# Patient Record
Sex: MALE | Race: WHITE | NOT HISPANIC OR LATINO | Employment: OTHER | URBAN - METROPOLITAN AREA
[De-identification: names, ages, dates, MRNs, and addresses within clinical notes are randomized per-mention and may not be internally consistent; named-entity substitution may affect disease eponyms.]

---

## 2020-07-29 ENCOUNTER — PREP FOR PROCEDURE (OUTPATIENT)
Dept: OTOLARYNGOLOGY | Facility: CLINIC | Age: 70
End: 2020-07-29

## 2020-07-29 ENCOUNTER — PATIENT OUTREACH (OUTPATIENT)
Dept: OTOLARYNGOLOGY | Facility: CLINIC | Age: 70
End: 2020-07-29

## 2020-07-29 DIAGNOSIS — C32.9 LARYNGEAL CANCER (H): Primary | ICD-10-CM

## 2020-07-29 DIAGNOSIS — Z11.59 ENCOUNTER FOR SCREENING FOR OTHER VIRAL DISEASES: Primary | ICD-10-CM

## 2020-07-29 NOTE — TELEPHONE ENCOUNTER
Called and spoke with patient regarding new consult scheduled on Friday with Dr. Wang. Advised that Dr. Wang will most likely need to plan for DL biopsy so we would like to have patient complete PAC appointment on Friday as well.     He is now scheduled for the followin. Consult with Marii on Friday at 1:00pm.   2. Covid testing Friday at 1:50pm.   3. PAC consult Friday at 2:45pm.     Also reviewed with patient that we will plan to place him on the OR schedule for Monday and would further evaluate and discuss at his clinic visit on Friday. Patient verbalized understanding of all above information.     Patient  has audible stridor over the phone but indicates that this is the way he has been for a few weeks without worsening. He does not currently feel short of breath. He was advised to contact writer with any new or worsening symptoms. Patient agreeable and has direct line for return.     Patient advised to bring family with him to appointment on Friday.     All information emailed to patient per his request.     Felisha Cronin, RN, BSN

## 2020-07-30 PROBLEM — I48.11 LONGSTANDING PERSISTENT ATRIAL FIBRILLATION (H): Status: ACTIVE | Noted: 2020-07-21

## 2020-07-30 PROBLEM — I50.22 CHRONIC SYSTOLIC CONGESTIVE HEART FAILURE (H): Status: ACTIVE | Noted: 2020-07-30

## 2020-07-30 PROBLEM — I48.20 CHRONIC ATRIAL FIBRILLATION (H): Status: ACTIVE | Noted: 2020-07-30

## 2020-07-30 PROBLEM — R49.0 HOARSENESS, CHRONIC: Status: ACTIVE | Noted: 2020-07-21

## 2020-07-30 PROBLEM — K21.9 CHRONIC GERD: Status: ACTIVE | Noted: 2020-07-21

## 2020-07-30 PROBLEM — I10 ESSENTIAL HYPERTENSION: Status: ACTIVE | Noted: 2020-07-21

## 2020-07-30 RX ORDER — IBUPROFEN 200 MG
400 TABLET ORAL EVERY 6 HOURS PRN
Status: ON HOLD | COMMUNITY
End: 2020-08-12

## 2020-07-30 RX ORDER — LISINOPRIL 10 MG/1
10 TABLET ORAL DAILY
Status: ON HOLD | COMMUNITY
End: 2020-08-31

## 2020-07-30 RX ORDER — METOPROLOL SUCCINATE 50 MG/1
100 TABLET, EXTENDED RELEASE ORAL DAILY
Status: ON HOLD | COMMUNITY
End: 2020-08-12

## 2020-07-30 RX ORDER — ONDANSETRON 4 MG/1
4 TABLET, FILM COATED ORAL EVERY 8 HOURS PRN
COMMUNITY
End: 2022-03-07

## 2020-07-30 RX ORDER — ACETAMINOPHEN 325 MG/1
325-650 TABLET ORAL EVERY 6 HOURS PRN
Status: ON HOLD | COMMUNITY
End: 2020-08-12

## 2020-07-30 RX ORDER — PANTOPRAZOLE SODIUM 40 MG/1
40 TABLET, DELAYED RELEASE ORAL DAILY
COMMUNITY
End: 2020-07-31

## 2020-07-30 NOTE — PROGRESS NOTES
Preoperative Assessment Center Medication History Note    Medication history completed via phone call on July 30, 2020 by this writer. See Epic admission navigator for prior to admission medications. Operating room staff will still need to confirm medications and last dose information on day of surgery.     Medication history interview sources:  patient, care everywhere records    Changes made to PTA medication list (reason)  Added: all medications on list were added by this user as the list was blank prior to me talking with him    Additional medication history information (including reliability of information, actions taken by pharmacist):    -- No recent (within 30 days) course of antibiotics  -- No recent (within 30 days) course of systemic steroids  -- Patient declines being on any other prescription or over-the-counter medications    Prior to Admission medications    Medication Sig Last Dose Taking? Auth Provider   acetaminophen (TYLENOL) 325 MG tablet Take 325-650 mg by mouth every 6 hours as needed for mild pain Taking Yes Unknown, Entered By History   ibuprofen (ADVIL/MOTRIN) 200 MG tablet Take 400 mg by mouth every 6 hours as needed for mild pain Taking Yes Unknown, Entered By History   lisinopril (ZESTRIL) 10 MG tablet Take 10 mg by mouth daily Taking Yes Unknown, Entered By History   metoprolol succinate ER (TOPROL-XL) 50 MG 24 hr tablet Take 100 mg by mouth daily Taking Yes Unknown, Entered By History   ondansetron (ZOFRAN) 4 MG tablet Take 4 mg by mouth every 8 hours as needed for nausea Taking Yes Unknown, Entered By History   pantoprazole (PROTONIX) 40 MG EC tablet Take 40 mg by mouth daily Taking Yes Unknown, Entered By History         Medication history completed by: [facility]

## 2020-07-30 NOTE — TELEPHONE ENCOUNTER
FUTURE VISIT INFORMATION      SURGERY INFORMATION:    Date: 8/3/20    Location: uu or    Surgeon:  Caron Wang MD     Anesthesia Type:  general    Procedure: LARYNGOSCOPY WITH BIOPSY TRACHEOSTOMY     Consult: Ov scheduled for  1:00pm    RECORDS REQUESTED FROM:       Primary Care Provider: Arian Ferris MD- [facility]    Most recent EKG+ Tracin20- [facility]    Most recent ECHO: 16- [facility]

## 2020-07-31 ENCOUNTER — PRE VISIT (OUTPATIENT)
Dept: SURGERY | Facility: CLINIC | Age: 70
End: 2020-07-31

## 2020-07-31 ENCOUNTER — APPOINTMENT (OUTPATIENT)
Dept: LAB | Facility: CLINIC | Age: 70
End: 2020-07-31

## 2020-07-31 ENCOUNTER — OFFICE VISIT (OUTPATIENT)
Dept: OTOLARYNGOLOGY | Facility: CLINIC | Age: 70
End: 2020-07-31

## 2020-07-31 ENCOUNTER — THERAPY VISIT (OUTPATIENT)
Dept: SPEECH THERAPY | Facility: CLINIC | Age: 70
End: 2020-07-31

## 2020-07-31 ENCOUNTER — PRE VISIT (OUTPATIENT)
Dept: OTOLARYNGOLOGY | Facility: CLINIC | Age: 70
End: 2020-07-31

## 2020-07-31 ENCOUNTER — OFFICE VISIT (OUTPATIENT)
Dept: SURGERY | Facility: CLINIC | Age: 70
End: 2020-07-31

## 2020-07-31 ENCOUNTER — ANESTHESIA EVENT (OUTPATIENT)
Dept: SURGERY | Facility: CLINIC | Age: 70
DRG: 012 | End: 2020-07-31
Payer: MEDICARE

## 2020-07-31 VITALS
HEART RATE: 98 BPM | DIASTOLIC BLOOD PRESSURE: 87 MMHG | RESPIRATION RATE: 16 BRPM | TEMPERATURE: 98.2 F | WEIGHT: 188 LBS | HEIGHT: 69 IN | OXYGEN SATURATION: 97 % | SYSTOLIC BLOOD PRESSURE: 136 MMHG | BODY MASS INDEX: 27.85 KG/M2

## 2020-07-31 VITALS
RESPIRATION RATE: 20 BRPM | DIASTOLIC BLOOD PRESSURE: 93 MMHG | OXYGEN SATURATION: 95 % | WEIGHT: 190 LBS | HEART RATE: 75 BPM | SYSTOLIC BLOOD PRESSURE: 138 MMHG | TEMPERATURE: 98.4 F | BODY MASS INDEX: 27.2 KG/M2 | HEIGHT: 70 IN

## 2020-07-31 DIAGNOSIS — Z01.818 PREOP EXAMINATION: Primary | ICD-10-CM

## 2020-07-31 DIAGNOSIS — C32.9 LARYNGEAL CANCER (H): Primary | ICD-10-CM

## 2020-07-31 DIAGNOSIS — C32.9 LARYNGEAL CANCER (H): ICD-10-CM

## 2020-07-31 DIAGNOSIS — R13.12 OROPHARYNGEAL DYSPHAGIA: ICD-10-CM

## 2020-07-31 DIAGNOSIS — Z11.59 ENCOUNTER FOR SCREENING FOR OTHER VIRAL DISEASES: ICD-10-CM

## 2020-07-31 DIAGNOSIS — Z01.818 PRE-OP EVALUATION: Primary | ICD-10-CM

## 2020-07-31 LAB
LABORATORY COMMENT REPORT: NORMAL
NT-PROBNP SERPL-MCNC: 3835 PG/ML (ref 0–125)
SARS-COV-2 RNA SPEC QL NAA+PROBE: NEGATIVE
SARS-COV-2 RNA SPEC QL NAA+PROBE: NORMAL
SPECIMEN SOURCE: NORMAL
SPECIMEN SOURCE: NORMAL

## 2020-07-31 ASSESSMENT — MIFFLIN-ST. JEOR
SCORE: 1630.14
SCORE: 1613.14

## 2020-07-31 ASSESSMENT — LIFESTYLE VARIABLES: TOBACCO_USE: 1

## 2020-07-31 ASSESSMENT — PAIN SCALES - GENERAL
PAINLEVEL: NO PAIN (0)
PAINLEVEL: NO PAIN (0)

## 2020-07-31 NOTE — PATIENT INSTRUCTIONS
Preparing for Your Surgery      Name:  Evin Sterling   MRN:  [id number]   :  1950   Today's Date:  2020       Arriving for surgery:  Surgery date:  8/3/20  Arrival time:  11:00 am    Restrictions due to COVID 19:  Patients are allowed one visitor in the pre-op period  All visitors must wear a mask  No visitors under 18  No ill visitors   parking is not available     Please come to:       [facility]   [address] [place]  [address]       -    Please proceed to the Surgery Lounge on the 3rd floor. [phone]?     - ?If you are in need of directions, wheelchair or escort please stop at the Information Desk in the lobby.  Inform the information person that you are here for surgery; a wheelchair and escort will be provided to the Surgery Lounge .?     What can I eat or drink?  -  You may eat and drink normally for up to 8 hours before your surgery. (Until 5:00 am)  -  You may have clear liquids until 2 hours before surgery. (Until 11:00 am)    Examples of clear liquids:  Water  Clear broth  Juices (apple, white grape, white cranberry  and cider) without pulp  Noncarbonated, powder based beverages  (lemonade and Justo-Aid)  Sodas (Sprite, 7-Up, ginger ale and seltzer)  Coffee or tea (without milk or cream)  Gatorade    -  No Alcohol for at least 24 hours before surgery     Which medicines can I take?    Hold Aspirin for 7 days before surgery.   Hold Multivitamins for 7 days before surgery.  Hold Supplements for 7 days before surgery.  Hold Ibuprofen (Advil, Motrin) for 1 day before surgery--unless otherwise directed by surgeon.  Hold Naproxen (Aleve) for 4 days before surgery.    -  DO NOT take these medications the day of surgery:  Lisinopril (Zestril)    -  PLEASE TAKE these medications the day of surgery:  Acetaminophen (Tylenol)  Metoprolol (Toprol) - be sure to take this the morning of surgery before you come in  Ondansetron (Zofran)    How do I  prepare myself?  - Please take 2 showers before surgery using Scrubcare or Hibiclens soap.    Use this soap only from the neck to your toes.     Leave the soap on your skin for one minute--then rinse thoroughly.      You may use your own shampoo and conditioner; no other hair products.   - Please remove all jewelry and body piercings.  - No lotions, deodorants or fragrance.  - Bring your ID and insurance card.    - All patients are required to have a Covid-19 test within 4 days of surgery/procedure.      -Patients will be contacted by the [facility] scheduling team within 1 week of surgery to make an appointment.      - Patients may call the Scheduling team at [phone] if they have not been scheduled within 4 days of  surgery.      ALL PATIENTS GOING HOME THE SAME DAY OF SURGERY ARE REQUIRED TO HAVE A RESPONSIBLE ADULT TO DRIVE AND BE IN ATTENDANCE WITH THEM FOR 24 HOURS FOLLOWING SURGERY     Questions or Concerns:    - For any questions regarding the day of surgery or your hospital stay, please contact the Pre Admission Nursing Office at [phone].       - If you have health changes between today and your surgery please call your surgeon.       For questions after surgery please call your surgeons office.

## 2020-07-31 NOTE — H&P
Pre-Operative H & P     CC:  Preoperative exam to assess for increased cardiopulmonary risk while undergoing surgery and anesthesia.    Date of Encounter: 7/31/2020  Primary Care Physician:  No primary care provider on file.  associated diagnosis: laryngeal mass    HPI  Evin Sterling is a 70 year old male who presents for pre-operative H & P in preparation for  LARYNGOSCOPY WITH BIOPSY, TRACHEOSTOMY with Dr. Wang on 8/3/20 at [facility]. 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 Sterling has a history of progressive hoarseness and poor vocal quality for many years. He has been using omeprazole as he attributed symptoms to GERD. His voice has continued to worsen.  He was seen by ENT at an outside facility. He was diagnosed with a large posterior glottis neoplasm and referred to Dr. Wang for further evaluation.  Above procedure is planned.     History is obtained from the patient and chart review.      Past Medical History  Past Medical History:   Diagnosis Date     CHF (congestive heart failure) (H)      GERD (gastroesophageal reflux disease)      Hypertension        Past Surgical History  Past Surgical History:   Procedure Laterality Date     HERNIA REPAIR, INGUINAL RT/LT       HERNIA REPAIR, UMBILICAL         Hx of Blood transfusions/reactions: denies     Hx of abnormal bleeding or anti-platelet use: deneis    Menstrual history: No LMP for male patient.    Steroid use in the last year: denies    Personal or FH with difficulty with Anesthesia:  denies    Prior to Admission Medications  Current Outpatient Medications   Medication Sig Dispense Refill     acetaminophen (TYLENOL) 325 MG tablet Take 325-650 mg by mouth every 6 hours as needed for mild pain       ibuprofen (ADVIL/MOTRIN) 200 MG tablet Take 400 mg by mouth every 6 hours as needed for mild pain        lisinopril (ZESTRIL) 10 MG tablet Take 10 mg by mouth daily       metoprolol succinate ER (TOPROL-XL) 50 MG 24 hr tablet Take 100 mg by mouth daily       ondansetron (ZOFRAN) 4 MG tablet Take 4 mg by mouth every 8 hours as needed for nausea         Allergies  No Known Allergies    Social History  Social History     Socioeconomic History     Marital status:      Spouse name: Not on file     Number of children: Not on file     Years of education: Not on file     Highest education level: Not on file   Occupational History     Not on file   Social Needs     Financial resource strain: Not on file     Food insecurity     Worry: Not on file     Inability: Not on file     Transportation needs     Medical: Not on file     Non-medical: Not on file   Tobacco Use     Smoking status: Former Smoker     Packs/day: 1.00     Years: 20.00     Pack years: 20.00     Last attempt to quit:      Years since quittin.5     Smokeless tobacco: Former User   Substance and Sexual Activity     Alcohol use: Not Currently     Drug use: Not Currently     Sexual activity: Not on file   Lifestyle     Physical activity     Days per week: Not on file     Minutes per session: Not on file     Stress: Not on file   Relationships     Social connections     Talks on phone: Not on file     Gets together: Not on file     Attends Mu-ism service: Not on file     Active member of club or organization: Not on file     Attends meetings of clubs or organizations: Not on file     Relationship status: Not on file     Intimate partner violence     Fear of current or ex partner: Not on file     Emotionally abused: Not on file     Physically abused: Not on file     Forced sexual activity: Not on file   Other Topics Concern     Not on file   Social History Narrative     Not on file       Family History  Family History   Problem Relation Age of Onset     Anesthesia Reaction No family hx of      Deep Vein Thrombosis (DVT) No family hx of      ROS/MED  "HX    ENT/Pulmonary:     (+)tobacco use (quit 2002), Past use , . .    Neurologic:  - neg neurologic ROS     Cardiovascular: Comment: H/o afib s/p ablation 2012. Most recent EKG showed a flutter. On rate control and will consider restarting anticoagulation after surgery per PCP.     (+) hypertension----. : . CHF . . :. . Previous cardiac testing Echodate:2016results:1. Mild concentric left ventricular hypertrophy.  2. Normal left ventricular systolic function with a visually estimated   ejection fraction of 55%.  3. Mild dilation of the right ventricle with normal function.  date: results:ECG reviewed date:4/2/20 results:Probable Atrial Flutter  Left axis deviation  Septal infarct , age undetermined  Inferior infarct , age undetermined  Abnormal ECG  No previous ECGs available   date: results:          METS/Exercise Tolerance: Comment: Walked for 40 minutes on Wednesday.  Some difficulty due to obstruction in throat. 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   Temp: 98.4  F (36.9  C) Temp src: Oral BP: (!) 138/93 Pulse: 75   Resp: 20 SpO2: 95 %         190 lbs 0 oz  5' 9.5\"[pt reported[   Body mass index is 27.66 kg/m .       Physical Exam  Constitutional: Awake, alert, cooperative, no apparent distress, and appears stated age.  Eyes: Pupils equal, round and reactive to light, extra ocular muscles intact, sclera clear, conjunctiva normal.  HENT: Normocephalic, oral pharynx with " moist mucus membranes, good dentition.   Respiratory: transmitted upper airway sounds. Stridor.   Cardiovascular: Regular rate and rhythm, normal S1 and S2, and no murmur noted.  Carotids no bruits. No edema. Palpable pulses to radial arteries.   GI: Normal bowel sounds, soft, non-distended, non-tender  Genitourinary:  deferred  Skin: Warm and dry.    Musculoskeletal: Full ROM of neck. There is no redness, warmth, or swelling of the exposed joints. Gross motor strength is normal.    Neurologic: Awake, alert, oriented to name, place and time. Cranial nerves II-XII are grossly intact. Gait is normal.   Neuropsychiatric: Calm, cooperative. Normal affect.     Labs: (personally reviewed)  Component      Latest Ref Rng & Units 7/31/2020   N-Terminal Pro Bnp      0 - 125 pg/mL 3,835 (H)     WHITE BLOOD COUNT          4.5 - 11.0 thou/cu mm  7.2     RED BLOOD COUNT            4.30 - 5.90 mil/cu mm  4.39     HEMOGLOBIN                 13.5 - 17.5 g/dL  14.2     HEMATOCRIT                 37.0 - 53.0 %  42.1     MCV                        80 - 100 fL  96     MCH                        26.0 - 34.0 pg  32.3     MCHC                       32.0 - 36.0 g/dL  33.6     RDW                        11.5 - 15.5 %  14.5     PLATELET COUNT             140 - 440 thou/cu mm  203     MPV                        6.5 - 11.0 fL  7.2     NRBC                        %  0.0     NEUTROPHILS                42.0 - 72.0 %  78.0High       LYMPHOCYTES                20.0 - 44.0 %  12.5Low       MONOCYTES                  <12.0 %  8.0     EOSINOPHILS                <8.0 %  1.3     BASOPHILS                  <3.0 %  0.2     ABSOLUTE NEUTROPHILS       1.7 - 7.0 thou/cu mm  5.6     ABSOLUTE LYMPHOCYTES       0.9 - 2.9 thou/cu mm  0.9     ABSOLUTE MONOCYTES         <0.9 thou/cu mm  0.6     ABSOLUTE EOSINOPHILS       <0.5 thou/cu mm  0.1     ABSOLUTE BASOPHILS         <0.3 thou/cu mm  0.0      SODIUM  136 - 145 mmol/L  142     POTASSIUM  3.5 - 5.1 mmol/L  4.3      CHLORIDE  98 - 110 mmol/L  102     CO2,TOTAL  22 - 29 MEq/L  30High       ANION GAP  2 - 12  10     GLUCOSE  70 - 110 mg/dL  87     CALCIUM  8.4 - 10.2 mg/dL  9.1     BUN  7 - 25 mg/dL  13     CREATININE  0.50 - 1.20 mg/dL  0.82     BUN/CREAT RATIO  6 - 22  16     GFR if African American  >60 ml/min/1.73m2  >60     GFR if not African American  >60 ml/min/1.73m2  >60        EKG 4/2/20  Probable Atrial Flutter  Left axis deviation  Septal infarct , age undetermined  Inferior infarct , age undetermined  Abnormal ECG  No previous ECGs available    ECHO 9/2016  1. Mild concentric left ventricular hypertrophy.  2. Normal left ventricular systolic function with a visually estimated   ejection fraction of 55%.  3. Mild dilation of the right ventricle with normal function.    Outside records reviewed from: care everywhere    ASSESSMENT and PLAN  Evin Sterling is a 70 year old male scheduled for LARYNGOSCOPY WITH BIOPSY, TRACHEOSTOMY on 8/3/20 by Dr. 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  Cardiac:  Functional status- METS >4- reports he went for a 40 minute walk Wednesday- some difficulty breathing associated with airway mass.  Denies CP or edema. H/o CHF. H/o atrial fibrillation s/p ablation in 2012 per patient. EKG 4/2/20 showed probable aflutter. He is rate controlled on metoprolol and discussed waiting to restart anticoagulation until after surgery with his PCP. Hypertension using lisinopril. Prior cardiac testing as above. BNP elevated at 3,800 today.  ECHO scheduled Monday morning at 8:30 prior to surgery. intermediate risk surgery with 0.9% (RCRI #) risk of major adverse cardiac event.   2.  Pulm:  Airway exam completed by Dr. Wang 7/31/20. Large laryngeal mass.  Stridor on exam. Above  procedure is planned.  COVID testing per surgery team.   3.  GI:  Risk of PONV score = 2.  If > 2, anti-emetic intervention recommended. GERD- no longer using Protonix.   4.  Psych: h/o ETOH abuse in remission.     VTE risk: 3%    Patient is optimized and is acceptable candidate for the proposed procedure.  Will obtain ECHO prior to procedure on Monday and order postoperative troponin x3 due to elevated BNP.  Discussed with Dr. Huang and Dr. Wang- will proceed with procedure on Monday due to potential airway compromise.       Addendum: ECHO 8/3/20 showed:  Interpretation Summary  Severely (EF <30%) reduced left ventricular function is present. Severe left  ventricular dilation is present. Severe diffuse hypokinesis is present.  Diastolic function not assessed due to atrial fibrillation.  Right ventricular function, chamber size, wall motion, and thickness are  normal.  No significant valvular abnormalities.  Pulmonary artery systolic pressure cannot be assessed.  Dilation of the inferior vena cava is present with abnormal respiratory  variation in diameter.  No pericaridal effusion.  There is no prior study for direct comparison.    Recommend cardiology consult while in the hospital and needs cardiac follow up. Dr. Trejo is alerting anesthesia team of these results.     Jacy Hay PA-C  Preoperative Assessment Center  [facility]  Clinic and Surgery Center  Phone: [phone]  Fax: [phone]

## 2020-07-31 NOTE — LETTER
7/31/2020       RE: Evin Sterling  [address] [facility] [address]     Dear Colleague,    Thank you for referring your patient, Evin Sterling, to the [facility] EAR [facility]ROAT at [facility]. Please see a copy of my visit note below.    Dear Dr. Blankenship:    I had the pleasure of meeting Evin Sterling in consultation today at the [facility] at your request.     History of Present Illness:   Evin Sterling is a 70 year old man with a likely laryngeal carcinoma. The patient has about a 4 year history of progressive hoarseness. He was potentially seen by an ENT locally in [facility] and diagnosed with reflux. He had insurance issues and was not able to afford the medication. He had progressive voice changes. He saw Dr Blankenship on 7/28/2020 for consultation at which time he had biphasic stridor and on scope exam was noted to have a large exophytic mass of the glottis without mobility of either cord and a glottic opening of about 5 mm.     He says today that he thinks the voice issues have been ongoing for about 6 months.  He does not think that they have substantially changed recently.  He has noticed some difficulty with his breathing in the last few weeks.  He says that he has no shortness of breath at rest but has difficulty with activities.  He does say that he can walk about 40 minutes.  His daughter notes that he is unable to lay flat.  He has some sore throat and intermittent bilateral ear pain.  He denies any specific dysphasia but his daughter is concerned about his eating/swallowing.  He does say that he coughs on carbonated beverages as well as certain liquids.  He has lost about 50 pounds in the last 6 months which he says is related to changes in his diet.        Past medical history: Congestive heart failure (last echo 4 years ago), history of atrial fibrillation    Past surgical history: Hernia repair    Social history: Previous alcoholic. Former  smoker, quit 12 years ago, previously 1/2 ppd.  Some chewing tobacco use, quit about 30 years ago.  Lives with his son-in-law's father.  Previously worked in construction.    Family history: Negative for laryngeal cancer    MEDICATIONS:     Current Outpatient Medications   Medication Sig Dispense Refill     acetaminophen (TYLENOL) 325 MG tablet Take 325-650 mg by mouth every 6 hours as needed for mild pain       ibuprofen (ADVIL/MOTRIN) 200 MG tablet Take 400 mg by mouth every 6 hours as needed for mild pain       lisinopril (ZESTRIL) 10 MG tablet Take 10 mg by mouth daily       metoprolol succinate ER (TOPROL-XL) 50 MG 24 hr tablet Take 100 mg by mouth daily       ondansetron (ZOFRAN) 4 MG tablet Take 4 mg by mouth every 8 hours as needed for nausea         ALLERGIES:  No Known Allergies    HABITS/SOCIAL HISTORY:   Previous alcoholic. Former smoker, quit 12 years ago, previously 1/2 ppd.  Some chewing tobacco use, quit about 30 years ago.    Lives with his son-in-law's father.    Previously worked in construction.    Social History     Socioeconomic History     Marital status:      Spouse name: Not on file     Number of children: Not on file     Years of education: Not on file     Highest education level: Not on file   Occupational History     Not on file   Social Needs     Financial resource strain: Not on file     Food insecurity     Worry: Not on file     Inability: Not on file     Transportation needs     Medical: Not on file     Non-medical: Not on file   Tobacco Use     Smoking status: Former Smoker     Packs/day: 1.00     Years: 20.00     Pack years: 20.00     Last attempt to quit:      Years since quittin.5     Smokeless tobacco: Former User   Substance and Sexual Activity     Alcohol use: Not Currently     Drug use: Not Currently     Sexual activity: Not on file   Lifestyle     Physical activity     Days per week: Not on file     Minutes per session: Not on file     Stress: Not on file  "  Relationships     Social connections     Talks on phone: Not on file     Gets together: Not on file     Attends Restorationism service: Not on file     Active member of club or organization: Not on file     Attends meetings of clubs or organizations: Not on file     Relationship status: Not on file     Intimate partner violence     Fear of current or ex partner: Not on file     Emotionally abused: Not on file     Physically abused: Not on file     Forced sexual activity: Not on file   Other Topics Concern     Not on file   Social History Narrative     Not on file       PAST MEDICAL HISTORY:   Past Medical History:   Diagnosis Date     CHF (congestive heart failure) (H)      GERD (gastroesophageal reflux disease)      Hypertension         PAST SURGICAL HISTORY:   Past Surgical History:   Procedure Laterality Date     HERNIA REPAIR, INGUINAL RT/LT       HERNIA REPAIR, UMBILICAL         FAMILY HISTORY:    Family History   Problem Relation Age of Onset     Anesthesia Reaction No family hx of      Deep Vein Thrombosis (DVT) No family hx of        REVIEW OF SYSTEMS:  12 point ROS was negative other than the symptoms noted above in the HPI.  Patient Supplied Answers to Review of Systems   ENT ROS 7/31/2020   Constitutional Problems with sleep   Neurology Headache   Ears, Nose, Throat Sore throat, Trouble swallowing, Hoarseness   Cardiopulmonary Cough, Breathing problems   Gastrointestinal/Genitourinary Heartburn/indigestion         PHYSICAL EXAMINATION:   /87   Pulse 98   Temp 98.2  F (36.8  C)   Resp 16   Ht 1.753 m (5' 9\")   Wt 85.3 kg (188 lb)   SpO2 97%   BMI 27.76 kg/m     Appearance:   normal; NAD, age-appropriate appearance, well-developed, normal habitus   Communication:   Communicates verbally, hoarse voice   Head/Face:   inspection -  Normal; no scars or visible lesions   Salivary glands -  Normal size, no tenderness, swelling, or palpable masses   Facial strength -  Normal and symmetric bilateral "   Skin:  normal, no rash   Ears:  auricle (AD) -  normal  EAC (AD) -  normal  TM (AD) -  Normal, no effusion  auricle (AS) -  normal  EAC (AS) -  normal  TM (AS) -  Normal, no effusion  Normal clinical speech reception   Nose:  Ext. inspection -  Normal  Internal Inspection -  Normal mucosa, septum, and turbinates   Nasopharynx:  Normal mucosa, no masses   Oral Cavity:  lips -  Normal mucosa, oral competence, and stoma size  Has a few remaining teeth in poor repair, healthy gingival mucosa  No mucosal lesions or masses along the hard palate, buccal mucosa, floor of mouth  Tongue protrudes midline, no palpable masses or mucosal lesions, strong gag reflex   Oropharynx:  mucosa -  Normal, no lesions  soft palate -  Normal, no lesions, no asymmetry, normal elevation  tonsils -  Normal, no exudates, no abnormal lesions, symmetric  Strong gag reflex limiting palpation of the oropharynx  Base of tongue exam is normal and the vallecula is clear on scope exam   Hypopharynx:  Normal pyriform sinus and pharyngeal wall mucosa   No pooled secretions    Larynx:  Epiglottis is normal on the lingual surface and the majority of the laryngeal surface.  There is an ulcerative lesion near the petiole of the epiglottis that then extends down to involve the false cords with some pooled secretions.  There is an exophytic lesion along the bilateral true cords that significantly narrows them.  The posterior glottis has about a 5 mm opening.  The cords are immobile bilaterally.   Neck: No visible mass or asymmetry   Normal palpation, no tenderness, no tracheal deviation  Larynx appears mobile relative to the spine  Normal range of motion   Lymphatic:  no abnormal nodes   Cardiovascular: warm, pink, well-perfused extremities without swelling, tenderness, or edema   Respiratory:  Some abdominal effort with breathing, stridor present with both inspiration and expiration    Neuro/Psych.:  mood/affect -  normal  mental status -  normal          PROCEDURES:   Flexible fiberoptic laryngoscopy: Scope exam was indicated due to laryngeal cancer. Verbal consent was obtained. The nasal cavity was prepped with an aerosolized solution of topical anesthetic and vasoconstrictive agent. The scope was passed through the anterior nasal cavity and advanced. Inspection of the nasopharynx revealed no gross abnormality. The base of tongue and vallecula are normal.  The lingual surface of the epiglottis is normal.  The laryngeal surface of the epiglottis is largely normal with exception that there is this ulcerative area near the petiole that then extends to involve both false cords.  There is an ulcerative mass involving the false cords and true cords bilaterally.  This significantly narrows the airway so it is only a few millimeters in size posteriorly near the interarytenoid space.  It appears that both cords are immobile.  The piriform sinuses are clear and uninvolved.  Procedure tolerated well with no immediate complications noted.            RESULTS REVIEWED:   I reviewed the referral records from Dr. 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  He comes in today without a tissue diagnosis.  He has audible stridor on exam and does have some mildly increased work of breathing.  I discussed with the patient and his daughter that we need to start with getting a tissue diagnosis which would need to be done with a laryngoscopy in the operating room.  His airway is significantly narrowed by the tumor and he would have difficulty with both intubation and extubation.  His airway cannot tolerate any swelling.  I discussed with him that my recommendation would be to proceed with a tracheostomy.  I anticipate performing this awake prior to doing a laryngoscopy as I am concerned about intubating him.    We discussed that he will need to be admitted to the hospital  postoperatively after the trach.  We will plan on placing an NG tube in the operating room in case he is unable to take his medications or a diet.  We discussed that the tracheostomy can impact his swallowing and is speaking.  I am hoping he will be able to do some communication verbally once we change his trach to a cuffless Shiley.  However I did warn him that he may have to use communication.  He says he is able to text and is comfortable with this plan.      We would plan on completing a staging work-up while he is in the hospital.  Ideally this will be done with a PET scan.  We did discuss with him that this will be important with assessing treatment recommendations.    Dr. Gonzales and I briefly discussed treatment options with him.  I explained that in my hands treatment would require a total laryngectomy.  The patient states very clearly that he ideally would not like to have a laryngectomy but he is amenable to other treatments based on our recommendations.  Dr. Gonzales spoke with the patient about radiation in some detail without going into further information about the side effects but did discuss the time course with the patient.  He also spoke with him about the role of concurrent chemotherapy or even upfront chemotherapy followed by radiation.  We did explain to him that he would have to have the tracheostomy in place with any sort of radiation due to concerns for airway swelling.    His daughter had questions about the safety of undergoing surgery with his congestive heart failure.  I explained to them that we do not really have a choice with proceeding with surgery given the significant narrowing of his airway.  I am concerned that we need to do this soon as possible and have scheduled him for surgery on Monday next week.  He is seeing the anesthesia preoperative clinic today for clearance.    Both myself and Dr. Gonzales discussed with him the importance of the role of the speech therapy team in his  treatment.  We did have him meet with speech today to assess him for clinical swallow as I am concerned he is aspirating.  He will likely need to be seen by the speech pathology team while in the hospital and potentially need a video swallow study after the trach is in place.    Once we get a tissue diagnosis and proceed with his work-up we will plan on reviewing his case at tumor conference and move forward with the treatment plan.     Thank you very much for the opportunity to participate in the care of your patient.      Caron Wang MD, M.D.  Otolaryngology- Head & Neck Surgery      This note was dictated with voice recognition software and then edited. Please excuse any unintentional errors.         CC:  Kyaw Blankenship MD  [address] [address]      Vineet Gonzales MD  Department of Radiation Oncology  [facility]

## 2020-07-31 NOTE — PROGRESS NOTES
Department of Radiation Oncology  [facility]  [address] [place] [address]  [phone]       Consultation Note    Name: Evin Sterling MRN: [id number]   : 1950   Date of Service: 2020  Referring: Dr. Kyaw Blankenship / head and neck surgery     Reason for consultation: Suspected cT3 N0 MX laryngeal carcinoma    History of Present Illness   Mr. Sterling is a 70 year old male who is referred to head and neck multidisciplinary clinic today by Dr. Kyaw Blankenship for evaluation of a suspected locally advanced squamous cell carcinoma. Briefly, Mr. Sterling presented to Dr. Blankenship on 2020 with a 4-year history of progressive hoarseness. He had been previously seen by an outside ENT at the time of his symptom onset and was diagnosed with GERD and started on omeprazole. He subsequently lost his insurance and did not have regular medical follow-up in the interim. On examination with Dr. Blankenship, a large exophytic posterior glottic mass with a 5 mm airway was appreciated and, given the concern for the local advanced tumor and his tenuous airway status, Mr. Sterling was referred to the [facility] for further management.    In clinic today, Mr. Sterling reports that his hoarseness has been present and fairly severe for the past 6 months. He has additionally noted more difficulty breathing over the past several weeks to a month and his daughter who accompanies him to clinic today reports that he is sleeping in a recliner as he is unable to lay flat without becoming dyspneic. His p.o. intake has significantly decreased over the past several months and he reports an approximately 50 pound weight loss over the past half year although does state that some of this was intentional and related to a planned diet. On further examination, he admits to frequent coughing/choking with some liquids and carbonated beverages.    Past Medical History:    CHF    Substance  abuse    Hypertension    Atrial fibrillation    Past Surgical History:    Hernia repair    Vasectomy    Cardiac ablation    Chemotherapy History:  None    Radiation History:  None    Pregnant: Not Applicable  Implanted Cardiac Devices: No    Medications:    Atenolol    Lisinopril    Metoprolol    Metoprolol    Allergies:    NKDA    Social History:  Tobacco: Former smoker. Quit approximately 12 years ago. Previously smoked 0.5 packs/day.  Alcohol: History of EtOH abuse. Currently sober.  Employment: Retired. Previously worked in construction.  Lives in [place], [place]    Family History:    No significant history for head and neck cancer    Review of Systems   A 10-point review of systems was performed. Pertinent findings are noted in the HPI.    Physical Exam   ECOG Status: 2    Vitals:  Weight: 85.3 kg  B/P: 136/87  P: 98  Pain: 0/10    General: Slightly dyspneic 70 year old gentleman seated in an examination chair  HEENT: NC/AT. EOMI. Normal TMs bilaterally. No rhinorrhea or epistaxis. Poor dentition. No visible oral cavity lesions. No palpable oral cavity masses, nodularity or lesions. Palpation of the base of tongue is limited by the patient's gag reflex.  Neck: No cervical adenopathy.  Pulmonary: Audible stridor with inspiration and expiration.   Skin: No cutaneous lesions of the head and neck    I was present for the flexible laryngoscopy is performed by Dr. Wang. Briefly, this examination reveals a normal oropharynx and supraglottic larynx with a large exophytic tumor involving the bilateral true and false vocal folds and obliterates visualization of the normal anatomic landmarks. The mass narrows the airway severely with a glottic opening of a few millimeters in size.     Imaging/Path/Labs   Imaging: Reviewed    Path: Reviewed    Labs: Reviewed    Assessment    Mr. Sterling is an 70 year old male with what appears to be a cT3 N0 M0 laryngeal cancer.    Plan   I reviewed treatment of locally advanced laryngeal cancer  with Mr. Sterling and his daughter at length, specifically focusing on the role of radiation therapy. Presently, he still requires a tissue diagnosis as well as airway stabilization. Dr. Wang is currently making plans to take him to the OR for biopsy and trach placement on an urgent basis given his somewhat tenuous airway status. Following this, we will obtain a PET/CT for staging purposes before ultimately deciding on a plan of care, likely consisting of either definitive chemoradiation therapy versus laryngectomy followed by tumor directed adjuvant treatment. Mr. Sterling and his daughter had several pertinent questions which were answered to their stated satisfaction. I will plan to follow back up with them pending completion of his upcoming staging work-up.    Vineet Gonzales MD/PhD    Dept of Radiation Oncology  [facility]

## 2020-07-31 NOTE — PROGRESS NOTES
Dear Dr. Blankenship:    I had the pleasure of meeting Evin Sterling in consultation today at the [facility] at your request.     History of Present Illness:   Evin Sterling is a 70 year old man with a likely laryngeal carcinoma. The patient has about a 4 year history of progressive hoarseness. He was potentially seen by an ENT locally in [facility] and diagnosed with reflux. He had insurance issues and was not able to afford the medication. He had progressive voice changes. He saw Dr Blankenship on 7/28/2020 for consultation at which time he had biphasic stridor and on scope exam was noted to have a large exophytic mass of the glottis without mobility of either cord and a glottic opening of about 5 mm.     He says today that he thinks the voice issues have been ongoing for about 6 months.  He does not think that they have substantially changed recently.  He has noticed some difficulty with his breathing in the last few weeks.  He says that he has no shortness of breath at rest but has difficulty with activities.  He does say that he can walk about 40 minutes.  His daughter notes that he is unable to lay flat.  He has some sore throat and intermittent bilateral ear pain.  He denies any specific dysphasia but his daughter is concerned about his eating/swallowing.  He does say that he coughs on carbonated beverages as well as certain liquids.  He has lost about 50 pounds in the last 6 months which he says is related to changes in his diet.        Past medical history: Congestive heart failure (last echo 4 years ago), history of atrial fibrillation    Past surgical history: Hernia repair    Social history: Previous alcoholic. Former smoker, quit 12 years ago, previously 1/2 ppd.  Some chewing tobacco use, quit about 30 years ago.  Lives with his son-in-law's father.  Previously worked in construction.    Family history: Negative for laryngeal cancer    MEDICATIONS:     Current Outpatient Medications    Medication Sig Dispense Refill     acetaminophen (TYLENOL) 325 MG tablet Take 325-650 mg by mouth every 6 hours as needed for mild pain       ibuprofen (ADVIL/MOTRIN) 200 MG tablet Take 400 mg by mouth every 6 hours as needed for mild pain       lisinopril (ZESTRIL) 10 MG tablet Take 10 mg by mouth daily       metoprolol succinate ER (TOPROL-XL) 50 MG 24 hr tablet Take 100 mg by mouth daily       ondansetron (ZOFRAN) 4 MG tablet Take 4 mg by mouth every 8 hours as needed for nausea         ALLERGIES:  No Known Allergies    HABITS/SOCIAL HISTORY:   Previous alcoholic. Former smoker, quit 12 years ago, previously 1/2 ppd.  Some chewing tobacco use, quit about 30 years ago.    Lives with his son-in-law's father.    Previously worked in construction.    Social History     Socioeconomic History     Marital status:      Spouse name: Not on file     Number of children: Not on file     Years of education: Not on file     Highest education level: Not on file   Occupational History     Not on file   Social Needs     Financial resource strain: Not on file     Food insecurity     Worry: Not on file     Inability: Not on file     Transportation needs     Medical: Not on file     Non-medical: Not on file   Tobacco Use     Smoking status: Former Smoker     Packs/day: 1.00     Years: 20.00     Pack years: 20.00     Last attempt to quit: 2002     Years since quittin.5     Smokeless tobacco: Former User   Substance and Sexual Activity     Alcohol use: Not Currently     Drug use: Not Currently     Sexual activity: Not on file   Lifestyle     Physical activity     Days per week: Not on file     Minutes per session: Not on file     Stress: Not on file   Relationships     Social connections     Talks on phone: Not on file     Gets together: Not on file     Attends Mosque service: Not on file     Active member of club or organization: Not on file     Attends meetings of clubs or organizations: Not on file     Relationship  "status: Not on file     Intimate partner violence     Fear of current or ex partner: Not on file     Emotionally abused: Not on file     Physically abused: Not on file     Forced sexual activity: Not on file   Other Topics Concern     Not on file   Social History Narrative     Not on file       PAST MEDICAL HISTORY:   Past Medical History:   Diagnosis Date     CHF (congestive heart failure) (H)      GERD (gastroesophageal reflux disease)      Hypertension         PAST SURGICAL HISTORY:   Past Surgical History:   Procedure Laterality Date     HERNIA REPAIR, INGUINAL RT/LT       HERNIA REPAIR, UMBILICAL         FAMILY HISTORY:    Family History   Problem Relation Age of Onset     Anesthesia Reaction No family hx of      Deep Vein Thrombosis (DVT) No family hx of        REVIEW OF SYSTEMS:  12 point ROS was negative other than the symptoms noted above in the HPI.  Patient Supplied Answers to Review of Systems  [facility] ROS 7/31/2020   Constitutional Problems with sleep   Neurology Headache   Ears, Nose, Throat Sore throat, Trouble swallowing, Hoarseness   Cardiopulmonary Cough, Breathing problems   Gastrointestinal/Genitourinary Heartburn/indigestion         PHYSICAL EXAMINATION:   /87   Pulse 98   Temp 98.2  F (36.8  C)   Resp 16   Ht 1.753 m (5' 9\")   Wt 85.3 kg (188 lb)   SpO2 97%   BMI 27.76 kg/m     Appearance:   normal; NAD, age-appropriate appearance, well-developed, normal habitus   Communication:   Communicates verbally, hoarse voice   Head/Face:   inspection -  Normal; no scars or visible lesions   Salivary glands -  Normal size, no tenderness, swelling, or palpable masses   Facial strength -  Normal and symmetric bilateral   Skin:  normal, no rash   Ears:  auricle (AD) -  normal  EAC (AD) -  normal  TM (AD) -  Normal, no effusion  auricle (AS) -  normal  EAC (AS) -  normal  TM (AS) -  Normal, no effusion  Normal clinical speech reception   Nose:  Ext. inspection -  Normal  Internal Inspection -  " Normal mucosa, septum, and turbinates   Nasopharynx:  Normal mucosa, no masses   Oral Cavity:  lips -  Normal mucosa, oral competence, and stoma size  Has a few remaining teeth in poor repair, healthy gingival mucosa  No mucosal lesions or masses along the hard palate, buccal mucosa, floor of mouth  Tongue protrudes midline, no palpable masses or mucosal lesions, strong gag reflex   Oropharynx:  mucosa -  Normal, no lesions  soft palate -  Normal, no lesions, no asymmetry, normal elevation  tonsils -  Normal, no exudates, no abnormal lesions, symmetric  Strong gag reflex limiting palpation of the oropharynx  Base of tongue exam is normal and the vallecula is clear on scope exam   Hypopharynx:  Normal pyriform sinus and pharyngeal wall mucosa   No pooled secretions    Larynx:  Epiglottis is normal on the lingual surface and the majority of the laryngeal surface.  There is an ulcerative lesion near the petiole of the epiglottis that then extends down to involve the false cords with some pooled secretions.  There is an exophytic lesion along the bilateral true cords that significantly narrows them.  The posterior glottis has about a 5 mm opening.  The cords are immobile bilaterally.   Neck: No visible mass or asymmetry   Normal palpation, no tenderness, no tracheal deviation  Larynx appears mobile relative to the spine  Normal range of motion   Lymphatic:  no abnormal nodes   Cardiovascular: warm, pink, well-perfused extremities without swelling, tenderness, or edema   Respiratory:  Some abdominal effort with breathing, stridor present with both inspiration and expiration    Neuro/Psych.:  mood/affect -  normal  mental status -  normal         PROCEDURES:   Flexible fiberoptic laryngoscopy: Scope exam was indicated due to laryngeal cancer. Verbal consent was obtained. The nasal cavity was prepped with an aerosolized solution of topical anesthetic and vasoconstrictive agent. The scope was passed through the anterior  nasal cavity and advanced. Inspection of the nasopharynx revealed no gross abnormality. The base of tongue and vallecula are normal.  The lingual surface of the epiglottis is normal.  The laryngeal surface of the epiglottis is largely normal with exception that there is this ulcerative area near the petiole that then extends to involve both false cords.  There is an ulcerative mass involving the false cords and true cords bilaterally.  This significantly narrows the airway so it is only a few millimeters in size posteriorly near the interarytenoid space.  It appears that both cords are immobile.  The piriform sinuses are clear and uninvolved.  Procedure tolerated well with no immediate complications noted.            RESULTS REVIEWED:   I reviewed the referral records from Dr. 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  He comes in today without a tissue diagnosis.  He has audible stridor on exam and does have some mildly increased work of breathing.  I discussed with the patient and his daughter that we need to start with getting a tissue diagnosis which would need to be done with a laryngoscopy in the operating room.  His airway is significantly narrowed by the tumor and he would have difficulty with both intubation and extubation.  His airway cannot tolerate any swelling.  I discussed with him that my recommendation would be to proceed with a tracheostomy.  I anticipate performing this awake prior to doing a laryngoscopy as I am concerned about intubating him.    We discussed that he will need to be admitted to the hospital postoperatively after the trach.  We will plan on placing an NG tube in the operating room in case he is unable to take his medications or a diet.  We discussed that the tracheostomy can impact his swallowing and is speaking.  I am hoping he will be able to do some communication verbally  once we change his trach to a cuffless Shiley.  However I did warn him that he may have to use communication.  He says he is able to text and is comfortable with this plan.      We would plan on completing a staging work-up while he is in the hospital.  Ideally this will be done with a PET scan.  We did discuss with him that this will be important with assessing treatment recommendations.    Dr. Gonzales and I briefly discussed treatment options with him.  I explained that in my hands treatment would require a total laryngectomy.  The patient states very clearly that he ideally would not like to have a laryngectomy but he is amenable to other treatments based on our recommendations.  Dr. Gonzales spoke with the patient about radiation in some detail without going into further information about the side effects but did discuss the time course with the patient.  He also spoke with him about the role of concurrent chemotherapy or even upfront chemotherapy followed by radiation.  We did explain to him that he would have to have the tracheostomy in place with any sort of radiation due to concerns for airway swelling.    His daughter had questions about the safety of undergoing surgery with his congestive heart failure.  I explained to them that we do not really have a choice with proceeding with surgery given the significant narrowing of his airway.  I am concerned that we need to do this soon as possible and have scheduled him for surgery on Monday next week.  He is seeing the anesthesia preoperative clinic today for clearance.    Both myself and Dr. Gonzales discussed with him the importance of the role of the speech therapy team in his treatment.  We did have him meet with speech today to assess him for clinical swallow as I am concerned he is aspirating.  He will likely need to be seen by the speech pathology team while in the hospital and potentially need a video swallow study after the trach is in place.    Once we get a  tissue diagnosis and proceed with his work-up we will plan on reviewing his case at tumor conference and move forward with the treatment plan.     Thank you very much for the opportunity to participate in the care of your patient.      Caron Wang MD, M.D.  Otolaryngology- Head & Neck Surgery      This note was dictated with voice recognition software and then edited. Please excuse any unintentional errors.         CC:  Kyaw Blankenship MD  [address] [address]      Vineet Gonzales MD  Department of Radiation Oncology  [facility]

## 2020-07-31 NOTE — LETTER
2020      RE: Evin Sterling  [address] [facility] [address]          Department of Radiation Oncology  [facility]  [address] [place] [address]  [phone]       Consultation Note    Name: Evin Sterling MRN: [id number]   : 1950   Date of Service: 2020  Referring: Dr. Kyaw Blankenship / head and neck surgery     Reason for consultation: Suspected cT3 N0 MX laryngeal carcinoma    History of Present Illness   Mr. Sterling is a 70 year old male who is referred to head and neck multidisciplinary clinic today by Dr. Kyaw Blankenship for evaluation of a suspected locally advanced squamous cell carcinoma. Briefly, Mr. Sterling presented to Dr. Blankenship on 2020 with a 4-year history of progressive hoarseness. He had been previously seen by an outside ENT at the time of his symptom onset and was diagnosed with GERD and started on omeprazole. He subsequently lost his insurance and did not have regular medical follow-up in the interim. On examination with Dr. Blankenship, a large exophytic posterior glottic mass with a 5 mm airway was appreciated and, given the concern for the local advanced tumor and his tenuous airway status, Mr. Sterling was referred to the [facility] for further management.    In clinic today, Mr. Sterling reports that his hoarseness has been present and fairly severe for the past 6 months. He has additionally noted more difficulty breathing over the past several weeks to a month and his daughter who accompanies him to clinic today reports that he is sleeping in a recliner as he is unable to lay flat without becoming dyspneic. His p.o. intake has significantly decreased over the past several months and he reports an approximately 50 pound weight loss over the past half year although does state that some of this was intentional and related to a planned diet. On further examination, he admits to frequent coughing/choking with some liquids and carbonated  beverages.    Past Medical History:    CHF    Substance abuse    Hypertension    Atrial fibrillation    Past Surgical History:    Hernia repair    Vasectomy    Cardiac ablation    Chemotherapy History:  None    Radiation History:  None    Pregnant: Not Applicable  Implanted Cardiac Devices: No    Medications:    Atenolol    Lisinopril    Metoprolol    Metoprolol    Allergies:    NKDA    Social History:  Tobacco: Former smoker. Quit approximately 12 years ago. Previously smoked 0.5 packs/day.  Alcohol: History of EtOH abuse. Currently sober.  Employment: Retired. Previously worked in construction.  Lives in [place], [place]    Family History:    No significant history for head and neck cancer    Review of Systems   A 10-point review of systems was performed. Pertinent findings are noted in the HPI.    Physical Exam   ECOG Status: 2    Vitals:  Weight: 85.3 kg  B/P: 136/87  P: 98  Pain: 0/10    General: Slightly dyspneic 70 year old gentleman seated in an examination chair  HEENT: NC/AT. EOMI. Normal TMs bilaterally. No rhinorrhea or epistaxis. Poor dentition. No visible oral cavity lesions. No palpable oral cavity masses, nodularity or lesions. Palpation of the base of tongue is limited by the patient's gag reflex.  Neck: No cervical adenopathy.  Pulmonary: Audible stridor with inspiration and expiration.   Skin: No cutaneous lesions of the head and neck    I was present for the flexible laryngoscopy is performed by Dr. Wang. Briefly, this examination reveals a normal oropharynx and supraglottic larynx with a large exophytic tumor involving the bilateral true and false vocal folds and obliterates visualization of the normal anatomic landmarks. The mass narrows the airway severely with a glottic opening of a few millimeters in size.     Imaging/Path/Labs   Imaging: Reviewed    Path: Reviewed    Labs: Reviewed    Assessment    Mr. Sterling is an 70 year old male with what appears to be a cT3 N0 M0 laryngeal cancer.    Plan   I  reviewed treatment of locally advanced laryngeal cancer with Mr. Sterling and his daughter at length, specifically focusing on the role of radiation therapy. Presently, he still requires a tissue diagnosis as well as airway stabilization. Dr. Wang is currently making plans to take him to the OR for biopsy and trach placement on an urgent basis given his somewhat tenuous airway status. Following this, we will obtain a PET/CT for staging purposes before ultimately deciding on a plan of care, likely consisting of either definitive chemoradiation therapy versus laryngectomy followed by tumor directed adjuvant treatment. Mr. Sterling and his daughter had several pertinent questions which were answered to their stated satisfaction. I will plan to follow back up with them pending completion of his upcoming staging work-up.    Vineet Gonzales MD/PhD    Dept of Radiation Oncology  [facility]

## 2020-07-31 NOTE — NURSING NOTE
"Chief Complaint   Patient presents with     Consult     laryngeal cancer      Blood pressure 136/87, pulse 98, temperature 98.2  F (36.8  C), resp. rate 16, height 1.753 m (5' 9\"), weight 85.3 kg (188 lb), SpO2 97 %.    .  Yoseph Yee LPN   "

## 2020-07-31 NOTE — ANESTHESIA PREPROCEDURE EVALUATION
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]   Procedure(s):  LARYNGOSCOPY WITH BIOPSY  TRACHEOSTOMY     LABS:  CBC: No results found for: WBC, HGB, HCT, PLT  BMP: No results found for: NA, POTASSIUM, CHLORIDE, CO2, BUN, CR, GLC  COAGS: No results found for: PTT, INR, FIBR  POC: No results found for: BGM, HCG, HCGS  OTHER: No results found for: PH, LACT, A1C, DONNIE, PHOS, MAG, ALBUMIN, PROTTOTAL, ALT, AST, GGT, ALKPHOS, BILITOTAL, BILIDIRECT, LIPASE, AMYLASE, ERMA, TSH, T4, T3, CRP, SED     Preop Vitals    BP Readings from Last 3 Encounters:   20 136/87    Pulse Readings from Last 3 Encounters:   20 98      Resp Readings from Last 3 Encounters:   20 16    SpO2 Readings from Last 3 Encounters:   20 97%      Temp Readings from Last 1 Encounters:   20 98.2  F (36.8  C)    Ht Readings from Last 1 Encounters:   20 1.753 m (5' 9\")      Wt Readings from Last 1 Encounters:   20 85.3 kg (188 lb)    Estimated body mass index is 27.76 kg/m  as calculated from the following:    Height as of an earlier encounter on 20: 1.753 m (5' 9\").    Weight as of an earlier encounter on 20: 85.3 kg (188 lb).     LDA:        Past Medical History:   Diagnosis Date     CHF (congestive heart failure) (H)      GERD (gastroesophageal reflux disease)      Hypertension       No past surgical history on file.   No Known Allergies     Anesthesia Evaluation     . Pt has had prior anesthetic.     No history of anesthetic complications          ROS/MED HX    ENT/Pulmonary:     (+)tobacco use (quit ), Past use , . .    Neurologic:  - neg neurologic ROS     Cardiovascular: Comment: H/o afib s/p ablation . Most recent EKG showed a flutter. On rate control and will consider restarting anticoagulation after surgery per PCP.     (+) hypertension----. " : . CHF . . :. . Previous cardiac testing Echodate:08/03/20results:Severely (EF <30%) reduced left ventricular function is present. Severe left ventricular dilation is present. Severe diffuse hypokinesis is present. Diastolic function not assessed due to atrial fibrillation. Right ventricular function, chamber size, wall motion, and thickness are  Normal. No significant valvular abnormalities. Pulmonary artery systolic pressure cannot be assessed. Dilation of the inferior vena cava is present with abnormal respiratory  variation in diameter. No pericaridal effusion.date: results:ECG reviewed date:4/2/20 results:Probable Atrial Flutter  Left axis deviation  Septal infarct , age undetermined  Inferior infarct , age undetermined  Abnormal ECG  No previous ECGs available   date: results:          METS/Exercise Tolerance: Comment: Walked for 40 minutes on Wednesday.  Some difficulty due to obstruction in throat. 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 Effort: Increased; Stridor      CV: Rhythm: Regular  Heart: Normal Sounds  Edema: None   Comments: Multiple missing teeth     Dental: Details                Assessment:   ASA SCORE: 3    H&P: History and physical reviewed and following examination; no interval change.   Smoking Status:  Non-Smoker/Unknown    NPO Status: NPO Appropriate     Plan:   Anes. Type:  General (Awake trach)   Pre-Medication: None   Induction:  N/a   Airway: tracheostomy to be placed intra operatively.   Access/Monitoring: PIV   Maintenance: Balanced     Postop Plan:   Postop Pain: Opioids  Postop Sedation/Airway: Not planned  Disposition: Inpatient/Admit     PONV Management:   Adult Risk Factors:, Non-Smoker, Postop Opioids   Prevention: Ondansetron     CONSENT: Direct conversation   Plan and risks discussed with: Patient   Blood Products: Consent Deferred (Minimal Blood Loss)       Comments for Plan/Consent:  Plan to convert to general for biopsies once trach is in place.  No sedation planned until close to completion of initial portion of surgery due to severe narrowing of airway and risk of collapse. Once trach in place              PAC Discussion and Assessment    ASA Classification: 3  Case is suitable for: [place]  Anesthetic techniques and relevant risks discussed: GA  Invasive monitoring and risk discussed:   Types:   Possibility and Risk of blood transfusion discussed:   NPO instructions given:   Additional anesthetic preparation and risks discussed:   Needs early admission to pre-op area:   Other:     PAC Resident/NP Anesthesia Assessment:  Evin Sterling is a 70 year old male scheduled for LARYNGOSCOPY WITH BIOPSY, TRACHEOSTOMY on 8/3/20 by Dr. 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  Cardiac:  Functional status- METS >4- reports he went for a 40 minute walk Wednesday- some difficulty breathing associated with airway mass.  Denies CP or edema. H/o CHF. H/o atrial fibrillation s/p ablation in 2012 per patient. EKG 4/2/20 showed probable aflutter. He is rate controlled on metoprolol and discussed waiting to  restart anticoagulation until after surgery with his PCP. Hypertension using lisinopril. Prior cardiac testing as above. BNP elevated at 3,800 today.  ECHO scheduled Monday morning at 8:30 prior to surgery. intermediate risk surgery with 0.9% (RCRI #) risk of major adverse cardiac event.   2.  Pulm:  Airway exam completed by Dr. Wang 7/31/20. Large laryngeal mass.  Stridor on exam. Above procedure is planned.  COVID testing per surgery team.   3.  GI:  Risk of PONV score = 2.  If > 2, anti-emetic intervention recommended. GERD- no longer using Protonix.   4.  Psych: h/o ETOH abuse in remission.     VTE risk: 3%    Patient is optimized and is acceptable candidate for the proposed procedure.  Will obtain ECHO prior to procedure on Monday and order postoperative troponin x3 due to elevated BNP.  Discussed with Dr. Huang and Dr. Wang- will proceed with procedure on Monday due to potential airway compromise.       Addendum: ECHO 8/3/20 showed:  Interpretation Summary  Severely (EF <30%) reduced left ventricular function is present. Severe left  ventricular dilation is present. Severe diffuse hypokinesis is present.  Diastolic function not assessed due to atrial fibrillation.  Right ventricular function, chamber size, wall motion, and thickness are  normal.  No significant valvular abnormalities.  Pulmonary artery systolic pressure cannot be assessed.  Dilation of the inferior vena cava is present with abnormal respiratory  variation in diameter.  No pericaridal effusion.  There is no prior study for direct comparison.    Recommend cardiology consult while in the hospital and needs cardiac follow up. Dr. Trejo is alerting anesthesia team of these results.        **For further details of assessment, testing, and physical exam please see H and P completed on same date.      Jacy Hay PA-C        Mid-Level Provider/Resident:   Date:   Time:     Attending Anesthesiologist Anesthesia  Assessment:        Anesthesiologist:   Date:   Time:   Pass/Fail:   Disposition:     PAC Pharmacist Assessment:        Pharmacist:   Date:   Time:    ALEXYS Macdonald MD  Staff Anesthesiologist  [phone]

## 2020-08-03 ENCOUNTER — APPOINTMENT (OUTPATIENT)
Dept: GENERAL RADIOLOGY | Facility: CLINIC | Age: 70
DRG: 012 | End: 2020-08-03
Attending: OTOLARYNGOLOGY
Payer: MEDICARE

## 2020-08-03 ENCOUNTER — HOSPITAL ENCOUNTER (INPATIENT)
Facility: CLINIC | Age: 70
LOS: 9 days | Discharge: HOME | DRG: 012 | End: 2020-08-12
Attending: OTOLARYNGOLOGY | Admitting: OTOLARYNGOLOGY
Payer: MEDICARE

## 2020-08-03 ENCOUNTER — ANCILLARY PROCEDURE (OUTPATIENT)
Dept: CARDIOLOGY | Facility: CLINIC | Age: 70
End: 2020-08-03
Attending: PHYSICIAN ASSISTANT

## 2020-08-03 ENCOUNTER — ANESTHESIA (OUTPATIENT)
Dept: SURGERY | Facility: CLINIC | Age: 70
DRG: 012 | End: 2020-08-03
Payer: MEDICARE

## 2020-08-03 DIAGNOSIS — C32.9 LARYNGEAL CANCER (H): ICD-10-CM

## 2020-08-03 DIAGNOSIS — I50.20 HFREF (HEART FAILURE WITH REDUCED EJECTION FRACTION) (H): ICD-10-CM

## 2020-08-03 DIAGNOSIS — I50.22 CHRONIC SYSTOLIC CONGESTIVE HEART FAILURE (H): ICD-10-CM

## 2020-08-03 DIAGNOSIS — I10 ESSENTIAL HYPERTENSION: ICD-10-CM

## 2020-08-03 DIAGNOSIS — R33.9 URINARY RETENTION: Primary | ICD-10-CM

## 2020-08-03 PROBLEM — Z43.0: Status: ACTIVE | Noted: 2020-08-03

## 2020-08-03 LAB
GLUCOSE BLDC GLUCOMTR-MCNC: 83 MG/DL (ref 70–99)
TROPONIN I SERPL-MCNC: 0.02 UG/L (ref 0–0.04)

## 2020-08-03 PROCEDURE — 84484 ASSAY OF TROPONIN QUANT: CPT | Performed by: ANESTHESIOLOGY

## 2020-08-03 PROCEDURE — 25000128 H RX IP 250 OP 636: Performed by: NURSE ANESTHETIST, CERTIFIED REGISTERED

## 2020-08-03 PROCEDURE — 0CBT8ZX EXCISION OF RIGHT VOCAL CORD, VIA NATURAL OR ARTIFICIAL OPENING ENDOSCOPIC, DIAGNOSTIC: ICD-10-PCS | Performed by: OTOLARYNGOLOGY

## 2020-08-03 PROCEDURE — 0DH67UZ INSERTION OF FEEDING DEVICE INTO STOMACH, VIA NATURAL OR ARTIFICIAL OPENING: ICD-10-PCS | Performed by: OTOLARYNGOLOGY

## 2020-08-03 PROCEDURE — 25000132 ZZH RX MED GY IP 250 OP 250 PS 637: Mod: GY | Performed by: STUDENT IN AN ORGANIZED HEALTH CARE EDUCATION/TRAINING PROGRAM

## 2020-08-03 PROCEDURE — 25800030 ZZH RX IP 258 OP 636: Performed by: NURSE ANESTHETIST, CERTIFIED REGISTERED

## 2020-08-03 PROCEDURE — 25000128 H RX IP 250 OP 636: Performed by: OTOLARYNGOLOGY

## 2020-08-03 PROCEDURE — 37000008 ZZH ANESTHESIA TECHNICAL FEE, 1ST 30 MIN: Performed by: OTOLARYNGOLOGY

## 2020-08-03 PROCEDURE — 36000059 ZZH SURGERY LEVEL 3 EA 15 ADDTL MIN UMMC: Performed by: OTOLARYNGOLOGY

## 2020-08-03 PROCEDURE — 88331 PATH CONSLTJ SURG 1 BLK 1SPC: CPT | Performed by: OTOLARYNGOLOGY

## 2020-08-03 PROCEDURE — 25000566 ZZH SEVOFLURANE, EA 15 MIN: Performed by: OTOLARYNGOLOGY

## 2020-08-03 PROCEDURE — 0CBV8ZX EXCISION OF LEFT VOCAL CORD, VIA NATURAL OR ARTIFICIAL OPENING ENDOSCOPIC, DIAGNOSTIC: ICD-10-PCS | Performed by: OTOLARYNGOLOGY

## 2020-08-03 PROCEDURE — 0CBR8ZX EXCISION OF EPIGLOTTIS, VIA NATURAL OR ARTIFICIAL OPENING ENDOSCOPIC, DIAGNOSTIC: ICD-10-PCS | Performed by: OTOLARYNGOLOGY

## 2020-08-03 PROCEDURE — 25000125 ZZHC RX 250: Performed by: NURSE ANESTHETIST, CERTIFIED REGISTERED

## 2020-08-03 PROCEDURE — 36000057 ZZH SURGERY LEVEL 3 1ST 30 MIN - UMMC: Performed by: OTOLARYNGOLOGY

## 2020-08-03 PROCEDURE — 40000986 XR ABDOMEN PORT 1 VW

## 2020-08-03 PROCEDURE — 25000128 H RX IP 250 OP 636: Performed by: ANESTHESIOLOGY

## 2020-08-03 PROCEDURE — 25800030 ZZH RX IP 258 OP 636: Performed by: STUDENT IN AN ORGANIZED HEALTH CARE EDUCATION/TRAINING PROGRAM

## 2020-08-03 PROCEDURE — 27210794 ZZH OR GENERAL SUPPLY STERILE: Performed by: OTOLARYNGOLOGY

## 2020-08-03 PROCEDURE — 40000170 ZZH STATISTIC PRE-PROCEDURE ASSESSMENT II: Performed by: OTOLARYNGOLOGY

## 2020-08-03 PROCEDURE — 37000009 ZZH ANESTHESIA TECHNICAL FEE, EACH ADDTL 15 MIN: Performed by: OTOLARYNGOLOGY

## 2020-08-03 PROCEDURE — 12000001 ZZH R&B MED SURG/OB UMMC

## 2020-08-03 PROCEDURE — 88305 TISSUE EXAM BY PATHOLOGIST: CPT | Performed by: OTOLARYNGOLOGY

## 2020-08-03 PROCEDURE — 71000014 ZZH RECOVERY PHASE 1 LEVEL 2 FIRST HR: Performed by: OTOLARYNGOLOGY

## 2020-08-03 PROCEDURE — 25000125 ZZHC RX 250: Performed by: OTOLARYNGOLOGY

## 2020-08-03 PROCEDURE — 71000015 ZZH RECOVERY PHASE 1 LEVEL 2 EA ADDTL HR: Performed by: OTOLARYNGOLOGY

## 2020-08-03 PROCEDURE — 00000146 ZZHCL STATISTIC GLUCOSE BY METER IP

## 2020-08-03 PROCEDURE — 0B110F4 BYPASS TRACHEA TO CUTANEOUS WITH TRACHEOSTOMY DEVICE, OPEN APPROACH: ICD-10-PCS | Performed by: OTOLARYNGOLOGY

## 2020-08-03 RX ORDER — SODIUM CHLORIDE, SODIUM LACTATE, POTASSIUM CHLORIDE, CALCIUM CHLORIDE 600; 310; 30; 20 MG/100ML; MG/100ML; MG/100ML; MG/100ML
INJECTION, SOLUTION INTRAVENOUS CONTINUOUS
Status: DISCONTINUED | OUTPATIENT
Start: 2020-08-03 | End: 2020-08-03

## 2020-08-03 RX ORDER — ONDANSETRON 2 MG/ML
4 INJECTION INTRAMUSCULAR; INTRAVENOUS EVERY 30 MIN PRN
Status: DISCONTINUED | OUTPATIENT
Start: 2020-08-03 | End: 2020-08-03 | Stop reason: HOSPADM

## 2020-08-03 RX ORDER — NALOXONE HYDROCHLORIDE 0.4 MG/ML
.1-.4 INJECTION, SOLUTION INTRAMUSCULAR; INTRAVENOUS; SUBCUTANEOUS
Status: ACTIVE | OUTPATIENT
Start: 2020-08-03 | End: 2020-08-04

## 2020-08-03 RX ORDER — SODIUM CHLORIDE, SODIUM LACTATE, POTASSIUM CHLORIDE, CALCIUM CHLORIDE 600; 310; 30; 20 MG/100ML; MG/100ML; MG/100ML; MG/100ML
INJECTION, SOLUTION INTRAVENOUS CONTINUOUS
Status: DISCONTINUED | OUTPATIENT
Start: 2020-08-03 | End: 2020-08-06

## 2020-08-03 RX ORDER — PROPOFOL 10 MG/ML
INJECTION, EMULSION INTRAVENOUS PRN
Status: DISCONTINUED | OUTPATIENT
Start: 2020-08-03 | End: 2020-08-03

## 2020-08-03 RX ORDER — LISINOPRIL 2.5 MG/1
2.5 TABLET ORAL DAILY
Status: DISCONTINUED | OUTPATIENT
Start: 2020-08-03 | End: 2020-08-05

## 2020-08-03 RX ORDER — SODIUM CHLORIDE, SODIUM LACTATE, POTASSIUM CHLORIDE, CALCIUM CHLORIDE 600; 310; 30; 20 MG/100ML; MG/100ML; MG/100ML; MG/100ML
INJECTION, SOLUTION INTRAVENOUS CONTINUOUS PRN
Status: DISCONTINUED | OUTPATIENT
Start: 2020-08-03 | End: 2020-08-03

## 2020-08-03 RX ORDER — LIDOCAINE 40 MG/G
CREAM TOPICAL
Status: DISCONTINUED | OUTPATIENT
Start: 2020-08-03 | End: 2020-08-12 | Stop reason: HOSPADM

## 2020-08-03 RX ORDER — HYDROMORPHONE HYDROCHLORIDE 1 MG/ML
.3-.5 INJECTION, SOLUTION INTRAMUSCULAR; INTRAVENOUS; SUBCUTANEOUS EVERY 5 MIN PRN
Status: DISCONTINUED | OUTPATIENT
Start: 2020-08-03 | End: 2020-08-03 | Stop reason: HOSPADM

## 2020-08-03 RX ORDER — NALOXONE HYDROCHLORIDE 0.4 MG/ML
.1-.4 INJECTION, SOLUTION INTRAMUSCULAR; INTRAVENOUS; SUBCUTANEOUS
Status: DISCONTINUED | OUTPATIENT
Start: 2020-08-03 | End: 2020-08-12 | Stop reason: HOSPADM

## 2020-08-03 RX ORDER — ONDANSETRON 2 MG/ML
INJECTION INTRAMUSCULAR; INTRAVENOUS PRN
Status: DISCONTINUED | OUTPATIENT
Start: 2020-08-03 | End: 2020-08-03

## 2020-08-03 RX ORDER — HYDROMORPHONE HYDROCHLORIDE 1 MG/ML
0.3 INJECTION, SOLUTION INTRAMUSCULAR; INTRAVENOUS; SUBCUTANEOUS EVERY 4 HOURS PRN
Status: DISCONTINUED | OUTPATIENT
Start: 2020-08-03 | End: 2020-08-05

## 2020-08-03 RX ORDER — FENTANYL CITRATE 50 UG/ML
INJECTION, SOLUTION INTRAMUSCULAR; INTRAVENOUS PRN
Status: DISCONTINUED | OUTPATIENT
Start: 2020-08-03 | End: 2020-08-03

## 2020-08-03 RX ORDER — DEXMEDETOMIDINE HYDROCHLORIDE 4 UG/ML
0.2-1.2 INJECTION, SOLUTION INTRAVENOUS CONTINUOUS
Status: DISCONTINUED | OUTPATIENT
Start: 2020-08-03 | End: 2020-08-03

## 2020-08-03 RX ORDER — LABETALOL 20 MG/4 ML (5 MG/ML) INTRAVENOUS SYRINGE
PRN
Status: DISCONTINUED | OUTPATIENT
Start: 2020-08-03 | End: 2020-08-03

## 2020-08-03 RX ORDER — HYDRALAZINE HYDROCHLORIDE 20 MG/ML
INJECTION INTRAMUSCULAR; INTRAVENOUS PRN
Status: DISCONTINUED | OUTPATIENT
Start: 2020-08-03 | End: 2020-08-03

## 2020-08-03 RX ORDER — LIDOCAINE HYDROCHLORIDE AND EPINEPHRINE 10; 10 MG/ML; UG/ML
INJECTION, SOLUTION INFILTRATION; PERINEURAL PRN
Status: DISCONTINUED | OUTPATIENT
Start: 2020-08-03 | End: 2020-08-03 | Stop reason: HOSPADM

## 2020-08-03 RX ORDER — OXYMETAZOLINE HYDROCHLORIDE 0.05 G/100ML
SPRAY NASAL PRN
Status: DISCONTINUED | OUTPATIENT
Start: 2020-08-03 | End: 2020-08-03 | Stop reason: HOSPADM

## 2020-08-03 RX ORDER — ONDANSETRON 4 MG/1
4 TABLET, ORALLY DISINTEGRATING ORAL EVERY 30 MIN PRN
Status: DISCONTINUED | OUTPATIENT
Start: 2020-08-03 | End: 2020-08-03 | Stop reason: HOSPADM

## 2020-08-03 RX ORDER — ONDANSETRON 2 MG/ML
4 INJECTION INTRAMUSCULAR; INTRAVENOUS EVERY 6 HOURS PRN
Status: DISCONTINUED | OUTPATIENT
Start: 2020-08-03 | End: 2020-08-12 | Stop reason: HOSPADM

## 2020-08-03 RX ORDER — NALOXONE HYDROCHLORIDE 0.4 MG/ML
.1-.4 INJECTION, SOLUTION INTRAMUSCULAR; INTRAVENOUS; SUBCUTANEOUS
Status: DISCONTINUED | OUTPATIENT
Start: 2020-08-03 | End: 2020-08-03

## 2020-08-03 RX ORDER — OXYCODONE HCL 5 MG/5 ML
5-10 SOLUTION, ORAL ORAL EVERY 4 HOURS PRN
Status: DISCONTINUED | OUTPATIENT
Start: 2020-08-03 | End: 2020-08-12 | Stop reason: HOSPADM

## 2020-08-03 RX ORDER — FENTANYL CITRATE 50 UG/ML
25-50 INJECTION, SOLUTION INTRAMUSCULAR; INTRAVENOUS
Status: DISCONTINUED | OUTPATIENT
Start: 2020-08-03 | End: 2020-08-03 | Stop reason: HOSPADM

## 2020-08-03 RX ORDER — AMPICILLIN AND SULBACTAM 2; 1 G/1; G/1
3 INJECTION, POWDER, FOR SOLUTION INTRAMUSCULAR; INTRAVENOUS
Status: COMPLETED | OUTPATIENT
Start: 2020-08-03 | End: 2020-08-03

## 2020-08-03 RX ORDER — DEXAMETHASONE SODIUM PHOSPHATE 4 MG/ML
10 INJECTION, SOLUTION INTRA-ARTICULAR; INTRALESIONAL; INTRAMUSCULAR; INTRAVENOUS; SOFT TISSUE ONCE
Status: COMPLETED | OUTPATIENT
Start: 2020-08-03 | End: 2020-08-03

## 2020-08-03 RX ORDER — AMPICILLIN AND SULBACTAM 1; .5 G/1; G/1
1.5 INJECTION, POWDER, FOR SOLUTION INTRAMUSCULAR; INTRAVENOUS SEE ADMIN INSTRUCTIONS
Status: DISCONTINUED | OUTPATIENT
Start: 2020-08-03 | End: 2020-08-03

## 2020-08-03 RX ORDER — LIDOCAINE HYDROCHLORIDE 20 MG/ML
INJECTION, SOLUTION INFILTRATION; PERINEURAL PRN
Status: DISCONTINUED | OUTPATIENT
Start: 2020-08-03 | End: 2020-08-03

## 2020-08-03 RX ADMIN — PROPOFOL 50 MG: 10 INJECTION, EMULSION INTRAVENOUS at 14:22

## 2020-08-03 RX ADMIN — LABETALOL 20 MG/4 ML (5 MG/ML) INTRAVENOUS SYRINGE 10 MG: at 13:54

## 2020-08-03 RX ADMIN — ROCURONIUM BROMIDE 20 MG: 10 INJECTION INTRAVENOUS at 14:29

## 2020-08-03 RX ADMIN — FENTANYL CITRATE 50 MCG: 50 INJECTION, SOLUTION INTRAMUSCULAR; INTRAVENOUS at 14:38

## 2020-08-03 RX ADMIN — AMPICILLIN SODIUM AND SULBACTAM SODIUM 3 G: 2; 1 INJECTION, POWDER, FOR SOLUTION INTRAMUSCULAR; INTRAVENOUS at 13:27

## 2020-08-03 RX ADMIN — PROPOFOL 100 MG: 10 INJECTION, EMULSION INTRAVENOUS at 14:15

## 2020-08-03 RX ADMIN — ROCURONIUM BROMIDE 20 MG: 10 INJECTION INTRAVENOUS at 14:37

## 2020-08-03 RX ADMIN — AMPICILLIN SODIUM AND SULBACTAM SODIUM 1.5 G: 1; .5 INJECTION, POWDER, FOR SOLUTION INTRAMUSCULAR; INTRAVENOUS at 15:27

## 2020-08-03 RX ADMIN — HYDRALAZINE HYDROCHLORIDE 10 MG: 20 INJECTION INTRAMUSCULAR; INTRAVENOUS at 13:49

## 2020-08-03 RX ADMIN — LISINOPRIL 2.5 MG: 2.5 TABLET ORAL at 19:44

## 2020-08-03 RX ADMIN — LABETALOL 20 MG/4 ML (5 MG/ML) INTRAVENOUS SYRINGE 10 MG: at 13:34

## 2020-08-03 RX ADMIN — ROCURONIUM BROMIDE 20 MG: 10 INJECTION INTRAVENOUS at 14:15

## 2020-08-03 RX ADMIN — ONDANSETRON 4 MG: 2 INJECTION INTRAMUSCULAR; INTRAVENOUS at 15:33

## 2020-08-03 RX ADMIN — FENTANYL CITRATE 25 MCG: 50 INJECTION, SOLUTION INTRAMUSCULAR; INTRAVENOUS at 16:23

## 2020-08-03 RX ADMIN — SODIUM CHLORIDE, POTASSIUM CHLORIDE, SODIUM LACTATE AND CALCIUM CHLORIDE: 600; 310; 30; 20 INJECTION, SOLUTION INTRAVENOUS at 13:14

## 2020-08-03 RX ADMIN — LIDOCAINE HYDROCHLORIDE 50 MG: 20 INJECTION, SOLUTION INFILTRATION; PERINEURAL at 14:15

## 2020-08-03 RX ADMIN — LABETALOL 20 MG/4 ML (5 MG/ML) INTRAVENOUS SYRINGE 10 MG: at 13:27

## 2020-08-03 RX ADMIN — DEXAMETHASONE SODIUM PHOSPHATE 10 MG: 4 INJECTION, SOLUTION INTRA-ARTICULAR; INTRALESIONAL; INTRAMUSCULAR; INTRAVENOUS; SOFT TISSUE at 14:03

## 2020-08-03 RX ADMIN — DEXAMETHASONE SODIUM PHOSPHATE 10 MG: 4 INJECTION, SOLUTION INTRA-ARTICULAR; INTRALESIONAL; INTRAMUSCULAR; INTRAVENOUS; SOFT TISSUE at 13:30

## 2020-08-03 RX ADMIN — SODIUM CHLORIDE, POTASSIUM CHLORIDE, SODIUM LACTATE AND CALCIUM CHLORIDE: 600; 310; 30; 20 INJECTION, SOLUTION INTRAVENOUS at 15:33

## 2020-08-03 RX ADMIN — Medication 3 MG: at 22:45

## 2020-08-03 RX ADMIN — FENTANYL CITRATE 50 MCG: 50 INJECTION, SOLUTION INTRAMUSCULAR; INTRAVENOUS at 17:00

## 2020-08-03 RX ADMIN — LABETALOL 20 MG/4 ML (5 MG/ML) INTRAVENOUS SYRINGE 10 MG: at 14:05

## 2020-08-03 RX ADMIN — SUGAMMADEX 200 MG: 100 INJECTION, SOLUTION INTRAVENOUS at 15:34

## 2020-08-03 RX ADMIN — HYDROMORPHONE HYDROCHLORIDE 0.5 MG: 1 INJECTION, SOLUTION INTRAMUSCULAR; INTRAVENOUS; SUBCUTANEOUS at 17:07

## 2020-08-03 RX ADMIN — ACETAMINOPHEN 650 MG: 325 SOLUTION ORAL at 19:44

## 2020-08-03 RX ADMIN — ROCURONIUM BROMIDE 20 MG: 10 INJECTION INTRAVENOUS at 14:51

## 2020-08-03 RX ADMIN — FENTANYL CITRATE 25 MCG: 50 INJECTION, SOLUTION INTRAMUSCULAR; INTRAVENOUS at 16:17

## 2020-08-03 RX ADMIN — LABETALOL 20 MG/4 ML (5 MG/ML) INTRAVENOUS SYRINGE 10 MG: at 14:01

## 2020-08-03 RX ADMIN — SODIUM CHLORIDE, POTASSIUM CHLORIDE, SODIUM LACTATE AND CALCIUM CHLORIDE: 600; 310; 30; 20 INJECTION, SOLUTION INTRAVENOUS at 17:14

## 2020-08-03 ASSESSMENT — ACTIVITIES OF DAILY LIVING (ADL)
COGNITION: 0 - NO COGNITION ISSUES REPORTED
RETIRED_COMMUNICATION: 0-->UNDERSTANDS/COMMUNICATES WITHOUT DIFFICULTY
DRESS: 0-->INDEPENDENT
AMBULATION: 0-->INDEPENDENT
BATHING: 0-->INDEPENDENT
TOILETING: 0-->INDEPENDENT
SWALLOWING: 0-->SWALLOWS FOODS/LIQUIDS WITHOUT DIFFICULTY
FALL_HISTORY_WITHIN_LAST_SIX_MONTHS: NO
ADLS_ACUITY_SCORE: 13
TRANSFERRING: 0-->INDEPENDENT
RETIRED_EATING: 0-->INDEPENDENT

## 2020-08-03 ASSESSMENT — MIFFLIN-ST. JEOR: SCORE: 1623.31

## 2020-08-03 NOTE — ANESTHESIA POSTPROCEDURE EVALUATION
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     Anesthesia Type: Other (see Comments)       Disposition: Admission   Postop Pain Control: Uneventful            Sign Out: Well controlled pain   PONV: No   Neuro/Psych: Uneventful            Sign Out: Acceptable/Baseline neuro status   Airway/Respiratory: Uneventful            Sign Out: AIRWAY IN SITU/Resp. Support               Airway in situ/Resp. Support: Tracheostomy                 Reason: Planned Pre-op   CV/Hemodynamics: Uneventful            Sign Out: Acceptable CV status   Other NRE: NONE   DID A NON-ROUTINE EVENT OCCUR? No         Last Anesthesia Record Vitals:  CRNA VITALS  8/3/2020 1520 - 8/3/2020 1620      8/3/2020             Resp Rate (observed):  14    EKG:  Atrial fibrillation          Last PACU Vitals:  Vitals Value Taken Time   /84 8/3/2020  4:50 PM   Temp 36.8  C (98.2  F) 8/3/2020  4:00 PM   Pulse 105 8/3/2020  4:50 PM   Resp 14 8/3/2020  4:00 PM   SpO2 99 % 8/3/2020  4:52 PM   Temp src     NIBP     Pulse     SpO2     Resp     Temp     Ht Rate     Temp 2     Vitals shown include unvalidated device data.      Electronically Signed By: Raman Mathias MD, August 3, 2020, 4:53 PM

## 2020-08-03 NOTE — OR NURSING
Paged DECLAN Jeffers to come discuss pt concerns regarding heart issues and scheduled procedure today per pt and his daughter request, also paged Dr. Felipe MD pt pt and family request to come talk with pt about upcoming procedure scheduled today. Advised LIANNE Govea, 3C Charge of pt concerns

## 2020-08-03 NOTE — BRIEF OP NOTE
[facility], [place]    Brief Operative Note    Pre-operative diagnosis: Laryngeal cancer (H) [C32.9]  Post-operative diagnosis Same as pre-operative diagnosis    Procedure: Procedure(s):  LARYNGOSCOPY WITH BIOPSY  TRACHEOSTOMY  Surgeon: Surgeon(s) and Role:     * Caron Wang MD - Primary     * Joy Hi MD - Resident - Assisting  Anesthesia: General   Estimated blood loss: * No values recorded between 8/3/2020  1:38 PM and 8/3/2020  3:27 PM *  Drains: None  Specimens:   ID Type Source Tests Collected by Time Destination   A : pre tracheal tissue  Tissue Trachea SURGICAL PATHOLOGY EXAM Caron Wang MD 8/3/2020  2:05 PM    B : Right False Cord Biopsy Tissue Other SURGICAL PATHOLOGY EXAM Caron Wang MD 8/3/2020  3:02 PM      Findings:   Please see full op note for details. .  Complications: None.  Implants: * No implants in log *    Neuro:   -PRN oxycodone and tylenol via feeding tube, PRN Dilaudid     HEENT;  -HIGH RISK TRACHEOSTOMY: due to tumor burden patient would be a difficult (likely impossible) intubation from above.  -6-0 cuffed shiley in place secured with sutures and trach ties.   -High risk trach change, will do change over exchanger   -NG tube in place and secured to right naris   -S/p DLB on 8/3  - PET scan scheduled for Friday. Will need to be cognizant about not giving glucose on Thursday in preparation for scan.   Trach Tube Instructions:   1) Contact ENT on-call with any questions or concerns   2) The first changing of trach tube, sponge, and or ties will be performed by ENT  3) Perform regular suctioning   4) RN should change disposable inner canulas every shift and/or clean it with a brush   5) Keep trach tube obturator taped to wall behind the head of the bed   6) Keep extra unopened 6-0 Shiley and 4-0 Shiley at bedside at all times   7) Minimize the amount of air in the cuff needed to adequately apply positive pressure ventilate. If  positive pressure ventilation is not need then the cuff should be down.     8) Do not cut trach ties or remove velcro ties     CV/H:  -Hx CHF, HTN, A. Fib, EF <30%  -Cardiology consult for assistance in managing comorbidities  -Trend troponins  - PTA metoprolol, lisinopril   - CBC daily     FEN/GI:  -NPO for now  -NG tube placed in OR, will follow up on abdominal xray   -SLP consult, appreciate recs (likely video swallow Wednesday)  -LR @ 75cc/hr  -Albumin, pre-albumin on POD#1  -Mg, phos, electrolytes daily  - PRN Zofran      :   -No morales     Endo:  -TSH, T4 on POD#1    ID:   -No indication for antibiotics     PPX:  -SCDs  -Encourage ambulation      CONSULTS:   -Nutrition consult for tube feeds   -SLP  -Cardiology

## 2020-08-03 NOTE — OR NURSING
DECLAN Jeffers and Dr. Felipe MD talked with pt and his daughter for approximately 25 minutes to discuss pt concerns, pt and his daughter now agree they want to proceed with his scheduled procedure today

## 2020-08-03 NOTE — OR NURSING
Pt has been doing very well.. Mod secretions from trach but is able to cough them out  Per self.. Sign out  Et feeding tube plcment checked Report to 6A

## 2020-08-03 NOTE — ANESTHESIA CARE TRANSFER NOTE
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    Anesthesia Type:   No value filed.     Note:  Airway :Tracheostomy  Patient transferred to:PACU  Comments:   -on 6L O2 via TD, Pt Spont. breathing, awake & alert, monitors placed, VSS, RN at bedside.      Vitals: (Last set prior to Anesthesia Care Transfer)    CRNA VITALS  8/3/2020 1520 - 8/3/2020 1557      8/3/2020             Pulse:  85    Ht Rate:  82    SpO2:  100 %    Resp Rate (observed):  (!) 1                Electronically Signed By: MARIBETH Daiz CRNA  August 3, 2020  3:57 PM

## 2020-08-03 NOTE — OP NOTE
Date of Procedure: 8/3/2020    Attending Physician: Caron Wang MD    Resident Physicians:   1. Joy Hi MD  2. Ramakrishna Pino MD    Procedure Performed:  1. Awake tracheostomy  2. Direct laryngoscopy with biopsy with telescope  3. NG tube placement    Preoperative Diagnosis:  1. Laryngeal carcinoma  2. Airway obstruction  3. Heart failure    Postoperative Diagnosis: same    Anesthesia: General    Blood loss: 10 cc    Specimens:   ID Type Source Tests Collected by Time Destination   A : pre tracheal tissue  Tissue Trachea SURGICAL PATHOLOGY EXAM Caron Wang MD 8/3/2020  2:05 PM     B : Right False Cord Biopsy Tissue Other SURGICAL PATHOLOGY EXAM Caron Wang MD 8/3/2020  3:02 PM          Implants:  6 cuffed Shiley  NG tube     Complications: None    Findings:  Friable tracheal and cricoid cartilage  6 cuffed Shiley placed between first and second tracheal ring through a window as tracheal cartilage tore with attempts at Edin flap  Tumor present within the supraglottis and glottis involving both false cords, the petiole of the epiglottis, bilateral true cords.  The patient had essentially no airway visible on direct laryngoscopy.  The tumor was firm to palpation.  The subglottis could not be visualized due to the tumor. Tumor demonstrated progression between flexible scope exam last week and OR today.  Frozen section pathology consistent with invasive squamous cell carcinoma.    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  The patient has a history of progressive hoarseness and dyspnea.  He was seen in clinic on 7/31/2020 with bilateral vocal cord paralysis and an exophytic tumor involving essentially the entire glottis with about a 5 mm opening.  He was also noted to have concerns for congestive heart failure in the setting of atrial fibrillation that was poorly controlled.  Given his significant airway obstruction and concerns for  decompensation with any tumor progression or swelling he is indicated for an awake tracheostomy followed by direct laryngoscopy with biopsy for definitive diagnosis.    Description of Procedure:  After informed consent was obtained, the patient was brought back to the main operating room and placed in a semirecumbent position.  The patient was appropriately positioned.  He was unable to tolerate laying flat due to the airway obstruction.  The landmarks of the neck were palpated including the tracheal cartilage, cricoid cartilage, sternal notch.  The planned incision was marked at approximately the level of the cricoid cartilage.  The planned incision was injected with 1% lidocaine with 1-100,000 epinephrine.  The patient was prepped and draped in usual fashion.  A timeout was performed.  After verifying adequate topical anesthesia 15 blade was used to make an incision through the skin.  This was extended down through the subcutaneous fat to the level of the strap muscles.  The midline raphae was divided with the monopolar cautery.  There is difficulty with palpating the cricoid cartilage.  Due to the patient's anatomy and his inability to lay flat the airway which is displaced deep at an angle and inferiorly.  The cricoid cartilage was attempted to be identified and the soft tissue was removed over it.  As the airway was displaced inferiorly, a cricoid hook was placed under the cricoid to lift the airway within the field.  The cricoid was essentially at the level of the sternal notch.  The cricoid cartilage was friable and partially tore with placement of the cricoid hook.  The thyroid isthmus was identified and lifted off the anterior tracheal wall.  The thyroid isthmus was divided with monopolar cautery.  A Kitner was used to release the soft tissue along the anterior tracheal wall.  Again a cricoid hook was placed to try and improve access to the tracheal rings.  The first and second tracheal ring were  identified.  At this point the patient was having increasing chest pain and a small amount of Precedex was administered to the patient.  A 15 blade was used to enter the airway between the first and second tracheal rings.  Attempts are made to perform lateral cuts with heavy curved scissor but the cartilage was partially calcified and difficult to cut.  Given the patient's increasing difficulty with tolerating the procedure the decision was made to place an endotracheal tube through the tracheotomy and sedate him.  End-tidal CO2 was verified.  Once he was adequately sedated the endotracheal tube was removed and the tracheotomy was visualized.  With some difficulty the lateral cuts were made with a heavy curved scissors.  With attempts at placement of the 3-0 Vicryl stitch for the Edin flap the cartilage tore and was unable to hold the stitch.  The heavy curved scissors were used to try to clean the edges of the tracheotomy to avoid puncturing of the cuff on the Shiley tracheostomy.  Trach spreaders were used to widen the tracheotomy window.  The 6 cuffed Shiley was attempted to be placed.  There was difficulty with passing the tube as the tracheotomy was deep relative to the skin.  A Cook airway exchange catheter was placed through the tracheotomy and the 6 cuffed Shiley was attempted be placed in Seldinger fashion.  There was still difficulty with passing the 6 cuffed Shiley.  This was removed and an endotracheal tube was placed to allow the patient to ventilate.  Once patient had adequate saturations endotracheal tube was again removed.  A rongeur was used to clean the edges of the tracheotomy to avoid any sharp edges to the opening.  Again the 6 cuffed Shiley was attempted to placed over a air exchange catheter but there was difficulty with passing the tube.  A 3-0 Vicryl was used to suture the trachea at the tracheotomy to the dermis.  This improved access to the tracheotomy and the 6 cuffed Shiley was able to  be placed without difficulty.  End-tidal CO2 was verified.  The Shiley was sutured into place with 2-0 silk sutures and secured with a Velcro strap.      The bed was turned 90 degrees away from anesthesia.  The Thermoplast dental guards were placed to protect the remaining upper and lower dentition.  The Dedo laryngoscope was introduced and used to inspect the larynx.  The 0 degree telescope was used to inspect the larynx and take photodocumentation.  The patient had an exophytic tumor involving the petiole of the epiglottis, false cords, true cords bilaterally.  The tumor essentially replaced the entire glottis.  There was inability to identify an obvious opening into the subglottis.  A suction was passed with some difficulty into the subglottis but the telescope cannot be passed beyond the tumor to visualize the subglottis or trachea. The tumor had clear progression since scope exam last week in clinic.  On palpation the tumor was extremely firm bilaterally.  Biopsies were obtained from the right false cord and sent for frozen section pathology.  This was consistent with invasive squamous cell carcinoma.  The 2 and 4 mm biopsy forceps were used to take biopsies along the right and left false cords and the petiole of the epiglottis.  The tumor was firm and difficult to debulk.  We were unable to debulk enough to achieve a patent airway and visualize the subglottis.  Hemostasis was achieved with an Afrin-soaked pledget.  A nasogastric feeding tube was placed under direct visualization with the laryngoscope in place.  The laryngoscope and dental guards were removed.    The patient was handed back to anesthesia for extubation.  He tolerated the procedure well with no immediate complications.  He was transported to the recovery room in stable condition.  I was present for and participated in the entire procedure.      Caron Wang MD    Department of Otolaryngology        The tracheostomy should be  billed with a 22 modifier.  It had to be performed in a semiupright position awake due to the patient's significant airway obstruction which complicated the procedure.  It took twice as long as normal.

## 2020-08-03 NOTE — OR NURSING
"[id number] paged the following     \"Please check on the abd xray for Mr. Sterling and call the RN at [phone] to verify you have done soon. thanks PACU RN \"   "

## 2020-08-03 NOTE — PROGRESS NOTES
07/31/20 1300   General Information   Type Of Visit Initial   Start Of Care Date 07/31/20   Referring Physician Dr. Caron Wang   Orders Evaluate And Treat   Orders Comment Clinical Swallow Eval   Medical Diagnosis Dysphagia, suspected laryngeal cancer   Precautions/limitations No Known Precautions/limitations   Hearing Adequate in quiet setting   Pertinent History of Current Problem/OT: Additional Occupational Profile Info Evin Sterling is a 70-year-old man with probable laryngeal carcinoma who was seen today in conjunction with ENT clinic. He currently has a large exophytic mass of the glottis without mobility of either cord and a glottic opening of only about 5 mm per Dr. Wang. He reports increased difficulty swallowing over the past several weeks and also coughing when he drinks liquids, especially carbonated beverages. He is currently planned to undergo direct laryngoscopy with biopsies and possible tracheostomy on 8/3/2020.   Respiratory Status Room air   Prior Level Of Function Swallowing   Prior Level Of Function Comment softer moist solids and thin liquids   Patient Role/employment History Retired   Home/community Accessibility Comments (flowsheet Row) Independent   General Observations Pt highly pleasant and cooperative throughout evaluation.    Patient/family Goals Pt would like to improve swallow function.    Clinical Swallow Evaluation   Oral Musculature generally intact   Dentition other (see comments)  (Multiple missing teeth most notably on top.)   Mucosal Quality adequate   Mandibular Strength and Mobility intact   Oral Labial Strength and Mobility WFL   Lingual Strength and Mobility WFL   Buccal Strength and Mobility intact   Laryngeal Function Cough;Throat clear;Swallow;Voicing initiated   Oral Musculature Comments Hoarse vocal quality demonstrated   Clinical Swallow Eval: Thin Liquid Texture Trial   Mode of Presentation, Thin Liquids cup;self-fed   Volume of Liquid or Food Presented 4 oz water  via cup   Oral Phase of Swallow WFL   Pharyngeal Phase of Swallow impaired;coughing/choking;throat clearing;repeated swallows   Diagnostic Statement Pt demonstrated overt clinical s/sx of aspiration/penetration following large consecutive sips of thin liquid. He was able to tolerate smaller single sips of thin liquid without overt clinical s/sx of aspiration/penetration. Cued pt to take smaller sips and he was agreeable.    Clinical Swallow Eval: Puree Solid Texture Trial   Mode of Presentation, Puree spoon;self-fed   Volume of Puree Presented tsp x2   Oral Phase, Puree WFL   Pharyngeal Phase, Puree repeated swallows   Diagnostic Statement No overt clinical s/sx of aspiration/penetration noted on puree consistency trials. He demonstrates multiple swallows on small amount   Clinical Swallow Eval: Solid Food Texture Trial   Mode of Presentation, Solid self-fed   Volume of Solid Food Presented Rosie velásquez x1   Oral Phase, Solid WFL   Pharyngeal Phase, Solid repeated swallows   Diagnostic Statement No overt clinical s/sx of aspiration/penetration noted on solid consistency trials however pt did report residual in his throat. Sips of liquid cleared this feeling.    Swallow Compensations   Swallow Compensations Alternate viscosity of consistencies;Reduce amounts   Results Suspect aspiration   Educational Assessment   Barriers to Learning No barriers   Esophageal Phase of Swallow   Patient reports or presents with symptoms of esophageal dysphagia No   General Therapy Interventions   Planned Therapy Interventions Dysphagia Treatment   Dysphagia treatment Oropharyngeal exercise training;Instruction of safe swallow strategies;Compensatory strategies for swallowing   Swallow Eval: Clinical Impressions   Skilled Criteria for Therapy Intervention Skilled criteria met.  Treatment indicated.   Functional Assessment Scale (FAS) 3   Treatment Diagnosis Moderate oropharyngeal dysphagia   Diet texture recommendations Regular  diet;Thin liquids   Recommended Feeding/Eating Techniques alternate between small bites and sips of food/liquid;hard swallow w/ each bite or sip;maintain upright posture during/after eating for 30 mins;small sips/bites   Rehab Potential good, to achieve stated therapy goals   Predicted Duration of Therapy Intervention (days/wks) 1/week x6 weeks   Anticipated Discharge Disposition home w/ outpatient services   Risks and Benefits of Treatment have been explained. Yes   Patient, family and/or staff in agreement with Plan of Care Yes   Clinical Impression Comments Evin Sterling demonstrates moderate oropharyngeal dysphagia as characterized by overt clinical s/sx of aspiration/penetration on thin liquid trials. He was able to minimize s/sx of aspiration/penetration by taking smaller sips and using slow rate when drinking liquids. He notes coughing on liquids over the past several weeks. He demonstrates multiple swallows on all consistencies presented. Hoarse vocal quality demonstrated throughout. Recommend he continue moist soft solids and thin liquids until after he undergos his DL with biopsies and tracheostomy placement on 8/3/2020. He would benefit from video swallow study to further quality dysphagia prior to potential radiation treatment or further surgical intervention. Pt agreeable to plan.    Swallow Goal 1   Goal Identifier Safe swallow precautions   Goal Description Pt will demonstrate safe swallow precautions during therapy session x1 with min cues.    Target Date 10/19/20   Total Session Time   SLP Eval: oral/pharyngeal swallow function, clinical minutes (04354) 10   Total Evaluation Time 10       Thank you for the referral of Evin Sterling. If you have any questions about this report, please contact me using the information below.      Aide Whitfield MS, CCC-SLP  Speech-Language Pathology  [facility]  Departement of Otolaryngology/D&T - 4th floor  Pager:  [phone]  Phone: [phone]  Email: [email or web address]

## 2020-08-04 LAB
ALBUMIN SERPL-MCNC: 2.7 G/DL (ref 3.4–5)
ANION GAP SERPL CALCULATED.3IONS-SCNC: 4 MMOL/L (ref 3–14)
BUN SERPL-MCNC: 13 MG/DL (ref 7–30)
CALCIUM SERPL-MCNC: 8.3 MG/DL (ref 8.5–10.1)
CHLORIDE SERPL-SCNC: 104 MMOL/L (ref 94–109)
CO2 SERPL-SCNC: 31 MMOL/L (ref 20–32)
CREAT SERPL-MCNC: 0.91 MG/DL (ref 0.66–1.25)
ERYTHROCYTE [DISTWIDTH] IN BLOOD BY AUTOMATED COUNT: 14.6 % (ref 10–15)
GFR SERPL CREATININE-BSD FRML MDRD: 86 ML/MIN/{1.73_M2}
GLUCOSE SERPL-MCNC: 130 MG/DL (ref 70–99)
HCT VFR BLD AUTO: 42.2 % (ref 40–53)
HGB BLD-MCNC: 13.4 G/DL (ref 13.3–17.7)
MAGNESIUM SERPL-MCNC: 2 MG/DL (ref 1.6–2.3)
MCH RBC QN AUTO: 31.2 PG (ref 26.5–33)
MCHC RBC AUTO-ENTMCNC: 31.8 G/DL (ref 31.5–36.5)
MCV RBC AUTO: 98 FL (ref 78–100)
PHOSPHATE SERPL-MCNC: 3.3 MG/DL (ref 2.5–4.5)
PLATELET # BLD AUTO: 201 10E9/L (ref 150–450)
POTASSIUM SERPL-SCNC: 4.5 MMOL/L (ref 3.4–5.3)
PREALB SERPL IA-MCNC: 13 MG/DL (ref 15–45)
RBC # BLD AUTO: 4.29 10E12/L (ref 4.4–5.9)
SODIUM SERPL-SCNC: 139 MMOL/L (ref 133–144)
TSH SERPL DL<=0.005 MIU/L-ACNC: 0.89 MU/L (ref 0.4–4)
WBC # BLD AUTO: 12 10E9/L (ref 4–11)

## 2020-08-04 PROCEDURE — 84443 ASSAY THYROID STIM HORMONE: CPT | Performed by: STUDENT IN AN ORGANIZED HEALTH CARE EDUCATION/TRAINING PROGRAM

## 2020-08-04 PROCEDURE — 36415 COLL VENOUS BLD VENIPUNCTURE: CPT | Performed by: STUDENT IN AN ORGANIZED HEALTH CARE EDUCATION/TRAINING PROGRAM

## 2020-08-04 PROCEDURE — 25800030 ZZH RX IP 258 OP 636: Performed by: STUDENT IN AN ORGANIZED HEALTH CARE EDUCATION/TRAINING PROGRAM

## 2020-08-04 PROCEDURE — 40000275 ZZH STATISTIC RCP TIME EA 10 MIN

## 2020-08-04 PROCEDURE — 80069 RENAL FUNCTION PANEL: CPT | Performed by: STUDENT IN AN ORGANIZED HEALTH CARE EDUCATION/TRAINING PROGRAM

## 2020-08-04 PROCEDURE — 40000047 ZZH STATISTIC CTO2 CONT OXYGEN TECH TIME EA 90 MIN

## 2020-08-04 PROCEDURE — 85027 COMPLETE CBC AUTOMATED: CPT | Performed by: STUDENT IN AN ORGANIZED HEALTH CARE EDUCATION/TRAINING PROGRAM

## 2020-08-04 PROCEDURE — 93010 ELECTROCARDIOGRAM REPORT: CPT | Performed by: INTERNAL MEDICINE

## 2020-08-04 PROCEDURE — 93005 ELECTROCARDIOGRAM TRACING: CPT

## 2020-08-04 PROCEDURE — 12000001 ZZH R&B MED SURG/OB UMMC

## 2020-08-04 PROCEDURE — 83735 ASSAY OF MAGNESIUM: CPT | Performed by: STUDENT IN AN ORGANIZED HEALTH CARE EDUCATION/TRAINING PROGRAM

## 2020-08-04 PROCEDURE — 84134 ASSAY OF PREALBUMIN: CPT | Performed by: STUDENT IN AN ORGANIZED HEALTH CARE EDUCATION/TRAINING PROGRAM

## 2020-08-04 PROCEDURE — 25000125 ZZHC RX 250: Performed by: STUDENT IN AN ORGANIZED HEALTH CARE EDUCATION/TRAINING PROGRAM

## 2020-08-04 PROCEDURE — 27210429 ZZH NUTRITION PRODUCT INTERMEDIATE LITER

## 2020-08-04 PROCEDURE — 99222 1ST HOSP IP/OBS MODERATE 55: CPT | Mod: 25 | Performed by: INTERNAL MEDICINE

## 2020-08-04 PROCEDURE — 25000132 ZZH RX MED GY IP 250 OP 250 PS 637: Mod: GY | Performed by: STUDENT IN AN ORGANIZED HEALTH CARE EDUCATION/TRAINING PROGRAM

## 2020-08-04 RX ORDER — SPIRONOLACTONE 25 MG
12.5 TABLET ORAL DAILY
Status: DISCONTINUED | OUTPATIENT
Start: 2020-08-04 | End: 2020-08-12 | Stop reason: HOSPADM

## 2020-08-04 RX ORDER — DEXTROSE MONOHYDRATE 100 MG/ML
INJECTION, SOLUTION INTRAVENOUS CONTINUOUS PRN
Status: DISCONTINUED | OUTPATIENT
Start: 2020-08-04 | End: 2020-08-06

## 2020-08-04 RX ADMIN — Medication 12.5 MG: at 20:06

## 2020-08-04 RX ADMIN — ACETAMINOPHEN 650 MG: 325 SOLUTION ORAL at 00:07

## 2020-08-04 RX ADMIN — ACETAMINOPHEN 650 MG: 325 SOLUTION ORAL at 04:19

## 2020-08-04 RX ADMIN — METOPROLOL TARTRATE 25 MG: 100 TABLET ORAL at 08:57

## 2020-08-04 RX ADMIN — LISINOPRIL 2.5 MG: 2.5 TABLET ORAL at 08:57

## 2020-08-04 RX ADMIN — METOPROLOL TARTRATE 25 MG: 100 TABLET ORAL at 20:07

## 2020-08-04 RX ADMIN — ACETAMINOPHEN 650 MG: 325 SOLUTION ORAL at 14:30

## 2020-08-04 RX ADMIN — METOPROLOL TARTRATE 25 MG: 100 TABLET ORAL at 14:30

## 2020-08-04 RX ADMIN — SODIUM CHLORIDE, POTASSIUM CHLORIDE, SODIUM LACTATE AND CALCIUM CHLORIDE: 600; 310; 30; 20 INJECTION, SOLUTION INTRAVENOUS at 02:21

## 2020-08-04 RX ADMIN — SODIUM CHLORIDE, POTASSIUM CHLORIDE, SODIUM LACTATE AND CALCIUM CHLORIDE: 600; 310; 30; 20 INJECTION, SOLUTION INTRAVENOUS at 17:36

## 2020-08-04 RX ADMIN — MULTIVITAMIN 15 ML: LIQUID ORAL at 08:57

## 2020-08-04 RX ADMIN — Medication 6 MG: at 22:04

## 2020-08-04 ASSESSMENT — ACTIVITIES OF DAILY LIVING (ADL)
ADLS_ACUITY_SCORE: 12
ADLS_ACUITY_SCORE: 12
ADLS_ACUITY_SCORE: 13
ADLS_ACUITY_SCORE: 12

## 2020-08-04 NOTE — PLAN OF CARE
SLP consult received. SLP spoke with ENT PA this AM who reported the patient has not tolerated cuff deflation due to excess secretions, so bedside swallow eval is not appropriate. Will check back tomorrow for bedside or videoswallow assessment as appropriate.    Addendum: Spoke with ENT PA and the patient is now tolerating cuff deflation. Will be appropriate for bedside assessment 8/5/20, SLP to recommend videoswallow if needed.

## 2020-08-04 NOTE — PLAN OF CARE
Status: POD#1 LARYNGOSCOPY WITH BIOPSY  TRACHEOSTOMY, AND NG TUBE PLACEMENT  Vitals: VSS on trach dome at 30% on continuous o2 monitoring.  Neuros: Alert and oriented x4. Denies N/T. 5/5 throughout.   IV: PIV infusing LR at 125mL/hr.   Resp/trach: Trach shiley #6 cuff up, secured with trach ties and sutured. Moderate amount of clear, red streaked secretions. Suctioned x3, inner cannula and trach cares done x3 this shift.   Diet: NPO. TF started at 10mL/hr. Tolerating.   Bowel status: BS+ No BM this shift.   : Voiding without difficulty.   Skin: Small amount of bloody drainage around trach site. Reddened and swollen around site.   Pain: Reports tolerable discomfort this shift.   Activity: SBA for lines.   Social: Daughter at bedside.   Plan: Increase TF to 20mL/hr @ 1900. PET scan scheduled for 8/7. PLC consult ordered.  Continue to monitor and follow POC.

## 2020-08-04 NOTE — CONSULTS
[facility]    Cardiology Consult Note      Date of Admission:  8/3/2020  Consult Requested by: Dr. Angeles  Reason for Consult: CHF, HTN and Afib management    Assessment & Plan: HVSL   Evin Sterling is a 70 year old male with PMHx of Afib, HFrEF and HTN admitted on 8/3/2020 for laryngoscopy with biopsy and tracheostomy. He has had VS WNL since surgery and is feeling well. Pt has been off all cardiac medications for three years prior to 1 week ago due to loss of insurance. Drop in EF likely due to lack of medical care. He is now following up with PCP and recommend continuing home medications with future re-assessment of cardiac function given past improvement with medical management. Pt has no signs of CHF exacerbation at this time.    #HFrEF  #Afib s/p ablation in 2012  #HTN    Recommendations  - continue metoprolol XL 25 mg daily, titrate up as tolerated  - increase lisinopril to 2.5 mg BID  - add spironolactone 12.5 mg daily  - order EKG  - set up outpatient follow up with cardiology with repeat ECHO after 3 months of medical management     The patient's care was discussed with the Attending Physician, Dr. De La Torre.    Nadeen Robles MD  Internal Medicine & Pediatrics PGY1  [facility]  Pager: [phone]      ______________________________________________________________________    Chief Complaint   Laryngeal Cancer    History is obtained from the patient and patient's daughter    History of Present Illness   Evin Sterling is a 70 year old male with PMHx of Afib, HFrEF and HTN who presented for laryngoscopy with biopsy and tracheostomy on 8/3/2020. Pt states he is feeling well after his surgery and able to breath through the trach without problems. Denies CP, SOB, orthopnea, lower extremity edema or dizziness. He has a hx of Afib with ablation in 2012. Notes that since then he has had intermittent episodes of Afib which he can only  feel by checking his pulse. Mr. Sterling lost health insurance for the past three years and has been off all medications during that time until 1 week ago. His new PCP has restarted the metoprolol 100mg daily and lisinopril 10mg daily; they decided to hold anticoagulation and revisit after the surgery. Last ECHO in  had EF of 45%, repeat on 2020 with EF of 30%. Prior to obstruction in throat pt states he was able to walk multiple miles every day and lift weights 5X/week without CP or SOB.     Review of Systems   The 5 point Review of Systems is negative other than noted in the HPI or here.     Past Medical History    I have reviewed this patient's medical history and updated it with pertinent information if needed.   Past Medical History:   Diagnosis Date     CHF (congestive heart failure) (H)      GERD (gastroesophageal reflux disease)      Hypertension        Past Surgical History   I have reviewed this patient's surgical history and updated it with pertinent information if needed.  Past Surgical History:   Procedure Laterality Date     HERNIA REPAIR, INGUINAL RT/LT       HERNIA REPAIR, UMBILICAL         Social History   I have reviewed this patient's social history and updated it with pertinent information if needed.  Social History     Tobacco Use     Smoking status: Former Smoker     Packs/day: 1.00     Years: 20.00     Pack years: 20.00     Last attempt to quit:      Years since quittin.6     Smokeless tobacco: Former User   Substance Use Topics     Alcohol use: Not Currently     Drug use: Not Currently     Family History   I have reviewed this patient's family history and updated it with pertinent information if needed.   No significant family history.    Medications   I have reviewed this patient's current medications    Allergies   No Known Allergies    Physical Exam   Vital Signs: Temp: 97.7  F (36.5  C) Temp src: Oral BP: 120/61 Pulse: 94 Heart Rate: 80 Resp: 18 SpO2: 97 % O2 Device: Trach  dome Oxygen Delivery: 5 LPM  Weight: 188 lbs 7.89 oz    Constitutional: awake, alert, cooperative, no apparent distress, and appears stated age  Eyes: Lids and lashes normal, pupils equal, round and reactive to light, extra ocular muscles intact, sclera clear, conjunctiva normal  Neck: Tracheostomy in place   Respiratory: No increased work of breathing, good air exchange, clear to auscultation bilaterally, no crackles or wheezing  Cardiovascular: Normal apical impulse, irregular rhythm, normal S2, no S3 or S4, and no murmur noted  Skin: no redness or warmth; mild pitting edema in b/l LE up to knees  Neuropsychiatric: General: normal, calm and normal eye contact  Affect: normal  Orientation: oriented to self, place, time and situation      Data   I personally reviewed the ECHO image(s) showing Severely (EF <30%) reduced left ventricular function is present.  Severe left  ventricular dilation is present. Severe diffuse hypokinesis is present.  Diastolic function not assessed due to atrial fibrillation.  Right ventricular function, chamber size, wall motion, and thickness are  normal.  No significant valvular abnormalities.  Pulmonary artery systolic pressure cannot be assessed.  Dilation of the inferior vena cava is present with abnormal respiratory  variation in diameter.  No pericaridal effusion.  Results for orders placed or performed during the hospital encounter of 08/03/20 (from the past 24 hour(s))   Glucose by meter   Result Value Ref Range    Glucose 83 70 - 99 mg/dL   Surgical pathology exam   Result Value Ref Range    Copath Report       Patient Name: LAUREN NO  MR#: [id number]  Specimen #: [id number]  Reported: 8/3/2020 15:31  Ordering Phy(s): TANVI ENRIQUEZ    +++PRELIMINARY+++  INTRAOPERATIVE DIAGNOSIS  PRELIMINARY INTRAOPERATIVE FROZEN SECTION DIAGNOSIS:  B. Right false cord biopsy:  - Invasive squamous cell carcinoma    ELECTRONICALLY SIGNED OUT BY:  Juli Connolly M.D., [facility]    Status:   Signed Out  Date Ordered: 8/3/2020 15:07  Date Reported: 8/3/2020 15:31       XR Abdomen Port 1 View    Narrative    Exam: XR ABDOMEN PORT 1 VW, 8/3/2020 4:41 PM    Indication: Check feeding tube placement. We want it in the stomach.    Comparison: None    Findings: Single portable abdominal radiograph. Enteric tube  terminating over the stomach. Nonobstructive bowel gas pattern. No  portal venous gas. No definite pneumatosis. Bibasilar opacities,  likely atelectasis. Degenerative changes in the lumbar spine.      Impression    Impression: Enteric tube terminating over the stomach. Moderate lumbar  spondylosis.    I have personally reviewed the examination and initial interpretation  and I agree with the findings.    ELIUD GOLDEN MD   Troponin I   Result Value Ref Range    Troponin I ES 0.017 0.000 - 0.045 ug/L   Basic metabolic panel   Result Value Ref Range    Sodium 139 133 - 144 mmol/L    Potassium 4.5 3.4 - 5.3 mmol/L    Chloride 104 94 - 109 mmol/L    Carbon Dioxide 31 20 - 32 mmol/L    Anion Gap 4 3 - 14 mmol/L    Glucose 130 (H) 70 - 99 mg/dL    Urea Nitrogen 13 7 - 30 mg/dL    Creatinine 0.91 0.66 - 1.25 mg/dL    GFR Estimate 86 >60 mL/min/[1.73_m2]    GFR Estimate If Black >90 >60 mL/min/[1.73_m2]    Calcium 8.3 (L) 8.5 - 10.1 mg/dL   CBC with platelets   Result Value Ref Range    WBC 12.0 (H) 4.0 - 11.0 10e9/L    RBC Count 4.29 (L) 4.4 - 5.9 10e12/L    Hemoglobin 13.4 13.3 - 17.7 g/dL    Hematocrit 42.2 40.0 - 53.0 %    MCV 98 78 - 100 fl    MCH 31.2 26.5 - 33.0 pg    MCHC 31.8 31.5 - 36.5 g/dL    RDW 14.6 10.0 - 15.0 %    Platelet Count 201 150 - 450 10e9/L   Magnesium   Result Value Ref Range    Magnesium 2.0 1.6 - 2.3 mg/dL   Phosphorus   Result Value Ref Range    Phosphorus 3.3 2.5 - 4.5 mg/dL   TSH with free T4 reflex   Result Value Ref Range    TSH 0.89 0.40 - 4.00 mU/L   Prealbumin   Result Value Ref Range    Prealbumin 13 (L) 15 - 45 mg/dL   Albumin level   Result Value Ref Range    Albumin  2.7 (L) 3.4 - 5.0 g/dL     ATTENDING ATTESTATION:  Patient has been seen and evaluated by me on August 4, 2020. I have reviewed the medications, laboratory, imaging, and other relevant results.  I agree with the above note which I have edited, as necessary.  I have discussed my assessment and plan with the house staff.      70-year-old gentleman with     Laryngeal cancer  Post tracheostomy  Nonischemic cardiomyopathy   Persistent atrial fibrillation  Severe LV dilatation with severely reduced systolic function this hospitalization LVEF about 20%  No coronary disease per patient, cardiac cath 2012    Patient is euvolemic on exam.  He is in atrial fibrillation.  He is currently on a low-dose beta-blocker and ACEi, which we can uptitrate and add spironolactone to his medical regimen.  He lives in [place] and can follow-up with cardiologist from [facility] which will be close to him.      Simin De La Torre MD, MS  Professor of Medicine  Cardiovascular Division

## 2020-08-04 NOTE — PROGRESS NOTES
CLINICAL NUTRITION SERVICES - ASSESSMENT NOTE     Nutrition Prescription    RECOMMENDATIONS FOR MDs/PROVIDERS TO ORDER:  None at this time    Malnutrition Status:    Unable to determine at this time    Recommendations already ordered by Registered Dietitian (RD):  - Orders entered to initiate TF via NGT as follows: Isosource 1.5 @ 10 ml/hr x 8 hrs. If tolerated and lytes (K and Mg WNL and PO4 is 2.0 or higher) to goal of 65 ml/hr. Regimen provides (1560 ml/day) to provide 2340 kcals (27 kcal/kg/day), 106 g PRO (1.2 g/kg/day), 1186 ml free H2O, 275 g CHO and 23 g Fiber daily.  - Order multivitamin/mineral (Certavite 15 ml/day) via TF to help ensure micronutrient needs are met due slow TF adv and potential for interruptions to infusion.  - Order daily check of K+, Mg and PO4 until TF adv to goal rate to evaluate for refeeding and need for lyte replacement (no order at this time for K+, Mg or PO4 replacement)  - H2O flushes of 30 ml every 4 hrs.    Future/Additional Recommendations:  - Recommendations for transitioning to bolus TFs (only if FT is gastric):  Once patient is tolerating continuous goal TF rate for at least 8 hrs, would transition to bolus TFs as follows; Stop continuous TF for 1-2 hrs to let stomach empty prior to starting gravity feeding. Then begin first bolus with 0.5 cans (125 ml) and if tolerates after approx 4 hrs without GI complaints and/or residuals < 500 ml, rec adv each subsequent bolus by 0.5 cans (125 ml) every ~4 hrs until reach goal regimen of Isosource 1.5, 2 cans TID. Regimen provides 6 cans daily (1500 ml/day) which provides 2250 kcals (26 kcal/kg), 102 g PRO (1.2 g/kg/day), 1140 ml H2O, 264 g CHO and 23 g Fiber daily.   *Do not give feedings at night while pt asleep (during the day only).  *Change H2O flushes to 170 ml H2O before and after each bolus TID (to provide an addtl 1020 ml H2O) to meet est fld needs.          REASON FOR ASSESSMENT  Evin Sterling is a/an 70 year old male  "assessed by the dietitian for Provider Order - Registered Dietitian to Assess and Order TF per Medical Nutrition Therapy Protocol    NUTRITION HISTORY  Unable to obtain from pt at this time. Per chart review, pt assessed by SLP on 7/31 for swallow evaluation and reported that pt demonstrates moderate oropharyngeal dysphagia as characterized by overt clinical s/sx of aspiration/penetration on thin liquid trials.    CURRENT NUTRITION ORDERS  Diet: NPO since 8/3  - SLP consult pending    LABS  K+, Mg and PO4 WNL    MEDICATIONS  MIVF's @ 125 ml/hr    ANTHROPOMETRICS  Height: 176.5 cm (5' 9.5\")  Most Recent Weight: 85.5 kg (188 lb 7.9 oz) - admit wt  IBW: 74 kg  BMI: Overweight BMI 25-29.9  Weight History: Per review of Care Everywhere, no recent wt loss noted  Wt Readings from Last 10 Encounters:   08/03/20 85.5 kg (188 lb 7.9 oz)   07/31/20 86.2 kg (190 lb)   07/31/20 85.3 kg (188 lb)     Dosing Weight:  86 kg (based on admit wt of 85.5 kg)     ASSESSED NUTRITION NEEDS  Estimated Energy Needs: 9255-1431 kcals/day (25 - 30 kcals/kg)  Justification: Maintenance  Estimated Protein Needs: 103-130 grams protein/day (1.2 - 1.5 grams of pro/kg)  Justification: Post-op  Estimated Fluid Needs: (1 mL/kcal)   Justification: Maintenance    PHYSICAL FINDINGS  See malnutrition section below.  Per chart review, reported pt with multiple missing teeth on upper palate.    MALNUTRITION  % Intake: Unable to assess  % Weight Loss: None noted  Subcutaneous Fat Loss: Unable to assess  Muscle Loss: Unable to assess  Fluid Accumulation/Edema: None noted  Malnutrition Diagnosis: Unable to determine due to unable to complete all parameters of nutritional assessment at this time    NUTRITION DIAGNOSIS  Inadequate protein-energy intake related to inability to consume oral intakes secondary to surgery and TF therapy ordered, but not yet initiated as evidenced by NPO status since 8/3 and no TF intakes received at this time.  "     INTERVENTIONS  Implementation  Nutrition Education: Unable to complete due to pt unable to communicate via phone and this writer unable to see pt at bedside at this time   Enteral Nutrition - Initiate  Multivitamin/mineral supplement therapy     Goals  Total avg nutritional intake to meet a minimum of 25 kcal/kg and 1.2 g PRO/kg daily (per dosing wt 86 kg).     Monitoring/Evaluation  Progress toward goals will be monitored and evaluated per protocol.  Yaima Hummel RD,LD  6A pager [phone]

## 2020-08-04 NOTE — PROGRESS NOTES
"Otolaryngology Progress Note  August 4, 2020  24 hr events:   -s/p awake tracheostomy, DL with biopsy, NG placement     S: No acute events overnight. No SOB. Pain controlled. Recovering well.     O: /61 (BP Location: Right arm)   Pulse 94   Temp 97.7  F (36.5  C) (Axillary)   Resp 18   Ht 1.765 m (5' 9.5\")   Wt 85.5 kg (188 lb 7.9 oz)   SpO2 97%   BMI 27.44 kg/m     General: Alert and oriented x 3, resting comfortably. Interactive in discussion.    HEENT: EOMI. HB 1/6. 6-0 cuffed Shiley in place, mucus, blood tinged secretions from trach when cuff taken down. Strong cough. Neck is soft, no palpable crepitus or hematoma.    Pulmonary: 6-0 cuffed shiley in place on TD at 30%,  no accessory muscle use.    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 He was then admitted to [facility] for awake tracheostomy, direct laryngoscopy with biopsy and NG tube placement on 8/3.     Neuro:   -PRN oxycodone and tylenol via feeding tube, PRN Dilaudid      HEENT: s/p awake trach, 6-0 cuffed shiley placed. DLB  -HIGH RISK TRACHEOSTOMY: due to tumor burden patient would be a difficult (likely impossible) intubation from above.  -6-0 cuffed shiley in place secured with sutures and trach ties.   -High risk trach change, will do change over " exchanger   -NG tube in place and secured to right naris   - PET scan scheduled for Friday. Will need to be cognizant about not giving glucose on Thursday in preparation for scan.     Trach Tube Instructions:   1) Contact ENT on-call with any questions or concerns   2) The first changing of trach tube, sponge, and or ties will be performed by ENT  3) Perform regular suctioning   4) RN should change disposable inner canulas every shift and/or clean it with a brush   5) Keep trach tube obturator taped to wall behind the head of the bed   6) Keep extra unopened 6-0 Shiley and 4-0 Shiley at bedside at all times   7) Minimize the amount of air in the cuff needed to adequately apply positive pressure ventilate. If positive pressure ventilation is not need then the cuff should be down.   8) Do not cut trach ties or remove velcro ties      CV/H:  -Hx CHF, HTN, A. Fib, EF <30%  -Cardiology consult for assistance in managing comorbidities, appreciate assistance   -troponin 0.017  - PTA metoprolol, lisinopril   - CBC daily      FEN/GI: BMP wnl.   -NPO for now  -NG tube in appropriate position. Will start tube feeds today   -SLP consult, appreciate recs: clinical exam today,  Possibly video swallow Wednesday  -LR @ 75cc/hr  -Albumin, pre-albumin on POD#1 (albumin 2.7, prealbumin 13)  -Mg, phos, electrolytes daily  - PRN Zofran       :   -Voiding independently       Endo:  - SHE  -TSH 0.89     ID: AF, CBC wnl.   -No indication for antibiotics      PPX:  -SCDs  -Encourage ambulation       CONSULTS:   -Nutrition consult for tube feeds   -SLP  -Cardiology, pending input     Dispo: pending adequate nutrition, airway stability, PET scan Friday      -- Patient and above plan discussed with Dr. Felipe Navarro, PGY1   Otolaryngology-Head & Neck Surgery  Please contact ENT with questions by dialing [phone] and entering job code [phone] when prompted

## 2020-08-04 NOTE — PLAN OF CARE
Status: POD#0 LARYNGOSCOPY WITH BIOPSY  TRACHEOSTOMY, AND NG TUBE PLACEMENT   Vitals: VSS. On 30% trach Tent. Stating >92%.   Neuros: A&Ox4 Strengths 5/5 denies N/T, Pupils 2mm, round and reactive.   IV: PIV-Infusing LR at 125 ml/hr   Resp/trach: Shiley 6-0 cuffed inflated secured with trach ties, Suction supplies at bedside, did not suction this shift-pt coughing up secretions. Site cleaned and inner cannula changed.   Diet: NPO-until speech can see him   Bowel status: BS+ Last BM 8/3/2020 per report   : Voiding spontaneously w/o difficulty with bedside urinal.   Skin: Intact. Trach site secured with trach ties. NG tube sutured at 69cm.    Pain: Pain controlled with Tylenol   Activity: SBA/GB steady on feet   Social: Daughter will visit tomorrow and bring pts cellphone. Communicating on paper with clipboard.  Plan: Will continue to monitor and follow POC.    Ar[facility]:  [facility]  Belongings/meds:  With Patient   2 RN Skin Assessment Completed by:  Rani ANNA RN   Non-intact findings documented (yes/no/NA): NA

## 2020-08-04 NOTE — PROGRESS NOTES
Status: POD#1 LARYNGOSCOPY WITH BIOPSY  TRACHEOSTOMY, AND NG TUBE PLACEMENT  Vitals: VSS.  Sating >92%, on 30% trach dome.    Neuros: A&Ox4.  5/5 strengths throughout.  Writes to communicate.  IV: PIV infusing LR@125mL/hr.    Resp/trach: Trach Shiley #6, inflated secured with trach ties.  Bloody drainage (small amts) around trach site.  Inner cannula last changed at 0400.  Lavaged x1, strong cough.    Diet: NPO- until speech can evaluate in am  Bowel status: BS+.  LBM 8/3.  Passing flatus  : Voiding spont into bedside urinal, and also walked to toilet.    Skin: Intact.  Trach site secured with trach ties.  NG tube sutured.  Pain: Neck pain, managed with tylenol  Activity: OOBx1 this shift.  Ambulated to bathroom.  SBA/GB, steady on feet.   Plan: continue with current POC.

## 2020-08-04 NOTE — PHARMACY-CONSULT NOTE
Pharmacy Tube Feeding Consult    Medication reviewed for administration by feeding tube and for potential food/drug interactions.    Recommendation: No changes are needed at this time.     Pharmacy will continue to follow as new medications are ordered.    Jacquelyn Salter, PharmD, BCPS  8/4/2020 8:16 AM

## 2020-08-05 ENCOUNTER — APPOINTMENT (OUTPATIENT)
Dept: GENERAL RADIOLOGY | Facility: CLINIC | Age: 70
DRG: 012 | End: 2020-08-05
Attending: PHYSICIAN ASSISTANT
Payer: MEDICARE

## 2020-08-05 ENCOUNTER — APPOINTMENT (OUTPATIENT)
Dept: SPEECH THERAPY | Facility: CLINIC | Age: 70
DRG: 012 | End: 2020-08-05
Attending: OTOLARYNGOLOGY
Payer: MEDICARE

## 2020-08-05 LAB
ANION GAP SERPL CALCULATED.3IONS-SCNC: 3 MMOL/L (ref 3–14)
BUN SERPL-MCNC: 11 MG/DL (ref 7–30)
CALCIUM SERPL-MCNC: 8.4 MG/DL (ref 8.5–10.1)
CHLORIDE SERPL-SCNC: 105 MMOL/L (ref 94–109)
CO2 SERPL-SCNC: 30 MMOL/L (ref 20–32)
COPATH REPORT: NORMAL
CREAT SERPL-MCNC: 0.72 MG/DL (ref 0.66–1.25)
ERYTHROCYTE [DISTWIDTH] IN BLOOD BY AUTOMATED COUNT: 14.5 % (ref 10–15)
GFR SERPL CREATININE-BSD FRML MDRD: >90 ML/MIN/{1.73_M2}
GLUCOSE BLDC GLUCOMTR-MCNC: 126 MG/DL (ref 70–99)
GLUCOSE SERPL-MCNC: 118 MG/DL (ref 70–99)
HCT VFR BLD AUTO: 41.6 % (ref 40–53)
HGB BLD-MCNC: 13.5 G/DL (ref 13.3–17.7)
INTERPRETATION ECG - MUSE: NORMAL
MAGNESIUM SERPL-MCNC: 2.2 MG/DL (ref 1.6–2.3)
MCH RBC QN AUTO: 31.5 PG (ref 26.5–33)
MCHC RBC AUTO-ENTMCNC: 32.5 G/DL (ref 31.5–36.5)
MCV RBC AUTO: 97 FL (ref 78–100)
PHOSPHATE SERPL-MCNC: 2.6 MG/DL (ref 2.5–4.5)
PLATELET # BLD AUTO: 180 10E9/L (ref 150–450)
POTASSIUM SERPL-SCNC: 4 MMOL/L (ref 3.4–5.3)
RBC # BLD AUTO: 4.28 10E12/L (ref 4.4–5.9)
SODIUM SERPL-SCNC: 138 MMOL/L (ref 133–144)
WBC # BLD AUTO: 10.4 10E9/L (ref 4–11)

## 2020-08-05 PROCEDURE — 00000146 ZZHCL STATISTIC GLUCOSE BY METER IP

## 2020-08-05 PROCEDURE — 27210429 ZZH NUTRITION PRODUCT INTERMEDIATE LITER

## 2020-08-05 PROCEDURE — 25000132 ZZH RX MED GY IP 250 OP 250 PS 637: Mod: GY | Performed by: STUDENT IN AN ORGANIZED HEALTH CARE EDUCATION/TRAINING PROGRAM

## 2020-08-05 PROCEDURE — 92526 ORAL FUNCTION THERAPY: CPT | Mod: GN

## 2020-08-05 PROCEDURE — 25000128 H RX IP 250 OP 636: Performed by: STUDENT IN AN ORGANIZED HEALTH CARE EDUCATION/TRAINING PROGRAM

## 2020-08-05 PROCEDURE — 12000001 ZZH R&B MED SURG/OB UMMC

## 2020-08-05 PROCEDURE — 40000275 ZZH STATISTIC RCP TIME EA 10 MIN

## 2020-08-05 PROCEDURE — 25800030 ZZH RX IP 258 OP 636: Performed by: STUDENT IN AN ORGANIZED HEALTH CARE EDUCATION/TRAINING PROGRAM

## 2020-08-05 PROCEDURE — 85027 COMPLETE CBC AUTOMATED: CPT | Performed by: STUDENT IN AN ORGANIZED HEALTH CARE EDUCATION/TRAINING PROGRAM

## 2020-08-05 PROCEDURE — 80048 BASIC METABOLIC PNL TOTAL CA: CPT | Performed by: STUDENT IN AN ORGANIZED HEALTH CARE EDUCATION/TRAINING PROGRAM

## 2020-08-05 PROCEDURE — 83735 ASSAY OF MAGNESIUM: CPT | Performed by: STUDENT IN AN ORGANIZED HEALTH CARE EDUCATION/TRAINING PROGRAM

## 2020-08-05 PROCEDURE — 36415 COLL VENOUS BLD VENIPUNCTURE: CPT | Performed by: STUDENT IN AN ORGANIZED HEALTH CARE EDUCATION/TRAINING PROGRAM

## 2020-08-05 PROCEDURE — 25000125 ZZHC RX 250: Performed by: STUDENT IN AN ORGANIZED HEALTH CARE EDUCATION/TRAINING PROGRAM

## 2020-08-05 PROCEDURE — 92611 MOTION FLUOROSCOPY/SWALLOW: CPT | Mod: GN

## 2020-08-05 PROCEDURE — 74230 X-RAY XM SWLNG FUNCJ C+: CPT

## 2020-08-05 PROCEDURE — 84100 ASSAY OF PHOSPHORUS: CPT | Performed by: STUDENT IN AN ORGANIZED HEALTH CARE EDUCATION/TRAINING PROGRAM

## 2020-08-05 PROCEDURE — 40000983 ZZH STATISTIC HFNC ADULT NON-CPAP

## 2020-08-05 RX ORDER — BISACODYL 10 MG
10 SUPPOSITORY, RECTAL RECTAL DAILY PRN
Status: DISCONTINUED | OUTPATIENT
Start: 2020-08-05 | End: 2020-08-12 | Stop reason: HOSPADM

## 2020-08-05 RX ORDER — POLYETHYLENE GLYCOL 3350 17 G/17G
17 POWDER, FOR SOLUTION ORAL 2 TIMES DAILY PRN
Status: DISCONTINUED | OUTPATIENT
Start: 2020-08-05 | End: 2020-08-12 | Stop reason: HOSPADM

## 2020-08-05 RX ORDER — DOXAZOSIN 1 MG/1
1 TABLET ORAL DAILY
Status: DISCONTINUED | OUTPATIENT
Start: 2020-08-05 | End: 2020-08-06

## 2020-08-05 RX ORDER — OXYCODONE HCL 5 MG/5 ML
5-10 SOLUTION, ORAL ORAL EVERY 4 HOURS PRN
Qty: 200 ML | Refills: 0 | Status: ON HOLD | OUTPATIENT
Start: 2020-08-05 | End: 2020-08-28

## 2020-08-05 RX ORDER — LISINOPRIL 2.5 MG/1
2.5 TABLET ORAL 2 TIMES DAILY
Status: DISCONTINUED | OUTPATIENT
Start: 2020-08-05 | End: 2020-08-10

## 2020-08-05 RX ORDER — DOXAZOSIN 1 MG/1
1 TABLET ORAL DAILY
Status: DISCONTINUED | OUTPATIENT
Start: 2020-08-05 | End: 2020-08-05

## 2020-08-05 RX ADMIN — SODIUM CHLORIDE, POTASSIUM CHLORIDE, SODIUM LACTATE AND CALCIUM CHLORIDE: 600; 310; 30; 20 INJECTION, SOLUTION INTRAVENOUS at 22:35

## 2020-08-05 RX ADMIN — METOPROLOL TARTRATE 25 MG: 100 TABLET ORAL at 08:46

## 2020-08-05 RX ADMIN — ENOXAPARIN SODIUM 40 MG: 40 INJECTION SUBCUTANEOUS at 16:21

## 2020-08-05 RX ADMIN — METOPROLOL TARTRATE 25 MG: 100 TABLET ORAL at 13:16

## 2020-08-05 RX ADMIN — SODIUM CHLORIDE, POTASSIUM CHLORIDE, SODIUM LACTATE AND CALCIUM CHLORIDE: 600; 310; 30; 20 INJECTION, SOLUTION INTRAVENOUS at 13:13

## 2020-08-05 RX ADMIN — LISINOPRIL 2.5 MG: 2.5 TABLET ORAL at 20:55

## 2020-08-05 RX ADMIN — Medication 12.5 MG: at 08:46

## 2020-08-05 RX ADMIN — Medication 6 MG: at 22:35

## 2020-08-05 RX ADMIN — MULTIVITAMIN 15 ML: LIQUID ORAL at 08:46

## 2020-08-05 RX ADMIN — METOPROLOL TARTRATE 25 MG: 100 TABLET ORAL at 01:38

## 2020-08-05 RX ADMIN — METOPROLOL TARTRATE 25 MG: 100 TABLET ORAL at 20:55

## 2020-08-05 RX ADMIN — ACETAMINOPHEN 650 MG: 325 SOLUTION ORAL at 16:21

## 2020-08-05 RX ADMIN — LISINOPRIL 2.5 MG: 2.5 TABLET ORAL at 08:46

## 2020-08-05 RX ADMIN — DOXAZOSIN 1 MG: 1 TABLET ORAL at 16:21

## 2020-08-05 ASSESSMENT — ACTIVITIES OF DAILY LIVING (ADL)
ADLS_ACUITY_SCORE: 13

## 2020-08-05 ASSESSMENT — MIFFLIN-ST. JEOR: SCORE: 1612.91

## 2020-08-05 NOTE — PLAN OF CARE
Status: POD#2 LARYNGOSCOPY WITH BIOPSY  TRACHEOSTOMY, AND NG TUBE PLACEMENT  Vitals: VSS on trach dome at 21% on continuous o2 monitoring.  Neuros: Alert and oriented x4. Denies N/T. 5/5 throughout. C   IV: PIV infusing LR at 125mL/hr.   Resp/trach: Trach shiley #6 cuff down, secured with trach ties and sutured. Moderate amount of clear, red streaked secretions. Inner cannula and trach cares done x2 this shift. No suction needed this shift.   Diet: Swallow study done today. Regular diet with nectar thick liquid with strict use of chin tuck with swallowing. See speech note for details. TF running @40mL/hr .   Bowel status: BS+ No BM this shift.   : Voiding but having issues with retention. PVR 130mL at 1330.   Skin: Small amount of bloody drainage around trach site. Reddened and swollen around site.   Pain: Denies pain this shift.   Activity: SBA for lines.   Social: Daughter at bedside. Communicates writing on board.   Plan: Increase TF to 50mL/hr @ 2100. PET scan scheduled for 8/7. PLC scheduled. Social work consult put in.  Continue to monitor and follow POC

## 2020-08-05 NOTE — PROGRESS NOTES
"Otolaryngology Progress Note  August 4, 2020  24 hr events:   -cardiology consult  - advancing TF   - Urinary retention-> straight cath x1     S: No acute events overnight. No SOB, breathing comfortably with trach. Pain controlled. Having difficulty urinating, feels need to go but unable to. Never had urinary retention issues in the past. Tolerating TF without nausea or vomiting.     O: /83 (BP Location: Right arm)   Pulse 94   Temp 98.2  F (36.8  C) (Oral)   Resp 16   Ht 1.765 m (5' 9.5\")   Wt 84.5 kg (186 lb 3.2 oz)   SpO2 97%   BMI 27.10 kg/m     General: Alert and oriented x 3, resting comfortably. Interactive in discussion.    HEENT: EOMI. HB 1/6. 6-0 cuffed Shiley in place, cuff down. Strong cough. Neck is soft, no palpable crepitus or hematoma.    Pulmonary: 6-0 cuffed shiley in place on TD at 30%,  no accessory muscle use.    Intake/Output Summary (Last 24 hours) at 8/4/2020 1121  Last data filed at 8/4/2020 1100  Gross per 24 hour   Intake 2851.25 ml   Output 260 ml   Net 2591.25 ml     LABS:  BMP  Recent Labs   Lab 08/05/20  0625 08/04/20  0602    139   POTASSIUM 4.0 4.5   CHLORIDE 105 104   DONNIE 8.4* 8.3*   CO2 30 31   BUN 11 13   CR 0.72 0.91   * 130*     CBC  Recent Labs   Lab 08/05/20  0625 08/04/20  0602   WBC 10.4 12.0*   RBC 4.28* 4.29*   HGB 13.5 13.4   HCT 41.6 42.2   MCV 97 98   MCH 31.5 31.2   MCHC 32.5 31.8   RDW 14.5 14.6    201     A/P: Evin Sterling is a 70 year old male with a past medical history of CHF (EF<30%), HTN, afib, GERD, 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 He was then admitted to [facility] for awake tracheostomy, direct laryngoscopy with biopsy and NG tube placement on 8/3.     Neuro:   -PRN oxycodone and tylenol via feeding tube, PRN Dilaudid      HEENT: s/p awake trach, 6-0 cuffed shiley " placed. DLB  -HIGH RISK TRACHEOSTOMY: due to tumor burden patient would be a difficult (likely impossible) intubation from above.  -6-0 cuffed shiley in place secured with sutures and trach ties.   -High risk trach change, will do change over exchanger   -NG tube in place and secured to right naris   - PET scan scheduled for Friday. Will need to be cognizant about not giving glucose on Thursday in preparation for scan.     Trach Tube Instructions:   1) Contact ENT on-call with any questions or concerns   2) The first changing of trach tube, sponge, and or ties will be performed by ENT  3) Perform regular suctioning   4) RN should change disposable inner canulas every shift and/or clean it with a brush   5) Keep trach tube obturator taped to wall behind the head of the bed   6) Keep extra unopened 6-0 Shiley and 4-0 Shiley at bedside at all times   7) Minimize the amount of air in the cuff needed to adequately apply positive pressure ventilate. If positive pressure ventilation is not need then the cuff should be down.   8) Do not change trach ties  9) Do not remove mepilex from under trach faceplate.      CV/H:  -Hx CHF, HTN, A. Fib, EF <30%  -Cardiology consult, appreciate assistance    - continue metoprolol XL 25 mg daily, titrate up as tolerated   - increase lisinopril to 2.5 mg BID   - add spironolactone 12.5 mg daily    - EKG (will follow-up read with cards)   - set up outpatient follow up with cardiology with repeat ECHO after 3 months of  medical management  - PTA metoprolol, lisinopril   - CBC daily      FEN/GI: BMP wnl. Last BM preop  -NPO for now  -NG tube in appropriate position. Advancing TF to goal of 65mL/hr  -SLP consult, appreciate recs: plan for video swallow today   -LR @ 125cc/hr  -Albumin, pre-albumin on POD#1 (albumin 2.7, prealbumin 13)  -Mg, phos, electrolytes daily  - PRN Zofran    - BR: PRN senna, miralax and suppository.      : urinary retention req. Straight cath x1 overnight   -continue  q4hr bladder scans if no void   - added doxazosin 1mg daily      Endo:  - SHE  -TSH 0.89     ID: AF, CBC wnl.   -No indication for antibiotics      PPX:  -SCDs  -Encourage ambulation       CONSULTS:   -Nutrition consult for tube feeds   -SLP  -Cardiology  - PLC: pending scheduling     Dispo: pending adequate nutrition, airway stability, PET scan Friday      -- Patient and above plan discussed with Dr. Felipe Navarro, PGY1   Otolaryngology-Head & Neck Surgery  Please contact ENT with questions by dialing [phone] and entering job code [phone] when prompted

## 2020-08-05 NOTE — PROGRESS NOTES
Care Coordinator - Discharge Planning    Admission Date/Time:  8/3/2020  Attending MD:  Caron Wang MD     Data  Chart reviewed, discussed with interdisciplinary team.   Patient was admitted for:   1. Laryngeal cancer (H)    2. Laryngeal cancer (H)         Assessment   Concerns with insurance coverage for discharge needs: insurance coverage is inadequate.  Current Living Situation: Patient lives with dtr Jesica and her spouse.  Support System: Supportive and Involved  Services Involved: None  Transportation at Discharge: Family or friend will provide  Transportation to Medical Appointments:    - Name of caregiver: Self and daughters  Barriers to Discharge: Medical stabiity, PLC, ability to manage enteral and trach cares, lack of insurance coverage    Pt s/p laryngoscopy with biopsy tracheostomy and NG tube placement.  PLC on 8/7 for home enteral and PLC on 8/9 for trach cares.   PET scan scheduled for Friday 8/7.      Met briefly with pt and his daughter Jesica.   Introduced RNCC role.  Per discussion with pt and his daughter pt will be living with his daughter and her family.  Pt daughter does work full time.  Pt would be home alone for up to 10 hours per day.  Pt family is looking into options to support pt to get to and from future medical appointments.  Pt and his daughter note concerns with finaical coverage for home care, DME and infusion supplies.   Pt has Medicare A only.  Pt can apply for Medicare B but has to wait until next year in July.  Patient and his daughter have not spoken with a FC regarding other insurance options - ie MA.  Agreed to f/u with FC.  Pt is ok with FC calling Jesica to discuss insurance options.    Pt and daughter ok with starting referrals for home enteral and DME as patient may be MA pending and or private pay and will need to know out of pocket costs.      Email sent to MILL[facility]ridge regarding possible need for MA.  Spoke with Corinne, [facility] who needs to confirm if  this agency can accept a patient as MA pending.        Will defer to primary RNCC for further f/u.      Coordination of Care and Referrals: Provided patient/family with options for DME, Home Care and Home Infusion.      Plan  Anticipated Discharge Date: TBD  Anticipated Discharge Plan:  TBD    Vikki Luque, RN BSN, PHN, ACM-RN  RN Care Coordinator  Internal Medicine    8/5/2020 4:02 PM

## 2020-08-05 NOTE — PLAN OF CARE
Status: POD #2 laryngoscopy with biopsy, awake  trach placement, and NG placement   Vitals: VSS on 21% HTD, on continuous pulse ox   Neuros: Intact, writes to communicate   IV: PIV infusing LR @ 125 ml/hr  Resp/trach: Diminished, no WOB noted. Good productive cough with moderate red-streaked thick secretions. No suctioning required overnight. Changed inner cannula x2. #6 shiley, cuff deflated. Trach ties in place  Diet: NPO. TF @ 30 ml/hr. Can advance to 40 @ 1100 today if labs WNL.   Bowel status: No BM since surgery  : Voiding spontaneously via urinal. Complained of feeling as though always needing to urinate. Bladder scanned for 985 ml. Straight cath'd for 700 ml overnight, notified MDs  Skin: Trach site reddened with small amount of serosanguinous drainage. Trach ties secure. Cleansed per order  Pain: Denied overnight   Activity: SBA d/t lines  Plan: Swallow study with speech today. PLC consult in, not scheduled. PET scan 7/8. Continue to monitor and follow POC

## 2020-08-05 NOTE — PLAN OF CARE
Discharge Planner SLP   Patient plan for discharge: none stated   Current status: Video swallow study completed per MD orders. Pt presents with moderate-severe oropharyngeal dysphagia under fluroscopy in the setting of known glottic mass. Pt with Shiley #6 cuffed trach in place with cuff deflated for exam.  Pt's swallow function characterized by adequate time in oral phase, premature pharyngeal entry, and reduced hyolaryngeal elevation. Pt with consistent hussein silent aspiration across PO trials with head in neutral position. Chin tuck greatly improved airway protection and eliminated penetration/aspiration with nectar thick liquids, pureed, and regular solid textures. Despite chin tuck strategy, pt with persisting silent aspiration on thin liquids. Pt was unable to fully clear aspirated residuals from trachea due to suspected inadequate airflow into upper airway. In AP view, pt with evidence of stasis in upper esophagus which eventually cleared with double swallows and increased time. Recommend regular textures and nectar thick liquids with strict use of chin tuck strategy every bite and sip. Pt should be fully upright for all PO, use chin tuck every swallow, alternate between consistencies, pace self, and double swallow each bite. ST to continue to follow targeting diet tolerance and strategy training. Pt will require close OP ST follow up at ENT clinic upon discharge.   Barriers to return to prior living situation: dysphagia, trach, TF, medical readiness   Recommendations for discharge: home with OP ST follow up at ENT clinic   Rationale for recommendations: pt would benefit from continued ST to assess diet tolerance and train compensatory swallow strategies to minimize aspiration risk.        Entered by: Priscilla Abdi 08/05/2020 12:10 PM

## 2020-08-05 NOTE — PLAN OF CARE
Status: POD#1 LARYNGOSCOPY WITH BIOPSY  TRACHEOSTOMY, AND NG TUBE PLACEMENT   Vitals: VSS. On 30% trach Dome. Stating >92%.   Neuros: A&Ox4 Strengths 5/5 denies N/T, Pupils 2mm, round and reactive.   IV: PIV-Infusing LR at 125 ml/hr   Resp/trach: Shiley 6-0 cuffed deflated secured with trach ties, Suction supplies at bedside, Suction x2-pt coughing up secretions well. Site cleaned and inner cannula changed x2  Diet: NPO-Tube feed currently at 20ml/hr, Increase tube feed at 3:00am to 30ml/hr    Bowel status: BS+ Last BM 8/3/2020 per report   : Voiding spontaneously w/o difficulty with bedside urinal.   Skin: Intact. Trach site secured with trach ties. Small amount of drainage around trach site.   Pain: Tolerable pain    Activity: SBA for lines   Social: Visited with daughter this evening. Communicating on paper with clipboard.  Plan: Will continue to monitor and follow POC. Pet Scan scheduled for 8/7.

## 2020-08-05 NOTE — PROGRESS NOTES
"   08/05/20 1136   General Information   Onset Date 08/03/20   Start of Care Date 08/05/20   Referring Physician Joy Angeles MD   Patient Profile Review/OT: Additional Occupational Profile Info See Profile for full history and prior level of function   Patient/Family Goals Statement Pt did not state   Swallowing Evaluation Videofluoroscopic evaluation   Behaviorial Observations WNL (within normal limits)   Mode of current nutrition NPO;NJ   Respiratory Status Tracheostomy;Room air  (Shiley #6 cuffed trach-- cuff deflated on RA)   Comments  Evin Sterling is a 70 year old male with a past medical history of CHF (EF<30%), HTN, afib, GERD, 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 He was then admitted to [facility] for awake tracheostomy, direct laryngoscopy with biopsy and NG tube placement on 8/3. Pt with recent OP clinical swallow eval at ENT clinic with recommendations for regular textures and thin liquids. Pt now with Shiley #6 cuffed trach in place. Pt's trach cuff deflated and on RA for swallow eval. Pt reports solid foods go \"OK\" but he does endorse difficulty swallowing liquids. Video swallow study completed per MD orders to further assess oropharyngeal swallowing mechanism.    Clinical Swallow Evaluation   Oral Musculature generally intact   Structural Abnormalities none present   Dentition present and adequate   Mucosal Quality adequate   Mandibular Strength and Mobility intact   Oral Labial Strength and Mobility WFL   Lingual Strength and Mobility WFL   Velar Elevation intact   Buccal Strength and Mobility intact   Laryngeal Function Cough;Voicing initiated;Throat clear;Swallow  (strangled voicing with brief finger occlusion)   VFSS Evaluation   VFSS Additional Documentation Yes   VFSS Eval: Radiology   Radiologist [facility] staff radiologist    Views Taken left " lateral;A/P   Physical Location of Procedure radiology suite #5   VFSS Eval: Thin Liquid Texture Trial   Mode of Presentation, Thin Liquid cup;spoon;self-fed;fed by clinician   Order of Presentation 1, 5, 6, 10 (AP view),11   Preparatory Phase WFL   Oral Phase, Thin Liquid Premature pharyngeal entry   Pharyngeal Phase, Thin Liquid Delayed swallow reflex;Residue in pyriform sinus   Rosenbek's Penetration Aspiration Scale: Thin Liquid Trial Results 8 - contrast passes glottis, visible subglottic residue remains, absent patient response (aspiration)   Response to Aspiration absent response, silent aspiration   Diagnostic Statement Thin liquids via spoon and cup resulted in hussein silent aspiration with and without chin tuck strategy. Pt was unable to fully clear aspirated residuals due to suspected inadequate airflow into upper airway   VFSS Eval: Nectar Thick Liquid Texture Trial   Mode of Presentation, Nectar cup;self-fed   Order of Presentation 2, 3, 4   Preparatory Phase WFL   Oral Phase, Nectar Premature pharyngeal entry   Pharyngeal Phase, Nectar Delayed swallow reflex;Residue in pyriform sinus   Rosenbek's Penetration Aspiration Scale: Nectar-Thick Liquid Trial Results 8 - contrast passes glottis, visible subglottic residue remains, absent patient response (aspiration)   Response to Aspiration, Nectar absent response, silent aspiration   Successful Strategies Trialed During Procedure, Nectar chin tuck   Diagnostic Statement Nectar thick liquids via cup resulted in hussein silent aspiration. Pt was unable to fully clear aspirated residuals despite cues to cough. Pt with improved airway protection with chin tuck with no evidence of aspiration or penetration   VFSS Eval: Puree Solid Texture Trial   Mode of Presentation, Puree spoon;self-fed   Order of Presentation 7, 8, 11   Preparatory Phase WFL   Oral Phase, Puree Premature pharyngeal entry   Pharyngeal Phase, Puree Residue in pyriform sinus;WFL   Rosenbek's  Penetration Aspiration Scale: Puree Food Trial Results 8 - contrast passes glottis, visible subglottic residue remains, absent patient response (aspiration)   Response to Aspiration, Puree absent response, silent aspiration   Diagnostic Statement Pureed textures resulted in hussein silent aspiration. Chin tuck improved airway protection and no evidence of aspiration or penetration.   VFSS Eval: Solid Food Texture Trial   Mode of Presentation, Solid self-fed;spoon   Order of Presentation 9   Preparatory Phase WFL   Oral Phase, Solid WFL   Pharyngeal Phase, Solid WFL;Residue in pyriform sinus   Rosenbek's Penetration Aspiration Scale: Solid Food Trial Results 1 - no aspiration, contrast does not enter airway   Diagnostic Statement No aspiration or penetration with chin tuck strategy. Mild amounts of pharyngeal residuals    FEES Evaluation   Additional Documentation No   Swallow Compensations   Swallow Compensations Alternate viscosity of consistencies;Chin tuck;Pacing;Reduce amounts;Multiple swallow   Results Aspiration   Esophageal Phase of Swallow   Patient reports or presents with symptoms of esophageal dysphagia Yes   Esophageal comments Pt with upper esophageal stasis noted in AP view. Pt was eventually able to clear residuals with double swallows and increased time   General Therapy Interventions   Planned Therapy Interventions Dysphagia Treatment   Dysphagia treatment Compensatory strategies for swallowing;Instruction of safe swallow strategies;Modified diet education;Oropharyngeal exercise training   Swallow Eval: Clinical Impressions   Skilled Criteria for Therapy Intervention Skilled criteria met.  Treatment indicated.   Dysphagia Outcome Severity Scale (SWAPNA) Level 2 - SWAPNA   Treatment Diagnosis moderate-severe oropharyngeal dysphagia    Diet texture recommendations Regular diet;Nectar thick liquids  (w/strict use of chin tuck strategy)   Recommended Feeding/Eating Techniques alternate between small bites and  sips of food/liquid;check mouth frequently for oral residue/pocketing;hard swallow w/ each bite or sip;maintain upright posture during/after eating for 30 mins;small sips/bites;tuck chin during every swallow   Demonstrates Need for Referral to Another Service dietitian;occupational therapy;respiratory therapy;physical therapy   Therapy Frequency 5x/week   Predicted Duration of Therapy Intervention (days/wks) 3 weeks   Anticipated Discharge Disposition home w/ outpatient services   Risks and Benefits of Treatment have been explained. Yes   Patient, family and/or staff in agreement with Plan of Care Yes   Clinical Impression Comments Video swallow study completed per MD orders. Pt presents with moderate-severe oropharyngeal dysphagia under fluroscopy in the setting of known glottic mass. Pt with Shiley #6 cuffed trach in place with cuff deflated for exam.  Pt's swallow function characterized by adequate time in oral phase, premature pharyngeal entry, and reduced hyolaryngeal elevation. Pt with consistent hussein silent aspiration across PO trials with head in neutral position. Chin tuck greatly improved airway protection and eliminated penetration/aspiration with nectar thick liquids, pureed, and regular solid textures. Despite chin tuck strategy, pt with persisting silent aspiration on thin liquids. Pt was unable to fully clear aspirated residuals from trachea due to suspected inadequate airflow into upper airway. In AP view, pt with evidence of stasis in upper esophagus which eventually cleared with double swallows and increased time. Recommend regular textures and nectar thick liquids with strict use of chin tuck strategy every bite and sip. Pt should be fully upright for all PO, use chin tuck every swallow, alternate between consistencies, pace self, and double swallow each bite. ST to continue to follow targeting diet tolerance and strategy training. Pt will require close OP ST follow up at ENT clinic upon  discharge.    Total Evaluation Time   Total Evaluation Time (Minutes) 20

## 2020-08-06 ENCOUNTER — APPOINTMENT (OUTPATIENT)
Dept: SPEECH THERAPY | Facility: CLINIC | Age: 70
DRG: 012 | End: 2020-08-06
Attending: OTOLARYNGOLOGY
Payer: MEDICARE

## 2020-08-06 LAB
ALBUMIN UR-MCNC: 10 MG/DL
AMORPH CRY #/AREA URNS HPF: ABNORMAL /HPF
ANION GAP SERPL CALCULATED.3IONS-SCNC: 4 MMOL/L (ref 3–14)
APPEARANCE UR: ABNORMAL
BILIRUB UR QL STRIP: NEGATIVE
BUN SERPL-MCNC: 12 MG/DL (ref 7–30)
CALCIUM SERPL-MCNC: 8.2 MG/DL (ref 8.5–10.1)
CHLORIDE SERPL-SCNC: 106 MMOL/L (ref 94–109)
CO2 SERPL-SCNC: 30 MMOL/L (ref 20–32)
COLOR UR AUTO: YELLOW
CREAT SERPL-MCNC: 0.66 MG/DL (ref 0.66–1.25)
ERYTHROCYTE [DISTWIDTH] IN BLOOD BY AUTOMATED COUNT: 14.8 % (ref 10–15)
GFR SERPL CREATININE-BSD FRML MDRD: >90 ML/MIN/{1.73_M2}
GLUCOSE SERPL-MCNC: 104 MG/DL (ref 70–99)
GLUCOSE UR STRIP-MCNC: NEGATIVE MG/DL
HCT VFR BLD AUTO: 37.6 % (ref 40–53)
HGB BLD-MCNC: 12 G/DL (ref 13.3–17.7)
HGB UR QL STRIP: ABNORMAL
KETONES UR STRIP-MCNC: NEGATIVE MG/DL
LEUKOCYTE ESTERASE UR QL STRIP: ABNORMAL
MAGNESIUM SERPL-MCNC: 2.1 MG/DL (ref 1.6–2.3)
MCH RBC QN AUTO: 31.3 PG (ref 26.5–33)
MCHC RBC AUTO-ENTMCNC: 31.9 G/DL (ref 31.5–36.5)
MCV RBC AUTO: 98 FL (ref 78–100)
MUCOUS THREADS #/AREA URNS LPF: PRESENT /LPF
NITRATE UR QL: NEGATIVE
PH UR STRIP: 7.5 PH (ref 5–7)
PHOSPHATE SERPL-MCNC: 2.6 MG/DL (ref 2.5–4.5)
PLATELET # BLD AUTO: 164 10E9/L (ref 150–450)
POTASSIUM SERPL-SCNC: 4.1 MMOL/L (ref 3.4–5.3)
RBC # BLD AUTO: 3.84 10E12/L (ref 4.4–5.9)
RBC #/AREA URNS AUTO: 34 /HPF (ref 0–2)
SODIUM SERPL-SCNC: 140 MMOL/L (ref 133–144)
SOURCE: ABNORMAL
SP GR UR STRIP: 1.01 (ref 1–1.03)
UROBILINOGEN UR STRIP-MCNC: 2 MG/DL (ref 0–2)
WBC # BLD AUTO: 8.8 10E9/L (ref 4–11)
WBC #/AREA URNS AUTO: 3 /HPF (ref 0–5)

## 2020-08-06 PROCEDURE — 80048 BASIC METABOLIC PNL TOTAL CA: CPT | Performed by: OTOLARYNGOLOGY

## 2020-08-06 PROCEDURE — 27210429 ZZH NUTRITION PRODUCT INTERMEDIATE LITER

## 2020-08-06 PROCEDURE — 40000047 ZZH STATISTIC CTO2 CONT OXYGEN TECH TIME EA 90 MIN

## 2020-08-06 PROCEDURE — 92526 ORAL FUNCTION THERAPY: CPT | Mod: GN

## 2020-08-06 PROCEDURE — 25000125 ZZHC RX 250: Performed by: STUDENT IN AN ORGANIZED HEALTH CARE EDUCATION/TRAINING PROGRAM

## 2020-08-06 PROCEDURE — 40000275 ZZH STATISTIC RCP TIME EA 10 MIN

## 2020-08-06 PROCEDURE — 83735 ASSAY OF MAGNESIUM: CPT | Performed by: OTOLARYNGOLOGY

## 2020-08-06 PROCEDURE — 84100 ASSAY OF PHOSPHORUS: CPT | Performed by: OTOLARYNGOLOGY

## 2020-08-06 PROCEDURE — 36415 COLL VENOUS BLD VENIPUNCTURE: CPT | Performed by: OTOLARYNGOLOGY

## 2020-08-06 PROCEDURE — 25800030 ZZH RX IP 258 OP 636: Performed by: STUDENT IN AN ORGANIZED HEALTH CARE EDUCATION/TRAINING PROGRAM

## 2020-08-06 PROCEDURE — 25000132 ZZH RX MED GY IP 250 OP 250 PS 637: Mod: GY | Performed by: STUDENT IN AN ORGANIZED HEALTH CARE EDUCATION/TRAINING PROGRAM

## 2020-08-06 PROCEDURE — 12000001 ZZH R&B MED SURG/OB UMMC

## 2020-08-06 PROCEDURE — 81001 URINALYSIS AUTO W/SCOPE: CPT | Performed by: STUDENT IN AN ORGANIZED HEALTH CARE EDUCATION/TRAINING PROGRAM

## 2020-08-06 PROCEDURE — 40000141 ZZH STATISTIC PERIPHERAL IV START W/O US GUIDANCE

## 2020-08-06 PROCEDURE — 85027 COMPLETE CBC AUTOMATED: CPT | Performed by: OTOLARYNGOLOGY

## 2020-08-06 PROCEDURE — 25000128 H RX IP 250 OP 636: Performed by: STUDENT IN AN ORGANIZED HEALTH CARE EDUCATION/TRAINING PROGRAM

## 2020-08-06 RX ORDER — TAMSULOSIN HYDROCHLORIDE 0.4 MG/1
0.4 CAPSULE ORAL DAILY
Status: DISCONTINUED | OUTPATIENT
Start: 2020-08-07 | End: 2020-08-06

## 2020-08-06 RX ORDER — TAMSULOSIN HYDROCHLORIDE 0.4 MG/1
0.4 CAPSULE ORAL DAILY
Status: DISCONTINUED | OUTPATIENT
Start: 2020-08-06 | End: 2020-08-12 | Stop reason: HOSPADM

## 2020-08-06 RX ORDER — SODIUM CHLORIDE 9 MG/ML
INJECTION, SOLUTION INTRAVENOUS CONTINUOUS
Status: DISCONTINUED | OUTPATIENT
Start: 2020-08-07 | End: 2020-08-07

## 2020-08-06 RX ADMIN — DOXAZOSIN 1 MG: 1 TABLET ORAL at 08:10

## 2020-08-06 RX ADMIN — METOPROLOL TARTRATE 25 MG: 100 TABLET ORAL at 08:11

## 2020-08-06 RX ADMIN — MULTIVITAMIN 15 ML: LIQUID ORAL at 08:11

## 2020-08-06 RX ADMIN — METOPROLOL TARTRATE 25 MG: 100 TABLET ORAL at 02:14

## 2020-08-06 RX ADMIN — ACETAMINOPHEN 650 MG: 325 SOLUTION ORAL at 22:16

## 2020-08-06 RX ADMIN — LISINOPRIL 2.5 MG: 2.5 TABLET ORAL at 20:25

## 2020-08-06 RX ADMIN — Medication 12.5 MG: at 08:11

## 2020-08-06 RX ADMIN — TAMSULOSIN HYDROCHLORIDE 0.4 MG: 0.4 CAPSULE ORAL at 20:25

## 2020-08-06 RX ADMIN — ENOXAPARIN SODIUM 40 MG: 40 INJECTION SUBCUTANEOUS at 16:30

## 2020-08-06 RX ADMIN — SODIUM CHLORIDE: 9 INJECTION, SOLUTION INTRAVENOUS at 23:57

## 2020-08-06 RX ADMIN — LISINOPRIL 2.5 MG: 2.5 TABLET ORAL at 08:10

## 2020-08-06 RX ADMIN — METOPROLOL TARTRATE 25 MG: 100 TABLET ORAL at 13:21

## 2020-08-06 RX ADMIN — ACETAMINOPHEN 650 MG: 325 SOLUTION ORAL at 06:13

## 2020-08-06 RX ADMIN — ACETAMINOPHEN 650 MG: 325 SOLUTION ORAL at 13:20

## 2020-08-06 RX ADMIN — ACETAMINOPHEN 650 MG: 325 SOLUTION ORAL at 17:21

## 2020-08-06 RX ADMIN — METOPROLOL TARTRATE 25 MG: 100 TABLET ORAL at 20:26

## 2020-08-06 ASSESSMENT — ACTIVITIES OF DAILY LIVING (ADL)
ADLS_ACUITY_SCORE: 13

## 2020-08-06 ASSESSMENT — VISUAL ACUITY
OU: NORMAL ACUITY
OU: NORMAL ACUITY

## 2020-08-06 NOTE — PLAN OF CARE
Status: POD#1 LARYNGOSCOPY WITH BIOPSY  TRACHEOSTOMY, AND NG TUBE PLACEMENT   Vitals: VSS. On 30% trach Dome. Stating >92%.   Neuros: A&Ox4 Strengths 5/5 denies N/T, Pupils 2mm, round and reactive.   IV: PIV-Infusing LR at 125 ml/hr   Resp/trach: Shiley 6-0 cuffed deflated secured with trach ties and sutured. Suction supplies at bedside, Suction x3 for thick creamy secreations. Pt having more secretions this shift. MDs notified. Site cleaned and inner cannula changed x2  Diet: Regular diet/ Nector thick liquids. Tube feed currently at 40ml/hr, Increase tube feed at 0500 to 65ml/hr  (goal)  Bowel status: BM this shift per patient report   : bladder scanned for 919cc at 2000 after voiding small amounts. Straight cathed for 900cc.   Skin: Intact. Trach site secured with trach ties. Small amount of drainage around trach site. Mepilex under trach changed per ENT orders.  Pain: Tolerable pain . Tylenol given x1   Activity: SBA for lines. Walked the unit this shift x2   Social: Visited with daughter this evening. Communicating on paper with clipboard.  Plan: Will continue to monitor and follow POC. Pet Scan scheduled for 8/7.

## 2020-08-06 NOTE — PLAN OF CARE
Discharge Planner SLP   Patient plan for discharge: none stated   Current status: Pt tolerated small amounts of nectar thick liquids and pureed textures with chin tuck strategy with no overt s/sx of aspiration. Pt completed oropharyngeal exercises and education pt on guidelines for thickening liquids. Recommend pt continue regular textures and nectar thick liquids with strict use of chin tuck strategy every bite and sip. Pt should be fully upright for all PO, chin tuck every swallow, slow pacing, and alternate between consistencies. ST to continue to follow.   Barriers to return to prior living situation: dysphagia, trach, TF, medical readiness   Recommendations for discharge: pt will require OP ST follow up at ENT clinic   Rationale for recommendations: pt below baseline oropharyngeal swallowing skills.       Entered by: Priscilla Abdi 08/06/2020 9:07 AM

## 2020-08-06 NOTE — PROGRESS NOTES
"Otolaryngology Progress Note  August 6, 2020  24 hr events:   - Persistent urinary retention  - Video swallow: OK for regular diet with nectar thick liquids with chin tuck     S: No acute events overnight. Difficulty urinating has never been an issue. Prior to coming peed several times a day small amounts. Now urination becoming painful. He would like to walk more. Taking small oral intake denies coughing. Tolerating TF. No issues with SOB.     O: /59 (BP Location: Left arm)   Pulse 76   Temp 97.3  F (36.3  C) (Axillary)   Resp 16   Ht 1.765 m (5' 9.5\")   Wt 84.5 kg (186 lb 3.2 oz)   SpO2 95%   BMI 27.10 kg/m     General: Alert and oriented x 3, resting comfortably. Interactive in discussion.    HEENT: EOMI. HB 1/6. 6-0 cuffed Shiley in place, cuff down. Strong cough. Neck is soft, no palpable crepitus or hematoma.    Pulmonary: 6-0 cuffed shiley in place on TD at 30%,  no accessory muscle use.    Intake/Output Summary (Last 24 hours) at 8/4/2020 1121  Last data filed at 8/4/2020 1100  Gross per 24 hour   Intake 2851.25 ml   Output 260 ml   Net 2591.25 ml     LABS:  BMP  Recent Labs   Lab 08/05/20  0625 08/04/20  0602    139   POTASSIUM 4.0 4.5   CHLORIDE 105 104   DONNIE 8.4* 8.3*   CO2 30 31   BUN 11 13   CR 0.72 0.91   * 130*     CBC  Recent Labs   Lab 08/05/20  0625 08/04/20  0602   WBC 10.4 12.0*   RBC 4.28* 4.29*   HGB 13.5 13.4   HCT 41.6 42.2   MCV 97 98   MCH 31.5 31.2   MCHC 32.5 31.8   RDW 14.5 14.6    201     A/P: Evin Sterling is a 70 year old male with a past medical history of CHF (EF<30%), HTN, afib, GERD, 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 He was then admitted to [facility] for awake tracheostomy, direct laryngoscopy with biopsy and NG tube placement on 8/3.     Neuro:   -PRN oxycodone and tylenol via feeding tube, " PRN Dilaudid      HEENT: s/p awake trach, 6-0 cuffed shiley placed. DLB. Will plan for bedside trach change to 6-0 cuffless tomorrow.   -HIGH RISK TRACHEOSTOMY: due to tumor burden patient would be a difficult (likely impossible) intubation from above.  -6-0 cuffed shiley in place secured with sutures and trach ties.   -High risk trach change, will do change over exchanger   -NG tube in place and secured to right naris   - PET scan scheduled for Friday. Will need to be cognizant about not giving glucose today in preparation for scan.     Trach Tube Instructions:   1) Contact ENT on-call with any questions or concerns   2) The first changing of trach tube, sponge, and or ties will be performed by ENT  3) Perform regular suctioning   4) RN should change disposable inner canulas every shift and/or clean it with a brush   5) Keep trach tube obturator taped to wall behind the head of the bed   6) Keep extra unopened 6-0 Shiley and 4-0 Shiley at bedside at all times   7) Minimize the amount of air in the cuff needed to adequately apply positive pressure ventilate. If positive pressure ventilation is not need then the cuff should be down.   8) Do not change trach ties  9) OK to change mepilex on chest as long as trach face plate is not manipulated.      CV/H:  -Hx CHF, HTN, A. Fib, EF <30%  -Cardiology consult, appreciate assistance    - continue metoprolol XL 25 mg daily, titrate up as tolerated   - increase lisinopril to 2.5 mg BID   - add spironolactone 12.5 mg daily    - EKG (will follow-up read with cards)   - set up outpatient follow up with cardiology with repeat ECHO after 3 months of  medical management  - PTA metoprolol, lisinopril   - CBC daily      FEN/GI: BMP wnl. Last BM 8,5  -NPO for now  -NG tube in appropriate position. TF at goal 65mL, will hold TF at midnight tonight in preparation for PET tomorrow. Will start NS at that time.   -SLP consult, appreciate recs: regular textures and nectar thick liquids  with strict use of chin tuck strategy every bite and sip. Pt should be fully upright for all PO, chin tuck every swallow, slow pacing, and alternate between consistencies  -LR @ 125cc/hr  -Albumin, pre-albumin on POD#1 (albumin 2.7, prealbumin 13)  -Mg, phos, electrolytes daily  - PRN Zofran    - BR: PRN senna, miralax and suppository.      : ongoing urinary retention, intermittently requiring straight cath. Says he was having frequent low output voids preop.   -continue q4hr bladder scans if no void   - added doxazosin 1mg daily yesterday with minimal improvement. Discussed patient and case with urology today who recommended increased ambulation, limiting narcotic use, and transitioning to flomax if able.     Endo:  - SHE  -TSH 0.89     ID: AF, CBC wnl.   -No indication for antibiotics      PPX:  -SCDs  -Encourage ambulation       CONSULTS:   -Nutrition consult for tube feeds   -SLP  -Cardiology  - PLC: trach and TF scheduled for 8/7 at 12 and 1PM    Dispo: pending adequate nutrition, airway stability, PET scan Friday      -- Patient and above plan discussed with Dr. Felipe Navarro, PGY1   Otolaryngology-Head & Neck Surgery  Please contact ENT with questions by dialing [phone] and entering job code [phone] when prompted

## 2020-08-06 NOTE — PLAN OF CARE
Status: POD#3 LARYNGOSCOPY WITH BIOPSY  TRACHEOSTOMY, AND NG TUBE PLACEMENT  Vitals: VSS on trach dome at 28% on continuous o2 monitoring.  Neuros: Alert and oriented x4. Denies N/T. 5/5 throughout.   IV: PIV saline locked. Start NS once NPO at midnight.   Resp/trach: Trach shiley #6 cuff down, secured with trach ties and sutured. Moderate amount of clear secretions Inner cannula and trach cares done x3 this shift. Suction x1.  Diet:  Regular diet with nectar thick liquid with strict use of chin tuck with swallowing. See speech note for details. TF running @65mL/hr (goal). NPO at midnight.   Bowel status: BS+ No BM this shift.   : Voiding but having issues with retention.  at 1330. MD aware.   Skin: Small amount of bloody drainage around trach site. Reddened and swollen around site.   Pain: PRN tylenol x1 this shift for soreness.    Activity: SBA for lines.Ok to walk halls with daughter as long as they stay on the unit.   Social: Daughter at bedside. Communicates writing on board.   Plan: Flomax starting today. PET scan scheduled for 8/7. PLC scheduled for tomorrow.Continue to monitor and follow POC

## 2020-08-06 NOTE — PLAN OF CARE
Status: Admitted to [facility] for awake tracheostomy, direct laryngoscopy with biopsy and NG tube placement on 8/3.  Vitals: VSS on HTD 28% with continuous pulse ox in mid-high 90's, intermittenty bradycardia while resting (50-60bpm) and intermittent tachycardia with activity (100-110bpm)  Neuros: A&Ox4. Mouths or uses clipboard to communicate needs   IV: L. PIV infusing LR at 125 mL/hr until 0600. Now SL  Resp/trach: #6 cuffed shiley, cuff down secured with trach ties and sutures. Suction x1 for thick creamy secreations, pt. with strong productive cough. Inner cannula changed x2.   Diet: Regular diet with nectar thick liquids and TF infusing via NG at goal rate of 65mL/hr, tolerating well.   Bowel status: No BM this shift    : Voiding spontaneously via urinal in small amounts and with high PVR's, straight cathed at 0200 for 800mL. Bladder scanned around 0545 for 617mL, MD notified and plans to discuss further management of urinary retention during morning rounds  Skin:  Small amount of drainage around trach site.   Pain: No c/o pain this shift   Activity: SBA for lines  Plan: PET scan scheduled for 8/7, Will need to be cognizant about not giving glucose on Thursday in preparation for scan. TF PLC 8/7 at 1300. Trach PLC 8/9 at 0900. Continue to monitor and follow POC.

## 2020-08-07 ENCOUNTER — APPOINTMENT (OUTPATIENT)
Dept: SPEECH THERAPY | Facility: CLINIC | Age: 70
DRG: 012 | End: 2020-08-07
Attending: OTOLARYNGOLOGY
Payer: MEDICARE

## 2020-08-07 ENCOUNTER — APPOINTMENT (OUTPATIENT)
Dept: PET IMAGING | Facility: CLINIC | Age: 70
DRG: 012 | End: 2020-08-07
Attending: OTOLARYNGOLOGY
Payer: MEDICARE

## 2020-08-07 ENCOUNTER — HOSPITAL ENCOUNTER (OUTPATIENT)
Dept: PET IMAGING | Facility: CLINIC | Age: 70
Discharge: HOME | End: 2020-08-07
Attending: OTOLARYNGOLOGY | Admitting: OTOLARYNGOLOGY

## 2020-08-07 DIAGNOSIS — C32.9 LARYNGEAL CANCER (H): ICD-10-CM

## 2020-08-07 LAB
ANION GAP SERPL CALCULATED.3IONS-SCNC: 4 MMOL/L (ref 3–14)
BUN SERPL-MCNC: 13 MG/DL (ref 7–30)
CALCIUM SERPL-MCNC: 8 MG/DL (ref 8.5–10.1)
CHLORIDE SERPL-SCNC: 107 MMOL/L (ref 94–109)
CO2 SERPL-SCNC: 29 MMOL/L (ref 20–32)
CREAT SERPL-MCNC: 0.57 MG/DL (ref 0.66–1.25)
ERYTHROCYTE [DISTWIDTH] IN BLOOD BY AUTOMATED COUNT: 14.6 % (ref 10–15)
GFR SERPL CREATININE-BSD FRML MDRD: >90 ML/MIN/{1.73_M2}
GLUCOSE SERPL-MCNC: 100 MG/DL (ref 70–99)
HCT VFR BLD AUTO: 37.6 % (ref 40–53)
HGB BLD-MCNC: 11.9 G/DL (ref 13.3–17.7)
MAGNESIUM SERPL-MCNC: 2.1 MG/DL (ref 1.6–2.3)
MCH RBC QN AUTO: 31.4 PG (ref 26.5–33)
MCHC RBC AUTO-ENTMCNC: 31.6 G/DL (ref 31.5–36.5)
MCV RBC AUTO: 99 FL (ref 78–100)
PHOSPHATE SERPL-MCNC: 2.5 MG/DL (ref 2.5–4.5)
PLATELET # BLD AUTO: 150 10E9/L (ref 150–450)
POTASSIUM SERPL-SCNC: 4.2 MMOL/L (ref 3.4–5.3)
RBC # BLD AUTO: 3.79 10E12/L (ref 4.4–5.9)
SODIUM SERPL-SCNC: 140 MMOL/L (ref 133–144)
WBC # BLD AUTO: 8.2 10E9/L (ref 4–11)

## 2020-08-07 PROCEDURE — 83735 ASSAY OF MAGNESIUM: CPT | Performed by: OTOLARYNGOLOGY

## 2020-08-07 PROCEDURE — 71260 CT THORAX DX C+: CPT

## 2020-08-07 PROCEDURE — 40000275 ZZH STATISTIC RCP TIME EA 10 MIN

## 2020-08-07 PROCEDURE — 70491 CT SOFT TISSUE NECK W/DYE: CPT

## 2020-08-07 PROCEDURE — 25000128 H RX IP 250 OP 636: Performed by: STUDENT IN AN ORGANIZED HEALTH CARE EDUCATION/TRAINING PROGRAM

## 2020-08-07 PROCEDURE — 80048 BASIC METABOLIC PNL TOTAL CA: CPT | Performed by: OTOLARYNGOLOGY

## 2020-08-07 PROCEDURE — 0B21XFZ CHANGE TRACHEOSTOMY DEVICE IN TRACHEA, EXTERNAL APPROACH: ICD-10-PCS | Performed by: OTOLARYNGOLOGY

## 2020-08-07 PROCEDURE — 12000001 ZZH R&B MED SURG/OB UMMC

## 2020-08-07 PROCEDURE — 25000125 ZZHC RX 250: Performed by: STUDENT IN AN ORGANIZED HEALTH CARE EDUCATION/TRAINING PROGRAM

## 2020-08-07 PROCEDURE — 36415 COLL VENOUS BLD VENIPUNCTURE: CPT | Performed by: OTOLARYNGOLOGY

## 2020-08-07 PROCEDURE — 99207 ZZC CONSULT E&M CHANGED TO INITIAL LEVEL: CPT | Performed by: INTERNAL MEDICINE

## 2020-08-07 PROCEDURE — 25000132 ZZH RX MED GY IP 250 OP 250 PS 637: Mod: GY | Performed by: STUDENT IN AN ORGANIZED HEALTH CARE EDUCATION/TRAINING PROGRAM

## 2020-08-07 PROCEDURE — 84100 ASSAY OF PHOSPHORUS: CPT | Performed by: OTOLARYNGOLOGY

## 2020-08-07 PROCEDURE — 99222 1ST HOSP IP/OBS MODERATE 55: CPT | Performed by: INTERNAL MEDICINE

## 2020-08-07 PROCEDURE — 40000047 ZZH STATISTIC CTO2 CONT OXYGEN TECH TIME EA 90 MIN

## 2020-08-07 PROCEDURE — 25000128 H RX IP 250 OP 636: Performed by: OTOLARYNGOLOGY

## 2020-08-07 PROCEDURE — 92526 ORAL FUNCTION THERAPY: CPT | Mod: GN

## 2020-08-07 PROCEDURE — 85027 COMPLETE CBC AUTOMATED: CPT | Performed by: OTOLARYNGOLOGY

## 2020-08-07 PROCEDURE — 34300033 ZZH RX 343: Performed by: OTOLARYNGOLOGY

## 2020-08-07 PROCEDURE — A9552 F18 FDG: HCPCS | Performed by: OTOLARYNGOLOGY

## 2020-08-07 PROCEDURE — 99207 ZZC APP CREDIT; MD BILLING SHARED VISIT: CPT | Performed by: PHYSICIAN ASSISTANT

## 2020-08-07 RX ORDER — IOPAMIDOL 755 MG/ML
114 INJECTION, SOLUTION INTRAVASCULAR ONCE
Status: COMPLETED | OUTPATIENT
Start: 2020-08-07 | End: 2020-08-07

## 2020-08-07 RX ORDER — CARBOXYMETHYLCELLULOSE SODIUM 5 MG/ML
1 SOLUTION/ DROPS OPHTHALMIC 4 TIMES DAILY PRN
Status: DISCONTINUED | OUTPATIENT
Start: 2020-08-07 | End: 2020-08-12 | Stop reason: HOSPADM

## 2020-08-07 RX ADMIN — Medication 6 MG: at 21:59

## 2020-08-07 RX ADMIN — METOPROLOL TARTRATE 25 MG: 100 TABLET ORAL at 14:54

## 2020-08-07 RX ADMIN — TAMSULOSIN HYDROCHLORIDE 0.4 MG: 0.4 CAPSULE ORAL at 08:54

## 2020-08-07 RX ADMIN — METOPROLOL TARTRATE 25 MG: 100 TABLET ORAL at 20:22

## 2020-08-07 RX ADMIN — ENOXAPARIN SODIUM 40 MG: 40 INJECTION SUBCUTANEOUS at 14:54

## 2020-08-07 RX ADMIN — FLUDEOXYGLUCOSE F-18 11.69 MCI.: 500 INJECTION, SOLUTION INTRAVENOUS at 07:30

## 2020-08-07 RX ADMIN — LISINOPRIL 2.5 MG: 2.5 TABLET ORAL at 08:54

## 2020-08-07 RX ADMIN — Medication 12.5 MG: at 08:54

## 2020-08-07 RX ADMIN — Medication 6 MG: at 00:00

## 2020-08-07 RX ADMIN — LISINOPRIL 2.5 MG: 2.5 TABLET ORAL at 20:22

## 2020-08-07 RX ADMIN — ACETAMINOPHEN 650 MG: 325 SOLUTION ORAL at 20:29

## 2020-08-07 RX ADMIN — MULTIVITAMIN 15 ML: LIQUID ORAL at 08:54

## 2020-08-07 RX ADMIN — METOPROLOL TARTRATE 25 MG: 100 TABLET ORAL at 08:54

## 2020-08-07 RX ADMIN — METOPROLOL TARTRATE 25 MG: 100 TABLET ORAL at 02:12

## 2020-08-07 RX ADMIN — IOPAMIDOL 114 ML: 755 INJECTION, SOLUTION INTRAVENOUS at 08:03

## 2020-08-07 ASSESSMENT — ACTIVITIES OF DAILY LIVING (ADL)
ADLS_ACUITY_SCORE: 15
ADLS_ACUITY_SCORE: 15
ADLS_ACUITY_SCORE: 13
ADLS_ACUITY_SCORE: 15

## 2020-08-07 NOTE — PLAN OF CARE
Status: POD #4 laryngoscopy with biopsy, awake trach placement, and NG placement.  Vitals: Stable on 21% HTD, continuous pulse ox in place.   Neuros: Intact, uses writing board.   IV: PIV SL.   Resp/trach: LS diminished in bases. Strong cough, no suction needed this shift. Trach changed to #6 shiley cuffless today. Trach ties in place. Inner cannula cleansed x2 this shift.   Diet: Reg diet with nectar thick liquids. Rom counts to start tomorrow. Bolus TF started today, patient tolerated 0.5 can and 1 can this afternoon/evening. Unable to tolerate more.    Bowel status: Constipated; would like to wait until AM for bowel meds.    : Voiding in small amounts.  at 1900. Double voiding and urology saw patient. He is reluctant to straight cath and morales; will continue to encourage. MD aware. Pt doesn't want straight cath or morales tonight will reconsider tomorrow AM.   Skin: Trach site reddened with small amount of serosanguinous drainage. Trach ties secure. Neck/trach plate cleansed x2.   Pain: Denies pain.  Activity: SBA; stable on feet. Walked halls x2. HME on board for walks.    Plan: PET scan completed today, trach PLC and NG PLC completed today with daughters. Started to encourage own cares with trach/NG and needs more self-care prior to discharge. Medicine consult today. Psych consulted placed today and hasn't seen patient yet. [place]in ordered overnight and minimize interruptions overnight per patient. Continue to monitor and follow POC.

## 2020-08-07 NOTE — PLAN OF CARE
Vitals: VSS on 28% HTD, continuous O2 monitoring.  Neuros: Alert and oriented x4. Writes to communicate or nods head yes/no. All extremities 5/5.  IV: PIV SL'd  Resp/trach: #6 shiley cuffed, cuff deflated. Strong, productive cough. No suction needed this shift. Inner cannula changed x2.   Diet: Regular diet, nectar thick liquids-good intake. TF running at 65ml/hr. NPO at MN.  Bowel status: Bs+x4, no BM this shift.  : Voiding in small amounts. , MD aware of high PVRs. Flomax started.  Skin: Redden/swollen around trach site.  Pain: C/o discomfort to trach/neck-prn tylenol given, declined anything stronger.  Activity: Up with SBA. Ambulating in the hallways frequently.  Social: Daughter at bedside this shift.  Plan: PET scan in the am. [facility] tomorrow.

## 2020-08-07 NOTE — PLAN OF CARE
Discharge Planner SLP   Patient plan for discharge: none stated   Current status: Pt continues to tolerated soft solid textures and nectar thick liquids with use of chin tuck strategy with no overt s/sx of aspiration. Pt reports some claustrophobia due to trach collar so provided pt with external HME to wear when out of room. Recommend pt continue regular textures and nectar thick liquids with strict use of chin tuck strategy every bite and sip. Pt should be fully upright for all PO, chin tuck every swallow, slow pacing, and alternate between consistencies. ST to continue to follow.   Barriers to return to prior living situation: dysphagia, trach, medical readiness   Recommendations for discharge: home with OP ST follow up at ENT clinic   Rationale for recommendations: pt below baseline oropharyngeal swallowing skills and would benefit from continued ST targeting compensatory swallow strategies.        Entered by: Priscilla Abdi 08/07/2020 10:35 AM

## 2020-08-07 NOTE — PROGRESS NOTES
CLINICAL NUTRITION SERVICES - BRIEF NOTE     Nutrition Prescription      Recommendations already ordered by Registered Dietitian (RD):  Modified order for Calorie Counts to start 8/8 x 3 days in order to capture a full day of po intakes.    Future/Additional Recommendations:  If ave po intakes per calorie counts x 3 days meeting > 1600 calories and 75 g PRO, recommend discontinuing TF.     Per chart review, noted calorie counts ordered at 1315 today. Per chart review, po diet adv to Regular at 0900 per SLP's recommendations. Pt also continues on TF with goal rate of 65 ml/hr reached yesterday am and pt has now transitioned to bolus feeds this morning.       Intervention - as outlined above      Yaima Hummel RD,LD  6A pager [phone]

## 2020-08-07 NOTE — PROGRESS NOTES
"Otolaryngology Progress Note  August 7, 2020  24 hr events:   - Continued Urinary retention   - Pt refusing multiple straight caths despite large PVR   - NPO for PET scan this morning (on NS maintenance fluids)    S: No acute events. The patient continues to have urinary retention. He is able to void but has large PVR. He endorses using the bathroom quite frequently at home. He had been tolerating tube feeds well and reached goal last evening prior to NPO status. Has also been tolerating regular diet with nectar thick liquids. No trach concerns per nursing.      O: /82 (BP Location: Left arm)   Pulse 94   Temp 99.1  F (37.3  C) (Oral)   Resp 18   Ht 1.765 m (5' 9.5\")   Wt 84.5 kg (186 lb 3.2 oz)   SpO2 95%   BMI 27.10 kg/m     General: Alert and oriented x 3, resting comfortably. Interactive in discussion.    HEENT: EOMI. HB 1/6. 6-0 cuffed Shiley in place, cuff down. Strong cough. Neck is soft, no palpable crepitus or hematoma. Trach changed to 6-0 cuffless shiley, see procedure note below. Thick, tan secretions at trach face plate     Pulmonary: 6-0 cuffed shiley in place on TD at 30%,  no accessory muscle use.    PROCEDURE: trach change   New tracheostomy tube: 6-0 cuffless shiley   Old Tracheostomy tube: 6-0 cuffed shiley     Description: The patient was placed flat in bed. All equipment was gathered. Due to the patients hx of difficult trach placement in the OR a scope, exchange catheter and trach set were at bedside. A 14 [place] was used to suction the trach tube. The ventilation catheter was placed through the 6-0 cuffed shiley. The cuffed trach was removed over the catheter and the new 6-0 cuffless shiley was placed over the catheter without difficulty. We noted a well matured and easily visible trach stoma during exchange. The trach tube went in without resistance. The patient was suctioned for secretions which he cleared easily. Trach ties were placed and tightened appropriately. This " completed our procedure. The patient tolerated the procedure well.     Blood loss: None       Intake/Output Summary (Last 24 hours) at 8/7/2020 1245  Last data filed at 8/7/2020 1208  Gross per 24 hour   Intake 2229.17 ml   Output 2000 ml   Net 229.17 ml       LABS:  BMP  Recent Labs   Lab 08/07/20  0641 08/06/20  0734 08/05/20  0625 08/04/20  0602    140 138 139   POTASSIUM 4.2 4.1 4.0 4.5   CHLORIDE 107 106 105 104   DONNIE 8.0* 8.2* 8.4* 8.3*   CO2 29 30 30 31   BUN 13 12 11 13   CR 0.57* 0.66 0.72 0.91   * 104* 118* 130*     CBC  Recent Labs   Lab 08/07/20 0641 08/06/20  0734 08/05/20 0625 08/04/20  0602   WBC 8.2 8.8 10.4 12.0*   RBC 3.79* 3.84* 4.28* 4.29*   HGB 11.9* 12.0* 13.5 13.4   HCT 37.6* 37.6* 41.6 42.2   MCV 99 98 97 98   MCH 31.4 31.3 31.5 31.2   MCHC 31.6 31.9 32.5 31.8   RDW 14.6 14.8 14.5 14.6    164 180 201     A/P: Evin Sterling is a 70 year old male with a past medical history of CHF (EF<30%), HTN, afib, GERD, 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 He was then admitted to [facility] for awake tracheostomy, direct laryngoscopy with biopsy and NG tube placement on 8/3.     Neuro:   -PRN oxycodone and tylenol via feeding tube     HEENT: s/p awake trach, 6-0 cuffless shiley in place, changed on 8/7  -Due to tumor burden patient would be a difficult (likely impossible) intubation from above.  -Trach changed performed 8/7 - easy trach change without resistance. Subsequent changes can be done by any qualified personnel without exchange catheter.   -NG tube in place and secured to right naris   - PET scan performed today     Trach Tube Instructions:   1) Contact ENT on-call with any questions or concerns   2) Remove and clean inner cannula qshift or more frequently if needed   3) Keep trach tube obturator taped to wall behind the  head of the bed   4) Keep extra unopened 6-0 Shiley and 4-0 Shiley at bedside at all times   5) OK to change mepilex on chest as long as trach face plate is not manipulated.   6) Keep trach ties appropriately tight - only should be able to get two fingers beneath ties and neck      CV/H:  -Hx CHF, HTN, A. Fib, EF <30%  -Cardiology consult, appreciate assistance    - continue metoprolol XL 25 mg daily, titrate up as tolerated   - increase lisinopril to 2.5 mg BID   - add spironolactone 12.5 mg daily    - EKG (will follow-up read with cards)   - set up outpatient follow up with cardiology with repeat ECHO after 3 months of medical management  - PTA metoprolol, lisinopril   - CBC daily      FEN/GI: BMP wnl. Last BM 8,5  -Begin bolus feeds today   -NG tube in appropriate position.   -SLP consult, appreciate recs: regular textures and nectar thick liquids with strict use of chin tuck strategy every bite and sip. Pt should be fully upright for all PO, chin tuck every swallow, slow pacing, and alternate between consistencies  - ml/hr - can discontinue once bolus feeds restarted and patient tolerating well   -Albumin, pre-albumin on POD#1 (albumin 2.7, prealbumin 13)  -Mg, phos, electrolytes daily  - PRN Zofran    - BR: PRN senna, miralax and suppository.  - Will begin Calorie Count       : ongoing urinary retention, intermittently requiring straight cath. Says he was having frequent low output voids preop.   -continue q4hr bladder scans if no void   - Discussed patient and case with urology today who recommended increased ambulation, limiting narcotic use and recommended morales or straight caths for PVR >400 ml - will reassess patients residuals this afternoon. If still elevated will discuss placement of a morales vs straight caths   - Continue flomax    Endo:  - SHE  -TSH 0.89     ID: AF, CBC wnl.   -No indication for antibiotics      PPX:  -SCDs  -Encourage ambulation       CONSULTS:   -Nutrition consult for tube  feeds   -SLP  -Cardiology  - PLC: trach and TF scheduled for 8/7 at 12 and 1PM    Dispo: pending adequate nutrition, and further evaluation of elevated post void residuals     -- Patient and above plan discussed with Dr. Felipe Pino, PGY3   Otolaryngology-Head & Neck Surgery  Please contact ENT with questions by dialing [phone] and entering job code [phone] when prompted

## 2020-08-07 NOTE — CONSULTS
[facility], [place]  Consult Note - Hospitalist Service, Gold Night      Date of Admission:  8/3/2020  Consult Requested by: Dr. Sheridan Navarro  Reason for Consult: Pre op medical optimization     Assessment & Plan   Evin Sterling is a 70 year old male admitted on 8/3/2020. He has a history of persistent atrial fibrillation, HTN, CHF (EF 30%), GERD who has had progressive hoarseness and dyspnea admitted 8/3 for awake tracheostomy, laryngoscopy with biopsy, NG tube placement who will likely undergo large surgery in the coming days for which internal medicine was consulted for pre op medical optimization.     Laryngeal carcinoma  Originally presented hoarseness of voice and dyspnea. He was evaluated by ENT surgeon for ongoing hoarseness of voice. 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  Flexible laryngoscopy is performed by Dr. Wang on 07/31/2020. Successful Laryngoscopy with biopsy, Tracheostomy and NG tube placement performed by Dr. Wang on 08/03/2020. CT of head and neck today showing 1.6 cm non-FDG avid enhancing extra-axial mass overlying the temporal lobe. PTA patient was seen by Dr. Gonzales of radiology oncology.   - Trach management per ENT   - Recommend oncology consult inpatient vs outpatient   - Recommend Brain MRI due to possibility of metastasis   - PET pending    Persistent Afib   HFrEF   HTN   Patient has long standing history of afib. Echo 2016 with EF 55%, repeat 7/31/2020 with EF 30%. Had been off all medications for afib due to loss of insurance until about one week ago when he restarted metoprolol  mg daily and lisinopril 10 mg daily. He is currently not on AC due to upcoming surgery. Cardiology consulted and recommended Metoprolol XL 25 mg daily, lisinopril 2.5 mg BID, spironolactone 12.5 mg daily. HR has been well controlled since admission.   - Given new decrease in EF, would inquire with cardiology if LHC indicated prior to  surgery    - Agree with metoprolol 25 mg QID, lisinopril 2.5 mg BID and spironolactone 12.5 mg daily    - Will need follow up with cardiology in 3 months with repeat ECHO  - I and O   - Daily weights   - Sodium restriction of 2 gm    - Stop IVF    - Consider gentle diuresis, medicine will follow     Urinary retention   Noted to have urinary frequency 8/5 with bladder scan 985 ml, has been intermittently straight cathed, however now often refusing. Started on tamsulosin 0.4 mg daily.   - Urology consulted   - Agree with urology recommendations   - See urology progress note 8/7    - Avoid narcotics as able, encourage ambulation    Malnutrition   NG tube placed on admission. Tube feeds started 8/4. SLP consulted, recommended regular texture with nectar thick liquids with strict use of chin tuck strategies.    - Agree with calorie count   - Monitor mag, phos, BMP daily   - Stop IVF    - Continue bolus tube feeds     The patient's care was discussed with the Attending Physician, Dr. Montes De Oca, Bedside Nurse, Patient and Primary team.    CHRISTA Lanier  [facility], [place]  Pager: [phone]  Please see sticky note for cross cover information  ______________________________________________________________________    Chief Complaint   I'm exhausted     History is obtained from the patient and review of medical records.     History of Present Illness   Evin Sterling is a 70 year old male who has a history of history of atrial fibrillation with ablation in 2012, HFrEF and HTN who recently presented for laryngoscopy with biopsy and tracheostomy on 8/3/2020. Medicine was consulted for medical evaluation.     Patient reports he is doing well following the procedure. Notes he is very tired today and feeling as if he needs time to process everything that he was told today by previous providers. He reports he was off of all of his medications recently due to not having insurance, however was able  "to restart medications for afib and heart failure about two weeks ago. He reports he had hoarseness of his voice and was evaluated by ENT. He was told he has cancer and is \"still trying to process everything.\" Prior to these symptoms, he was walking multiple miles per day and lifting weights. He had subjection intermittent episodes of atrial fibrillation with self pulse checks.    Patient states he is feeling well after his surgery and able to breath through the trach without problems. Denies fever, chills, CP, SOB, orthopnea, lower extremity edema (out of \"normal\" per patient's daughter) or dizziness.     Review of Systems   The 10 point Review of Systems is negative other than noted in the HPI.    Past Medical History    I have reviewed this patient's medical history and updated it with pertinent information if needed.   Past Medical History:   Diagnosis Date     CHF (congestive heart failure) (H)      GERD (gastroesophageal reflux disease)      Hypertension        Past Surgical History   I have reviewed this patient's surgical history and updated it with pertinent information if needed.  Past Surgical History:   Procedure Laterality Date     HERNIA REPAIR, INGUINAL RT/LT       HERNIA REPAIR, UMBILICAL       LARYNGOSCOPY WITH BIOPSY(IES) N/A 8/3/2020    Procedure: LARYNGOSCOPY WITH BIOPSY, TRACHEOSTOMY, NG TUBE PLACEMENT;  Surgeon: Caron Wang MD;  Location: [facility] OR     TRACHEOSTOMY N/A 8/3/2020    Procedure: TRACHEOSTOMY;  Surgeon: Caron Wang MD;  Location: [facility] OR       Social History   I have reviewed this patient's social history and updated it with pertinent information if needed.  Social History     Tobacco Use     Smoking status: Former Smoker     Packs/day: 1.00     Years: 20.00     Pack years: 20.00     Last attempt to quit:      Years since quittin.6     Smokeless tobacco: Former User   Substance Use Topics     Alcohol use: Not Currently     Drug use: Not Currently       Family " History   I have reviewed this patient's family history and updated it with pertinent information if needed.   Family History   Problem Relation Age of Onset     Anesthesia Reaction No family hx of      Deep Vein Thrombosis (DVT) No family hx of      Medications   I have reviewed this patient's current medications    Allergies   No Known Allergies    Physical Exam   Vital Signs: Temp: 99.3  F (37.4  C) Temp src: Oral BP: 120/70 Pulse: 94 Heart Rate: 98 Resp: 16 SpO2: 95 % O2 Device: None (Room air) Oxygen Delivery: 5 LPM  Weight: 186 lbs 3.2 oz  Constitutional: awake, alert, cooperative, appears irritable  ENT: Normocephalic, without obvious abnormality, atraumatic, sinuses nontender on palpation, external ears without lesions. Tracheostomy in place, coughing intermittently, clearing secretions with cough  Respiratory: No increased work of breathing, good air exchange, course breath sounds throughout  Cardiovascular: Normal apical impulse, irregular rhythm, normal S1 and S2, no S3 or S4, and no murmur noted   GI: Normal bowel sounds, soft, non-distended, non-tender, no masses palpated, no hepatosplenomegally  Skin: no bruising or bleeding, normal skin color, texture, no redness, or warmth.    Musculoskeletal: Bilateral lower extremity grade 2-3 pitting edema present extending just below the knees  Neuropsychiatric: General: normal eye contact and somewhat irritable, appears tired   Level of consciousness: alert / normal  Mood: somewhat irritable  Affect: congruent with mood  Memory and insight: normal, memory for past and recent events intact and thought process normal    Data   I personally reviewed the EKG tracing showing afib with HR .

## 2020-08-07 NOTE — CONSULTS
"Urology Consult History and Physical    Name: Evin Sterling    MRN: [id number]   YOB: 1950       We were asked to see Evin Sterling at the request of Dr. Angeles for evaluation and treatment of the following chief complaint.          Chief Complaint:   Urinary retention  History is obtained from the patient (via written responses) and daughters          History of Present Illness:   Evin Sterling is a 70 year old male with PMH of laryngeal cancer obstructing his airway, Afib (not previously on anticoagulation, on lovenox here), HFrEF, HTN and urologic history of vasectomy 33 years ago who was admitted 8/3 after awake tracheostomy. He has been voiding spontaneously since surgery but on 8/5 complained of urinary frequency and was bladder scanned for 985 mL. He has since been intermittently straight cathed but is now refusing. He was started on flomax yesterday.     He had a UA yesterday which revealed no nitrites, trace leukocyte esterase, 3 WBCs, 4 WBCs. His Cr is 0.57. He underwent CT PET which reveals a markedly enlarged prostate (~190 g) with prominent median lobe, distended bladder, prominent extrarenal pelvis bilaterally.    Today patient reports that prior to this hospitalization he felt he was peeing \"fine for a 70 year old.\" However he endorses medium to slow stream, some hesitancy, 1-2x nocturia, urinary frequency, and occasional urgency. He has never had a prostate biopsy and has no family or personal history of urologic cancers.    He reports that the straight catheterizations have been really uncomfortable.          Past Medical History:     Past Medical History:   Diagnosis Date     CHF (congestive heart failure) (H)      GERD (gastroesophageal reflux disease)      Hypertension           Past Surgical History:     Past Surgical History:   Procedure Laterality Date     HERNIA REPAIR, INGUINAL RT/LT       HERNIA REPAIR, UMBILICAL       LARYNGOSCOPY WITH BIOPSY(IES) N/A 8/3/2020    Procedure: " LARYNGOSCOPY WITH BIOPSY, TRACHEOSTOMY, NG TUBE PLACEMENT;  Surgeon: Caron Wang MD;  Location: [facility] OR     TRACHEOSTOMY N/A 8/3/2020    Procedure: TRACHEOSTOMY;  Surgeon: Caron Wang MD;  Location: [facility] OR          Social History:     Social History     Tobacco Use     Smoking status: Former Smoker     Packs/day: 1.00     Years: 20.00     Pack years: 20.00     Last attempt to quit:      Years since quittin.6     Smokeless tobacco: Former User   Substance Use Topics     Alcohol use: Not Currently          Family History:     Family History   Problem Relation Age of Onset     Anesthesia Reaction No family hx of      Deep Vein Thrombosis (DVT) No family hx of           Allergies:   No Known Allergies         Medications:     Current Facility-Administered Medications   Medication     acetaminophen (TYLENOL) solution 650 mg     bisacodyl (DULCOLAX) Suppository 10 mg     carboxymethylcellulose PF (REFRESH PLUS) 0.5 % ophthalmic solution 1 drop     enoxaparin ANTICOAGULANT (LOVENOX) injection 40 mg     lidocaine (LMX4) cream     lidocaine 1 % 0.1-1 mL     lisinopril (ZESTRIL) tablet 2.5 mg     melatonin liquid 6 mg     metoprolol (LOPRESSOR) suspension 25 mg     multivitamins w/minerals (CERTAVITE) liquid 15 mL     naloxone (NARCAN) injection 0.1-0.4 mg     ondansetron (ZOFRAN) injection 4 mg     oxyCODONE (ROXICODONE) solution 5-10 mg     polyethylene glycol (MIRALAX) Packet 17 g     sennosides (SENOKOT) syrup 5 mL     sodium chloride (PF) 0.9% PF flush 3 mL     sodium chloride (PF) 0.9% PF flush 3 mL     sodium chloride 0.9% infusion     spironolactone (ALDACTONE) half-tab 12.5 mg     tamsulosin (FLOMAX) capsule 0.4 mg          Review of Systems:    ROS: 10 point ROS neg other than the symptoms noted above in the HPI           Physical Exam:   VS:  T: 99.1    HR: 94    BP: 124/82    RR: 18   GEN:  AOx3.  NAD.    CV:  RRR  LUNGS: Non-labored breathing via trach  BACK:  No midline or CVA  "tenderness.  ABD:  Soft.  NT.  ND.  No rebound or guarding.  No masses.  :  deferred  EXT:  Warm, well perfused.  No edema.  SKIN:  Warm.  Dry.  No rashes.  NEURO:  CN grossly intact.            Data:   All laboratory data reviewed:    Recent Labs   Lab 08/07/20  0641 08/06/20  0734 08/05/20  0625 08/04/20  0602   WBC 8.2 8.8 10.4 12.0*   HGB 11.9* 12.0* 13.5 13.4    164 180 201     Recent Labs   Lab 08/07/20  0641 08/06/20  0734 08/05/20  0625 08/04/20  0602    140 138 139   POTASSIUM 4.2 4.1 4.0 4.5   CHLORIDE 107 106 105 104   CO2 29 30 30 31   BUN 13 12 11 13   CR 0.57* 0.66 0.72 0.91   * 104* 118* 130*   DONNIE 8.0* 8.2* 8.4* 8.3*   MAG 2.1 2.1 2.2 2.0   PHOS 2.5 2.6 2.6 3.3     Recent Labs   Lab 08/06/20  0707   COLOR Yellow   APPEARANCE Cloudy   URINEGLC Negative   URINEBILI Negative   URINEKETONE Negative   SG 1.015   URINEPH 7.5*   PROTEIN 10*   NITRITE Negative   LEUKEST Trace*   RBCU 34*   WBCU 3     All pertinent imaging reviewed:  CT scan of the abdomen:      My read: 6.1 x 8.1 x 8 cm prostate (~190 g), possibly ball-valving median lobe         Impression and Plan:   Impression:   Evin Sterling is a 70 year old male with PMH of laryngeal cancer, Afib, and heart failure and no prior urologic hx who is experiencing postoperative urinary retention. While he has no specific voiding complaints, he has been experiencing many irritative and obstructive symptoms prior to this hospitalization. Likely has been obstructed for a long time and the postoperative period (with decreased ambulation, anesthesia, pain meds, etc.) has \"unmasked\" this.    Patient advised me that he has had a long day and will discuss immediate plan (morales vs CIC) with his daughters tomorrow.     We additionally discussed that his very large prostate may require procedural intervention. We discussed that over time, bladder outlet obstruction can lead to worsening kidney and bladder function. I informed  Hallie that this " "would be an elective intervention at this point since his kidney function is normal, and that the impetus really depends on his level of bother.    We discussed the options - I recommended prostatic artery embolization vs. HoLEP. He would like to gather more information and think over the options, however he told me that he \"will definitely pursue one of the options.\" Literature provided at patient's request.        Plan:   - Avoid anticholinergic, antihistamine, and alpha-agonist medications as these will exacerbate urinary retention  - Increased ambulation/mobility will also usually help with improvement in the degree of urinary retention  - Continue tamsulosin 0.4 mg qday    - Patient can either be managed with a indwelling morales catheter vs clean intermittent catheterization until seen in f/u in urology clinic  - If CIC is chosen, then the patient will need nursing instruction on proper self-intermittent catheterization technique.  - Remind patient that CIC should be performed ONLY after an attempt at spontaneous voiding is made  - Frequency of SIC can be determined based on the average post-void residual:  If PVR < 150cc - no cathing needed  If PVR is 150-250cc - cath bid (qam & qhs)  If PVR is 251-350cc - cath tid  If PVR is 351-450cc - cath qid  If PVR is > 450cc - cath every 4 hours  - The patient will need to keep a journal of his cathing regimen after discharge (will need to include frequency of cathing and post-void residual amount obtained from CIC) and bring this to clinic for review   - If patient unwilling or unable to perform CIC, may leave morales catheter in place  - Patient will need to f/u in urology clinic approximately 1 week after discharge for a trial of void and to discuss treatment options.    Urology will sign off at this time. Please call with questions and page prior to discharge so we may arrange outpatient follow up.       This patient's exam findings, labs, and imaging discussed with " urology staff surgeon Dr. Suarez, who developed the treatment plan.    Laina Chun MD  PGY-2 Urology Resident  [id number]

## 2020-08-07 NOTE — PLAN OF CARE
Status: POD #4 laryngoscopy with biopsy, awake trach placement, and NG placement   Vitals: VSS on 28% HTD, on continuous pulse ox   Neuros: Intact, writes to communicate   IV: PIV infusing NS @ 125 ml/hr  Resp/trach: Diminished, no WOB noted. Good productive cough with moderate thick secretions. No suctioning required overnight. Changed inner cannula x2. #6 shiley, cuff deflated. Trach ties in place  Diet: NPO for PET scan   Bowel status: No BM overnight   : Flomax started. Voiding frequent, small amounts. PVR 730ml overnight. Refused straight cath, ENT aware of pt refusing catheterization. Stated to writer he would be okay with catheterization once a day.   Skin: Trach site reddened with small amount of serosanguinous drainage. Trach ties secure. Site cleansed per order  Pain: Denied overnight   Activity: SBA   Plan: PET scan @ 0645. PLC (TF + trach) @ 1200 and 1300. Possible trach change today, cuffless shiley at bedside. CT to be done today. Continue to monitor and follow POC .

## 2020-08-07 NOTE — CONSULTS
Dorian was seen at the bedside along with his two daughters for instructions on his enteral feeds and trach care. After showing Jesica and Rita some of the cares, Dorian was acknowledged as the one to be doing the cares and so he was the focus of the instructions. He did well with doing medications, setting up a feedings and all trach cares. He was strongly encouraged to do the cares with the supervision of the nursing staff so that he would be able to learn these cares before discharge. He stated he is very motivated to practice and learn on his own. Literature given: Handwashing, Care of the NG,  Gravity feedings, Care of Your Tracheostomy.

## 2020-08-08 ENCOUNTER — PREP FOR PROCEDURE (OUTPATIENT)
Dept: OTOLARYNGOLOGY | Facility: CLINIC | Age: 70
End: 2020-08-08

## 2020-08-08 ENCOUNTER — APPOINTMENT (OUTPATIENT)
Dept: SPEECH THERAPY | Facility: CLINIC | Age: 70
DRG: 012 | End: 2020-08-08
Attending: OTOLARYNGOLOGY
Payer: MEDICARE

## 2020-08-08 DIAGNOSIS — C32.9 LARYNGEAL CANCER (H): Primary | ICD-10-CM

## 2020-08-08 LAB
ANION GAP SERPL CALCULATED.3IONS-SCNC: 3 MMOL/L (ref 3–14)
BUN SERPL-MCNC: 12 MG/DL (ref 7–30)
CALCIUM SERPL-MCNC: 8.7 MG/DL (ref 8.5–10.1)
CHLORIDE SERPL-SCNC: 104 MMOL/L (ref 94–109)
CO2 SERPL-SCNC: 30 MMOL/L (ref 20–32)
CREAT SERPL-MCNC: 0.7 MG/DL (ref 0.66–1.25)
ERYTHROCYTE [DISTWIDTH] IN BLOOD BY AUTOMATED COUNT: 14.5 % (ref 10–15)
GFR SERPL CREATININE-BSD FRML MDRD: >90 ML/MIN/{1.73_M2}
GLUCOSE SERPL-MCNC: 99 MG/DL (ref 70–99)
HCT VFR BLD AUTO: 40.3 % (ref 40–53)
HGB BLD-MCNC: 12.7 G/DL (ref 13.3–17.7)
MAGNESIUM SERPL-MCNC: 2.2 MG/DL (ref 1.6–2.3)
MCH RBC QN AUTO: 31.4 PG (ref 26.5–33)
MCHC RBC AUTO-ENTMCNC: 31.5 G/DL (ref 31.5–36.5)
MCV RBC AUTO: 100 FL (ref 78–100)
PHOSPHATE SERPL-MCNC: 2.7 MG/DL (ref 2.5–4.5)
PLATELET # BLD AUTO: 181 10E9/L (ref 150–450)
POTASSIUM SERPL-SCNC: 3.9 MMOL/L (ref 3.4–5.3)
PSA SERPL-MCNC: 4.62 UG/L (ref 0–4)
RBC # BLD AUTO: 4.05 10E12/L (ref 4.4–5.9)
SODIUM SERPL-SCNC: 138 MMOL/L (ref 133–144)
WBC # BLD AUTO: 10.6 10E9/L (ref 4–11)

## 2020-08-08 PROCEDURE — 25000132 ZZH RX MED GY IP 250 OP 250 PS 637: Mod: GY | Performed by: STUDENT IN AN ORGANIZED HEALTH CARE EDUCATION/TRAINING PROGRAM

## 2020-08-08 PROCEDURE — 83735 ASSAY OF MAGNESIUM: CPT | Performed by: OTOLARYNGOLOGY

## 2020-08-08 PROCEDURE — 99232 SBSQ HOSP IP/OBS MODERATE 35: CPT | Performed by: INTERNAL MEDICINE

## 2020-08-08 PROCEDURE — 40000275 ZZH STATISTIC RCP TIME EA 10 MIN

## 2020-08-08 PROCEDURE — 80048 BASIC METABOLIC PNL TOTAL CA: CPT | Performed by: OTOLARYNGOLOGY

## 2020-08-08 PROCEDURE — 92526 ORAL FUNCTION THERAPY: CPT | Mod: GN

## 2020-08-08 PROCEDURE — 25000128 H RX IP 250 OP 636: Performed by: STUDENT IN AN ORGANIZED HEALTH CARE EDUCATION/TRAINING PROGRAM

## 2020-08-08 PROCEDURE — 84153 ASSAY OF PSA TOTAL: CPT | Performed by: OTOLARYNGOLOGY

## 2020-08-08 PROCEDURE — 25000125 ZZHC RX 250: Performed by: STUDENT IN AN ORGANIZED HEALTH CARE EDUCATION/TRAINING PROGRAM

## 2020-08-08 PROCEDURE — 84100 ASSAY OF PHOSPHORUS: CPT | Performed by: OTOLARYNGOLOGY

## 2020-08-08 PROCEDURE — 85027 COMPLETE CBC AUTOMATED: CPT | Performed by: OTOLARYNGOLOGY

## 2020-08-08 PROCEDURE — 12000001 ZZH R&B MED SURG/OB UMMC

## 2020-08-08 PROCEDURE — 36415 COLL VENOUS BLD VENIPUNCTURE: CPT | Performed by: OTOLARYNGOLOGY

## 2020-08-08 PROCEDURE — 40000047 ZZH STATISTIC CTO2 CONT OXYGEN TECH TIME EA 90 MIN

## 2020-08-08 PROCEDURE — 84153 ASSAY OF PSA TOTAL: CPT | Performed by: STUDENT IN AN ORGANIZED HEALTH CARE EDUCATION/TRAINING PROGRAM

## 2020-08-08 RX ADMIN — METOPROLOL TARTRATE 25 MG: 100 TABLET ORAL at 19:20

## 2020-08-08 RX ADMIN — POLYETHYLENE GLYCOL 3350 17 G: 17 POWDER, FOR SOLUTION ORAL at 08:21

## 2020-08-08 RX ADMIN — ACETAMINOPHEN 650 MG: 325 SOLUTION ORAL at 19:20

## 2020-08-08 RX ADMIN — METOPROLOL TARTRATE 25 MG: 100 TABLET ORAL at 08:20

## 2020-08-08 RX ADMIN — METOPROLOL TARTRATE 25 MG: 100 TABLET ORAL at 02:15

## 2020-08-08 RX ADMIN — Medication 12.5 MG: at 08:20

## 2020-08-08 RX ADMIN — Medication 6 MG: at 21:03

## 2020-08-08 RX ADMIN — ENOXAPARIN SODIUM 40 MG: 40 INJECTION SUBCUTANEOUS at 14:11

## 2020-08-08 RX ADMIN — LISINOPRIL 2.5 MG: 2.5 TABLET ORAL at 19:20

## 2020-08-08 RX ADMIN — SENNOSIDES 5 ML: 8.8 LIQUID ORAL at 08:21

## 2020-08-08 RX ADMIN — TAMSULOSIN HYDROCHLORIDE 0.4 MG: 0.4 CAPSULE ORAL at 08:19

## 2020-08-08 RX ADMIN — METOPROLOL TARTRATE 25 MG: 100 TABLET ORAL at 14:11

## 2020-08-08 RX ADMIN — MULTIVITAMIN 15 ML: LIQUID ORAL at 08:20

## 2020-08-08 RX ADMIN — ACETAMINOPHEN 650 MG: 325 SOLUTION ORAL at 08:20

## 2020-08-08 RX ADMIN — LISINOPRIL 2.5 MG: 2.5 TABLET ORAL at 08:19

## 2020-08-08 ASSESSMENT — ACTIVITIES OF DAILY LIVING (ADL)
ADLS_ACUITY_SCORE: 14
ADLS_ACUITY_SCORE: 15

## 2020-08-08 ASSESSMENT — MIFFLIN-ST. JEOR: SCORE: 1627.42

## 2020-08-08 NOTE — PROGRESS NOTES
"Social Work: Assessment with Discharge Plan    Patient Name:  Evin Sterling  :  1950  Age:  70 year old  MRN:  [id number]  Risk/Complexity Score:     Completed assessment with:  Pt daughter Jesica, chart review    Presenting Information   Reason for Referral:  Discharge plan, transportation, family support.   Date of Intake:  2020  Referral Source:  Physician  Decision Maker:  Pt  Alternate Decision Maker:  Pt daughter Jesica  Health Care Directive:  Declined completing  Living Situation:  House  Previous Functional Status:  Independent  Patient and family understanding of hospitalization:  Pt daughter stated she understands her new diagnosis, pt \"gets it\" and understands what is going on, there is just a lot of information.  Cultural/Language/Spiritual Considerations:  Pt does not have spiritual, or cultural considerations.  Adjustment to Illness:  Pt and family report that they are very emotional regarding adjusting to his new diagnosis of cancer and ongoing medical needs. Pt daughter states it is overwhelming and stressful for her and the family.     Physical Health  Reason for Admission:    1. Laryngeal cancer (H)    2. Laryngeal cancer (H)      Services Needed/Recommended:  TCU    Mental Health/Chemical Dependency  Diagnosis: Undiagnosed depression, Sober 10 years from alcohol. Pt no longer has desire to drink per daughter.   Support/Services in Place:  Pt has a supportive family, no longer goes to AA.   Services Needed/Recommended:  None at this time, pt and family overwhelmed with new diagnosis of cancer and ongoing medical needs.    Support System  Significant relationship at present time: Daughter Jesica  Family of origin is available for support:  yes  Other support available:  Past roommate friend and will text.  Gaps in support system:  Pt has a history of being \"down\" and can get low. Per dtr, pt is overwhelmed currently, but might consider mental health support later on.   Patient is " caregiver to:  None     Provider Information   Primary Care Physician:  Gabriele Bui   [phone]   Clinic[facility]  [facility] [address] [facility]      :  None at this time    Financial   Income Source:  Pt has pension, is applying for social security  Financial Concerns:  Pt has referral to  and daughter is concerned about paying for his medical care. Dtr Jesica is collecting documents for MA application for pt.   Insurance:    Payor/Plan Subscriber Name Rel Member # Group #   MEDICARE - MEDICARE P* LAUREN NO SLF [id number]       ATTN CLAIMS, [address]       Discharge Plan   Patient and family discharge goal: TBD  Provided education on discharge plan:  YES  Patient agreeable to discharge plan:  TBD  A list of Medicare Certified Facilities was provided to the patient and/or family to encourage patient choice. Patient's choices for facility are:  Not at this time, pt family in caregiver education.   Will NH provide Skilled rehabilitation or complex medical:  YES  General information regarding anticipated insurance coverage and possible out of pocket cost was discussed. Patient and patient's family are aware patient may incur the cost of transportation to the facility, pending insurance payment: YES  Barriers to discharge:  Medical stability, discharge plan to family or TCU, financial insecurity. Pt and family education on cares. Transportation to appointments    Discharge Recommendations   Anticipated Disposition:  TBD  Transportation Needs:  Other:  [employer] application provided to pt and daughter  Name of Transportation Company and Phone:  N/A    Additional comments   Pt daughter requested call from  for support on discharge, transportation and financial concerns. Pt daughter expressed her emotional reactions to her fathers new medical diagnosis and health care needs. She expressed that she is struggling with the thought of  caring for him in her home, due  to working FT, and safety of him home alone, or TCU and not have full family accessibility due to covid concerns. SW discussed MA application, and how TCU/long term care utilized funds and income for payment of stay and spend down. SW discussed and printed [employer] form for pt dtr to assist with transportation, and explained how transportation is arranged within a health care setting.  Support was provided for daughters emotions due to change in fathers status and new role she is taking on as caregiver.     [facility]  Social Work On call   [phone]  4a, 4c, 4e, 5a, 5b Pager [phone]  6a,6b,6c,6d Pager [phone]  5c,7a,7b,7c,7d Pager [phone]    1600-midnight  Pager [phone]

## 2020-08-08 NOTE — PROGRESS NOTES
"Otolaryngology Progress Note  August 8, 2020  24 hr events:   - Tolerated 6/6 TF can boluses   - Pt refusing multiple straight caths or morales (Urology recommendations) despite PVR   - Tmax 100.3     S:  He had a restful night. His mood is ok but he would still like to speak with psychiatry. No issues with breathing. Oral intake going ok; he is practicing techniques from SLP. His TF are not causing GI discomfort. He would like to avoid using the morales and endorses knowing the symptoms of a bladder infection. He would like to ambulate more for overall health and to help him urinate.     O: /78   Pulse 94   Temp 100.3  F (37.9  C) (Oral)   Resp 18   Ht 1.765 m (5' 9.5\")   Wt 84.5 kg (186 lb 3.2 oz)   SpO2 95%   BMI 27.10 kg/m     General: Alert and oriented x 3, resting comfortably. Interactive in discussion.    HEENT: EOMI. HB 1/6. 6-0 cuffed Shiley in place. Strong cough. Neck is soft, no palpable crepitus or hematoma.    Pulmonary: 6-0 cuffed shiley in place on TD at 30%,  no accessory muscle use.      Intake/Output Summary (Last 24 hours) at 8/7/2020 1245  Last data filed at 8/7/2020 1208  Gross per 24 hour   Intake 2229.17 ml   Output 2000 ml   Net 229.17 ml       LABS:  BMP  Recent Labs   Lab 08/07/20  0641 08/06/20  0734 08/05/20  0625 08/04/20  0602    140 138 139   POTASSIUM 4.2 4.1 4.0 4.5   CHLORIDE 107 106 105 104   DONNIE 8.0* 8.2* 8.4* 8.3*   CO2 29 30 30 31   BUN 13 12 11 13   CR 0.57* 0.66 0.72 0.91   * 104* 118* 130*     CBC  Recent Labs   Lab 08/07/20  0641 08/06/20  0734 08/05/20  0625 08/04/20  0602   WBC 8.2 8.8 10.4 12.0*   RBC 3.79* 3.84* 4.28* 4.29*   HGB 11.9* 12.0* 13.5 13.4   HCT 37.6* 37.6* 41.6 42.2   MCV 99 98 97 98   MCH 31.4 31.3 31.5 31.2   MCHC 31.6 31.9 32.5 31.8   RDW 14.6 14.8 14.5 14.6    164 180 201     A/P: Evin Sterling is a 70 year old male with a past medical history of CHF (EF<30%), HTN, afib, GERD, 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 He was then admitted to [facility] for awake tracheostomy, direct laryngoscopy with biopsy and NG tube placement on 8/3.     Neuro:   -PRN oxycodone and tylenol via feeding tube     HEENT: s/p awake trach, 6-0 cuffless shiley in place, changed on 8/7   -Due to tumor burden patient would be a difficult (likely impossible) intubation from above.  -Trach changed performed 8/7 - easy trach change without resistance. Subsequent changes can be done by any qualified personnel without exchange catheter.   -NG tube in place and secured to right naris     Trach Tube Instructions:   1) Contact ENT on-call with any questions or concerns   2) Remove and clean inner cannula qshift or more frequently if needed   3) Keep trach tube obturator taped to wall behind the head of the bed   4) Keep extra unopened 6-0 Shiley and 4-0 Shiley at bedside at all times   5) OK to change mepilex on chest as long as trach face plate is not manipulated.   6) Keep trach ties appropriately tight - only should be able to get two fingers beneath ties and neck      CV/H:  -Hx CHF, HTN, A. Fib, EF <30%  - Talked briefly with cardiology fellow recommending to re-consult cardiology Monday for discussion on patients care and appropriately optimizing for H&N surgery.   -Cardiology and Medicine consult, appreciate assistance.    - continue metoprolol XL 25 mg daily, titrate up as tolerated   - increase lisinopril to 2.5 mg BID   - add spironolactone 12.5 mg daily    - EKG (will follow-up read with cards)   - set up outpatient follow up with cardiology with repeat ECHO after 3 months of medical management   - Per medicine team: Given new decrease in EF, would inquire with cardiology if LHC indicated prior to surgery    - PTA metoprolol, lisinopril   - CBC daily   - Begin Strict I/O  - Begin Daily weights  - Begin  Sodium restriction of 2gm       FEN/GI: BMP wnl. Last BM 8,5  - Bolus TF   -NG tube in appropriate position.   -SLP consult, appreciate recs: regular textures and nectar thick liquids with strict use of chin tuck strategy every bite and sip. Pt should be fully upright for all PO, chin tuck every swallow, slow pacing, and alternate between consistencies  - ml/hr discontinued    -Albumin, pre-albumin on POD#1 (albumin 2.7, prealbumin 13)  -Mg, phos, electrolytes daily  - PRN Zofran    - BR: PRN senna, miralax and suppository.  -  Calorie Count       : ongoing urinary retention, intermittently requiring straight cath. Says he was having frequent low output voids preop. He is currently declining straight cath or morales placement and endorses that he knows and understands infectious urinary symptoms.   -continue q4hr bladder scans if no void   - Urology recommendations: increased ambulation, limiting narcotic use and recommended morales or straight caths for PVR >400 ml. Patient currently declining straight cath and morales   - Continue flomax    Endo:  - SHE  -TSH 0.89     ID: AF, CBC wnl. Monitor for fevers.   -No indication for antibiotics      PPX:  -SCDs  -Encourage ambulation       CONSULTS:   -Nutrition consult for tube feeds   -SLP  - Cardiology  - Medicine   - Psychiatry pending   - PLC: trach and TF completed  8/7 at 12 and 1PM    Dispo: pending adequate nutrition, and further evaluation of elevated post void residuals     -- Patient and above plan discussed with Dr. Felipe Wallace, PGY2   Otolaryngology-Head & Neck Surgery  Please contact ENT with questions by dialing [phone] and entering job code [phone] when prompted

## 2020-08-08 NOTE — PLAN OF CARE
Status: Patient admitted on 8/3, now POD#5 s/p awake tracheostomy, direct laryngoscopy with biopsy and NG tube placement   Vitals: VSS, HTD at 21% sats in upper 90's, HME on when ambulating in hallway  Neuros: Intact  IV: PIV saline locked  Resp/trach: #6 Shiley cuffless, no suction required today, patient has strong productive cough, patient able to remove and cleanse inner cannula with supervision, lung sounds coarse in lower lobes  Diet: 2g Na diet, strict I&O's, with bolus TF's tolerated 2/6 cans today. Ate 100% of breakfast this AM, on enoch-counts  Bowel status: No BM, BS hypoactive, Senna and Miralax given  : Voiding spontaneously with retention, PVR 560mL, straight cathed patient for 400mL at 1500  Skin: Trach site with blanchable redness, CDI, changed trach ties today  Pain: Patient c/o neck pain relieved with Tylenol  Activity: Independent, ambulated around unit several times today  Social: Calm and cooperative with cares, daughter at bedside, supportive  Plan: Continue to have patient complete trach and TF cares, discharge home when independent with cares and medically stable, continue to monitor and follow POC

## 2020-08-08 NOTE — PROGRESS NOTES
[facility] Progress Note - Hospitalist Service, Gold 8       Date of Admission:  8/3/2020  Assessment & Plan   Evin Sterling is a 70 year old male admitted on 8/3/2020. He has a history of persistent atrial fibrillation, HTN, CHF (EF 30%), GERD who has had progressive hoarseness and dyspnea admitted 8/3 for awake tracheostomy, laryngoscopy with biopsy, NG tube placement who will likely undergo large surgery in the coming days for which internal medicine was consulted for pre op medical optimization.      Laryngeal carcinoma  Originally presented hoarseness of voice and dyspnea. He was evaluated by ENT surgeon for ongoing hoarseness of voice. 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  Flexible laryngoscopy is performed by Dr. Wang on 07/31/2020. Successful Laryngoscopy with biopsy, Tracheostomy and NG tube placement performed by Dr. Wang on 08/03/2020.   - Mx as per primary team      Persistent Afib   HFrEF   HTN   Patient has long standing history of afib. Echo 2016 with EF 55%, repeat 7/31/2020 with EF 30%. Had been off all medications for afib due to loss of insurance until about one week ago when he restarted metoprolol  mg daily and lisinopril 10 mg daily. He is currently not on AC due to upcoming surgery. Cardiology consulted and recommended Metoprolol XL 25 mg daily, lisinopril 2.5 mg BID, spironolactone 12.5 mg daily. HR has been well controlled since admission.   - Given new decrease in EF, would inquire with cardiology if  LHC indicated prior to surgery    - Agree with metoprolol 25 mg QID, lisinopril 2.5 mg BID and spironolactone 12.5 mg daily       Urinary retention   Noted to have urinary frequency 8/5 with bladder scan 985 ml, has been intermittently straight cathed, however now often refusing. Started on tamsulosin 0.4 mg daily.   -better today      Disposition Plan  - as per primary team     The patient's care  "was discussed with the Bedside Nurse and Patient.    Asher Lee MD, MD  Hospitalist Service, Gold 8  [facility], [place]  Pager: [phone]  Please see sticky note for cross cover information  ______________________________________________________________________    Interval History   EMR reviewed.  Patient denies any new complaints.  Denies any fever/chills/rigors.  Feels his urinary symptoms are getting better.  Denies any chest pain/palpitations.  Reviewed her detailed discussion with Dr. Wang this morning for further planning.     Data reviewed today: I reviewed all medications, new labs and imaging results over the last 24 hours. I personally reviewed no images or EKG's today.    Physical Exam   /75 (BP Location: Left arm)   Pulse 94   Temp 96.6  F (35.9  C) (Axillary)   Resp 16   Ht 1.765 m (5' 9.5\")   Wt 85.9 kg (189 lb 6.4 oz)   SpO2 97%   BMI 27.57 kg/m    Gen- pleasant lying in bed  HEENT- NAD, BENITO, MMM  Neck-tracheostomy in place  CVS- I+II+ no m/r/g, irregular  RS- CTAB  Abdo- soft, no tenderness. No g/r/r   Ext-bilateral lower extremity edema.    Data   BMP  Recent Labs   Lab 08/08/20  0709 08/07/20  0641 08/06/20  0734 08/05/20  0625    140 140 138   POTASSIUM 3.9 4.2 4.1 4.0   CHLORIDE 104 107 106 105   DONNIE 8.7 8.0* 8.2* 8.4*   CO2 30 29 30 30   BUN 12 13 12 11   CR 0.70 0.57* 0.66 0.72   GLC 99 100* 104* 118*     CBC  Recent Labs   Lab 08/08/20  0709 08/07/20  0641 08/06/20  0734 08/05/20  0625   WBC 10.6 8.2 8.8 10.4   RBC 4.05* 3.79* 3.84* 4.28*   HGB 12.7* 11.9* 12.0* 13.5   HCT 40.3 37.6* 37.6* 41.6    99 98 97   MCH 31.4 31.4 31.3 31.5   MCHC 31.5 31.6 31.9 32.5   RDW 14.5 14.6 14.8 14.5    150 164 180     INRNo lab results found in last 7 days.  LFTs  Recent Labs   Lab 08/04/20  0602   ALBUMIN 2.7*      PANCNo lab results found in last 7 days.    "

## 2020-08-08 NOTE — PROGRESS NOTES
"  Care Coordinator Progress Note    Admission Date/Time:  8/3/2020  Attending MD:  Dr Caron Wang    Data  8-07: Chart reviewed, discussed with interdisciplinary team. In 6A Discharge Rounds on 8-07 Dr Navarro reported plan is PET scan today; will change trach today; bolus tube feedings started. Having difficulty voiding, will consult Urology. May need a laryngectomy in the future; pending PET results and tumor board discussion.     Patient was admitted for:  Laryngeal carcinoma; airway obstruction  Procedure 8-:  Awake tracheostomy; direct laryngoscopy with biopsy with telescopy; NG tube placement.     Cardiology consulted post-op: \"lost health insurance for the past three years and has been off all medications during that time until 1 week ago.\" EF 45% in 2016; EF 30% 7-31-20.   Echo 8-: \"Severely (EF <30%) reduced left ventricular function is present. Severe left  ventricular dilation is present.\"    Past history includes:  A-fib, s/p ablation in 2012; CHF; HTN; GERD. Hx of smoking and alcohol abuse.    Concerns with insurance coverage for discharge needs:  Yes. Pt has Medicare Part A only. Does not have insurance coverage for trach & tube feeding supplies or skilled home care. Need to ask pt if he has prescription drug coverage.   Current Living Situation: Patient lives with family. Currently living with his daughter and son-in-law in [place], [place]. Usually lives with a roommate in [place], [place].  Support System: Supportive  Services Involved: DME  Transportation at Discharge: Family or friend will provide  Transportation to Medical Appointments:   - Name of caregiver: TBD, family requesting transportation resources.  Barriers to Discharge: financial support inadequate.    Coordination of Care and Referral  8-07: Chart reviewed. Noted pt established primary care recently, 7-, with Dr Gabriele Bui at [facility]. He was seen for dysphagia and hoarseness. Referred to ENT. Seen " by Dr Wang in ENT Clinic on 7-31 and diagnosed with likely laryngeal cancer. Pt having stridor, trach recommended, may eventually need a laryngectomy. It was noted pt had a 50 pound weight loss in the last 6 months. Also seen by Radiation Oncology on 7-31. Speech Therapy on 7-31: moderate oropharyngeal dysphagia.     8-07: Informed by Will, pt's nurse and LIANNE Velasquez with PLC that daughter, Jesica has been crying, does not seem able to cope with pt's cares. Jesica thinks pt may need a nursing home. Will reported pt is alert, ambulatory and able to learn; he would anticipate pt could discharge to a home setting. We discussed working with pt on his trach/tube feeding cares and encouraging independence.    Financial:  On 8-05 Vikki Luque RNCC, notified  Financial Counselor of possible need for MA. Pt was OK with FC contacting daughter, Jesica. Reviewed Account Notes, FC aware of pt's case, has not been seen yet for MA ryne.    On 8-07 afternoon I introduced myself to pt and his daughters, Jesica and Vandana. Pt alert, sitting up in chair. He was trying a new ryne on his phone; the ryne will speak what he types in. He wasn't able to get it to work while I was there. Explained I help with discharge planning. Encouraged pt to practice his trach & tube feeding cares with nursing. Explained I help order the trach & tube feeding supplies for home.  Jesica concerned about finances and cost of supplies. Said they have not heard from a Financial Counselor. I verified her phone number. Jesica is aware Medicare Part A does not cover the supplies pt will need after discharge. I informed pt and daughters that even if pt had a supplemental insurance and Medicare Part B the tube feeding supplies may not be covered. Informed them tube feeding is only covered by Medicare if it is the only source of nutrition for greater than 90 days. I explained we can send pt home with some gravity feeding bags, syringes and a couple days of formula. Eventually  pt could do syringe feedings. The IV pole is a rental, so he could get by without and hang the gravity bag on a nail, hook or curtain wes at home. Jesica asked if there was any financial assistance available for the tube feeding supplies. I did not have any immediate resource for them. I did tell them the tube feeding is approximately $1.50 per carton (it is available on-line).   I explained pt will need a portable suction machine and humidity via trach dome. I can have a DME provider such as [facility] or [facility] give a private pay estimate for that equipment and the monthly cost of trach supplies.. These are rentals.   I explained if pt is approved for MA they should pay retroactive. Vandana asked if MA goes retroactive if the person is over 64 y/o; I think they do, but [facility] can answer that question better. Pt and daughters did not seem prepared to pay any out of pocket costs; although I did not ask the question directly.  Jesica was frustrated because she had been asking to speak with Financial since 7-31. Informed her I will e-mail [facility], they won't see her this weekend (today if Friday) but should contact her on Monday, 8-10. I offered to print off the MA application so she can look at it or start working on it. Gave her the MA Application for Certain Populations (this application was mentioned in the [facility] Acct Notes).    Discharge Location: Pt will be staying with Jesica and her  after discharge. The address on the facesheet is Jesica's. Pt was living in [place] with roommates. Jesica said they work and will be gone for long hours, pt will be alone part of the day. She expressed concern about this, said he always has a nurse with him in the hospital when he gets up. I explained pt will need to be independent with cares to discharge home. If he has difficulty breathing he needs to call 911. Suggested she contact their local 911 provider and let them know someone in the home cannot speak.  She said they live across from a firehouse, but someone isn't always there.    Jesica concerned pt may need more help in the future, wondering if he will need a TCU or assisted living. I explained sometimes when pts are on MA they can be assessed for an Elderly Waiver program, to provide more services in the home. I said sometimes after this type of surgery pts may need a short TCU stay if they or the caregiver are not able to do the cares yet, if pt is too weak or deconditioned to return directly home. I will ask SW to speak with her for some TCU options, transport resources to appointments.   I left the room to get the MA application printed out. When I returned Urology was seeing pt. Jesica asked to speak with me outside the room. Apparently there was discussion about going home with a morales. Jesica was almost horrified about him going home with a morales and did not see how this would be possible at home. I tried to explain we would instruct pt on the morales cares, but she was not receptive.   I reviewed again pt has to demonstrate he can do his cares before discharge. If he gets home and he is not taking care of himself or doing his cares, then Jesica should contact the primary care doctor or the ENT Clinic and ask for a SW to assist with TCU placement. I informed her placement with MA pending and a new trach the options may be limited.   In conclusion I told Jesica the plan will be for pt to practice his cares with nursing; I will e-mail Fin Counselor to call her Monday, 8-10 for MA ryne and I will request SW contact her this w/e for TCU options, transport resources.     8-07: Updated Dr Hi and Dr Navarro; MA application not done yet. Unable to make DME referrals until application has been done. Then will try to find a supplier who will accept pt with MA pending. Other option is for pt to pay privately for supplies.     Assessment  Pt with new cancer diagnosis in the last week. New trach and tube feeding. Pt will  need to be independent with cares to discharge home. Will not have hands-on support from family and will be alone part of the day. Daughters supportive but will not be providing cares. Limited insurance coverage. Lack of insurance coverage for outpt services, trach, tube feeding supplies and home care. Needs financial assessment and to complete MA application.      Plan  Anticipated Discharge Date:  TBD. When medically stable, independent with trach, tube feeding and morales catheter cares. When MA application is pending. When DME provider arranged that will accept pt MA pending.     Anticipated Discharge Plan:  Discharge to daughter's home with trach and tube feeding supplies.   --May need to work on both a home & TCU plan.   --RNCC will e-mail  Financial Counselor and request they speak with daughter, Jesica, on pt's behalf, as soon as possible for MA application. (done--email sent on 8-08 to RENNY Aldridge Counselor; copied to NICOL Parisi and MICH Alexander---Care Mgmt coverage next week).  --MICH consult for TCU options for pt with a new trach; transportation resources to medical appointments; cancer resources; financial assistance.,  --Pt to continue practicing trach, tube feeding and morales cares with nursing at the bedside.  --RNCC will make referral to DME providers, [facility] & [facility], and see if they will accept pt with MA pending.   --RNCC: Obtain private pay estimate of monthly trach/tube feeding supplies from DME provider.       Ale Combs RN Care Coordinator  Unit 6[address], [facility]

## 2020-08-08 NOTE — PLAN OF CARE
Status: POD #5 laryngoscopy with biopsy, awake trach placement, and NG placement   Vitals: VSS on 21% HTD, on continuous pulse ox. Max temp 100.3  Neuros: Intact, writes to communicate   IV: PIV SL   Resp/trach: Diminished, no WOB noted. Good productive cough with moderate thick secretions. No suctioning required overnight. Cleansed inner cannula x2. #6 cuffless shiley, cuff Trach ties in place. HME in room for walks   Diet: Regular with nectar thick liquids, enoch counts. Bolus with goal of 6 cans/day   Bowel status: No BM overnight. Wants bowel meds this AM   : Voiding in small amounts, has been having high PVR's (356 ml overnight). Urology following, not wanting straight cath's or Zazueta, continue to monitor    Skin: Trach site reddened with scant amount of serosanguinous drainage. Trach ties secure. Site cleansed per order  Pain: Tylenol given x1 for mild HA   Activity: Independent   Plan: Encourage own cares. Psych to see pt today. Continue to monitor and follow POC .

## 2020-08-08 NOTE — PROGRESS NOTES
I rounded on the patient on 8/8/2020. I discussed the preliminary PET scan results with the patient and the tumor board recommendations. We discussed that the tumor is localized to the larynx but that there is thyroid cartilage erosion making it staged as a T4. There is no mariel disease. The body PET read is pending but there is no obvious distant disease in the chest.     I explained to the patient that based on this information, the recommendation would be for a total laryngectomy followed by postoperative radiation. We discussed that the concern for ability to clear his disease with laryngeal preservation attempts is lower along with concerns for his overall long term laryngeal functioning. He already has a trach because of his airway, his swallow study demonstrated silent aspiration, and his voice is poor. He is at high risk for needing a salvage laryngectomy for his quality of life after chemoradiation if used as primary treatment.     The patient says the most important thing to him is having quality of life which includes being with his family and eating. I explained that I think he has a better chance of eating with upfront laryngectomy following by postoperative radiation than with upfront chemoradiation with high likelihood of salvage laryngectomy after. We discussed wound healing complications of laryngectomy are higher in salvage situations as well.    He says that he has come to accept the need for laryngectomy. We discussed the procedure including that he would be a permanent neck breather. We discussed that he would no longer aspirate once he heals and should be able to take a fairly normal diet. We discussed the risks of postoperative fistula and the need for NPO status after. We discussed communication options including a TEP and electrolarynx. We discussed plans for bilateral neck dissections and the risks with this including hypoglossal nerve injury, spinal accessory nerve injury, phrenic nerve  injury, vagus nerve injury, chyle leak. We discussed that he would be hospitalized for about a week pending perioperative complications.     I did discuss the role of a PEG for him given that he will likely need the NG when he leaves the hospital and with his surgery/postoperative recovery. He may need it with his postoperative radiation so it may be worthwhile to pursue. He is open to the idea.    We discussed potentially trying to pull some of his remaining teeth if able while he is having his surgery which he will consider.    We discussed that he does have to have cardiac clearance prior to surgery and medicine raised the question of a left heart catheterization. We will reach out to cardiology to discuss the need in the setting of a larger surgery.     He asked appropriate questions during the discussion and seems to understand the recommendations and is willing to proceed. I will reach out to the scheduling team to try to find a surgical date.    I spoke with SLP team who will provide him with the before and after laryngectomy diagrams.         Caron Wang MD    Department of Otolaryngology        I personally spent a total of 30 minutes in direct patient care activities on the floor discussing the tumor board recommendations and discussion of risks of surgery with the patient.

## 2020-08-08 NOTE — PLAN OF CARE
Discharge Planner SLP   Patient plan for discharge: none stated   Current status: Per discussion with Dr. Wang, pt is likely to proceed with total laryngectomy. Educated pt and daughter regarding pre and post-laryngectomy anatomical and sensory changes, communication options s/p TL, and important safety concerns for neck breathers. Recommend pt continue regular textures and nectar thick liquids with strict use of chin tuck strategy with all PO. Pt should be fully upright for all PO, chin tuck every swallow, slow pacing, and alternate between consistencies. ST to continue to follow.  Barriers to return to prior living situation: dysphagia, trach, medical readiness   Recommendations for discharge: home with OP ST follow up at ENT clinic   Rationale for recommendations: pt would benefit from OP ST at ENT clinic targeting pre-operative total laryngectomy counseling and education.        Entered by: Priscilla Abdi 08/08/2020 12:31 PM

## 2020-08-09 ENCOUNTER — APPOINTMENT (OUTPATIENT)
Dept: SPEECH THERAPY | Facility: CLINIC | Age: 70
DRG: 012 | End: 2020-08-09
Attending: OTOLARYNGOLOGY
Payer: MEDICARE

## 2020-08-09 LAB
ANION GAP SERPL CALCULATED.3IONS-SCNC: 4 MMOL/L (ref 3–14)
BUN SERPL-MCNC: 15 MG/DL (ref 7–30)
CALCIUM SERPL-MCNC: 8.6 MG/DL (ref 8.5–10.1)
CHLORIDE SERPL-SCNC: 103 MMOL/L (ref 94–109)
CO2 SERPL-SCNC: 30 MMOL/L (ref 20–32)
CREAT SERPL-MCNC: 0.72 MG/DL (ref 0.66–1.25)
ERYTHROCYTE [DISTWIDTH] IN BLOOD BY AUTOMATED COUNT: 14.3 % (ref 10–15)
GFR SERPL CREATININE-BSD FRML MDRD: >90 ML/MIN/{1.73_M2}
GLUCOSE SERPL-MCNC: 107 MG/DL (ref 70–99)
HCT VFR BLD AUTO: 39 % (ref 40–53)
HGB BLD-MCNC: 12.3 G/DL (ref 13.3–17.7)
MAGNESIUM SERPL-MCNC: 2.3 MG/DL (ref 1.6–2.3)
MCH RBC QN AUTO: 31.2 PG (ref 26.5–33)
MCHC RBC AUTO-ENTMCNC: 31.5 G/DL (ref 31.5–36.5)
MCV RBC AUTO: 99 FL (ref 78–100)
PHOSPHATE SERPL-MCNC: 2.6 MG/DL (ref 2.5–4.5)
PLATELET # BLD AUTO: 197 10E9/L (ref 150–450)
POTASSIUM SERPL-SCNC: 4 MMOL/L (ref 3.4–5.3)
RBC # BLD AUTO: 3.94 10E12/L (ref 4.4–5.9)
SODIUM SERPL-SCNC: 137 MMOL/L (ref 133–144)
WBC # BLD AUTO: 8.4 10E9/L (ref 4–11)

## 2020-08-09 PROCEDURE — 12000001 ZZH R&B MED SURG/OB UMMC

## 2020-08-09 PROCEDURE — 80048 BASIC METABOLIC PNL TOTAL CA: CPT | Performed by: OTOLARYNGOLOGY

## 2020-08-09 PROCEDURE — 25000125 ZZHC RX 250: Performed by: STUDENT IN AN ORGANIZED HEALTH CARE EDUCATION/TRAINING PROGRAM

## 2020-08-09 PROCEDURE — 25000132 ZZH RX MED GY IP 250 OP 250 PS 637: Mod: GY | Performed by: STUDENT IN AN ORGANIZED HEALTH CARE EDUCATION/TRAINING PROGRAM

## 2020-08-09 PROCEDURE — 36415 COLL VENOUS BLD VENIPUNCTURE: CPT | Performed by: OTOLARYNGOLOGY

## 2020-08-09 PROCEDURE — 25000128 H RX IP 250 OP 636: Performed by: STUDENT IN AN ORGANIZED HEALTH CARE EDUCATION/TRAINING PROGRAM

## 2020-08-09 PROCEDURE — 40000047 ZZH STATISTIC CTO2 CONT OXYGEN TECH TIME EA 90 MIN

## 2020-08-09 PROCEDURE — 85027 COMPLETE CBC AUTOMATED: CPT | Performed by: OTOLARYNGOLOGY

## 2020-08-09 PROCEDURE — 92526 ORAL FUNCTION THERAPY: CPT | Mod: GN

## 2020-08-09 PROCEDURE — 83735 ASSAY OF MAGNESIUM: CPT | Performed by: OTOLARYNGOLOGY

## 2020-08-09 PROCEDURE — 84100 ASSAY OF PHOSPHORUS: CPT | Performed by: OTOLARYNGOLOGY

## 2020-08-09 PROCEDURE — 40000275 ZZH STATISTIC RCP TIME EA 10 MIN

## 2020-08-09 PROCEDURE — 99232 SBSQ HOSP IP/OBS MODERATE 35: CPT | Mod: 24 | Performed by: THORACIC SURGERY (CARDIOTHORACIC VASCULAR SURGERY)

## 2020-08-09 RX ORDER — MAGNESIUM HYDROXIDE 1200 MG/15ML
LIQUID ORAL
Status: DISPENSED
Start: 2020-08-09 | End: 2020-08-09

## 2020-08-09 RX ADMIN — ACETAMINOPHEN 650 MG: 325 SOLUTION ORAL at 17:08

## 2020-08-09 RX ADMIN — METOPROLOL TARTRATE 25 MG: 100 TABLET ORAL at 01:01

## 2020-08-09 RX ADMIN — ENOXAPARIN SODIUM 40 MG: 40 INJECTION SUBCUTANEOUS at 15:06

## 2020-08-09 RX ADMIN — ONDANSETRON 4 MG: 2 INJECTION INTRAMUSCULAR; INTRAVENOUS at 21:08

## 2020-08-09 RX ADMIN — LISINOPRIL 2.5 MG: 2.5 TABLET ORAL at 20:44

## 2020-08-09 RX ADMIN — LISINOPRIL 2.5 MG: 2.5 TABLET ORAL at 08:56

## 2020-08-09 RX ADMIN — Medication 12.5 MG: at 08:56

## 2020-08-09 RX ADMIN — Medication 6 MG: at 20:45

## 2020-08-09 RX ADMIN — ACETAMINOPHEN 650 MG: 325 SOLUTION ORAL at 05:33

## 2020-08-09 RX ADMIN — ACETAMINOPHEN 650 MG: 325 SOLUTION ORAL at 09:40

## 2020-08-09 RX ADMIN — TAMSULOSIN HYDROCHLORIDE 0.4 MG: 0.4 CAPSULE ORAL at 08:56

## 2020-08-09 RX ADMIN — METOPROLOL TARTRATE 25 MG: 100 TABLET ORAL at 08:54

## 2020-08-09 RX ADMIN — METOPROLOL TARTRATE 25 MG: 100 TABLET ORAL at 20:44

## 2020-08-09 RX ADMIN — ACETAMINOPHEN 650 MG: 325 SOLUTION ORAL at 21:50

## 2020-08-09 RX ADMIN — MULTIVITAMIN 15 ML: LIQUID ORAL at 08:54

## 2020-08-09 RX ADMIN — METOPROLOL TARTRATE 25 MG: 100 TABLET ORAL at 16:47

## 2020-08-09 ASSESSMENT — ACTIVITIES OF DAILY LIVING (ADL)
ADLS_ACUITY_SCORE: 15
ADLS_ACUITY_SCORE: 12
ADLS_ACUITY_SCORE: 15
ADLS_ACUITY_SCORE: 12
ADLS_ACUITY_SCORE: 14
ADLS_ACUITY_SCORE: 12

## 2020-08-09 NOTE — PROGRESS NOTES
"Otolaryngology Progress Note  August 9, 2020  24 hr events:   - PVR improving 180mL noted ON.   - Tolerated 4/6 TF can boluses plus dinner from outside hospital   - Minimum Blood tinged secretions from trach   - Becoming independent with cares     S:  No new complaints. Voiding better with PVR's in low 400's. Received 4 of 6 cans of tube feeds on 8/8 because he was able to tolerate PO intake. Had some questions about PEG placement so we discussed this option further with him, explaining the improvements that a PEG could make to his tube feeding schedule.    O: /64 (BP Location: Left arm)   Pulse 77   Temp 99  F (37.2  C) (Oral)   Resp 20   Ht 1.765 m (5' 9.5\")   Wt 85.9 kg (189 lb 6.4 oz)   SpO2 94%   BMI 27.57 kg/m     General: Alert and oriented x 3, resting comfortably. Interactive in discussion.    HEENT: EOMI. HB 1/6. 6-0 cuffed Shiley in place. Strong cough. Neck is soft, no palpable crepitus or hematoma.    Pulmonary: 6-0 cuffed shiley in place on TD at 30%,  no accessory muscle use.      Intake/Output Summary (Last 24 hours) at 8/7/2020 1245  Last data filed at 8/7/2020 1208  Gross per 24 hour   Intake 2229.17 ml   Output 2000 ml   Net 229.17 ml       LABS:  BMP  Recent Labs   Lab 08/09/20 0617 08/08/20  0709 08/07/20  0641 08/06/20  0734    138 140 140   POTASSIUM 4.0 3.9 4.2 4.1   CHLORIDE 103 104 107 106   DONNIE 8.6 8.7 8.0* 8.2*   CO2 30 30 29 30   BUN 15 12 13 12   CR 0.72 0.70 0.57* 0.66   * 99 100* 104*     CBC  Recent Labs   Lab 08/09/20  0617 08/08/20  0709 08/07/20  0641 08/06/20  0734   WBC 8.4 10.6 8.2 8.8   RBC 3.94* 4.05* 3.79* 3.84*   HGB 12.3* 12.7* 11.9* 12.0*   HCT 39.0* 40.3 37.6* 37.6*   MCV 99 100 99 98   MCH 31.2 31.4 31.4 31.3   MCHC 31.5 31.5 31.6 31.9   RDW 14.3 14.5 14.6 14.8    181 150 164     A/P: Evin Sterling is a 70 year old male with a past medical history of CHF (EF<30%), HTN, afib, GERD, 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 He was then admitted to [facility] for awake tracheostomy, direct laryngoscopy with biopsy and NG tube placement on 8/3.     Neuro:   -PRN oxycodone and tylenol via feeding tube     HEENT: s/p awake trach, 6-0 cuffless shiley in place, changed on 8/7   -Due to tumor burden patient would be a difficult (likely impossible) intubation from above.  -Trach changed performed 8/7 - easy trach change without resistance. Subsequent changes can be done by any qualified personnel without exchange catheter.   -NG tube in place and secured to right naris     Trach Tube Instructions:   1) Contact ENT on-call with any questions or concerns   2) Remove and clean inner cannula qshift or more frequently if needed   3) Keep trach tube obturator taped to wall behind the head of the bed   4) Keep extra unopened 6-0 Shiley and 4-0 Shiley at bedside at all times   5) OK to change mepilex on chest as long as trach face plate is not manipulated.   6) Keep trach ties appropriately tight - only should be able to get two fingers beneath ties and neck      CV/H:  -Hx CHF, HTN, A. Fib, EF <30%  - Talked briefly with cardiology fellow 8/8 recommending to re-consult cardiology Monday for discussion on patients care and appropriately optimizing for H&N surgery.   -Cardiology and Medicine consult, appreciate assistance.    - continue metoprolol XL 25 mg daily, titrate up as tolerated   - increase lisinopril to 2.5 mg BID   - add spironolactone 12.5 mg daily    - EKG (will follow-up read with cards)   - set up outpatient follow up with cardiology with repeat ECHO after 3 months of medical management   - Per medicine team: Given new decrease in EF, would inquire with cardiology if LHC indicated prior to surgery    - PTA metoprolol, lisinopril   - CBC daily   - Begin Strict I/O  - Begin Daily weights  - Begin Sodium  restriction of 2gm       FEN/GI: BMP wnl. Mg 2.3, phos 2.6 Last BM 8,5  - Bolus TF with  170mL before and after each bolus   -NG tube in appropriate position.   -SLP consult, appreciate recs: regular textures and nectar thick liquids with strict use of chin tuck strategy every bite and sip. Pt should be fully upright for all PO, chin tuck every swallow, slow pacing, and alternate between consistencies  -  ml/hr discontinued    -Albumin, pre-albumin on POD#1 (albumin 2.7, prealbumin 13)  -Mg, phos, electrolytes daily  - PRN Zofran    - BR: PRN senna, miralax and suppository.  -  Calorie Count       : Urinary retention improving. PSA 4.62  -continue q4hr bladder scans if no void   - Urology recommendations: increased ambulation, limiting narcotic use and recommended morales or straight caths for PVR >400 ml. Patient currently declining straight cath and morales     -- Consider OP Urology follow up   - Continue flomax    Endo:  - SHE  -TSH 0.89     ID: AF, CBC wnl. Monitor for fevers.   -No indication for antibiotics      PPX:  -SCDs  -Encourage ambulation   - Lovenox       CONSULTS:   -Nutrition consult for tube feeds   -SLP  - Cardiology  - Medicine   - Psychiatry saw 8/8 awaiting dictated note   - PLC: trach and TF completed  8/7 at 12 and 1PM    Dispo: pending adequate nutrition, and further evaluation of elevated post void residuals     -- Patient and above plan discussed with Dr. Felipe Wallace, PGY2   Otolaryngology-Head & Neck Surgery  Please contact ENT with questions by dialing [phone] and entering job code [phone] when prompted

## 2020-08-09 NOTE — PLAN OF CARE
Status: Patient admitted on 8/3, now POD#6 s/p awake tracheostomy, direct laryngoscopy with biopsy and NG tube placement   Vitals: VSS, HTD at 21% sats in upper 90's, HME on when ambulating in hallway  Neuros: Intact  IV: PIV saline locked  Resp/trach: #6 Shiley cuffless, no suction required today, patient has strong productive cough, patient able to remove and cleanse inner cannula with supervision, lung sounds coarse in lower lobes  Diet: 2g Na diet, strict I&O's, with bolus TF's tolerated 4/6 cans today.  Bowel status: Last BM yesterday, BS+  : Voiding spontaneously with retention, PVR 330mL, straight cathed patient for 300mL at 1410  Skin: Trach site with blanchable redness, CDI, changed trach ties today  Pain: Patient c/o neck pain relieved with Tylenol  Activity: Independent, ambulated around unit several times today  Social: Calm and cooperative with cares, daughter at bedside, supportive  Plan: Continue to have patient complete trach and TF cares, discharge home when independent with cares and medically stable, continue to monitor and follow POC

## 2020-08-09 NOTE — PLAN OF CARE
Status: Patient admitted on 8/3, now POD#5 s/p awake tracheostomy, direct laryngoscopy with biopsy and NG tube placement  Vitals: VSS, HTD at 21%, HME on when ambulating in hallway  Neuros: Intact  IV: SL  Resp/trach: #6 Shiley cuffless, no suction required this shift, patient has strong, productive cough, able to complete trach cares with supervision and nurse providing assistance as needed. Coarse lung sounds  Diet: 2g Na diet, strict I&Os with bolus TFs. Patient completed 4/6 cans today. Ate take out provided by daughter this shift, on enoch counts  Bowel status: Had 1x BM this shift  : Voiding spontaneously with retention, Bladder scan at 2200 180mL  Skin: Trach site with blanchable redness  Pain: PRN Tylenol given x1  Activity: Independent, ambulatory  Plan: Continue to have patient complete trach and TF cares, discharge home when independent with cares and medically stable, continue to monitor and follow POC

## 2020-08-09 NOTE — PROGRESS NOTES
Calorie Count  Intake recorded for: 8/8  Total Kcals: 1699 Total Protein: 55g  Kcals from Hospital Food: 909  Protein: 31g  Kcals from Outside Food (average):790 Protein: 24g  # Meals Recorded: 3 meals (First - 100% fruit cup)      (Second - 100% 1.5 pancakes w/ syrup, scrambled eggs)      (Third - 100% filet of fish sandwich, 75% small james, 50% small shake, all from [employer])  # Supplements Recorded: 100% 1 Magic Cup

## 2020-08-09 NOTE — PROGRESS NOTES
Small/moderate amount of bright blood in coughed up secretions as well as some leaking under face plate. ENT notified, no new orders. Will continue to monitor and notify team of changes.

## 2020-08-09 NOTE — CONSULTS
Consult Date:  08/08/2020      PSYCHIATRIC CONSULTATION      IDENTIFICATION:  Mr. Sterling is a 70-year-old white male who is hospitalized for the treatment of throat cancer.  He is currently trached and prefers to write rather than use his talking trach.  I am asked to evaluate his depression by Dr. Wallace.      HISTORY:  Mr. Sterling says that he has had depression on and off for the last 25 years.  He lost his first wife, which was quite difficult for him.  Then his second marriage did not work out well.  He does have 2 very supportive daughters and 2 grandchildren.  He reports that he has had poor sleep, poor appetite, decreased energy and some intermittent feelings of helplessness and hopelessness; however, he denies suicidal ideation.  From what I can tell, he has never been treated for depression.  Today we discussed treating with Lexapro 10 mg in the morning and also I strongly recommend the patient does get a therapist.  I think he would benefit from someone to talk to.  Happily, he is somewhat optimistic about the outcome of his cancer surgery.      PAST MEDICAL HISTORY:  Includes CHF, gastroesophageal reflux disease and hypertension.      ALLERGIES:  HE HAS NO KNOWN ALLERGIES.      FAMILY HISTORY:  The patient is unaware of any family psychiatric history.      SOCIAL HISTORY:  The patient is .  He has 2 supportive daughters, one is visiting today.  He was a former smoker but has quit smoking cigarettes.  He tells me that in the past he was both an alcoholic and a drug addict, but these are currently in remission.      PHYSICAL REVIEW OF SYSTEMS:  On my interview, the patient denied headache or problems with vision or hearing.  He has a trach in place and can talk using a talking trach, but prefers to write.  He denied chest pain or shortness of breath, abdominal pain, diarrhea or constipation, genitourinary symptoms or problems with muscles, skin or joints.      MENTAL STATUS EXAMINATION:  The patient was a  pleasant, cooperative white male.  His mood was dysphoric.  His affect was somewhat restricted.  His speech was coherent and goal oriented.  His associations tight.  His thought processes were logical and linear.  His content of thought was without psychosis or suicidal ideation.  Recent and remote memory, concentration, fund of knowledge and use of language were at baseline, as was muscle strength and tone.  He is alert and oriented x 3.  Insight and judgment are intact.  Recent vital signs include a temperature of 98.7, pulse of 94, respiration rate of 18 with 96% oxygen saturation and a blood pressure of 99/56.      ASSESSMENT:  Recurrent major depressive disorder.      RECOMMENDATIONS:  Lexapro 10 mg in the morning.  The patient really would benefit from having a therapist.         AMAYA GONZALEZ MD             D: 2020   T: 2020   MT: ASHISH      Name:     LAUREN NO   MRN:      [id number]        Account:       [id number]   :      1950           Consult Date:  2020      Document: [id number]       cc: Amaya Gonzalez MD

## 2020-08-09 NOTE — PROGRESS NOTES
EMR reviewed. No new acute medical issue.     - We will talk to ENT if Medicine team can sign off.     Asher Lee MD, FACP   Internal Medicine/ Hospitalist (Pager- [phone])

## 2020-08-09 NOTE — PLAN OF CARE
Discharge Planner SLP   Patient plan for discharge: none stated   Current status: Pt tolerated a small amount of nectar thick liquids with chin tuck strategy with no overt s/sx of aspiration. Reviewed anatomical and sensory changes s/p laryngectomy with pt and daughter. Recommend pt continue regular textures and nectar thick liquids with strict use of chin tuck strategy with all PO. Pt should be fully upright for all PO, chin tuck every swallow, slow pacing, and alternate between consistencies. ST to continue to follow.  Barriers to return to prior living situation: dysphagia, trach, medical readiness   Recommendations for discharge: home with OP ST follow up at ENT clinic   Rationale for recommendations: pt would benefit from OP ST at ENT clinic targeting pre-operative total laryngectomy counseling and education.        Entered by: Priscilla Abdi 08/09/2020 3:01 PM

## 2020-08-09 NOTE — CONSULTS
THORACIC SURGERY CONSULT NOTE    Consult Reason: PEG insertion    HPI: Patient with laryngeal cancer and status post tracheostomy placement.  Has nasal feeding tube and consulted for PEG placement.  The patient would like to adjust to the tracheostomy for now and is fine with the nasal tube.  He would like time to consider a PEG.    A/P: Patient is a 70 year old male with laryngeal cancer  -Discussed PEG placement and benefits of not having tube in nose for the long term - with risk of aspiration, sinusitis and septum erosion.  A PEG is also more comfortable.  We are willing to place a tube if the patient decides to proceed.    Patient and plan discussed with attending.    Thank you for the opportunity to participate in the care of this patient.    PMH:  Past Medical History:   Diagnosis Date     CHF (congestive heart failure) (H)      GERD (gastroesophageal reflux disease)      Hypertension          PSH:  Past Surgical History:   Procedure Laterality Date     HERNIA REPAIR, INGUINAL RT/LT       HERNIA REPAIR, UMBILICAL       LARYNGOSCOPY WITH BIOPSY(IES) N/A 8/3/2020    Procedure: LARYNGOSCOPY WITH BIOPSY, TRACHEOSTOMY, NG TUBE PLACEMENT;  Surgeon: Caron Wang MD;  Location: [facility] OR     TRACHEOSTOMY N/A 8/3/2020    Procedure: TRACHEOSTOMY;  Surgeon: Caron Wang MD;  Location: [facility] OR       SH:  Tobacco use: 1 ppd for 20 years.  Quit 19 years ago    Allergies:  No Known Allergies    Home Meds:  Medications Prior to Admission   Medication Sig Dispense Refill Last Dose     ibuprofen (ADVIL/MOTRIN) 200 MG tablet Take 400 mg by mouth every 6 hours as needed for mild pain   Past Week at Unknown time     metoprolol succinate ER (TOPROL-XL) 50 MG 24 hr tablet Take 100 mg by mouth daily   8/2/2020 at 2000     ondansetron (ZOFRAN) 4 MG tablet Take 4 mg by mouth every 8 hours as needed for nausea   8/2/2020 at 1600     acetaminophen (TYLENOL) 325 MG tablet Take 325-650 mg by mouth every 6 hours as needed for mild  pain   8/2/2020 at 2000     lisinopril (ZESTRIL) 10 MG tablet Take 10 mg by mouth daily   7/31/2020       Physical Exam:  Temp:  [98.7  F (37.1  C)-99.1  F (37.3  C)] 99  F (37.2  C)  Pulse:  [77] 77  Heart Rate:  [] 94  Resp:  [14-20] 20  BP: ()/(56-86) 121/64  FiO2 (%):  [21 %] 21 %  SpO2:  [94 %-96 %] 94 %    Gen: NAD, eating comfortably  Lungs: non-labored breathing  Neuro: AOx3  NG feeding tube in place  Appropriate affect      Labs:  CBC  Recent Labs   Lab 08/09/20  0617 08/08/20  0709 08/07/20  0641 08/06/20  0734   WBC 8.4 10.6 8.2 8.8   HGB 12.3* 12.7* 11.9* 12.0*    181 150 164     BMP  Recent Labs   Lab 08/09/20  0617 08/08/20  0709 08/07/20  0641 08/06/20  0734    138 140 140   POTASSIUM 4.0 3.9 4.2 4.1   CHLORIDE 103 104 107 106   CO2 30 30 29 30   BUN 15 12 13 12   CR 0.72 0.70 0.57* 0.66   * 99 100* 104*     LFT  Recent Labs   Lab 08/04/20  0602   ALBUMIN 2.7*     Imaging:  PET 8/7/2020:  Stomach in reasonable position for PEG          Reuben Hastings MD

## 2020-08-09 NOTE — PLAN OF CARE
Status: POD #6 laryngoscopy with biopsy, awake trach placement, and NG placement   Vitals: VSS on 21% HTD, on continuous pulse ox   Neuros: Intact, writes to communicate   IV: PIV SL   Resp/trach: Diminished, no WOB noted. Good productive cough with moderate thick secretions. Had some bright blood in secretions overnight (small/moderate), MD's aware and resolved. No suctioning required overnight. Cleansed inner cannula x2. #6 cuffless shiley, cuff Trach ties in place. HME in room for walks   Diet: 2G sodium diet with nectar thick liquids, enoch counts. Bolus with goal of 6 cans/day   Bowel status: BS+   : Voiding in small amounts, has been having high PVR's (408 overnight). Urology following, not wanting straight cath's or Zazueta, continue to monitor    Skin: Trach site reddened with small/moderate amount of serosanguinous,sanguinous drainage. Trach ties secure. Site cleansed per order  Pain: Tylenol given x1 for neck pain  Activity: Independent   Plan: Encourage own cares. Continue to monitor and follow POC .

## 2020-08-10 ENCOUNTER — APPOINTMENT (OUTPATIENT)
Dept: SPEECH THERAPY | Facility: CLINIC | Age: 70
DRG: 012 | End: 2020-08-10
Attending: OTOLARYNGOLOGY
Payer: MEDICARE

## 2020-08-10 DIAGNOSIS — Z11.59 ENCOUNTER FOR SCREENING FOR OTHER VIRAL DISEASES: Primary | ICD-10-CM

## 2020-08-10 LAB
ANION GAP SERPL CALCULATED.3IONS-SCNC: 4 MMOL/L (ref 3–14)
BUN SERPL-MCNC: 16 MG/DL (ref 7–30)
CALCIUM SERPL-MCNC: 8.1 MG/DL (ref 8.5–10.1)
CHLORIDE SERPL-SCNC: 106 MMOL/L (ref 94–109)
CO2 SERPL-SCNC: 29 MMOL/L (ref 20–32)
CREAT SERPL-MCNC: 0.59 MG/DL (ref 0.66–1.25)
GFR SERPL CREATININE-BSD FRML MDRD: >90 ML/MIN/{1.73_M2}
GLUCOSE SERPL-MCNC: 98 MG/DL (ref 70–99)
MAGNESIUM SERPL-MCNC: 2.2 MG/DL (ref 1.6–2.3)
PHOSPHATE SERPL-MCNC: 3 MG/DL (ref 2.5–4.5)
POTASSIUM SERPL-SCNC: 4.3 MMOL/L (ref 3.4–5.3)
SODIUM SERPL-SCNC: 139 MMOL/L (ref 133–144)

## 2020-08-10 PROCEDURE — 84100 ASSAY OF PHOSPHORUS: CPT | Performed by: OTOLARYNGOLOGY

## 2020-08-10 PROCEDURE — 25000128 H RX IP 250 OP 636: Performed by: STUDENT IN AN ORGANIZED HEALTH CARE EDUCATION/TRAINING PROGRAM

## 2020-08-10 PROCEDURE — 25000132 ZZH RX MED GY IP 250 OP 250 PS 637: Mod: GY | Performed by: STUDENT IN AN ORGANIZED HEALTH CARE EDUCATION/TRAINING PROGRAM

## 2020-08-10 PROCEDURE — 25000125 ZZHC RX 250: Performed by: STUDENT IN AN ORGANIZED HEALTH CARE EDUCATION/TRAINING PROGRAM

## 2020-08-10 PROCEDURE — 83735 ASSAY OF MAGNESIUM: CPT | Performed by: OTOLARYNGOLOGY

## 2020-08-10 PROCEDURE — 92526 ORAL FUNCTION THERAPY: CPT | Mod: GN

## 2020-08-10 PROCEDURE — 36415 COLL VENOUS BLD VENIPUNCTURE: CPT | Performed by: OTOLARYNGOLOGY

## 2020-08-10 PROCEDURE — 99207 ZZC NO CHARGE LOS: CPT | Mod: ZP | Performed by: INTERNAL MEDICINE

## 2020-08-10 PROCEDURE — 40000047 ZZH STATISTIC CTO2 CONT OXYGEN TECH TIME EA 90 MIN

## 2020-08-10 PROCEDURE — 12000001 ZZH R&B MED SURG/OB UMMC

## 2020-08-10 PROCEDURE — 40000275 ZZH STATISTIC RCP TIME EA 10 MIN

## 2020-08-10 PROCEDURE — 80048 BASIC METABOLIC PNL TOTAL CA: CPT | Performed by: OTOLARYNGOLOGY

## 2020-08-10 RX ORDER — LISINOPRIL 10 MG/1
10 TABLET ORAL DAILY
Status: DISCONTINUED | OUTPATIENT
Start: 2020-08-11 | End: 2020-08-12 | Stop reason: HOSPADM

## 2020-08-10 RX ORDER — ESCITALOPRAM OXALATE 10 MG/1
10 TABLET ORAL DAILY
Status: DISCONTINUED | OUTPATIENT
Start: 2020-08-10 | End: 2020-08-11

## 2020-08-10 RX ADMIN — Medication 12.5 MG: at 07:50

## 2020-08-10 RX ADMIN — ACETAMINOPHEN 650 MG: 325 SOLUTION ORAL at 04:19

## 2020-08-10 RX ADMIN — LISINOPRIL 2.5 MG: 2.5 TABLET ORAL at 07:50

## 2020-08-10 RX ADMIN — METOPROLOL TARTRATE 25 MG: 100 TABLET ORAL at 07:50

## 2020-08-10 RX ADMIN — METOPROLOL TARTRATE 25 MG: 100 TABLET ORAL at 15:22

## 2020-08-10 RX ADMIN — METOPROLOL TARTRATE 25 MG: 100 TABLET ORAL at 01:07

## 2020-08-10 RX ADMIN — MULTIVITAMIN 15 ML: LIQUID ORAL at 07:50

## 2020-08-10 RX ADMIN — ENOXAPARIN SODIUM 40 MG: 40 INJECTION SUBCUTANEOUS at 15:22

## 2020-08-10 RX ADMIN — ACETAMINOPHEN 650 MG: 325 SOLUTION ORAL at 20:53

## 2020-08-10 RX ADMIN — Medication 6 MG: at 20:51

## 2020-08-10 RX ADMIN — METOPROLOL TARTRATE 25 MG: 100 TABLET ORAL at 20:51

## 2020-08-10 RX ADMIN — TAMSULOSIN HYDROCHLORIDE 0.4 MG: 0.4 CAPSULE ORAL at 07:50

## 2020-08-10 ASSESSMENT — ACTIVITIES OF DAILY LIVING (ADL)
ADLS_ACUITY_SCORE: 12
ADLS_ACUITY_SCORE: 12
ADLS_ACUITY_SCORE: 13
ADLS_ACUITY_SCORE: 12
ADLS_ACUITY_SCORE: 13
ADLS_ACUITY_SCORE: 13

## 2020-08-10 ASSESSMENT — MIFFLIN-ST. JEOR: SCORE: 1632.41

## 2020-08-10 NOTE — PROGRESS NOTES
CLINICAL NUTRITION SERVICES - BRIEF NOTE      Nutrition Prescription     Recommendations already ordered by Registered Dietitian (RD):  --Decreased TF regimen to 4 cans Isosource 1.5 per day due to increase in PO intake and pt complaining of fullness/difficulty fitting in all cans:       --Provides 1000 ml volume, 1500 kcal, 68 g pro, 760 ml free water, 176 g CHO, 15 g fiber daily.        --With current PO intake, expect pt to still meet >100% of needs.      Future/Additional Recommendations:  Continue to monitor PO intake, TF tolerance to reduced bolus TF regimen, and need to further adjust TF regimen    *Please see full assessment note from 8/4/2020    New Findings:  TF order changed by MD to bolus feeds as recommended by RD in previous assessment note. Pt has been tolerating 6 cans/day, though with increased fullness given improvement in PO intake. Spoke with RN over the phone, reports pt has been having a hard time fitting in all 6 cans of TF/day. Discussed this writer will make adjustments in TF order given improvement in PO. Continuing kcal counts. Noted pt considering PEG.     8/8:         Total Kcals: 1699     Total Protein: 55g   8/9:         Total Kcals: 1419     Total Protein: 32g   --Note: pt also had a [place] sandwich, but not enough information was given to caluclate calories/protein.   8/10: pending    6 cans Isosource 1.5/day providing 2250 kcals (26 kcal/kg), 102 g PRO (1.2 g/kg/day), 1140 ml H2O, 264 g CHO and 23 g Fiber daily       --Pt has been taking in ~3670+ kcal (43+ kcal/kg), and ~132+ g pro (1.5+ g/kg)    Interventions  Collaboration with other providers - RN  Enteral Nutrition - Modify regimen to 4 cans/day  Continue kcal counts    RD to follow per protocol.    Jeisca Horn, MS, RD, LD, CNSC  6A/7D RD Pager: [phone]

## 2020-08-10 NOTE — PLAN OF CARE
Discharge Planner SLP   Patient plan for discharge: hopeful to go home today or tomorrow  Current status: The patient was seen for tx this afternoon with his daughter present. Continue to recommend a regular texture diet with nectar thick liquids and strict use of a chin tuck strategy with each swallow. Pt/daughter demonstrated understanding of diet modification, need for chin tuck and general safety precautions (eat slowly, sit up, small bites/sips). SLP provided education about how to obtain thickener and how to thicken liquids to nectar consistency at home.  Barriers to return to prior living situation: dysphagia, tracheostomy, medical status  Recommendations for discharge: home with OP SLP tx at ENT clinic  Rationale for recommendations: benefits from ongoing SLP intervention to maximize safe swallowing       Entered by: Anette Pugh 08/10/2020 3:44 PM

## 2020-08-10 NOTE — PROGRESS NOTES
"Otolaryngology Progress Note  August 10, 2020  24 hr events:   - some nausea with last TF can (but ate 100% of meals)  - not ready for PEG     S:  No new complaints. Voiding better, still declining some straight caths. Some nausea with last TF can (but tolerating adequate PO intake). Increasing independence with cares. Strong cough. Ongoing care coordination.     O: /63 (BP Location: Left arm)   Pulse 98   Temp 96.4  F (35.8  C) (Axillary)   Resp 15   Ht 1.765 m (5' 9.5\")   Wt 85.9 kg (189 lb 6.4 oz)   SpO2 (!) 86%   BMI 27.57 kg/m     General: Alert and oriented x 3, sitting comfortably in chair. Just returned from walk.     HEENT: EOMI. HB 1/6. 6-0 cuffed Shiley in place. Strong cough. Neck is soft, no palpable crepitus or hematoma.     Pulmonary: 6-0 cuffed shiley in place on TD at 30%,  no accessory muscle use.      Intake/Output Summary (Last 24 hours) at 8/10/2020 0803  Last data filed at 8/10/2020 0600  Gross per 24 hour   Intake 2640 ml   Output 2250 ml   Net 390 ml       LABS:  BMP  Recent Labs   Lab 08/10/20  0631 08/09/20  0617 08/08/20  0709 08/07/20  0641    137 138 140   POTASSIUM 4.3 4.0 3.9 4.2   CHLORIDE 106 103 104 107   DONNIE 8.1* 8.6 8.7 8.0*   CO2 29 30 30 29   BUN 16 15 12 13   CR 0.59* 0.72 0.70 0.57*   GLC 98 107* 99 100*     CBC  Recent Labs   Lab 08/09/20  0617 08/08/20  0709 08/07/20  0641 08/06/20  0734   WBC 8.4 10.6 8.2 8.8   RBC 3.94* 4.05* 3.79* 3.84*   HGB 12.3* 12.7* 11.9* 12.0*   HCT 39.0* 40.3 37.6* 37.6*   MCV 99 100 99 98   MCH 31.2 31.4 31.4 31.3   MCHC 31.5 31.5 31.6 31.9   RDW 14.3 14.5 14.6 14.8    181 150 164     A/P: Evin Sterling is a 70 year old male with a past medical history of CHF (EF<30%), HTN, afib, GERD, 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 He was then admitted to [facility] for awake " tracheostomy, direct laryngoscopy with biopsy and NG tube placement on 8/3.     Neuro:   -PRN oxycodone and tylenol via feeding tube  - added lexapro 10mg qday per psych rec's   - will follow-up with SW re: outpatient therapist      HEENT: s/p awake trach, 6-0 cuffless shiley in place, changed on 8/7   -Due to tumor burden patient would be a difficult (likely impossible) intubation from above.  -Trach changed performed 8/7 - easy trach change without resistance. Subsequent changes can be done by any qualified personnel without exchange catheter.   -NG tube in place and secured to right naris     Trach Tube Instructions:   1) Contact ENT on-call with any questions or concerns   2) Remove and clean inner cannula qshift or more frequently if needed   3) Keep trach tube obturator taped to wall behind the head of the bed   4) Keep extra unopened 6-0 Shiley and 4-0 Shiley at bedside at all times   5) OK to change mepilex on chest as long as trach face plate is not manipulated.   6) Keep trach ties appropriately tight - only should be able to get two fingers beneath ties and neck      CV/H:  -Hx CHF, HTN, A. Fib, EF <30%  - re- consulted cardiology this morning re: final discharge rec's and possible LHC. Will follow-up on recs  -Cardiology and Medicine consult, appreciate assistance.    - continue metoprolol XL 25 mg daily, titrate up as tolerated   - increase lisinopril to 2.5 mg BID   - add spironolactone 12.5 mg daily    - EKG (will follow-up read with cards)   - set up outpatient follow up with cardiology with repeat ECHO after 3 months of medical management   - Per medicine team: Given new decrease in EF, would inquire with cardiology if LHC indicated prior to surgery. Will follow-up with medicine re: final discharge rec's  - PTA metoprolol, lisinopril   - CBC daily   - Begin Strict I/O  - Begin Daily weights  - Begin Sodium restriction of 2gm       FEN/GI: BMP wnl. Mg 2.3, phos 2.6 Last BM 8,5  - Bolus TF with  170mL  before and after each bolus   -NG tube in appropriate position.   -SLP consult, appreciate recs: regular textures and nectar thick liquids with strict use of chin tuck strategy every bite and sip. Pt should be fully upright for all PO, chin tuck every swallow, slow pacing, and alternate between consistencies  -Albumin, pre-albumin on POD#1 (albumin 2.7, prealbumin 13)  -Mg, phos, electrolytes daily  - PRN Zofran    - BR: PRN senna, miralax and suppository.  -  Calorie Count       : Urinary retention improving. PSA 4.62  -continue q4hr bladder scans if no void   - Urology recommendations: increased ambulation, limiting narcotic use and recommended morales or straight caths for PVR >400 ml. Patient intermittently declining straight cath, no morales     -- will contact urology for outpatient follow-up   - Continue flomax    Endo:  - SHE  -TSH 0.89     ID: AF, CBC wnl. Monitor for fevers.   -No indication for antibiotics      PPX:  -SCDs  -Encourage ambulation   - Lovenox       CONSULTS:   -Nutrition consult for tube feeds   -SLP  - Cardiology  - Medicine   - Psychiatry   - PLC: trach and TF completed  8/7 at 12 and 1PM    Dispo: pending discussions with consulting services, today vs. Tomorrow     -- Patient and above plan discussed with Dr. Felipe Navarro, PGY1  Otolaryngology-Head & Neck Surgery  Please contact ENT with questions by dialing [phone] and entering job code [phone] when prompted

## 2020-08-10 NOTE — PROGRESS NOTES
Social Work Services Progress Note    Hospital Day: 8  Date of Initial Social Work Evaluation:  8/8/20  Collaborated with:  Bedside RN, pt    Data:  Per pt's bedside RN, pt would like resources related to his recent cancer dx.   SW met with pt at bedside and provided general list of cancer resources for coping, transport, lodging and financial. Pt accepting, no other concerns noted.     Intervention:  Care coordination     Assessment:  Pt accepting of resources    Plan:    Anticipated Disposition:  Home with services    Barriers to d/c plan:  Medical stability    Follow Up:  SW available as needed    SHARRI Alexander, [facility]  Adult Acute Care Float   6A ph: [phone]  Pgr: [phone]

## 2020-08-10 NOTE — PROGRESS NOTES
Calorie Count  Intake recorded for: 8/9  Total Kcals: 1419 Total Protein: 32g  Kcals from Hospital Food: 919  Protein: 29g  Kcals from Outside Food (average): 500 Protein: 3g  # Meals Recorded: 1100% 2 doughnuts from outside the hospital,  pancake w/ syrup, 50% scrambled eggs;   # Supplements Recorded: 100% 2 Magic Cups    Note: pt also had a [place] sandwich from outside the hospital, but not enough information was given to caluclate calories/protein.

## 2020-08-10 NOTE — PROGRESS NOTES
Care Coordinator Progress Note    Admission Date/Time:  8/3/2020  Attending MD:  Caron Wang MD    Data  Chart reviewed, discussed with interdisciplinary team.   Patient was admitted for:    Laryngeal cancer (H)  Laryngeal cancer (H)  Urinary retention.       Assessment  This writer sent referrals to:   [facility]  [facility]   (P) [phone]  (F) [phone]    [facility]  (P) [phone]  (F) [phone]    This writer is waiting for a response from the medical suppliers for an estimate for private pay.        This writer also spoke with Brice Cleary's daughter. She was inquiring about discharge and the cost of supplies needed for Evin. This writer informed her of plan of care.     Plan  Anticipated Discharge Date:  TBD  Anticipated Discharge Plan:  Home     LIANNE Cruz RN Care Coordinator  Pager [phone] (covering pager)

## 2020-08-10 NOTE — PLAN OF CARE
Status: Patient admitted on 8/3, now POD#6 s/p awake tracheostomy, direct laryngoscopy with biopsy and NG tube placement  Vitals: VSS, on HTD @ 21%. HME while up in halls   Neuros: intact.   IV: PIV sl   Resp/trach: #6 Shiley cuffless, no suction required this evening. Strong and productive cough. Patient able to remove and cleanse inner cannula with supervision. Course ls in lower lobes.   Diet: 2g Na diet w/ nectar thick liquids. Ate 100% of dinner this evening. 6/6 TF boluses. Pt felt full and nauseous this evening, zofran given x1.   Bowel status: +bs, bm this evening   : voiding w/ retention. Last bs was 170.   Skin: trach site w/ blanchable redness, otherwise intact.   Pain: c/o incisional pain, tylenol given.   Activity: up ad dez.   Social: daughter at bedside earlier, supportive.   Plan: Continue w/ independence

## 2020-08-10 NOTE — CONSULTS
[facility]    Cardiology Consult Note      Date of Admission:  8/3/2020  Consult Requested by: Dr. Navarro  Reason for Consult: Cardiac risk stratification for surgery    Assessment & Plan: HVSL   Evin Sterling is a 70 year old male with PMHx of Afib, HFrEF and HTN admitted on 8/3/2020 for laryngoscopy with biopsy and tracheostomy. Surgical pathology showed invasive squamous cell carcinoma and the plan is for a laryngectomy. He has had VS WNL since surgery and is feeling well. Prior to admission pt was off all cardiac medications for three years due to loss of insurance. Drop in EF likely due to lack of medical care. He has been taking metoprolol 25mg q6h, lisinopril 2.5mg BID, and spironolactone 12.5mg daily without side effects. Pt had cardiac cath in 2012 after an ablation for Afib with no signs of blockages. Given patient's risk for a moderately risked procedure, lack of symptoms, and previous hx of non-ischemic cardiomyopathy, no LHC is recommended at this time.    RCRI - score 1 with 6% 30 day risk  High risk surgery - No  Hx of ischemic heart disease - No  Hx of congestive heart failure - Yes  Hx of cerebrovascular disease - No  Pre-operative treatment with insulin - No  Pre-operative Cr >2 - No    #HFrEF  #Persistent Afib   #HTN     Recommendations  - Continue metoprolol 25mg q6h spironolactone 12.5mg daily  - Increase lisinopril to 10mg once daily  - No further studies needed for risk stratification at this time  - Will repeat ECHO and pro-BNP in 3 months after optimal medical therapy       The patient's care was discussed with the fellow, Dr. Roberts and with the Attending Physician, Dr. Hudson.     Nadeen Robles MD  Internal Medicine & Pediatrics PGY1  [facility]  Pager: [phone]      ______________________________________________________________________    Chief Complaint   Laryngeal Cancer    History is obtained from the  patient    History of Present Illness   Evin Sterling is a 70 year old male with PMHx of Afib, HFrEF and HTN who presented for laryngoscopy with biopsy and tracheostomy on 8/3/2020. 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 Pt and ENT have opted for laryngectomy.     Last week, cardiology consulted and recommended titrating up metoprolol, increasing lisinopril to 2.5mg BID and adding spironolactone. Pt states he continues to feel well since these medication changes. Admits the mild swelling in his legs has been relieved. Denies dizziness, lightheadedness, CP, SOB, palpitations or orthopnea. His BPs have been 100s-130s/50s-70s and HRs in the 70s-90s.     Review of Systems   The 5 point Review of Systems is negative other than noted in the HPI.     Past Medical History    I have reviewed this patient's medical history and updated it with pertinent information if needed.   Past Medical History:   Diagnosis Date     CHF (congestive heart failure) (H)      GERD (gastroesophageal reflux disease)      Hypertension        Past Surgical History   I have reviewed this patient's surgical history and updated it with pertinent information if needed.  Past Surgical History:   Procedure Laterality Date     HERNIA REPAIR, INGUINAL RT/LT       HERNIA REPAIR, UMBILICAL       LARYNGOSCOPY WITH BIOPSY(IES) N/A 8/3/2020    Procedure: LARYNGOSCOPY WITH BIOPSY, TRACHEOSTOMY, NG TUBE PLACEMENT;  Surgeon: Caron Wang MD;  Location: [facility] OR     TRACHEOSTOMY N/A 8/3/2020    Procedure: TRACHEOSTOMY;  Surgeon: Caron Wang MD;  Location: [facility] OR       Social History   I have reviewed this patient's social history and updated it with pertinent information if needed.  Social History     Tobacco Use     Smoking status: Former Smoker     Packs/day: 1.00     Years: 20.00     Pack years: 20.00     Last attempt to quit:      Years since quittin.6     Smokeless tobacco: Former User   Substance Use Topics      Alcohol use: Not Currently     Drug use: Not Currently     Family History   I have reviewed this patient's family history and updated it with pertinent information if needed.   No significant family history.    Medications   I have reviewed this patient's current medications    Allergies   No Known Allergies    Physical Exam   Vital Signs: Temp: 96.4  F (35.8  C) Temp src: Axillary BP: 108/63 Pulse: 98 Heart Rate: 90 Resp: 15 SpO2: (!) 86 % O2 Device: None (Room air) Oxygen Delivery: 5 LPM  Weight: 190 lbs 8 oz    Constitutional: awake, alert, cooperative, no apparent distress, and appears stated age  Eyes: Lids and lashes normal, pupils equal, round and reactive to light, extra ocular muscles intact, sclera clear, conjunctiva normal  Respiratory: No increased work of breathing, good air exchange, clear to auscultation bilaterally, no crackles or wheezing, tracheostomy with in place  Cardiovascular: Normal apical impulse, regular rate and irregularly irregular rhythm, normal S1 and S2, no S3 or S4, and no murmur noted  Skin: no redness, warmth, or swelling in b/l LE         Data   I personally reviewed no images or EKG's today.  Results for orders placed or performed during the hospital encounter of 08/03/20 (from the past 24 hour(s))   Basic metabolic panel   Result Value Ref Range    Sodium 139 133 - 144 mmol/L    Potassium 4.3 3.4 - 5.3 mmol/L    Chloride 106 94 - 109 mmol/L    Carbon Dioxide 29 20 - 32 mmol/L    Anion Gap 4 3 - 14 mmol/L    Glucose 98 70 - 99 mg/dL    Urea Nitrogen 16 7 - 30 mg/dL    Creatinine 0.59 (L) 0.66 - 1.25 mg/dL    GFR Estimate >90 >60 mL/min/[1.73_m2]    GFR Estimate If Black >90 >60 mL/min/[1.73_m2]    Calcium 8.1 (L) 8.5 - 10.1 mg/dL   Magnesium   Result Value Ref Range    Magnesium 2.2 1.6 - 2.3 mg/dL   Phosphorus   Result Value Ref Range    Phosphorus 3.0 2.5 - 4.5 mg/dL

## 2020-08-11 LAB
ANION GAP SERPL CALCULATED.3IONS-SCNC: 3 MMOL/L (ref 3–14)
BUN SERPL-MCNC: 13 MG/DL (ref 7–30)
CALCIUM SERPL-MCNC: 8.7 MG/DL (ref 8.5–10.1)
CHLORIDE SERPL-SCNC: 103 MMOL/L (ref 94–109)
CO2 SERPL-SCNC: 32 MMOL/L (ref 20–32)
CREAT SERPL-MCNC: 0.74 MG/DL (ref 0.66–1.25)
GFR SERPL CREATININE-BSD FRML MDRD: >90 ML/MIN/{1.73_M2}
GLUCOSE SERPL-MCNC: 138 MG/DL (ref 70–99)
MAGNESIUM SERPL-MCNC: 2.4 MG/DL (ref 1.6–2.3)
PHOSPHATE SERPL-MCNC: 2.8 MG/DL (ref 2.5–4.5)
POTASSIUM SERPL-SCNC: 4.2 MMOL/L (ref 3.4–5.3)
SODIUM SERPL-SCNC: 138 MMOL/L (ref 133–144)

## 2020-08-11 PROCEDURE — 25000128 H RX IP 250 OP 636: Performed by: STUDENT IN AN ORGANIZED HEALTH CARE EDUCATION/TRAINING PROGRAM

## 2020-08-11 PROCEDURE — 83735 ASSAY OF MAGNESIUM: CPT | Performed by: OTOLARYNGOLOGY

## 2020-08-11 PROCEDURE — 99233 SBSQ HOSP IP/OBS HIGH 50: CPT | Mod: GC | Performed by: INTERNAL MEDICINE

## 2020-08-11 PROCEDURE — 84100 ASSAY OF PHOSPHORUS: CPT | Performed by: OTOLARYNGOLOGY

## 2020-08-11 PROCEDURE — 25000125 ZZHC RX 250: Performed by: STUDENT IN AN ORGANIZED HEALTH CARE EDUCATION/TRAINING PROGRAM

## 2020-08-11 PROCEDURE — 36415 COLL VENOUS BLD VENIPUNCTURE: CPT | Performed by: OTOLARYNGOLOGY

## 2020-08-11 PROCEDURE — 25000132 ZZH RX MED GY IP 250 OP 250 PS 637: Mod: GY | Performed by: STUDENT IN AN ORGANIZED HEALTH CARE EDUCATION/TRAINING PROGRAM

## 2020-08-11 PROCEDURE — 80048 BASIC METABOLIC PNL TOTAL CA: CPT | Performed by: OTOLARYNGOLOGY

## 2020-08-11 PROCEDURE — 40000275 ZZH STATISTIC RCP TIME EA 10 MIN

## 2020-08-11 PROCEDURE — 12000001 ZZH R&B MED SURG/OB UMMC

## 2020-08-11 RX ORDER — LORAZEPAM 0.5 MG/1
0.5 TABLET ORAL
Status: DISCONTINUED | OUTPATIENT
Start: 2020-08-11 | End: 2020-08-11

## 2020-08-11 RX ORDER — MAGNESIUM HYDROXIDE 1200 MG/15ML
LIQUID ORAL
Status: DISPENSED
Start: 2020-08-11 | End: 2020-08-12

## 2020-08-11 RX ADMIN — TAMSULOSIN HYDROCHLORIDE 0.4 MG: 0.4 CAPSULE ORAL at 08:00

## 2020-08-11 RX ADMIN — METOPROLOL TARTRATE 25 MG: 100 TABLET ORAL at 02:15

## 2020-08-11 RX ADMIN — METOPROLOL TARTRATE 25 MG: 100 TABLET ORAL at 08:00

## 2020-08-11 RX ADMIN — Medication 6 MG: at 21:27

## 2020-08-11 RX ADMIN — METOPROLOL TARTRATE 25 MG: 100 TABLET ORAL at 20:27

## 2020-08-11 RX ADMIN — ACETAMINOPHEN 650 MG: 325 SOLUTION ORAL at 18:58

## 2020-08-11 RX ADMIN — ENOXAPARIN SODIUM 40 MG: 40 INJECTION SUBCUTANEOUS at 14:27

## 2020-08-11 RX ADMIN — LISINOPRIL 10 MG: 10 TABLET ORAL at 08:00

## 2020-08-11 RX ADMIN — ACETAMINOPHEN 650 MG: 325 SOLUTION ORAL at 07:59

## 2020-08-11 RX ADMIN — MULTIVITAMIN 15 ML: LIQUID ORAL at 08:00

## 2020-08-11 RX ADMIN — Medication 12.5 MG: at 08:00

## 2020-08-11 ASSESSMENT — ACTIVITIES OF DAILY LIVING (ADL)
ADLS_ACUITY_SCORE: 12
ADLS_ACUITY_SCORE: 13

## 2020-08-11 ASSESSMENT — PAIN DESCRIPTION - DESCRIPTORS: DESCRIPTORS: ACHING

## 2020-08-11 ASSESSMENT — MIFFLIN-ST. JEOR: SCORE: 1634.68

## 2020-08-11 NOTE — PROGRESS NOTES
CLINICAL NUTRITION SERVICES - REASSESSMENT NOTE     Nutrition Prescription    RECOMMENDATIONS FOR MDs/PROVIDERS TO ORDER:  None     Malnutrition Status:    Unable to determine at this time    Recommendations already ordered by Registered Dietitian (RD):  - Enter prn order for Magic Cup supplement.     Future/Additional Recommendations:  - Monitor need for ongoing TF intakes given pt able to take po  - Monitor TF tolerance with bolus feeding of 2 cans BID plus 170 ml H2O before and after each bolus feeding ( total of 840 ml per feeding which is significantly high vol for gastric feeding)     EVALUATION OF THE PROGRESS TOWARD GOALS   Nutrition Support: Pt transitioned from continuous TF to bolus TFs on 8/7 with initial goal of 6 cans per day of Isosource 1.5. However, d/t pt complaining of fullness/difficulty fitting in all cans, TF vol was decreased to 4 cans per day ( 2 cans per feeding x 2 feedings per day) on 8/10.  Regimen provides 1000 ml, 1500 kcal (17 kcals/kg), 68 g PRO (0.8 g PRO/kg), 760 ml free water, 176 g CHO, 15 g fiber daily.    Intake: Ave TF intakes x 7 days = 936 ml which provides 1404 kcals (16 kcals/kg) and 64 g PRO (0.7 g PRO/kg)      NEW FINDINGS   Diet: 2 g Sodium and Nectar Thickened Liquids order on 8/8  - Pt also ordering Magic Cup supplement with meals    - Calorie Counts (8/8 thru 8/10); Ave po intakes x 3 days = 1319 Kcals and 36 g PRO    Total ave nutritional intakes from TF plus PO = 1969 kcals (23 kcals/kg) and 79 g PRO (0.9 g PRO/kg)    Weight: 86.6 kg (8/11), 85.5 kg (8/3); Wt fairly stable over the past week.      MALNUTRITION  % Intake: Decreased intake does not meet criteria  % Weight Loss: None noted  Subcutaneous Fat Loss: Unable to assess  Muscle Loss: Unable to assess  Fluid Accumulation/Edema: Unable to assess  Malnutrition Diagnosis: Unable to determine due to unable to complete all parameters of nutrition assessment at this time.    Previous Goals   Total avg nutritional  intake to meet a minimum of 25 kcal/kg and 1.2 g PRO/kg daily (per dosing wt 86 kg).    Evaluation: Not met    Previous Nutrition Diagnosis  Inadequate protein-energy intake related to inability to consume oral intakes secondary to surgery and TF therapy ordered, but not yet initiated as evidenced by NPO status since 8/3 and no TF intakes received at this time.      Evaluation: Improving    CURRENT NUTRITION DIAGNOSIS  Inadequate protein-energy intake related to po limited by food options and fullness with bolus TFs  as evidenced by Total ave nutritional intakes from TF plus PO x 7 days meeting only 23 kcals/kg and 0.9 g PRO/kg.      INTERVENTIONS  Implementation  Collaboration with RN regarding pt's intakes from TF plus po (informed that pt doesn't care for the foods available on the hospital menu)    Goals  Total avg nutritional intake to meet a minimum of 25 kcal/kg and 1.2 g PRO/kg daily (per dosing wt 86 kg).      Monitoring/Evaluation  Progress toward goals will be monitored and evaluated per protocol.        Yaima Hummel RD,LD  6A pager [phone]

## 2020-08-11 NOTE — PROGRESS NOTES
"Otolaryngology Progress Note  August 11, 2020  24 hr events:   - Decreased TF goal to 4 cans given PO intake  - Improved PVRs with Flomax  - Cards consult yesterday, increased lisinopril and no indication for heart cath at this time  - SW is working on supplies for home   - Patient declining starting Lexapro. Would instead like an outpatient therapist/support groups     S:  No acute events overnight. Patient reports pain is well controlled. He is anxious to get home so he can get some of his morale back prior to starting treatment. Does not want to get MRI right now, states he is claustrophobic and cannot lay down due to secretions so would prefer to do it at a later date.     O: /68 (BP Location: Left arm)   Pulse 84   Temp 97.1  F (36.2  C) (Axillary)   Resp 16   Ht 1.765 m (5' 9.5\")   Wt 86.4 kg (190 lb 8 oz)   SpO2 93%   BMI 27.73 kg/m     General: Alert and oriented x 3. Walking around the room independently.    HEENT: 6-0 cuffless shiley in place. Strong cough. Neck is soft, no palpable crepitus or hematoma.     Pulmonary: Breathing comfortably on room air with HME in place.         LABS:  BMP  Recent Labs   Lab 08/10/20  0631 08/09/20  0617 08/08/20  0709 08/07/20  0641    137 138 140   POTASSIUM 4.3 4.0 3.9 4.2   CHLORIDE 106 103 104 107   DONNIE 8.1* 8.6 8.7 8.0*   CO2 29 30 30 29   BUN 16 15 12 13   CR 0.59* 0.72 0.70 0.57*   GLC 98 107* 99 100*     CBC  Recent Labs   Lab 08/09/20  0617 08/08/20  0709 08/07/20  0641 08/06/20  0734   WBC 8.4 10.6 8.2 8.8   RBC 3.94* 4.05* 3.79* 3.84*   HGB 12.3* 12.7* 11.9* 12.0*   HCT 39.0* 40.3 37.6* 37.6*   MCV 99 100 99 98   MCH 31.2 31.4 31.4 31.3   MCHC 31.5 31.5 31.6 31.9   RDW 14.3 14.5 14.6 14.8    181 150 164     A/P: Evin Sterling is a 70 year old male with a past medical history of CHF (EF<30%), HTN, afib, GERD, 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 He was then admitted to [facility] for awake tracheostomy, direct laryngoscopy with biopsy and NG tube placement on 8/3.     Neuro:   -PRN oxycodone and tylenol via feeding tube  - will follow-up with [facility] re: outpatient therapist   - Will work on information regarding support groups  - CT scan 8/7/2020: \"1.6 cm non-FDG avid enhancing extra-axial mass overlying the left  temporal lobe, most likely a meningioma. Consider further evaluation  with brain MRI with contrast.\" Will defer MRI at this time given patient preference.      HEENT: s/p awake trach  -Due to tumor burden patient would be a difficult (likely impossible) intubation from above.  -Trach change performed 8/7 - easy trach change without resistance. Subsequent changes can be done by any qualified personnel without exchange catheter.   -NG tube in place and secured to right naris     Trach Tube Instructions:   1) Contact ENT on-call with any questions or concerns   2) Remove and clean inner cannula qshift or more frequently if needed   3) Keep trach tube obturator taped to wall behind the head of the bed   4) Keep extra unopened 6-0 Shiley and 4-0 Shiley at bedside at all times   5) OK to change mepilex on chest as long as trach face plate is not manipulated.   6) Keep trach ties appropriately tight - only should be able to get two fingers beneath ties and neck      CV/H:  -Hx CHF, HTN, A. Fib, EF <30%  -Cardiology and Medicine consult, appreciate assistance.    - continue metoprolol XL 25 mg daily, titrate up as tolerated   - increased lisinopril 10mg daily   - add spironolactone 12.5 mg daily    - EKG (will follow-up read with cards)   - set up outpatient follow up with cardiology with repeat ECHO after 3 months of medical management   - Medicine team signed off    - Outpatient referral to [facility]   - PTA metoprolol, lisinopril   - Strict I/O  - Daily weights  - Continue sodium restriction " of 2gm, to continue on discharge        FEN/GI: BMP wnl. Mg 2.3, phos 2.6 Last BM 8,5  - Bolus TF with  170mL before and after each bolus   -NG tube in appropriate position. Patient would like to go forward with PEG placement.   -SLP consult, appreciate recs: regular textures and nectar thick liquids with strict use of chin tuck strategy every bite and sip. Pt should be fully upright for all PO, chin tuck every swallow, slow pacing, and alternate between consistencies  -Albumin, pre-albumin on POD#1 (albumin 2.7, prealbumin 13)  -Mg, phos, electrolytes daily  - PRN Zofran    - BR: PRN senna, miralax and suppository.  -  Calorie Count       : Urinary retention improving. PSA 4.62  -continue q4hr bladder scans if no void   - Urology recommendations: increased ambulation, limiting narcotic use and recommended morales or straight caths for PVR >400 ml. Patient intermittently declining straight cath, no morales     -- will contact urology for outpatient follow-up   - Continue flomax    Endo:  - No issues  -TSH 0.89     ID: AF. Monitor for fevers.   -No indication for antibiotics      PPX:  -SCDs  -Encourage ambulation   - Lovenox       CONSULTS:   -Nutrition consult for tube feeds   -SLP  - Cardiology  - Medicine   - Psychiatry   - PLC: trach and TF completed  8/7 at 12 and 1PM    Dispo: pending discussions with consulting services and supplies for home, today vs. Tomorrow     -- Patient and above plan to be discussed with Dr. Felipe Hi MD PGY5  Otolaryngology-Head & Neck Surgery  Please contact ENT with questions by dialing [phone] and entering job code [phone] when prompted

## 2020-08-11 NOTE — PLAN OF CARE
Status: POD#8 awake tracheostomy, direct laryngoscopy with biopsy and NG tube placement  Vitals: VSS. 21% htd. HME while up walking.  Neuros: A/O x4 - intact  IV: PIV SL  Resp/trach: LS coarse. Strong, productive cough with mod to large amount of secretions. #6 shiley uncuffed. Trach cares completed/inner canula changed x1 - Pt participating in cares.  Diet: 2g Na diet w/nectar thick liquids. Also bolus TF w bolus feedings  Bowel status: BS+ No BM overnight  : Voiding w/o difficulty overnight. Some retention at times, pt declined straight cath yesterday am.  Skin: Trach site w/redness surrounding  Pain: Mild incisional pain managed w/tylenol  Activity: Up indep.  Plan: Continue to monitor and follow POC

## 2020-08-11 NOTE — DISCHARGE SUMMARY
Otolaryngology Head and Neck Surgery   Discharge Summary  08/11/20    Date of Admission: 8/3/2020  Date of Discharge: 08/12/2020    Admitting Diagnosis: Laryngeal cancer (H) [C32.9]  Airway obstruction  Congestive heart failure    Dsicharge Diagnosis: Same    Service: Otolaryngology- Head and Neck Surgery  Attending: Dr. 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 He was then advised to undergo the above stated procedure.     Hospital Course: The patient tolerated the procedure well and was transferred to PACU in good condition. He was admitted to the hospital for routine post-operative monitoring. On POD1 cardiology was consulted given patients significant cardiac history (recommendations below), he also was started on tube feeds. SLP was consulted on POD2 and cleared patient for regular diet with nectar thick liquids and chin tuck after both bedside and video swallow study demonstrated silent aspiration. Post-operatively patient was having ongoing urinary retention, requiring straight catheterization. Urology was consulted for their input and recommended flomax with outpatient follow-up. On POD4 his tracheostomy tube was changed at bedside to a 6-0 cuffless shiley, he tolerated the change without difficulty. He also completed education for both tracheostomy and tube feeding. Patient was reporting decreased mood during the admission so psychiatry was consulted for assistance (recommendations below). Patient later expressed more interest in support groups so the appropriate information was provided. Patient quickly grew comfortable with all  "cares and by discharge he was independent in both tracheostomy and tube feed cares. His hospitalization was prolonged due to having no outpatient health insurance which would cover supplies, was denied MA, and had to pay for supplies out of pocket with home care agency arranged for supplies. At discharge, the patient's pain was well controlled, he was voiding independently and taking in the recommended diet while also tolerating tube feeds.     Of note, patient had a 1.6cm extra-axial mass overlying left temporal lobe, per radiology read most likely meningioma. Offered patient further work-up with brain MRI with contrast however patient wished to delay this to a later date.      Consults:   Medicine-   - Given new decrease in EF, would inquire with cardiology if LHC indicated prior to surgery  (not recommended per cards)   - I and O,  Daily weights, Sodium restriction of 2 gm    Cardiology-    - continue metoprolol 25 mg q6hr  - increase lisinopril to 2.5 mg BID--> increase to 10BID  - add spironolactone 12.5 mg daily  - No further studies needed for risk stratification at this time  - Will repeat ECHO and pro-BNP in 3 months after optimal medical therapy    Psychiatry-     -Lexapro 10 mg in the morning.  The patient really would benefit from having a therapist. (Pt opted out of both options)     Urology-   -Avoid anticholinergic, antihistamine, and alpha-agonist medications as these will exacerbate urinary retention  - Increased ambulation/mobility will also usually help with improvement in the degree of urinary retention  - Continue tamsulosin 0.4 mg qday    Exam: BP 93/57   Pulse 64   Temp 98.3  F (36.8  C) (Axillary)   Resp 16   Ht 1.765 m (5' 9.5\")   Wt 86.6 kg (191 lb)   SpO2 95%   BMI 27.80 kg/m     General: Alert and oriented x 3. Walking around the room independently.                  HEENT: 6-0 cuffless shiley in place. Strong cough. Neck is soft, no palpable crepitus or hematoma. "                   Pulmonary: Breathing comfortably on room air with HME in place.      Evin Sterling   Home Medication Instructions ANA:[id number]    Printed on:08/11/20 [id number]   Medication Information                      acetaminophen (TYLENOL) 325 MG tablet  Take 325-650 mg by mouth every 6 hours as needed for mild pain             ibuprofen (ADVIL/MOTRIN) 200 MG tablet  Take 400 mg by mouth every 6 hours as needed for mild pain             lisinopril (ZESTRIL) 10 MG tablet  Take 10 mg by mouth daily             metoprolol succinate ER (TOPROL-XL) 50 MG 24 hr tablet  Take 100 mg by mouth daily             ondansetron (ZOFRAN) 4 MG tablet  Take 4 mg by mouth every 8 hours as needed for nausea             order for DME  Equipment being ordered: Tracheostomy supplies    0.9% sodium chloride 1000 mL bottles  Box split 4x4 gauze sponges  Trach kits with brushes  Velcro trach ties  3 cc 0.9% sodium chloride lavages for trach suctioning  Large gloves  Cool mist humidity via trach dome  6-0 Shiley disposable inner cannulas    Diagnosis: laryngeal cancer             order for DME  Equipment being ordered:   Portable suction machine   14 French suction catheters  [place] suction tips    Diagnosis: laryngeal cancer             order for DME  Equipment being ordered: Nasogastric bolus tube feeding supplies  Formula: Isosource 1.5, 6 cans per day  Gravity feeding bags  60 mL syringes    Treatment Diagnosis: laryngeal cancer             oxyCODONE (ROXICODONE) 5 MG/5ML solution  5-10 mLs (5-10 mg) by Per Feeding Tube route every 4 hours as needed for moderate to severe pain               Disposition: to home. All of patients questions and concerns were addressed prior to discharge. He was comfortable and independent in all cares. Recommended patient follow-up with his primary care provider within 2 weeks, as well urology and the cardiology heart failure clinic.     Sheridan Navarro, PGY1  Otolaryngology- Head and Neck Surgery        Teaching statement:  I saw the patient on the day of discharge. I discussed with him the plan for discharge and plan to return to the hospital for his laryngectomy with plans to perform the PEG at the same time. He would like to go home and have at least 5 days to recover prior to undergoing surgery. He is comfortable with the plan to pay out of pocket for his supplies for the trach and tube feeds. He is eager to leave the hospital as he says it is affecting his overall mood. Outpatient followup was offered prior to laryngectomy to further discuss surgery but patient says that he is comfortable moving forward with surgery as scheduled based on inpatient discussions.       Caron Wang MD    Department of Otolaryngology

## 2020-08-11 NOTE — PLAN OF CARE
VSS. Tylenol given x 1 for neck pain. Pt has #6 cuffless shiley. Did not require suctioning, pt has good cough. Secretions are thick/tan (MD aware). Pt wipes away secretions. Inner canula cleaned x 1. On regular diet, tolerating well. Also had 2/4 cans of TF today via NG. Voiding spontaneously, had BM yesterday. Up indep in room and halls. Pt is able to make needs known. Waiting for plan regarding home supplies and possible PEG tube.

## 2020-08-11 NOTE — CONSULTS
[facility]     Cardiology Consult Note        Date of Admission:  8/3/2020  Consult Requested by: Dr. Navarro  Reason for Consult: Cardiac risk stratification for surgery     Assessment & Plan: HVSL   Evin Sterling is a 70 year old male with PMHx of Afib, HFrEF and HTN admitted on 8/3/2020 for laryngoscopy with biopsy and tracheostomy. Surgical pathology showed invasive squamous cell carcinoma and the plan is for a laryngectomy. He has had VS WNL since surgery and is feeling well. Prior to admission pt was off all cardiac medications for three years due to loss of insurance. Drop in EF likely due to lack of CHF Rx. He has been taking metoprolol 25mg q6h, lisinopril 2.5mg BID, and spironolactone 12.5mg daily without side effects and feels improved. Pt had cardiac cath in 2012 after an ablation for Afib with no significant CAD. Given patient's risk for a low-moderate risk procedure, lack of symptoms, and previous hx of non-ischemic cardiomyopathy, no LHC is recommended at this time.     RCRI - score 1 with 6% 30 day risk  High risk surgery - No  Hx of ischemic heart disease - No  Hx of congestive heart failure - Yes  Hx of cerebrovascular disease - No  Pre-operative treatment with insulin - No  Pre-operative Cr >2 - No     #HFrEF  #Persistent Afib   #HTN     Recommendations  - Continue metoprolol 25mg q6h spironolactone 12.5mg daily  - Increase lisinopril to 10mg once daily  - No further studies needed for risk stratification at this time  - Recommend to repeat ECHO and pro-BNP in 3 months after optimal medical therapy        The patient's care was discussed with the fellow, Dr. Roberts and with the Attending Physician, Dr. Hudson.      Nadeen Robles MD  Internal Medicine & Pediatrics PGY1  [facility]  Pager: [phone]        ______________________________________________________________________     Chief Complaint   Laryngeal  Cancer     History is obtained from the patient     History of Present Illness   Evin Sterling is a 70 year old male with PMHx of Afib, HFrEF and HTN who presented for laryngoscopy with biopsy and tracheostomy on 8/3/2020. 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 Pt and ENT have opted for laryngectomy.     Last week, cardiology consulted and recommended titrating up metoprolol, increasing lisinopril to 2.5mg BID and adding spironolactone. Pt states he continues to feel well since these medication changes. Admits the mild swelling in his legs has been relieved. Denies dizziness, lightheadedness, CP, SOB, palpitations or orthopnea. His BPs have been 100s-130s/50s-70s and HRs in the 70s-90s.      Review of Systems   The 10 point Review of Systems is negative other than noted in the HPI.      Past Medical History    I have reviewed this patient's medical history and updated it with pertinent information if needed.   Past Medical History:   Diagnosis Date     CHF (congestive heart failure) (H)       GERD (gastroesophageal reflux disease)       Hypertension           Past Surgical History   I have reviewed this patient's surgical history and updated it with pertinent information if needed.        Past Surgical History:   Procedure Laterality Date     HERNIA REPAIR, INGUINAL RT/LT         HERNIA REPAIR, UMBILICAL         LARYNGOSCOPY WITH BIOPSY(IES) N/A 8/3/2020     Procedure: LARYNGOSCOPY WITH BIOPSY, TRACHEOSTOMY, NG TUBE PLACEMENT;  Surgeon: Caron Wang MD;  Location: [facility] OR     TRACHEOSTOMY N/A 8/3/2020     Procedure: TRACHEOSTOMY;  Surgeon: Caron Wang MD;  Location: [facility] OR         Social History   I have reviewed this patient's social history and updated it with pertinent information if needed.  Social History            Tobacco Use     Smoking status: Former Smoker       Packs/day: 1.00       Years: 20.00       Pack years: 20.00       Last attempt to quit: 2002       Years  since quittin.6     Smokeless tobacco: Former User   Substance Use Topics     Alcohol use: Not Currently     Drug use: Not Currently      Family History   I have reviewed this patient's family history and updated it with pertinent information if needed.   No significant family history.     Medications   I have reviewed this patient's current medications     Allergies   No Known Allergies     Physical Exam   Vital Signs: Temp: 96.4  F (35.8  C) Temp src: Axillary BP: 108/63 Pulse: 98 Heart Rate: 90 Resp: 15 SpO2: (!) 86 % O2 Device: None (Room air) Oxygen Delivery: 5 LPM  Weight: 190 lbs 8 oz     Constitutional: awake, alert, cooperative, no apparent distress, and appears stated age  Eyes: Lids and lashes normal, pupils equal, round and reactive to light, extra ocular muscles intact, sclera clear, conjunctiva normal  Respiratory: No increased work of breathing, good air exchange, clear to auscultation bilaterally, no crackles or wheezing, tracheostomy with in place  Cardiovascular: Normal apical impulse, regular rate and irregularly irregular rhythm, normal S1 and S2, no S3 or S4, and no murmur noted  Skin: no redness, warmth, or swelling in b/l LE            Data   I personally reviewed no images or EKG's today.        Results for orders placed or performed during the hospital encounter of 20 (from the past 24 hour(s))   Basic metabolic panel   Result Value Ref Range     Sodium 139 133 - 144 mmol/L     Potassium 4.3 3.4 - 5.3 mmol/L     Chloride 106 94 - 109 mmol/L     Carbon Dioxide 29 20 - 32 mmol/L     Anion Gap 4 3 - 14 mmol/L     Glucose 98 70 - 99 mg/dL     Urea Nitrogen 16 7 - 30 mg/dL     Creatinine 0.59 (L) 0.66 - 1.25 mg/dL     GFR Estimate >90 >60 mL/min/[1.73_m2]     GFR Estimate If Black >90 >60 mL/min/[1.73_m2]     Calcium 8.1 (L) 8.5 - 10.1 mg/dL   Magnesium   Result Value Ref Range     Magnesium 2.2 1.6 - 2.3 mg/dL   Phosphorus   Result Value Ref Range     Phosphorus 3.0 2.5 - 4.5 mg/dL             Attending Attestation: I saw, examined and evaluated the patient and reviewed all relevant data with the rounding team on 8/11/20. I agree with the findings, assessment and plan of care documented in the edited note and summarized the ovalles findings and plan with the patient.

## 2020-08-11 NOTE — PROGRESS NOTES
Care Coordinator Progress Note    Admission Date/Time:  8/3/2020  Attending MD:  Caron Wang MD    Data  Chart reviewed, discussed with interdisciplinary team.   Patient was admitted for:    Laryngeal cancer (H)  Laryngeal cancer (H)  Urinary retention  Chronic systolic congestive heart failure (H)  Essential hypertension  HFrEF (heart failure with reduced ejection fraction) (H).         Assessment  This writer received a call from Eri from [facility] [employer] ([phone]). She was inquiring about additional insurance that Kelvin can apply for. This writer discussed that our financial counseling team has already spoken to daughter Jesica about getting financial assistance and he does not qualify for MA at this point due to assets and Jesica is working on this. Eri did say that it may cost about $1500-$2000 a month for all the supplies needed for Evin but this is a rough estimate. Eri offered to call and discuss more with Jesica- this writer acknowledged. RNCC will continue to work with DME supplier.      Plan  Anticipated Discharge Date:  TBD  Anticipated Discharge Plan:  TBD    Ernestina Carter, RN  [facility] RN Care Coordinator  Pager [phone] (covering pager)     Addendum:   This writer called and spoke with Harper (covering for Amanda)  from [employer] ([phone]). She has not been able to review the orders to come up with an estimate, she plans to work on this and will call today or tomorrow.     1300: This writer received a fax of the cost for supplies needed for Evin from  [facility]. Total would be $1259.29. A copy of the estimate was given to Evin and was discussed with him. This writer will call and discuss with Jesica as well.    Providers were made aware of this estimate and will discuss POC moving forward.      1400: Providers would like to move forward with trying to discharge Evin tomorrow if all supplies are ready for him. This  writer also confirmed with Evin and with Daughter-Jesica about going with [facility] to provide equipment for Evin. This writer called [facility] and and left a message for Harper to call back about moving forward. Waiting for a callback.

## 2020-08-11 NOTE — PLAN OF CARE
VSS. #6 uncuffed shiley in place with trach ties; inner cannula cleaned q4h; moderate amt clear thin secretions; good productive cough; no suction required this shift; HME or humidity on at all times. Showered today. Up independently. Ambulates halls frequently. Independent with cares. Voiding spnt. PVRs <300cc this shift; no straight cath required. 2gm Na diet; eating well; TF via NG at 4 cans/day. Possible discharge home tomorrow. Continue with POC.

## 2020-08-11 NOTE — PROGRESS NOTES
Calorie Count  Intake recorded for: 8/10  Total Kcals: 840 Total Protein: 22g  Kcals from Hospital Food: 840  Protein: 22g  Kcals from Outside Food (average):0 Protein: 0g  # Meals Recorded: 100% pudding, wheat toast w/ butter  # Supplements Recorded: 100% 2 Magic Cups

## 2020-08-11 NOTE — DISCHARGE INSTRUCTIONS
[facility] will be your supplier for your tracheostomy and enteral feeds. Please call them if you have any questions.      [facility]   [address],   [place] [address]  P) [phone]

## 2020-08-12 ENCOUNTER — PATIENT OUTREACH (OUTPATIENT)
Dept: CARE COORDINATION | Facility: CLINIC | Age: 70
End: 2020-08-12

## 2020-08-12 ENCOUNTER — APPOINTMENT (OUTPATIENT)
Dept: SPEECH THERAPY | Facility: CLINIC | Age: 70
DRG: 012 | End: 2020-08-12
Attending: OTOLARYNGOLOGY
Payer: MEDICARE

## 2020-08-12 VITALS
OXYGEN SATURATION: 98 % | DIASTOLIC BLOOD PRESSURE: 76 MMHG | TEMPERATURE: 98.3 F | HEART RATE: 64 BPM | BODY MASS INDEX: 27.35 KG/M2 | HEIGHT: 70 IN | SYSTOLIC BLOOD PRESSURE: 125 MMHG | WEIGHT: 191 LBS | RESPIRATION RATE: 16 BRPM

## 2020-08-12 LAB
ANION GAP SERPL CALCULATED.3IONS-SCNC: 2 MMOL/L (ref 3–14)
BUN SERPL-MCNC: 16 MG/DL (ref 7–30)
CALCIUM SERPL-MCNC: 8.2 MG/DL (ref 8.5–10.1)
CHLORIDE SERPL-SCNC: 106 MMOL/L (ref 94–109)
CO2 SERPL-SCNC: 31 MMOL/L (ref 20–32)
CREAT SERPL-MCNC: 0.68 MG/DL (ref 0.66–1.25)
GFR SERPL CREATININE-BSD FRML MDRD: >90 ML/MIN/{1.73_M2}
GLUCOSE SERPL-MCNC: 86 MG/DL (ref 70–99)
MAGNESIUM SERPL-MCNC: 2.3 MG/DL (ref 1.6–2.3)
PHOSPHATE SERPL-MCNC: 3.2 MG/DL (ref 2.5–4.5)
POTASSIUM SERPL-SCNC: 4.3 MMOL/L (ref 3.4–5.3)
SODIUM SERPL-SCNC: 138 MMOL/L (ref 133–144)

## 2020-08-12 PROCEDURE — 36415 COLL VENOUS BLD VENIPUNCTURE: CPT | Performed by: OTOLARYNGOLOGY

## 2020-08-12 PROCEDURE — 25000132 ZZH RX MED GY IP 250 OP 250 PS 637: Mod: GY | Performed by: STUDENT IN AN ORGANIZED HEALTH CARE EDUCATION/TRAINING PROGRAM

## 2020-08-12 PROCEDURE — 80048 BASIC METABOLIC PNL TOTAL CA: CPT | Performed by: OTOLARYNGOLOGY

## 2020-08-12 PROCEDURE — 92526 ORAL FUNCTION THERAPY: CPT | Mod: GN

## 2020-08-12 PROCEDURE — 83735 ASSAY OF MAGNESIUM: CPT | Performed by: OTOLARYNGOLOGY

## 2020-08-12 PROCEDURE — 25000125 ZZHC RX 250: Performed by: STUDENT IN AN ORGANIZED HEALTH CARE EDUCATION/TRAINING PROGRAM

## 2020-08-12 PROCEDURE — 84100 ASSAY OF PHOSPHORUS: CPT | Performed by: OTOLARYNGOLOGY

## 2020-08-12 RX ORDER — POLYETHYLENE GLYCOL 3350 17 G/17G
17 POWDER, FOR SOLUTION ORAL 2 TIMES DAILY PRN
Qty: 14 PACKET | Refills: 0 | Status: ON HOLD | OUTPATIENT
Start: 2020-08-12 | End: 2020-08-31

## 2020-08-12 RX ORDER — SPIRONOLACTONE 25 MG/1
12.5 TABLET ORAL DAILY
Qty: 15 TABLET | Refills: 0 | Status: ON HOLD | OUTPATIENT
Start: 2020-08-12 | End: 2020-09-01

## 2020-08-12 RX ORDER — MAGNESIUM HYDROXIDE 1200 MG/15ML
LIQUID ORAL
Status: DISCONTINUED
Start: 2020-08-12 | End: 2020-08-12 | Stop reason: HOSPADM

## 2020-08-12 RX ORDER — TAMSULOSIN HYDROCHLORIDE 0.4 MG/1
0.4 CAPSULE ORAL DAILY
Qty: 30 CAPSULE | Refills: 0 | Status: ON HOLD | OUTPATIENT
Start: 2020-08-12 | End: 2020-08-31

## 2020-08-12 RX ORDER — CARBOXYMETHYLCELLULOSE SODIUM 5 MG/ML
1 SOLUTION/ DROPS OPHTHALMIC 4 TIMES DAILY PRN
Qty: 1 BOTTLE | Refills: 3 | Status: SHIPPED | OUTPATIENT
Start: 2020-08-12 | End: 2023-05-01

## 2020-08-12 RX ADMIN — MULTIVITAMIN 15 ML: LIQUID ORAL at 08:24

## 2020-08-12 RX ADMIN — METOPROLOL TARTRATE 25 MG: 100 TABLET ORAL at 08:24

## 2020-08-12 RX ADMIN — Medication 12.5 MG: at 08:24

## 2020-08-12 RX ADMIN — LISINOPRIL 10 MG: 10 TABLET ORAL at 08:24

## 2020-08-12 RX ADMIN — TAMSULOSIN HYDROCHLORIDE 0.4 MG: 0.4 CAPSULE ORAL at 08:24

## 2020-08-12 RX ADMIN — ACETAMINOPHEN 650 MG: 325 SOLUTION ORAL at 04:52

## 2020-08-12 RX ADMIN — METOPROLOL TARTRATE 25 MG: 100 TABLET ORAL at 02:25

## 2020-08-12 ASSESSMENT — ACTIVITIES OF DAILY LIVING (ADL)
ADLS_ACUITY_SCORE: 12

## 2020-08-12 NOTE — PLAN OF CARE
VSS. Denies pain. Pt has #6 cuffless shiley. Did not require suctioning, pt has good cough. Secretions are thick/tan. Pt wipes away secretions. Skin reddened near trach, mepilex lite applied. Inner canula cleaned x 1 by pt. On regular diet, tolerating well. Declined TF, spoke with dietician regarding Boost Plus at home.  Voiding spontaneously, had BM yesterday. Up indep in room and halls. Pt is able to make needs known. Pt is discharging home at this time. Instructions gone over with pt and daughter. Supplies also sent. Declined need for hospital transportation, walked to pharmacy and front door with daughter.

## 2020-08-12 NOTE — PLAN OF CARE
Pt admitted for glottic mass and stridor. POD #9 awake tracheostomy. NG with bolus TF. A&Ox4. Pt communicates with board or mouthing words & pointing. VSS. Trach dome at 21%. Pt denies pain or nausea. Pt has productive cough with cream colored secretions. Nikki #6 cuffless. No suction needed. Trach cares completed. Pt swallows with chin tuck. Blanchable redness on bottom. Plan for possible discharge home and possible PEG tube placement. Will continue to monitor.

## 2020-08-12 NOTE — PROGRESS NOTES
"Otolaryngology Progress Note  August 12, 2020  24 hr events:   - TF 2/4 yesterday   - No urinary issues   - SW is working on supplies for home   - Patient declining starting Lexapro  - Not wanting MRI 2/2 claustrophobia      S:  No acute events overnight. Patient reports pain is well controlled.      O: /76 (BP Location: Left arm)   Pulse 64   Temp 98.3  F (36.8  C) (Oral)   Resp 16   Ht 1.765 m (5' 9.5\")   Wt 86.6 kg (191 lb)   SpO2 98%   BMI 27.80 kg/m                   General: Alert and oriented x 3. Walking around the room independently.                  HEENT: 6-0 cuffless shiley in place. Strong cough. Neck is soft, no palpable crepitus or hematoma.                   Pulmonary: Breathing comfortably on room air with HME in place.      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 He was then admitted to [facility] for awake tracheostomy, direct laryngoscopy with biopsy and NG tube placement on 8/3.      Neuro:   -PRN oxycodone and tylenol via feeding tube  - will follow-up with [facility] re: outpatient therapist   - Will work on information regarding support groups  - CT scan 8/7/2020: \"1.6 cm non-FDG avid enhancing extra-axial mass overlying the left temporal lobe, most likely a meningioma. Consider further evaluation with brain MRI with contrast.\" Will defer MRI at this time given patient preference.      HEENT: s/p awake trach  -Due to tumor burden patient would be a difficult (likely impossible) intubation from above.  -Trach change performed 8/7 - easy trach change without resistance. Subsequent changes can be done by any qualified personnel without exchange catheter.   -NG tube in place and secured to right naris      Trach Tube " Instructions:   1) Contact ENT on-call with any questions or concerns   2) Remove and clean inner cannula qshift or more frequently if needed   3) Keep trach tube obturator taped to wall behind the head of the bed   4) Keep extra unopened 6-0 Shiley and 4-0 Shiley at bedside at all times   5) OK to change mepilex on chest as long as trach face plate is not manipulated.   6) Keep trach ties appropriately tight - only should be able to get two fingers beneath ties and neck      CV/H:  -Hx CHF, HTN, A. Fib, EF <30%  -Cardiology and Medicine consult, appreciate assistance.                  - continue metoprolol XL 25 mg daily, titrate up as tolerated                 - increased lisinopril 10mg daily                 - add spironolactone 12.5 mg daily                  - EKG (will follow-up read with cards)                 - set up outpatient follow up with cardiology with repeat ECHO after 3 months of medical management                 - Medicine team signed off                  - Outpatient referral to [facility]   - PTA metoprolol, lisinopril   - Strict I/O  - Daily weights  - Continue sodium restriction of 2gm, to continue on discharge         FEN/GI: BMP wnl, having BMs  - Bolus TF with  170mL before and after each bolus   -NG tube in appropriate position. Patient would like to go forward with PEG placement and this will likely occur during his upcoming surgery.   -SLP consult, appreciate recs: regular textures and nectar thick liquids with strict use of chin tuck strategy every bite and sip. Pt should be fully upright for all PO, chin tuck every swallow, slow pacing, and alternate between consistencies  -Albumin, pre-albumin on POD#1 (albumin 2.7, prealbumin 13)  - PRN Zofran    - BR: PRN senna, miralax and suppository.  -  Calorie Count       : Urinary retention improving. PSA 4.62  -continue q4hr bladder scans if no void   - Urology recommendations: increased ambulation, limiting narcotic use and recommended  morales or straight caths for PVR >400 ml. Patient intermittently declining straight cath, no morales                   -- will contact urology for outpatient follow-up   - Continue flomax     Endo:  - No issues  -TSH 0.89     ID: AF. Monitor for fevers.   -No indication for antibiotics      PPX:  -SCDs  -Encourage ambulation   - Lovenox       CONSULTS:   -Nutrition consult for tube feeds   -SLP  - Cardiology  - Medicine   - Psychiatry   - PLC: trach and TF completed  8/7 at 12 and 1PM     Dispo: pending discussions with consulting services and supplies for home, today vs. Tomorrow      -- Patient and above plan to be discussed with Dr. Felipe Gamez MD   Otolaryngology-Head & Neck Surgery  Please contact ENT with questions by dialing * [phone] and entering job code [phone] when prompted

## 2020-08-12 NOTE — PROGRESS NOTES
"  Care Coordinator Progress Note    Admission Date/Time:  8/3/2020  Attending MD:  Dr Caron Wang    Data  Chart reviewed, discussed with interdisciplinary team. In 6A Discharge Rounds Dr Navarro reported pt is ready for discharge today. Will discharge with a trach and NG tube feedings. ENT asked if there are less expensive formulas for pt to obtain for home. Dietician will give pt options.     Patient was admitted for:  Laryngeal carcinoma; airway obstruction.  Urinary retention  Chronic systolic congestive heart failure (H)  Essential hypertension  HFrEF (heart failure with reduced ejection fraction) (H).    Procedure 8-:  Awake tracheostomy; direct laryngoscopy with biopsy with telescopy; NG tube placement.      Cardiology consulted post-op: \"lost health insurance for the past three years and has been off all medications during that time until 1 week ago.\" EF 45% in 2016; EF 30% 7-31-20.   Echo 8-: \"Severely (EF <30%) reduced left ventricular function is present. Severe left  ventricular dilation is present.\"     Past history includes:  A-fib, s/p ablation in 2012; CHF; HTN; GERD. Hx of smoking and alcohol abuse.   _________________________________________________    Coordination of Care and Referrals  Financial:  Robbie LAWSON Financial Counselor spoke with pt's daughter, Jesica, earlier this week. Received a message from RENNY Ramos Financial Counselor: pt is over the $3000.00 asset limit for MA (has over this amount in his intermediate account). He will need the help of an Elder Law ; daughter was aware and planned to follow-up on this. F.C unable to assist any further until assets are reduced.   Called daughter, Jesica this morning. She spoke with Harper Richter at [facility]. [facility] will be at Jesica's home at 2:00pm today. Jesica said they need to leave the hospital by 1pm. Jesica said [facility] needs more info on the HMEs so they can order them.  Paged CHRISTA Blair and requested she " complete Discharge Orders.   Spoke with pt's nurse, Beatris and informed her plan is discharge today. She said pt has some HMEs in his room, she will send extra home with pt.   [employer]. Ref# [id number]. [facility]. Lot# [id number]. Called Harper at [facility] and gave this info to her.   Nursing reported pt having loose stools, they will contact the Dietician. Yaima Dietician met with pt and daughter.     Pt signed the IMM form, given a copy and original placed in chart.      Assessment  Ready for discharge.      Plan  Anticipated Discharge Date:  Today    Anticipated Discharge Plan:   Discharge home with daughter. [facility] will meet pt at home with trach & tube feeding supplies.   Note:  just prior to discharge nursing reported pt was refusing tube feeding. He is taking some food orally. I spoke daughterJesica and told her if they don't want the feeding supplies from [facility] they need to tell the ; the supplies can't be returned once they take delivery. Pt's nurse, Beatris said she will send pt home with some feeding supplies from the hospital.         Ale Combs, RN Care Coordinator  Unit [address], [facility]  Phone:  [phone]

## 2020-08-12 NOTE — PLAN OF CARE
Status: POD#8 awake tracheostomy, direct laryngoscopy with biopsy and NG tube placement  Vitals: VSS. Trach dome at 21%  Neuros: Alert and oriented x4. Denies N/T. 5/5 throughout.  IV: IV saline locked.  Resp/trach: Lung sounds clear.Good cough with thick creamy secretions. Shiley #6 cuffless in place. No suction this shift. Trach cares done x1.  Diet: Regular diet with 2g NA restriction. Nectar thick. NG in place for bolus TF. 2/4 cans complete today. Refused last 2 cans this shift due to good PO intake.   Bowel status: BS+ Bm this shift.   : Voiding spontaneously without difficulty.   Skin: Blanchable redness on bottom that pt reports as sore. Trach site with some redness surrounding it.   Pain: Headache controlled with PRN tylenol x1.  Activity: Up IND.  Social: Daughter at bedside this evening.   Plan: Continue to monitor and follow POC. Awaiting plan regarding home supplies and possible PEG tube.

## 2020-08-13 ENCOUNTER — PREP FOR PROCEDURE (OUTPATIENT)
Dept: OTOLARYNGOLOGY | Facility: CLINIC | Age: 70
End: 2020-08-13

## 2020-08-13 ENCOUNTER — TELEPHONE (OUTPATIENT)
Dept: UROLOGY | Facility: CLINIC | Age: 70
End: 2020-08-13

## 2020-08-13 NOTE — PROGRESS NOTES
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 He was then advised to undergo the above stated procedure.     ASSESSMENT      Discharge Assessment  Patient reports symptoms are: Improved(spoke with Jesica, daughter.  trach site a little red, localized around the plate.)  Does the patient have all of their medications?: Yes  Does patient know what their new medications are for?: Yes  Does patient have a follow-up appointment scheduled?: Yes  Does patient have any other questions or concerns?: No    Post-op  Did the patient have surgery or a procedure: Yes  Incision: healing  Drainage: No  Bleeding: none  Fever: No  Chills: No  Redness: No  Warmth: No  Swelling: No  Incision site pain: No  Eating & Drinking: NPO  Bowel Function: normal  Urinary Status: voiding without complaint/concerns        PLAN                                                      Outpatient Plan:      No future appointments.        Tigist Forte

## 2020-08-13 NOTE — TELEPHONE ENCOUNTER
----- Message from Magali Solis RN sent at 8/13/2020  8:11 AM CDT -----  He can wait until September. We can't over book.   ----- Message -----  From: Elizabeth Ross  Sent: 8/12/2020   2:50 PM CDT  To: Magali Solis RN    Hey he's booked until the end of September/October. I can try to squeeze him in somewhere but I'm trying not to overbook in case he has post op add ons.   ----- Message -----  From: Magali Solis RN  Sent: 8/11/2020   8:37 AM CDT  To: Clinic Coordinators[facility]    Can you please help this patient get set up for a V V with  Dr. Aguirre. See below message.  ----- Message -----  From: Laina Chun MD  Sent: 8/10/2020   8:17 AM CDT  To: LIANNE Land,    Hope you had a nice weekend.     Can you please help arrange a visit with Dr. Aguirre for Mr. Sterling in the next 2-4 weeks? He has a very enlarged prostate and is interested in holep vs prostate artery embolization    Thank you!    Barbara  [phone]

## 2020-08-13 NOTE — TELEPHONE ENCOUNTER
Left message for pt to call and schedule next available virtual visit with Dr. Aguirre to discuss holep vs embolization for enlarged prostate.     No sooner appt, schedule next available and place on waitlist.

## 2020-08-16 ENCOUNTER — TELEPHONE (OUTPATIENT)
Dept: OTOLARYNGOLOGY | Facility: CLINIC | Age: 70
End: 2020-08-16

## 2020-08-16 DIAGNOSIS — L08.9 LOCAL INFECTION OF SKIN AND SUBCUTANEOUS TISSUE: Primary | ICD-10-CM

## 2020-08-16 RX ORDER — CEPHALEXIN 250 MG/5ML
500 POWDER, FOR SUSPENSION ORAL 2 TIMES DAILY
Qty: 140 ML | Refills: 0 | Status: SHIPPED | OUTPATIENT
Start: 2020-08-16 | End: 2020-08-16

## 2020-08-16 RX ORDER — CEPHALEXIN 250 MG/5ML
500 POWDER, FOR SUSPENSION ORAL 2 TIMES DAILY
Qty: 140 ML | Refills: 0 | Status: ON HOLD | OUTPATIENT
Start: 2020-08-16 | End: 2020-08-31

## 2020-08-16 NOTE — TELEPHONE ENCOUNTER
Brief ENT Note:  08/16/20    Patients daughter called regarding foul smelling secretions around the trach. She reports that she was changing his trach ties today and noticed foul smelling discharge and redness around the trach site. He has not had any fevers or respiratory concerns. No issues with trach cares. Takes all medications and nutrition via NG.    Prescribed 7 day course of keflex. Recommended starting antibiotics and to call clinic if not improving or starting to worsen. Recommended that if there are any acute respiratory concerns or if Mr. Sterling starts to become more toxic appearing that they should come to the ED.    Discussed with chief resident.    Art Guerrero MD  Otolaryngology-Head & Neck Surgery PGY-3  Please contact ENT by dialing * [phone] and entering job code [phone].

## 2020-08-18 ENCOUNTER — TELEPHONE (OUTPATIENT)
Dept: OTOLARYNGOLOGY | Facility: CLINIC | Age: 70
End: 2020-08-18

## 2020-08-18 NOTE — TELEPHONE ENCOUNTER
Called patients daughter to schedule surgery as patient is currently sleeping and unable to talk. Daughter is in the middle of getting medical power of , which will be done by the end of the week. Discussed length of stay, possible appt on 9/4 is TBD. No further questions or concerns at this time.     Surgeon: Dr. Wang  Date of Surgery: 08/25/2020  Location of surgery: [facility] OR  Pre-Op H&P: Already completed by PAC   Post-Op Appt Date: Yes in dave [facility] 9/4 tentative   Imaging needed:  No  Discussed COVID-19 testing:  Yes - scheduled   Pre-cert/Authorization completed:  No  Packet sent out: Yes, 8/18 with instructions per LIANNE Solis  08/18/20 8:47 AM  P: [phone]

## 2020-08-21 DIAGNOSIS — Z11.59 ENCOUNTER FOR SCREENING FOR OTHER VIRAL DISEASES: ICD-10-CM

## 2020-08-21 PROCEDURE — U0003 INFECTIOUS AGENT DETECTION BY NUCLEIC ACID (DNA OR RNA); SEVERE ACUTE RESPIRATORY SYNDROME CORONAVIRUS 2 (SARS-COV-2) (CORONAVIRUS DISEASE [COVID-19]), AMPLIFIED PROBE TECHNIQUE, MAKING USE OF HIGH THROUGHPUT TECHNOLOGIES AS DESCRIBED BY CMS-2020-01-R: HCPCS | Performed by: OTOLARYNGOLOGY

## 2020-08-21 NOTE — TELEPHONE ENCOUNTER
Spoke with Daughter suzette. Stated pt will call to schedule after ENT surgery. Informed of long wait to see Dr. Aguirre, still declined to schedule. Gave scheduling number.

## 2020-08-22 ENCOUNTER — CARE COORDINATION (OUTPATIENT)
Dept: CARDIOLOGY | Facility: CLINIC | Age: 70
End: 2020-08-22

## 2020-08-22 DIAGNOSIS — C32.9 LARYNGEAL CANCER (H): Primary | ICD-10-CM

## 2020-08-22 LAB
SARS-COV-2 RNA SPEC QL NAA+PROBE: NOT DETECTED
SPECIMEN SOURCE: NORMAL

## 2020-08-22 NOTE — PROGRESS NOTES
Referral- Heart Failure    REFERRING CLINIC INFORMATION:    Referring Clinic: Internal referral - per hospital discharge.  Referring Provider: see discharge note  Contact Information:     REFERRING PATIENT INFORMATION:    Patient Phone Number:   Home Phone [phone]   Mobile [phone]      Consent to Communicate: None found  Insurance Obtained: No      SPOC NOTES:       August 22, 2020  Patient was recently hospitalized. He is an oncoolgy patient that is having an Larygnectomy  8/25. Will be in hospital for a week or so I will call him after surgery to make an appointment.       Sending to [facility] Nurse to Triage    PLAN: Will postpone encounter until October 16th then call just to check back in..    Attempts to  Contact:   1. August 20, 2020 - Called and made contact.  2. September 2, 2020 - LVM on his number then noticed it was recommended to call his daughter since he may not be speaking well after the procedure. I also left a VM on daughters (Jesica) phone.   3. LVM with Daughter Jesica.   4. September 24, 2020 - LVM with Daughter to call when they are ready to pursue cardiology referral.     Riki Oliva CMA  Heart Failure, Advanced Heart Failure & CORE  Referral Specialist &     [facility]  Cardiology  Office: [phone]  [phone]

## 2020-08-24 ENCOUNTER — ANESTHESIA EVENT (OUTPATIENT)
Dept: SURGERY | Facility: CLINIC | Age: 70
DRG: 011 | End: 2020-08-24
Payer: MEDICARE

## 2020-08-24 ASSESSMENT — ENCOUNTER SYMPTOMS: DYSRHYTHMIAS: 1

## 2020-08-24 ASSESSMENT — LIFESTYLE VARIABLES: TOBACCO_USE: 1

## 2020-08-24 NOTE — ANESTHESIA PREPROCEDURE EVALUATION
"Anesthesia Pre-Procedure Evaluation    Patient: Evin Sterling   MRN:     [id number] Gender:   male   Age:    70 year old :      1950        Preoperative Diagnosis: Laryngeal cancer (H) [C32.9]   Procedure(s):  INSERTION, PEG TUBE  LARYNGECTOMY  Bilateral neck dissection  LARYNGOSCOPY  SURGICAL EXTRACTION, TOOTH     LABS:  CBC:   Lab Results   Component Value Date    WBC 8.4 2020    WBC 10.6 2020    HGB 12.3 (L) 2020    HGB 12.7 (L) 2020    HCT 39.0 (L) 2020    HCT 40.3 2020     2020     2020     BMP:   Lab Results   Component Value Date     2020     2020    POTASSIUM 4.3 2020    POTASSIUM 4.2 2020    CHLORIDE 106 2020    CHLORIDE 103 2020    CO2 31 2020    CO2 32 2020    BUN 16 2020    BUN 13 2020    CR 0.68 2020    CR 0.74 2020    GLC 86 2020     (H) 2020     COAGS: No results found for: PTT, INR, FIBR  POC:   Lab Results   Component Value Date     (H) 2020     OTHER:   Lab Results   Component Value Date    DONNIE 8.2 (L) 2020    PHOS 3.2 2020    MAG 2.3 2020    ALBUMIN 2.7 (L) 2020    TSH 0.89 2020        Preop Vitals    BP Readings from Last 3 Encounters:   20 125/76   20 (!) 138/93   20 136/87    Pulse Readings from Last 3 Encounters:   20 64   20 75   20 98      Resp Readings from Last 3 Encounters:   20 16   20 20   20 16    SpO2 Readings from Last 3 Encounters:   20 98%   20 95%   20 97%      Temp Readings from Last 1 Encounters:   20 36.8  C (98.3  F) (Oral)    Ht Readings from Last 1 Encounters:   20 1.765 m (5' 9.5\")      Wt Readings from Last 1 Encounters:   20 86.6 kg (191 lb)    Estimated body mass index is 27.8 kg/m  as calculated from the following:    Height as of 8/3/20: 1.765 m (5' 9.5\").    Weight as " of 8/11/20: 86.6 kg (191 lb).     LDA:  Airway - Adult/Peds 6 Shiley (Active)   Number of days: 17       Surgical Airway Shiley 6 Uncuffed (Active)   Number of days: 21       NG/OG/NJ Tube Nasogastric 12 fr Right nostril (Active)   Number of days: 21        Past Medical History:   Diagnosis Date     CHF (congestive heart failure) (H)      GERD (gastroesophageal reflux disease)      Hypertension       Past Surgical History:   Procedure Laterality Date     HERNIA REPAIR, INGUINAL RT/LT       HERNIA REPAIR, UMBILICAL       LARYNGOSCOPY WITH BIOPSY(IES) N/A 8/3/2020    Procedure: LARYNGOSCOPY WITH BIOPSY, TRACHEOSTOMY, NG TUBE PLACEMENT;  Surgeon: Caron Wang MD;  Location: [facility] OR     TRACHEOSTOMY N/A 8/3/2020    Procedure: TRACHEOSTOMY;  Surgeon: Caron Wang MD;  Location: [facility] OR      No Known Allergies     Anesthesia Evaluation     . Pt has had prior anesthetic. 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 .    Neurologic:  - neg neurologic ROS     Cardiovascular:     (+) hypertension----. Taking blood thinners : . CHF Last EF: 30% date: 7/2020 . . :. dysrhythmias a-fib, Irregular Heartbeat/Palpitations, . Previous cardiac testing Echodate:07/2020results:Left ventricular wall thickness is normal. Severe left ventricular dilation is  present. Indexed LVEDV by biplane is 105 mL/m^2. Severely (EF <30%) reduced  left ventricular function is present. Diastolic function not assessed due to  atrial fibrillation. Severe diffuse hypokinesis is present. A false tendon is  noted.date: results:ECG reviewed date: results:Atrial fibrillation with premature ventricular complexes  Left axis deviation  Inferior infarct , age undetermined  ST & T wave abnormality, consider lateral ischemiaCath date: 2012 results:No significant blockages, s/p ablation for afib.          METS/Exercise Tolerance:     Hematologic:          Musculoskeletal:         GI/Hepatic:     (+) GERD Symptomatic,       Renal/Genitourinary:  - ROS Renal section negative       Endo:  - neg endo ROS       Psychiatric:         Infectious Disease:  - neg infectious disease ROS       Malignancy:   (+) Malignancy History of Other  Other CA Active status post         Other:                         PHYSICAL EXAM:   Mental Status/Neuro: A/A/O   Airway: Facies: Feasible  Mallampati: Not Assessed  Mouth/Opening: Not Assessed  TM distance: Not Assessed  Neck ROM: Not Assessed  Airway Device: Tracheostomy   Respiratory:   Resp. Rate: Normal     Resp. Effort: Normal      CV: Rhythm: Regular  Rate: Age appropriate  Edema: None   Comments:      Dental: Normal Dentition                Assessment:   ASA SCORE: 4    H&P: History and physical reviewed and following examination; no interval change.   Smoking Status:  Non-Smoker/Unknown   NPO Status: NPO Appropriate     Plan:   Anes. Type:  General   Pre-Medication: None   Induction:  IV (Standard)   Airway: Trach in situ   Access/Monitorinnd PIV; A-Line; FloTrac   Maintenance: Balanced     Drips/Meds: Remifentanil     Advanced Monitoring: BIS     Postop Plan:   Postop Pain: Opioids; Long Acting Opioids  Postop Sedation/Airway: SECURED AIRWAY likely  Disposition: Inpatient/Admit     PONV Management:   Adult Risk Factors:, Non-Smoker, Postop Opioids   Prevention: Ondansetron     CONSENT: Direct conversation   Plan and risks discussed with: Patient   Blood Products: Consented (ALL Blood Products)                   Oscar Kat MD

## 2020-08-24 NOTE — PROGRESS NOTES
Referral received for 70 year old male to advanced heart failure clinic for management. Patient with severely reduced EF, <30% (was 55% in September 2016). Currently diagnosed with laryngeal cancer; scheduled for laryngectomy later this week. Will set up for clinic appointment after discharge from hospital.

## 2020-08-25 ENCOUNTER — SURGERY (OUTPATIENT)
Age: 70
End: 2020-08-25
Payer: MEDICARE

## 2020-08-25 ENCOUNTER — ANESTHESIA (OUTPATIENT)
Dept: SURGERY | Facility: CLINIC | Age: 70
DRG: 011 | End: 2020-08-25
Payer: MEDICARE

## 2020-08-25 ENCOUNTER — HOSPITAL ENCOUNTER (INPATIENT)
Facility: CLINIC | Age: 70
LOS: 7 days | Discharge: HOME | DRG: 011 | End: 2020-09-01
Attending: THORACIC SURGERY (CARDIOTHORACIC VASCULAR SURGERY) | Admitting: INTERNAL MEDICINE
Payer: MEDICARE

## 2020-08-25 DIAGNOSIS — C32.9 LARYNGEAL CANCER (H): ICD-10-CM

## 2020-08-25 DIAGNOSIS — Z98.890 POST-OPERATIVE STATE: Primary | ICD-10-CM

## 2020-08-25 DIAGNOSIS — I10 ESSENTIAL HYPERTENSION: ICD-10-CM

## 2020-08-25 DIAGNOSIS — I50.20 HFREF (HEART FAILURE WITH REDUCED EJECTION FRACTION) (H): ICD-10-CM

## 2020-08-25 LAB
ABO + RH BLD: NORMAL
ABO + RH BLD: NORMAL
ANION GAP SERPL CALCULATED.3IONS-SCNC: 5 MMOL/L (ref 3–14)
BASE EXCESS BLDA CALC-SCNC: 2.2 MMOL/L
BASE EXCESS BLDA CALC-SCNC: 2.2 MMOL/L
BASE EXCESS BLDA CALC-SCNC: 2.7 MMOL/L
BLD GP AB SCN SERPL QL: NORMAL
BLOOD BANK CMNT PATIENT-IMP: NORMAL
BUN SERPL-MCNC: 10 MG/DL (ref 7–30)
CA-I BLD-MCNC: 4.4 MG/DL (ref 4.4–5.2)
CA-I BLD-MCNC: 4.4 MG/DL (ref 4.4–5.2)
CA-I BLD-MCNC: 4.5 MG/DL (ref 4.4–5.2)
CALCIUM SERPL-MCNC: 6.7 MG/DL (ref 8.5–10.1)
CHLORIDE SERPL-SCNC: 116 MMOL/L (ref 94–109)
CO2 SERPL-SCNC: 24 MMOL/L (ref 20–32)
CREAT SERPL-MCNC: 0.65 MG/DL (ref 0.66–1.25)
ERYTHROCYTE [DISTWIDTH] IN BLOOD BY AUTOMATED COUNT: 14.5 % (ref 10–15)
GFR SERPL CREATININE-BSD FRML MDRD: >90 ML/MIN/{1.73_M2}
GLUCOSE BLD-MCNC: 100 MG/DL (ref 70–99)
GLUCOSE BLD-MCNC: 105 MG/DL (ref 70–99)
GLUCOSE BLD-MCNC: 90 MG/DL (ref 70–99)
GLUCOSE BLDC GLUCOMTR-MCNC: 103 MG/DL (ref 70–99)
GLUCOSE BLDC GLUCOMTR-MCNC: 77 MG/DL (ref 70–99)
GLUCOSE SERPL-MCNC: 104 MG/DL (ref 70–99)
HCO3 BLD-SCNC: 26 MMOL/L (ref 21–28)
HCO3 BLD-SCNC: 27 MMOL/L (ref 21–28)
HCO3 BLD-SCNC: 27 MMOL/L (ref 21–28)
HCT VFR BLD AUTO: 41.7 % (ref 40–53)
HGB BLD-MCNC: 12.4 G/DL (ref 13.3–17.7)
HGB BLD-MCNC: 13.1 G/DL (ref 13.3–17.7)
HGB BLD-MCNC: 13.3 G/DL (ref 13.3–17.7)
HGB BLD-MCNC: 13.5 G/DL (ref 13.3–17.7)
HGB BLD-MCNC: 14.3 G/DL (ref 13.3–17.7)
INR PPP: 1.18 (ref 0.86–1.14)
LACTATE BLD-SCNC: 1.4 MMOL/L (ref 0.7–2)
MAGNESIUM SERPL-MCNC: 1.5 MG/DL (ref 1.6–2.3)
MCH RBC QN AUTO: 30.8 PG (ref 26.5–33)
MCHC RBC AUTO-ENTMCNC: 31.9 G/DL (ref 31.5–36.5)
MCV RBC AUTO: 97 FL (ref 78–100)
O2/TOTAL GAS SETTING VFR VENT: 40 %
O2/TOTAL GAS SETTING VFR VENT: 45 %
O2/TOTAL GAS SETTING VFR VENT: 50 %
PCO2 BLD: 36 MM HG (ref 35–45)
PCO2 BLD: 37 MM HG (ref 35–45)
PCO2 BLD: 43 MM HG (ref 35–45)
PH BLD: 7.41 PH (ref 7.35–7.45)
PH BLD: 7.46 PH (ref 7.35–7.45)
PH BLD: 7.46 PH (ref 7.35–7.45)
PHOSPHATE SERPL-MCNC: 3.4 MG/DL (ref 2.5–4.5)
PLATELET # BLD AUTO: 274 10E9/L (ref 150–450)
PO2 BLD: 131 MM HG (ref 80–105)
PO2 BLD: 174 MM HG (ref 80–105)
PO2 BLD: 239 MM HG (ref 80–105)
POTASSIUM BLD-SCNC: 4 MMOL/L (ref 3.4–5.3)
POTASSIUM BLD-SCNC: 4.2 MMOL/L (ref 3.4–5.3)
POTASSIUM BLD-SCNC: 4.2 MMOL/L (ref 3.4–5.3)
POTASSIUM SERPL-SCNC: 3.5 MMOL/L (ref 3.4–5.3)
POTASSIUM SERPL-SCNC: 4.2 MMOL/L (ref 3.4–5.3)
RBC # BLD AUTO: 4.32 10E12/L (ref 4.4–5.9)
SODIUM BLD-SCNC: 142 MMOL/L (ref 133–144)
SODIUM BLD-SCNC: 143 MMOL/L (ref 133–144)
SODIUM BLD-SCNC: 144 MMOL/L (ref 133–144)
SODIUM SERPL-SCNC: 146 MMOL/L (ref 133–144)
SPECIMEN EXP DATE BLD: NORMAL
WBC # BLD AUTO: 17 10E9/L (ref 4–11)

## 2020-08-25 PROCEDURE — 93005 ELECTROCARDIOGRAM TRACING: CPT

## 2020-08-25 PROCEDURE — 0CDWXZ1 EXTRACTION OF UPPER TOOTH, MULTIPLE, EXTERNAL APPROACH: ICD-10-PCS | Performed by: THORACIC SURGERY (CARDIOTHORACIC VASCULAR SURGERY)

## 2020-08-25 PROCEDURE — 40000170 ZZH STATISTIC PRE-PROCEDURE ASSESSMENT II: Performed by: THORACIC SURGERY (CARDIOTHORACIC VASCULAR SURGERY)

## 2020-08-25 PROCEDURE — 20000004 ZZH R&B ICU UMMC

## 2020-08-25 PROCEDURE — 25000125 ZZHC RX 250: Performed by: STUDENT IN AN ORGANIZED HEALTH CARE EDUCATION/TRAINING PROGRAM

## 2020-08-25 PROCEDURE — 25000125 ZZHC RX 250: Performed by: NURSE ANESTHETIST, CERTIFIED REGISTERED

## 2020-08-25 PROCEDURE — 83605 ASSAY OF LACTIC ACID: CPT | Performed by: THORACIC SURGERY (CARDIOTHORACIC VASCULAR SURGERY)

## 2020-08-25 PROCEDURE — 43246 EGD PLACE GASTROSTOMY TUBE: CPT | Mod: GC | Performed by: THORACIC SURGERY (CARDIOTHORACIC VASCULAR SURGERY)

## 2020-08-25 PROCEDURE — 25000128 H RX IP 250 OP 636: Performed by: OTOLARYNGOLOGY

## 2020-08-25 PROCEDURE — 25800030 ZZH RX IP 258 OP 636: Performed by: STUDENT IN AN ORGANIZED HEALTH CARE EDUCATION/TRAINING PROGRAM

## 2020-08-25 PROCEDURE — 25000128 H RX IP 250 OP 636: Performed by: STUDENT IN AN ORGANIZED HEALTH CARE EDUCATION/TRAINING PROGRAM

## 2020-08-25 PROCEDURE — 25000566 ZZH SEVOFLURANE, EA 15 MIN: Performed by: THORACIC SURGERY (CARDIOTHORACIC VASCULAR SURGERY)

## 2020-08-25 PROCEDURE — 27210995 ZZH RX 272: Performed by: THORACIC SURGERY (CARDIOTHORACIC VASCULAR SURGERY)

## 2020-08-25 PROCEDURE — 71000015 ZZH RECOVERY PHASE 1 LEVEL 2 EA ADDTL HR: Performed by: THORACIC SURGERY (CARDIOTHORACIC VASCULAR SURGERY)

## 2020-08-25 PROCEDURE — 82947 ASSAY GLUCOSE BLOOD QUANT: CPT | Performed by: THORACIC SURGERY (CARDIOTHORACIC VASCULAR SURGERY)

## 2020-08-25 PROCEDURE — 84132 ASSAY OF SERUM POTASSIUM: CPT | Performed by: INTERNAL MEDICINE

## 2020-08-25 PROCEDURE — 07T20ZZ RESECTION OF LEFT NECK LYMPHATIC, OPEN APPROACH: ICD-10-PCS | Performed by: THORACIC SURGERY (CARDIOTHORACIC VASCULAR SURGERY)

## 2020-08-25 PROCEDURE — 36000062 ZZH SURGERY LEVEL 4 1ST 30 MIN - UMMC: Performed by: THORACIC SURGERY (CARDIOTHORACIC VASCULAR SURGERY)

## 2020-08-25 PROCEDURE — 93010 ELECTROCARDIOGRAM REPORT: CPT | Performed by: INTERNAL MEDICINE

## 2020-08-25 PROCEDURE — 82803 BLOOD GASES ANY COMBINATION: CPT | Performed by: THORACIC SURGERY (CARDIOTHORACIC VASCULAR SURGERY)

## 2020-08-25 PROCEDURE — 86901 BLOOD TYPING SEROLOGIC RH(D): CPT | Performed by: INTERNAL MEDICINE

## 2020-08-25 PROCEDURE — 25000128 H RX IP 250 OP 636: Performed by: NURSE ANESTHETIST, CERTIFIED REGISTERED

## 2020-08-25 PROCEDURE — 86850 RBC ANTIBODY SCREEN: CPT | Performed by: INTERNAL MEDICINE

## 2020-08-25 PROCEDURE — 40000275 ZZH STATISTIC RCP TIME EA 10 MIN

## 2020-08-25 PROCEDURE — 25000128 H RX IP 250 OP 636: Performed by: ANESTHESIOLOGY

## 2020-08-25 PROCEDURE — 00000146 ZZHCL STATISTIC GLUCOSE BY METER IP

## 2020-08-25 PROCEDURE — 84132 ASSAY OF SERUM POTASSIUM: CPT | Performed by: THORACIC SURGERY (CARDIOTHORACIC VASCULAR SURGERY)

## 2020-08-25 PROCEDURE — 85610 PROTHROMBIN TIME: CPT | Performed by: INTERNAL MEDICINE

## 2020-08-25 PROCEDURE — 84100 ASSAY OF PHOSPHORUS: CPT | Performed by: STUDENT IN AN ORGANIZED HEALTH CARE EDUCATION/TRAINING PROGRAM

## 2020-08-25 PROCEDURE — 25000125 ZZHC RX 250: Performed by: OTOLARYNGOLOGY

## 2020-08-25 PROCEDURE — 25000132 ZZH RX MED GY IP 250 OP 250 PS 637: Mod: GY | Performed by: STUDENT IN AN ORGANIZED HEALTH CARE EDUCATION/TRAINING PROGRAM

## 2020-08-25 PROCEDURE — 37000009 ZZH ANESTHESIA TECHNICAL FEE, EACH ADDTL 15 MIN: Performed by: THORACIC SURGERY (CARDIOTHORACIC VASCULAR SURGERY)

## 2020-08-25 PROCEDURE — 0DH63UZ INSERTION OF FEEDING DEVICE INTO STOMACH, PERCUTANEOUS APPROACH: ICD-10-PCS | Performed by: THORACIC SURGERY (CARDIOTHORACIC VASCULAR SURGERY)

## 2020-08-25 PROCEDURE — 85027 COMPLETE CBC AUTOMATED: CPT | Performed by: STUDENT IN AN ORGANIZED HEALTH CARE EDUCATION/TRAINING PROGRAM

## 2020-08-25 PROCEDURE — 85018 HEMOGLOBIN: CPT | Performed by: INTERNAL MEDICINE

## 2020-08-25 PROCEDURE — 0CTS0ZZ RESECTION OF LARYNX, OPEN APPROACH: ICD-10-PCS | Performed by: THORACIC SURGERY (CARDIOTHORACIC VASCULAR SURGERY)

## 2020-08-25 PROCEDURE — 36000064 ZZH SURGERY LEVEL 4 EA 15 ADDTL MIN - UMMC: Performed by: THORACIC SURGERY (CARDIOTHORACIC VASCULAR SURGERY)

## 2020-08-25 PROCEDURE — 88300 SURGICAL PATH GROSS: CPT | Performed by: OTOLARYNGOLOGY

## 2020-08-25 PROCEDURE — 07T10ZZ RESECTION OF RIGHT NECK LYMPHATIC, OPEN APPROACH: ICD-10-PCS | Performed by: THORACIC SURGERY (CARDIOTHORACIC VASCULAR SURGERY)

## 2020-08-25 PROCEDURE — 88311 DECALCIFY TISSUE: CPT | Performed by: OTOLARYNGOLOGY

## 2020-08-25 PROCEDURE — 25800030 ZZH RX IP 258 OP 636: Performed by: INTERNAL MEDICINE

## 2020-08-25 PROCEDURE — P9041 ALBUMIN (HUMAN),5%, 50ML: HCPCS | Performed by: ANESTHESIOLOGY

## 2020-08-25 PROCEDURE — 37000008 ZZH ANESTHESIA TECHNICAL FEE, 1ST 30 MIN: Performed by: THORACIC SURGERY (CARDIOTHORACIC VASCULAR SURGERY)

## 2020-08-25 PROCEDURE — 27210794 ZZH OR GENERAL SUPPLY STERILE: Performed by: THORACIC SURGERY (CARDIOTHORACIC VASCULAR SURGERY)

## 2020-08-25 PROCEDURE — 27211024 ZZHC OR SUPPLY OTHER OPNP: Performed by: THORACIC SURGERY (CARDIOTHORACIC VASCULAR SURGERY)

## 2020-08-25 PROCEDURE — 40000014 ZZH STATISTIC ARTERIAL MONITORING DAILY

## 2020-08-25 PROCEDURE — 71000014 ZZH RECOVERY PHASE 1 LEVEL 2 FIRST HR: Performed by: THORACIC SURGERY (CARDIOTHORACIC VASCULAR SURGERY)

## 2020-08-25 PROCEDURE — 83735 ASSAY OF MAGNESIUM: CPT | Performed by: STUDENT IN AN ORGANIZED HEALTH CARE EDUCATION/TRAINING PROGRAM

## 2020-08-25 PROCEDURE — 80048 BASIC METABOLIC PNL TOTAL CA: CPT | Performed by: STUDENT IN AN ORGANIZED HEALTH CARE EDUCATION/TRAINING PROGRAM

## 2020-08-25 PROCEDURE — 25000125 ZZHC RX 250: Performed by: THORACIC SURGERY (CARDIOTHORACIC VASCULAR SURGERY)

## 2020-08-25 PROCEDURE — 88331 PATH CONSLTJ SURG 1 BLK 1SPC: CPT | Performed by: OTOLARYNGOLOGY

## 2020-08-25 PROCEDURE — 86900 BLOOD TYPING SEROLOGIC ABO: CPT | Performed by: INTERNAL MEDICINE

## 2020-08-25 PROCEDURE — 84295 ASSAY OF SERUM SODIUM: CPT | Performed by: THORACIC SURGERY (CARDIOTHORACIC VASCULAR SURGERY)

## 2020-08-25 PROCEDURE — 36415 COLL VENOUS BLD VENIPUNCTURE: CPT | Performed by: INTERNAL MEDICINE

## 2020-08-25 PROCEDURE — 88307 TISSUE EXAM BY PATHOLOGIST: CPT | Performed by: OTOLARYNGOLOGY

## 2020-08-25 PROCEDURE — 3E043XZ INTRODUCTION OF VASOPRESSOR INTO CENTRAL VEIN, PERCUTANEOUS APPROACH: ICD-10-PCS | Performed by: THORACIC SURGERY (CARDIOTHORACIC VASCULAR SURGERY)

## 2020-08-25 PROCEDURE — 88305 TISSUE EXAM BY PATHOLOGIST: CPT | Performed by: OTOLARYNGOLOGY

## 2020-08-25 PROCEDURE — 82330 ASSAY OF CALCIUM: CPT | Performed by: THORACIC SURGERY (CARDIOTHORACIC VASCULAR SURGERY)

## 2020-08-25 RX ORDER — ONDANSETRON 2 MG/ML
4 INJECTION INTRAMUSCULAR; INTRAVENOUS EVERY 30 MIN PRN
Status: DISCONTINUED | OUTPATIENT
Start: 2020-08-25 | End: 2020-08-25

## 2020-08-25 RX ORDER — OXYCODONE HCL 5 MG/5 ML
5-10 SOLUTION, ORAL ORAL EVERY 4 HOURS PRN
Status: DISCONTINUED | OUTPATIENT
Start: 2020-08-25 | End: 2020-08-25

## 2020-08-25 RX ORDER — AMPICILLIN AND SULBACTAM 2; 1 G/1; G/1
3 INJECTION, POWDER, FOR SOLUTION INTRAMUSCULAR; INTRAVENOUS EVERY 6 HOURS
Status: DISCONTINUED | OUTPATIENT
Start: 2020-08-25 | End: 2020-08-25

## 2020-08-25 RX ORDER — ACETAMINOPHEN 325 MG/1
975 TABLET ORAL EVERY 8 HOURS
Status: CANCELLED | OUTPATIENT
Start: 2020-08-25 | End: 2020-08-28

## 2020-08-25 RX ORDER — NALOXONE HYDROCHLORIDE 0.4 MG/ML
.1-.4 INJECTION, SOLUTION INTRAMUSCULAR; INTRAVENOUS; SUBCUTANEOUS
Status: DISCONTINUED | OUTPATIENT
Start: 2020-08-25 | End: 2020-09-01 | Stop reason: HOSPADM

## 2020-08-25 RX ORDER — NALOXONE HYDROCHLORIDE 0.4 MG/ML
.1-.4 INJECTION, SOLUTION INTRAMUSCULAR; INTRAVENOUS; SUBCUTANEOUS
Status: DISCONTINUED | OUTPATIENT
Start: 2020-08-25 | End: 2020-08-26

## 2020-08-25 RX ORDER — DEXAMETHASONE SODIUM PHOSPHATE 4 MG/ML
INJECTION, SOLUTION INTRA-ARTICULAR; INTRALESIONAL; INTRAMUSCULAR; INTRAVENOUS; SOFT TISSUE PRN
Status: DISCONTINUED | OUTPATIENT
Start: 2020-08-25 | End: 2020-08-25

## 2020-08-25 RX ORDER — SODIUM CHLORIDE, SODIUM LACTATE, POTASSIUM CHLORIDE, CALCIUM CHLORIDE 600; 310; 30; 20 MG/100ML; MG/100ML; MG/100ML; MG/100ML
INJECTION, SOLUTION INTRAVENOUS CONTINUOUS
Status: DISCONTINUED | OUTPATIENT
Start: 2020-08-25 | End: 2020-08-25

## 2020-08-25 RX ORDER — AMPICILLIN AND SULBACTAM 2; 1 G/1; G/1
3 INJECTION, POWDER, FOR SOLUTION INTRAMUSCULAR; INTRAVENOUS EVERY 6 HOURS
Status: COMPLETED | OUTPATIENT
Start: 2020-08-25 | End: 2020-08-27

## 2020-08-25 RX ORDER — SODIUM CHLORIDE, SODIUM LACTATE, POTASSIUM CHLORIDE, CALCIUM CHLORIDE 600; 310; 30; 20 MG/100ML; MG/100ML; MG/100ML; MG/100ML
INJECTION, SOLUTION INTRAVENOUS CONTINUOUS
Status: DISCONTINUED | OUTPATIENT
Start: 2020-08-25 | End: 2020-08-25 | Stop reason: HOSPADM

## 2020-08-25 RX ORDER — AMOXICILLIN 250 MG
2 CAPSULE ORAL 2 TIMES DAILY
Status: DISCONTINUED | OUTPATIENT
Start: 2020-08-25 | End: 2020-08-25

## 2020-08-25 RX ORDER — AMOXICILLIN 250 MG
2 CAPSULE ORAL 2 TIMES DAILY
Status: DISCONTINUED | OUTPATIENT
Start: 2020-08-25 | End: 2020-08-26

## 2020-08-25 RX ORDER — OMEPRAZOLE
20 KIT
Status: DISCONTINUED | OUTPATIENT
Start: 2020-08-26 | End: 2020-09-01 | Stop reason: HOSPADM

## 2020-08-25 RX ORDER — PROPOFOL 10 MG/ML
INJECTION, EMULSION INTRAVENOUS PRN
Status: DISCONTINUED | OUTPATIENT
Start: 2020-08-25 | End: 2020-08-25

## 2020-08-25 RX ORDER — ONDANSETRON 2 MG/ML
4 INJECTION INTRAMUSCULAR; INTRAVENOUS EVERY 6 HOURS
Status: COMPLETED | OUTPATIENT
Start: 2020-08-25 | End: 2020-08-27

## 2020-08-25 RX ORDER — MINERAL OIL/HYDROPHIL PETROLAT
OINTMENT (GRAM) TOPICAL EVERY 8 HOURS
Status: DISCONTINUED | OUTPATIENT
Start: 2020-08-26 | End: 2020-09-01 | Stop reason: HOSPADM

## 2020-08-25 RX ORDER — ACETAMINOPHEN 160 MG
TABLET,DISINTEGRATING ORAL PRN
Status: DISCONTINUED | OUTPATIENT
Start: 2020-08-25 | End: 2020-08-25 | Stop reason: HOSPADM

## 2020-08-25 RX ORDER — ACETAMINOPHEN 325 MG/1
650 TABLET ORAL EVERY 4 HOURS PRN
Status: DISCONTINUED | OUTPATIENT
Start: 2020-08-28 | End: 2020-09-01 | Stop reason: HOSPADM

## 2020-08-25 RX ORDER — ONDANSETRON 4 MG/1
4 TABLET, ORALLY DISINTEGRATING ORAL EVERY 30 MIN PRN
Status: DISCONTINUED | OUTPATIENT
Start: 2020-08-25 | End: 2020-08-25

## 2020-08-25 RX ORDER — CARBOXYMETHYLCELLULOSE SODIUM 5 MG/ML
1 SOLUTION/ DROPS OPHTHALMIC 4 TIMES DAILY PRN
Status: DISCONTINUED | OUTPATIENT
Start: 2020-08-25 | End: 2020-09-01 | Stop reason: HOSPADM

## 2020-08-25 RX ORDER — AMOXICILLIN 250 MG
1 CAPSULE ORAL 2 TIMES DAILY
Status: DISCONTINUED | OUTPATIENT
Start: 2020-08-25 | End: 2020-08-26

## 2020-08-25 RX ORDER — AMPICILLIN AND SULBACTAM 1; .5 G/1; G/1
1.5 INJECTION, POWDER, FOR SOLUTION INTRAMUSCULAR; INTRAVENOUS SEE ADMIN INSTRUCTIONS
Status: DISCONTINUED | OUTPATIENT
Start: 2020-08-25 | End: 2020-08-25 | Stop reason: HOSPADM

## 2020-08-25 RX ORDER — ALBUMIN, HUMAN INJ 5% 5 %
12.5 SOLUTION INTRAVENOUS ONCE
Status: COMPLETED | OUTPATIENT
Start: 2020-08-25 | End: 2020-08-25

## 2020-08-25 RX ORDER — FENTANYL CITRATE 50 UG/ML
INJECTION, SOLUTION INTRAMUSCULAR; INTRAVENOUS PRN
Status: DISCONTINUED | OUTPATIENT
Start: 2020-08-25 | End: 2020-08-25

## 2020-08-25 RX ORDER — REMIFENTANIL HYDROCHLORIDE 1 MG/ML
INJECTION, POWDER, LYOPHILIZED, FOR SOLUTION INTRAVENOUS PRN
Status: DISCONTINUED | OUTPATIENT
Start: 2020-08-25 | End: 2020-08-25

## 2020-08-25 RX ORDER — FENTANYL CITRATE 50 UG/ML
25-50 INJECTION, SOLUTION INTRAMUSCULAR; INTRAVENOUS
Status: DISCONTINUED | OUTPATIENT
Start: 2020-08-25 | End: 2020-08-25

## 2020-08-25 RX ORDER — OXYCODONE HYDROCHLORIDE 5 MG/1
5-10 TABLET ORAL EVERY 4 HOURS PRN
Status: DISCONTINUED | OUTPATIENT
Start: 2020-08-25 | End: 2020-08-25

## 2020-08-25 RX ORDER — TAMSULOSIN HYDROCHLORIDE 0.4 MG/1
0.4 CAPSULE ORAL DAILY
Status: DISCONTINUED | OUTPATIENT
Start: 2020-08-26 | End: 2020-08-25

## 2020-08-25 RX ORDER — DEXAMETHASONE SODIUM PHOSPHATE 4 MG/ML
10 INJECTION, SOLUTION INTRA-ARTICULAR; INTRALESIONAL; INTRAMUSCULAR; INTRAVENOUS; SOFT TISSUE ONCE
Status: DISCONTINUED | OUTPATIENT
Start: 2020-08-25 | End: 2020-08-25 | Stop reason: HOSPADM

## 2020-08-25 RX ORDER — LISINOPRIL 10 MG/1
10 TABLET ORAL DAILY
Status: DISCONTINUED | OUTPATIENT
Start: 2020-08-26 | End: 2020-09-01 | Stop reason: HOSPADM

## 2020-08-25 RX ORDER — SODIUM CHLORIDE, SODIUM LACTATE, POTASSIUM CHLORIDE, CALCIUM CHLORIDE 600; 310; 30; 20 MG/100ML; MG/100ML; MG/100ML; MG/100ML
INJECTION, SOLUTION INTRAVENOUS CONTINUOUS PRN
Status: DISCONTINUED | OUTPATIENT
Start: 2020-08-25 | End: 2020-08-25

## 2020-08-25 RX ORDER — OXYCODONE HYDROCHLORIDE 5 MG/1
5-10 TABLET ORAL EVERY 4 HOURS PRN
Status: DISCONTINUED | OUTPATIENT
Start: 2020-08-25 | End: 2020-09-01 | Stop reason: HOSPADM

## 2020-08-25 RX ORDER — NOREPINEPHRINE BITARTRATE 1 MG/ML
INJECTION, SOLUTION INTRAVENOUS PRN
Status: DISCONTINUED | OUTPATIENT
Start: 2020-08-25 | End: 2020-08-25

## 2020-08-25 RX ORDER — LIDOCAINE HYDROCHLORIDE AND EPINEPHRINE 10; 10 MG/ML; UG/ML
INJECTION, SOLUTION INFILTRATION; PERINEURAL PRN
Status: DISCONTINUED | OUTPATIENT
Start: 2020-08-25 | End: 2020-08-25 | Stop reason: HOSPADM

## 2020-08-25 RX ORDER — GINSENG 100 MG
CAPSULE ORAL EVERY 8 HOURS
Status: ACTIVE | OUTPATIENT
Start: 2020-08-25 | End: 2020-08-27

## 2020-08-25 RX ORDER — POLYETHYLENE GLYCOL 3350 17 G/17G
17 POWDER, FOR SOLUTION ORAL 2 TIMES DAILY PRN
Status: DISCONTINUED | OUTPATIENT
Start: 2020-08-25 | End: 2020-08-28

## 2020-08-25 RX ORDER — LISINOPRIL 10 MG/1
10 TABLET ORAL DAILY
Status: DISCONTINUED | OUTPATIENT
Start: 2020-08-26 | End: 2020-08-25

## 2020-08-25 RX ORDER — LIDOCAINE 40 MG/G
CREAM TOPICAL
Status: DISCONTINUED | OUTPATIENT
Start: 2020-08-25 | End: 2020-08-25 | Stop reason: HOSPADM

## 2020-08-25 RX ORDER — ONDANSETRON 2 MG/ML
INJECTION INTRAMUSCULAR; INTRAVENOUS PRN
Status: DISCONTINUED | OUTPATIENT
Start: 2020-08-25 | End: 2020-08-25

## 2020-08-25 RX ORDER — ONDANSETRON 4 MG/1
4 TABLET, ORALLY DISINTEGRATING ORAL EVERY 30 MIN PRN
Status: COMPLETED | OUTPATIENT
Start: 2020-08-25 | End: 2020-08-31

## 2020-08-25 RX ORDER — AMPICILLIN AND SULBACTAM 2; 1 G/1; G/1
3 INJECTION, POWDER, FOR SOLUTION INTRAMUSCULAR; INTRAVENOUS
Status: COMPLETED | OUTPATIENT
Start: 2020-08-25 | End: 2020-08-25

## 2020-08-25 RX ORDER — AMOXICILLIN 250 MG
1 CAPSULE ORAL 2 TIMES DAILY
Status: DISCONTINUED | OUTPATIENT
Start: 2020-08-25 | End: 2020-08-25

## 2020-08-25 RX ORDER — CHLORHEXIDINE GLUCONATE ORAL RINSE 1.2 MG/ML
15 SOLUTION DENTAL 2 TIMES DAILY
Status: DISCONTINUED | OUTPATIENT
Start: 2020-08-25 | End: 2020-08-26

## 2020-08-25 RX ORDER — SPIRONOLACTONE 25 MG
12.5 TABLET ORAL DAILY
Status: DISCONTINUED | OUTPATIENT
Start: 2020-08-26 | End: 2020-09-01 | Stop reason: HOSPADM

## 2020-08-25 RX ORDER — TAMSULOSIN HYDROCHLORIDE 0.4 MG/1
0.4 CAPSULE ORAL DAILY
Status: DISCONTINUED | OUTPATIENT
Start: 2020-08-26 | End: 2020-08-26

## 2020-08-25 RX ORDER — SODIUM CHLORIDE, SODIUM LACTATE, POTASSIUM CHLORIDE, CALCIUM CHLORIDE 600; 310; 30; 20 MG/100ML; MG/100ML; MG/100ML; MG/100ML
INJECTION, SOLUTION INTRAVENOUS CONTINUOUS
Status: DISCONTINUED | OUTPATIENT
Start: 2020-08-25 | End: 2020-08-26

## 2020-08-25 RX ORDER — LIDOCAINE HYDROCHLORIDE 20 MG/ML
INJECTION, SOLUTION INFILTRATION; PERINEURAL PRN
Status: DISCONTINUED | OUTPATIENT
Start: 2020-08-25 | End: 2020-08-25

## 2020-08-25 RX ORDER — ONDANSETRON 2 MG/ML
4 INJECTION INTRAMUSCULAR; INTRAVENOUS EVERY 30 MIN PRN
Status: COMPLETED | OUTPATIENT
Start: 2020-08-25 | End: 2020-08-31

## 2020-08-25 RX ORDER — HYDROMORPHONE HYDROCHLORIDE 1 MG/ML
.3-.5 INJECTION, SOLUTION INTRAMUSCULAR; INTRAVENOUS; SUBCUTANEOUS EVERY 5 MIN PRN
Status: DISCONTINUED | OUTPATIENT
Start: 2020-08-25 | End: 2020-08-27

## 2020-08-25 RX ORDER — POLYETHYLENE GLYCOL 3350 17 G/17G
17 POWDER, FOR SOLUTION ORAL 2 TIMES DAILY PRN
Status: DISCONTINUED | OUTPATIENT
Start: 2020-08-25 | End: 2020-08-25

## 2020-08-25 RX ORDER — ACETAMINOPHEN 325 MG/1
650 TABLET ORAL EVERY 4 HOURS PRN
Status: DISCONTINUED | OUTPATIENT
Start: 2020-08-28 | End: 2020-08-25

## 2020-08-25 RX ADMIN — SODIUM CHLORIDE, POTASSIUM CHLORIDE, SODIUM LACTATE AND CALCIUM CHLORIDE: 600; 310; 30; 20 INJECTION, SOLUTION INTRAVENOUS at 23:24

## 2020-08-25 RX ADMIN — ONDANSETRON 4 MG: 2 INJECTION INTRAMUSCULAR; INTRAVENOUS at 20:07

## 2020-08-25 RX ADMIN — PROPOFOL 30 MG: 10 INJECTION, EMULSION INTRAVENOUS at 08:48

## 2020-08-25 RX ADMIN — PHENYLEPHRINE HYDROCHLORIDE 50 MCG: 10 INJECTION INTRAVENOUS at 13:27

## 2020-08-25 RX ADMIN — NOREPINEPHRINE BITARTRATE 6.4 MCG: 1 INJECTION, SOLUTION, CONCENTRATE INTRAVENOUS at 13:58

## 2020-08-25 RX ADMIN — Medication 0.5 UNITS: at 11:49

## 2020-08-25 RX ADMIN — SODIUM CHLORIDE, POTASSIUM CHLORIDE, SODIUM LACTATE AND CALCIUM CHLORIDE: 600; 310; 30; 20 INJECTION, SOLUTION INTRAVENOUS at 08:16

## 2020-08-25 RX ADMIN — LIDOCAINE HYDROCHLORIDE 100 MG: 20 INJECTION, SOLUTION INFILTRATION; PERINEURAL at 08:29

## 2020-08-25 RX ADMIN — HYDROGEN PEROXIDE 60 ML: 2.65 LIQUID TOPICAL at 18:06

## 2020-08-25 RX ADMIN — PROPOFOL 70 MG: 10 INJECTION, EMULSION INTRAVENOUS at 13:49

## 2020-08-25 RX ADMIN — SODIUM CHLORIDE, POTASSIUM CHLORIDE, SODIUM LACTATE AND CALCIUM CHLORIDE: 600; 310; 30; 20 INJECTION, SOLUTION INTRAVENOUS at 17:35

## 2020-08-25 RX ADMIN — ROCURONIUM BROMIDE 50 MG: 10 INJECTION INTRAVENOUS at 14:46

## 2020-08-25 RX ADMIN — PHENYLEPHRINE HYDROCHLORIDE 100 MCG: 10 INJECTION INTRAVENOUS at 08:45

## 2020-08-25 RX ADMIN — REMIFENTANIL HYDROCHLORIDE: 1 INJECTION, POWDER, LYOPHILIZED, FOR SOLUTION INTRAVENOUS at 11:00

## 2020-08-25 RX ADMIN — FENTANYL CITRATE 50 MCG: 50 INJECTION, SOLUTION INTRAMUSCULAR; INTRAVENOUS at 20:01

## 2020-08-25 RX ADMIN — PROPOFOL 50 MG: 10 INJECTION, EMULSION INTRAVENOUS at 08:57

## 2020-08-25 RX ADMIN — PHENYLEPHRINE HYDROCHLORIDE 200 MCG: 10 INJECTION INTRAVENOUS at 13:53

## 2020-08-25 RX ADMIN — ROCURONIUM BROMIDE 30 MG: 10 INJECTION INTRAVENOUS at 16:23

## 2020-08-25 RX ADMIN — PHENYLEPHRINE HYDROCHLORIDE 200 MCG: 10 INJECTION INTRAVENOUS at 08:53

## 2020-08-25 RX ADMIN — REMIFENTANIL HYDROCHLORIDE 0.1 MCG/KG/MIN: 1 INJECTION, POWDER, LYOPHILIZED, FOR SOLUTION INTRAVENOUS at 08:49

## 2020-08-25 RX ADMIN — HYDROMORPHONE HYDROCHLORIDE 0.5 MG: 1 INJECTION, SOLUTION INTRAMUSCULAR; INTRAVENOUS; SUBCUTANEOUS at 20:08

## 2020-08-25 RX ADMIN — PHENYLEPHRINE HYDROCHLORIDE 0.5 MCG/KG/MIN: 10 INJECTION INTRAVENOUS at 09:04

## 2020-08-25 RX ADMIN — SODIUM CHLORIDE, POTASSIUM CHLORIDE, SODIUM LACTATE AND CALCIUM CHLORIDE: 600; 310; 30; 20 INJECTION, SOLUTION INTRAVENOUS at 10:06

## 2020-08-25 RX ADMIN — ROCURONIUM BROMIDE 20 MG: 10 INJECTION INTRAVENOUS at 18:16

## 2020-08-25 RX ADMIN — PHENYLEPHRINE HYDROCHLORIDE 100 MCG: 10 INJECTION INTRAVENOUS at 15:49

## 2020-08-25 RX ADMIN — PHENYLEPHRINE HYDROCHLORIDE 200 MCG: 10 INJECTION INTRAVENOUS at 19:24

## 2020-08-25 RX ADMIN — NOREPINEPHRINE BITARTRATE 3.2 MCG: 1 INJECTION, SOLUTION, CONCENTRATE INTRAVENOUS at 11:35

## 2020-08-25 RX ADMIN — FENTANYL CITRATE 50 MCG: 50 INJECTION, SOLUTION INTRAMUSCULAR; INTRAVENOUS at 20:30

## 2020-08-25 RX ADMIN — PROPOFOL 150 MG: 10 INJECTION, EMULSION INTRAVENOUS at 08:29

## 2020-08-25 RX ADMIN — PHENYLEPHRINE HYDROCHLORIDE 200 MCG: 10 INJECTION INTRAVENOUS at 18:33

## 2020-08-25 RX ADMIN — LIDOCAINE HYDROCHLORIDE AND EPINEPHRINE 10 ML: 10; 10 INJECTION, SOLUTION INFILTRATION; PERINEURAL at 08:40

## 2020-08-25 RX ADMIN — PHENYLEPHRINE HYDROCHLORIDE 100 MCG: 10 INJECTION INTRAVENOUS at 19:18

## 2020-08-25 RX ADMIN — REMIFENTANIL HYDROCHLORIDE: 1 INJECTION, POWDER, LYOPHILIZED, FOR SOLUTION INTRAVENOUS at 09:53

## 2020-08-25 RX ADMIN — ROCURONIUM BROMIDE 20 MG: 10 INJECTION INTRAVENOUS at 15:33

## 2020-08-25 RX ADMIN — ACETAMINOPHEN ORAL SOLUTION 650 MG: 325 SOLUTION ORAL at 23:21

## 2020-08-25 RX ADMIN — NOREPINEPHRINE BITARTRATE 6.4 MCG: 1 INJECTION, SOLUTION, CONCENTRATE INTRAVENOUS at 14:04

## 2020-08-25 RX ADMIN — ROCURONIUM BROMIDE 20 MG: 10 INJECTION INTRAVENOUS at 17:31

## 2020-08-25 RX ADMIN — AMPICILLIN AND SULBACTAM 1.5 G: 1; .5 INJECTION, POWDER, FOR SOLUTION INTRAMUSCULAR; INTRAVENOUS at 12:30

## 2020-08-25 RX ADMIN — AMPICILLIN AND SULBACTAM 1.5 G: 1; .5 INJECTION, POWDER, FOR SOLUTION INTRAMUSCULAR; INTRAVENOUS at 18:45

## 2020-08-25 RX ADMIN — SODIUM CHLORIDE, POTASSIUM CHLORIDE, SODIUM LACTATE AND CALCIUM CHLORIDE: 600; 310; 30; 20 INJECTION, SOLUTION INTRAVENOUS at 18:45

## 2020-08-25 RX ADMIN — DEXAMETHASONE SODIUM PHOSPHATE 4 MG: 4 INJECTION, SOLUTION INTRA-ARTICULAR; INTRALESIONAL; INTRAMUSCULAR; INTRAVENOUS; SOFT TISSUE at 09:47

## 2020-08-25 RX ADMIN — HYDROMORPHONE HYDROCHLORIDE 0.3 MG: 1 INJECTION, SOLUTION INTRAMUSCULAR; INTRAVENOUS; SUBCUTANEOUS at 21:05

## 2020-08-25 RX ADMIN — PHENYLEPHRINE HYDROCHLORIDE 100 MCG: 10 INJECTION INTRAVENOUS at 12:21

## 2020-08-25 RX ADMIN — AMPICILLIN AND SULBACTAM 1.5 G: 1; .5 INJECTION, POWDER, FOR SOLUTION INTRAMUSCULAR; INTRAVENOUS at 10:30

## 2020-08-25 RX ADMIN — ROCURONIUM BROMIDE 20 MG: 10 INJECTION INTRAVENOUS at 16:54

## 2020-08-25 RX ADMIN — AMPICILLIN SODIUM AND SULBACTAM SODIUM 3 G: 2; 1 INJECTION, POWDER, FOR SOLUTION INTRAMUSCULAR; INTRAVENOUS at 23:23

## 2020-08-25 RX ADMIN — FENTANYL CITRATE 50 MCG: 50 INJECTION, SOLUTION INTRAMUSCULAR; INTRAVENOUS at 20:35

## 2020-08-25 RX ADMIN — PHENYLEPHRINE HYDROCHLORIDE 100 MCG: 10 INJECTION INTRAVENOUS at 17:05

## 2020-08-25 RX ADMIN — ONDANSETRON 4 MG: 2 INJECTION INTRAMUSCULAR; INTRAVENOUS at 23:22

## 2020-08-25 RX ADMIN — FIBRINOGEN HUMAN AND THROMBIN HUMAN 2 ML: KIT at 18:09

## 2020-08-25 RX ADMIN — PHENYLEPHRINE HYDROCHLORIDE 200 MCG: 10 INJECTION INTRAVENOUS at 19:22

## 2020-08-25 RX ADMIN — NOREPINEPHRINE BITARTRATE 6.4 MCG: 1 INJECTION, SOLUTION, CONCENTRATE INTRAVENOUS at 19:23

## 2020-08-25 RX ADMIN — NOREPINEPHRINE BITARTRATE 6.4 MCG: 1 INJECTION, SOLUTION, CONCENTRATE INTRAVENOUS at 19:19

## 2020-08-25 RX ADMIN — SODIUM CHLORIDE, POTASSIUM CHLORIDE, SODIUM LACTATE AND CALCIUM CHLORIDE: 600; 310; 30; 20 INJECTION, SOLUTION INTRAVENOUS at 16:10

## 2020-08-25 RX ADMIN — NOREPINEPHRINE BITARTRATE 6.4 MCG: 1 INJECTION, SOLUTION, CONCENTRATE INTRAVENOUS at 12:08

## 2020-08-25 RX ADMIN — PHENYLEPHRINE HYDROCHLORIDE 100 MCG: 10 INJECTION INTRAVENOUS at 18:10

## 2020-08-25 RX ADMIN — ROCURONIUM BROMIDE 10 MG: 10 INJECTION INTRAVENOUS at 19:14

## 2020-08-25 RX ADMIN — ONDANSETRON 4 MG: 2 INJECTION INTRAMUSCULAR; INTRAVENOUS at 19:27

## 2020-08-25 RX ADMIN — SUGAMMADEX 200 MG: 100 INJECTION, SOLUTION INTRAVENOUS at 19:37

## 2020-08-25 RX ADMIN — REMIFENTANIL HYDROCHLORIDE 200 MCG: 1 INJECTION, POWDER, LYOPHILIZED, FOR SOLUTION INTRAVENOUS at 08:29

## 2020-08-25 RX ADMIN — AMPICILLIN SODIUM AND SULBACTAM SODIUM 3 G: 2; 1 INJECTION, POWDER, FOR SOLUTION INTRAMUSCULAR; INTRAVENOUS at 08:33

## 2020-08-25 RX ADMIN — Medication 0.25 UNITS: at 11:59

## 2020-08-25 RX ADMIN — PHENYLEPHRINE HYDROCHLORIDE 200 MCG: 10 INJECTION INTRAVENOUS at 09:01

## 2020-08-25 RX ADMIN — DOCUSATE SODIUM AND SENNOSIDES 1 TABLET: 8.6; 5 TABLET ORAL at 23:21

## 2020-08-25 RX ADMIN — PROPOFOL 30 MG: 10 INJECTION, EMULSION INTRAVENOUS at 13:47

## 2020-08-25 RX ADMIN — AMPICILLIN AND SULBACTAM 1.5 G: 1; .5 INJECTION, POWDER, FOR SOLUTION INTRAMUSCULAR; INTRAVENOUS at 14:39

## 2020-08-25 RX ADMIN — CHLORHEXIDINE GLUCONATE 0.12% ORAL RINSE 15 ML: 1.2 LIQUID ORAL at 23:21

## 2020-08-25 RX ADMIN — AMPICILLIN AND SULBACTAM 1.5 G: 1; .5 INJECTION, POWDER, FOR SOLUTION INTRAMUSCULAR; INTRAVENOUS at 16:40

## 2020-08-25 RX ADMIN — SODIUM CHLORIDE, POTASSIUM CHLORIDE, SODIUM LACTATE AND CALCIUM CHLORIDE: 600; 310; 30; 20 INJECTION, SOLUTION INTRAVENOUS at 19:35

## 2020-08-25 RX ADMIN — NOREPINEPHRINE BITARTRATE 3.2 MCG: 1 INJECTION, SOLUTION, CONCENTRATE INTRAVENOUS at 11:27

## 2020-08-25 RX ADMIN — ALBUMIN HUMAN 12.5 G: 0.05 INJECTION, SOLUTION INTRAVENOUS at 21:01

## 2020-08-25 ASSESSMENT — MIFFLIN-ST. JEOR: SCORE: 1568.25

## 2020-08-25 NOTE — PROGRESS NOTES
"SPIRITUAL HEALTH SERVICES  [facility] 3C   PRE-SURGERY VISIT    Pt listed as wanting presurgical visit. Upon Introduction of SH with pt and his dtr Jesica, they both agreed they were fine \" being together in the quiet \"and declined further SH visit.    Rev. Dina Rico MDiv, [facility]  Staff    Pager [phone]  "

## 2020-08-25 NOTE — ANESTHESIA PROCEDURE NOTES
Arterial Line Procedure Note  Staff -   Anesthesiologist:  Cj Byers MD  Resident/Fellow: Oscar Kat MD    Performed By: resident  Procedure performed by resident/CRNA in presence of a teaching physician.          Location: In OR Before Induction  Procedure Start/Stop Times:      8/25/2020 8:23 AM    patient identified, IV checked, site marked, risks and benefits discussed, informed consent, monitors and equipment checked, pre-op evaluation and at physician/surgeon's request      Correct Patient: Yes      Correct Position: Yes      Correct Site: Yes      Correct Procedure: Yes      Correct Laterality:  Yes    Site Marked:  Yes  Line Placement:     Procedure:  Arterial Line    Insertion Site:  Radial    Insertion laterality:  Left    Skin Prep: Chloraprep      Patient Prep: patient draped, mask, sterile gloves, hat and hand hygiene      Local skin infiltration:  2% lidocaine    Ultrasound Guided?: Yes      Artery evaluated via ultrasound confirming patency.   Using realtime imaging, the artery was punctured and the needle was observed entering the artery.      A permanent image is NOT entered into the patient's record.      Catheter size:  20 gauge, Quick cath    Cath secured with: other (comment)      Dressing:  Tegaderm    Complications:  None obvious    Arterial waveform: Yes      IBP within 10% of NIBP: Yes

## 2020-08-25 NOTE — OP NOTE
Date of Procedure: 8/25/2020    Attending Physician: Caron Wang MD    Resident Physicians:   1. Joy Beckman MD  2. Luis Manuel Gamez MD    Procedure Performed:  Direct laryngoscopy with telescope  Dental extractions x 13  Total laryngectomy  Left hemithyroidectomy  Bilateral neck dissections (levels II-IV)  Cricopharyngeal myotomy    Preoperative Diagnosis: squamous cell carcinoma of larynx    Postoperative Diagnosis: same    Anesthesia: General    Blood loss: 50 cc    Specimens:   ID Type Source Tests Collected by Time Destination   A : Right level 2A neck dissection Tissue Other SURGICAL PATHOLOGY EXAM Caron Wang MD 8/25/2020 11:39 AM     B : Right level 3 neck dissection Tissue Other SURGICAL PATHOLOGY EXAM Caron Wang MD 8/25/2020 11:39 AM     C : Right level 4 neck dissection Tissue Other SURGICAL PATHOLOGY EXAM Caron Wang MD 8/25/2020 11:39 AM     D : Right 2B neck dissection Tissue Other SURGICAL PATHOLOGY EXAM Caron Wang MD 8/25/2020 11:44 AM     E : Left level 2A neck dissection Tissue Neck SURGICAL PATHOLOGY EXAM Caron Wang MD 8/25/2020 12:37 PM     F : Left level 3 neck dissection Tissue Neck SURGICAL PATHOLOGY EXAM Caron Wang MD 8/25/2020 12:40 PM     G : Left level 4 neck dissection Tissue Neck SURGICAL PATHOLOGY EXAM Caron Wang MD 8/25/2020 12:40 PM     H : Left level 2B neck dissection Tissue Neck SURGICAL PATHOLOGY EXAM Caron Wang MD 8/25/2020  1:13 PM     I : Sternal Hyoid Biopsy Tissue Other SURGICAL PATHOLOGY EXAM Caron Wang MD 8/25/2020  1:24 PM     J : Right vallecula mucosal margin Tissue Other SURGICAL PATHOLOGY EXAM Caron Wang MD 8/25/2020  2:51 PM     K : left  vallecula mucosal margin Tissue Other SURGICAL PATHOLOGY EXAM Caron Wang MD 8/25/2020  2:52 PM     L : right pharyngeal wall mucosal margin Tissue Other SURGICAL PATHOLOGY EXAM Caron Wang MD 8/25/2020  2:52 PM     M : post  cricoid mucosal margin Tissue Other SURGICAL PATHOLOGY EXAM Caron Wang MD 8/25/2020  2:52 PM     N : left pharyngeal wall mucosal margin Tissue Other SURGICAL PATHOLOGY EXAM Caron Wang MD 8/25/2020  2:53 PM     O : posterior tracheal wall mucosal margin Tissue Other SURGICAL PATHOLOGY EXAM Caron Wang MD 8/25/2020  2:53 PM     P : left anterior tracheal wall mucosal margin Tissue Other SURGICAL PATHOLOGY EXAM Caron Wang MD 8/25/2020  2:53 PM     Q : right anterior tracheal wall mucosal margin Tissue Other SURGICAL PATHOLOGY EXAM Caron Wang MD 8/25/2020  2:54 PM     R : total laryngectomy with left hemithyroid Tissue Other SURGICAL PATHOLOGY EXAM Caron Wang MD 8/25/2020  3:19 PM     S : teeth x 13 Other (specify in comments) Other SURGICAL PATHOLOGY EXAM Caron Wang MD 8/25/2020  3:20 PM          Implants:  NERY drain x 4    Complications: None    Findings:  Tumor of the glottis completely obstructing the airway with inability to view the subglottis  Bilateral neck dissections performed levels II-IV with preservation of spinal accessory nerve, hypoglossal nerve, vagus nerve; no grossly abnormal nodes with just mildly enlarged bilateral level II nodes  Laryngectomy performed with negative margins circumferentially  Left thyroid lobe with abnormal appearing tissue - taken in continuity with the laryngectomy specimen  Tracheal cartilage friable, requiring stoma to be created at approximately the 4th tracheal ring  Left superior and left inferior parathyroid glands preserved  Extraction of remaining teeth and visualized previously broken roots  No evidence of leak on testing of pharyngeal closure  Right chyle leak controlled with clips and Evicel    Indications:  Evin Sterling is a 70-year-old man with a T4aN0 squamous cell carcinoma of the larynx.  The patient has a history of progressive hoarseness and dyspnea for about 6 months.  He was seen in clinic on  7/31/2020 with bilateral vocal cord paralysis and an exophytic tumor involving essentially the entire glottis with about a 5 mm opening. He was taken to the OR on 8/3/2020 for an awake tracheostomy, DL with biopsy. Pathology demonstrated an invasive squamous cell carcinoma. He had a PET scan which showed the tumor in the larynx with thyroid cartilage erosion but no obvious mariel disease. The patient demonstrated poor swallow function with aspiration on swallow study. Given these findings he was recommended to undergo a total laryngectomy with bilateral neck dissections with plans for postoperative radiation for definitive management.    Description of Procedure:  After informed consent was obtained, the patient was brought back to main operating room and placed in a supine position.  The bed had already been turned 180 degrees from anesthesia. General anesthesia was induced through the Shiley and the tube was switched out to a 7.0 wire reinforced endotracheal tube. An arterial line and morales were placed. Dr Hastings's team performed PEG tube placement (see separately dictated note).     The arms were tucked and a shoulder roll was placed. A brief time out was performed. The thermoplast dental guards were placed to protect the teeth. The Dedo laryngoscope was introduced and used to visualize the larynx. The patient had a normal appearing base of tongue and vallecula. The epiglottis was normal.  The pyriform sinuses and postcricoid area were normal. The zero degree telescope was used to visualize the larynx and subglottis and photodocumentation was obtained. The patient had a tumor that completely obstructed the glottis and prevented visualization of the subglottis. The scope and dental guards were removed.      The patient was then prepped and draped in sterile fashion for the planned surgical procedure.  A marking pen was used to varsha an apron incision in the neck with a 1 cm margin circumferenatially around the  tracheostomy site.  The incision was injected with 1% lidocaine with 1:100,000 epinephrine.  A #15 blade was used to make an incision through the skin down to the platysma.  The monopolar cautery was used to divide the platysma.  Subplatysmal flaps were raised superiorly up to the level of the hyoid medially and the mandible laterally.  Subplatysmal flaps were also raised inferiorly down past to the level of the clavicle.       We turned our attention to the left neck dissection.  The fascia was released off the SCM working from inferior to superior, and releasing it along its medial border.  We continued to release the fascia working toward the floor of the neck. The digastric muscle was identified inferior to the submandibular gland and traced posteriorly toward the mastoid tip, defining the superior border of our dissection. We continued to release the fascia of the SCM and the spinal accessory nerve was defined.  This was traced up superiorly towards its junction with the internal jugular vein. The lateral border of the internal jugular vein was defined. We then continued to release the fibrofatty contents of levels II through IV towards the floor of the neck and the cervical rootlets were defined. Grasping the contents of levels II through IV of the neck, we then released the specimen off the cervical rootlets along the floor of the neck using the monopolar cautery.  The omohyoid muscle was divided as it crossed the internal jugular vein. As we were releasing the specimen inferiorly in level IV, care was taken to clip the specimen in level IV to reduce the risk of a chyle leak.  We defined the inferior border of our dissection at the clavicle. The lateral border of the internal jugular vein was defined in level IV.  We then continued to release the fibrofatty contents of levels II through IV towards the carotid sheath. These were then bluntly dissected off the carotid artery and the internal jugular vein  working medially.  The transverse cervical vessels were preserved. We then defined the medial border of our dissection.  The monopolar cautery was used to release the fibrofatty contents off the medial border of the omohyoid. The fibrofatty contents along the medial border of the internal jugular vein were released to be taken in continuity with the neck dissection specimen. The superior thyroid pedicle was traced and preserved given the plans to preserve the thyroid. The neck dissection specimen was released off the internal jugular vein and then connected to the medial portion of the dissection. The specimen was divided into its respective level then handed off to nursing for permanent pathology. The spinal accessory nerve was freed circumferentially. An Allis clamp was used to grasp the contents of level IIB. The Metzenbaum scissors were used to release the contents of level IIB from the floor of the neck. The specimen was handed off to nursing for permanent pathology.      We then started to mobilize the larynx on the left side.  The carotid was bluntly dissected so it could be released off the laryngeal cartilage laterally. The midline raphe of the strap muscles was defined. The strap muscles were divided at the sternum on the left side. The strap muscles were dissected off the thyroid gland and retracted superiorly. The thyroid isthmus was divided on the left side of the tracheostomy site and the thyroid gland was reflected laterally. While performing this dissection with the bipolar cautery, the tracheal cartilage was noted to be friable. The tissue plane was abnormal appearing between the thyroid gland and the trachea. The decision was made to take the left thyroid in continuity with the laryngectomy specimen. The lateral border of the thyroid gland on the left side was released. The inferior and superior thyroid pedicles were divided. The inferior and superior parathyroid glands were visualized and  preserved. The lateral border of the thyroid cartilage was identified and the constrictor muscles were released off the thyroid ala.  A Edson was used to release the mucosa on the posterior aspect of the cartilage so it could be preserved for use in reconstruction.  The suprahyoid musculature was released off the hyoid working medial to lateral.  The entire cornu of the hyoid was released until the left side of the larynx was fully mobilized.      We then turned our attention to the right neck dissection. We started to release the fascia from the SCM and release it along its medial border. The inferior border of the submandibular gland was identified and released using monopolar cautery. From here we were able to identify the digastric muscle which defined the superior border of our dissection. This was traced posteriorly towards the mastoid tip.  The floor the neck was identified. We identified the spinal accessory nerve on the right side and this was traced superiorly towards the internal jugular vein. The lateral border of the internal jugular vein was identified. The spinal accessory nerve was freed up along its inferior border. We then continued to release the contents of levels II through IV along the floor of the neck. The cervical rootlets were identified. The omohyoid was divided as it crossed the lateral neck.  The inferior border of the dissection was defined at the clavicle. A large lymphatic was visualized in level IV and was clipped. We then continued to release the fibrofatty contents of levels II through IV off the rootlets working towards the carotid sheath. We then continued to release the medial border of the dissection along the omohyoid and release it off of the anterior surface of the carotid sheath. Once again the superior thyroid pedicle was dissected out and preserved. The lateral and medial borders of the neck dissection were then connected. Once the specimen was completely released, it was  divided into its respective levels and handed off to nursing for permanent pathology. The spinal accessory nerve was freed circumferentially with scissors. An Allis clamp was used to grasp the contents of level IIB and the Metzenbaum scissors were used to release the contents of level IIB from the floor of the neck. The specimen was handed off to nursing for permanent pathology. Hemostasis was achieved with bipolar cautery.      We then started to mobilize the larynx on the right side. The medial border of the carotid artery was defined and dissected away from the trachea. The strap muscles were divided inferiorly down to the clavicle with the Harmonic. The strap muscles were dissected off the thyroid gland reflected superiorly. The thyroid isthmus was divided on the right side of the tracheostomy and the thyroid gland was released off the trachea, reflecting it laterally. The thyroid gland was pedicled off the superior thyroid vessels.  We then mobilized the remainder of the thyroid cartilage releasing the constrictor muscles. A freer was used to release the musculature off the posterior border of the cartilage to preserve as much mucosa as possible. The superior border of the hyoid bone was released on the right side. We then continued to release the rest of the suprahyoid musculature until the entire right side of the larynx was mobilized.     The anterior wall of the trachea was cleaned of any soft tissue below the level of the tracheostomy. The patient's tracheal cartilage was friable. Below the level of the tracheostomy the tracheal wall anteriorly tore and the planned stoma had to be made below this, at approximately the level of the sternal notch. The cuff was deflated on the ET tube and a 15 blade was used to make an incision approximately two tracheal rings below the trachestomy (approximately 4th tracheal ring). The endotracheal tube was slowly withdrawn and Beveled cuts were made with curved scissors  along the lateral tracheal walls. A wire reinforced endotracheal tube was placed in the stoma and end-tidal CO2 was verified.      A Yessica retractor was placed within the oral cavity and down into the vallecula on the right side.  Monopolar cautery was used to enter the right vallecula.  An Allis clamp was used to grasp the epiglottis and retract it out of the larynx.  The superior cut was completed along the rightt vallecula towards the midline and then into the left vallecula.  At this point, we were able to better visualize the larynx and the monopolar cautery was used to make the pharyngeal cuts following the aryepiglottic folds, working from superior to inferiorly.  We then transitioned to our transverse cut at the postcricoid space.  We then used the monopolar cautery to connect our superior laryngeal cuts down to the tracheal cut to release the entire laryngectomy specimen.  The anterior esophageal wall was left intact without any mucosal injuries. The laryngectomy specimen was inspected and the specimen appeared to have adequate gross margins. Mucosal margins were sent from the specimen including the lateral pharyngeal walls, base of tongue, postcricoid space, posterior and anterior tracheal wall and sent for frozen section.  All of these were ultimately negative for carcinoma.     A cricopharyngeal myotomy was performed along the anterior esophageal wall.  The sternal heads of the sternocleidomastoid muscle were released bilaterally with the Harmonic scalpel.  Hemostasis was ensured with multiple sustained Valsalvas performed and concerns for a right sided chyle leak. This was clipped with no further leak identified. Evicel was applied to the right neck in level IV. The stoma was then matured using 3-0 PDS in half mattress fashion along the anterior wall.  The stoma was inspected and due to a low tracheal cut secondary to the previous tracheostomy, friable tracheal, and concerns for a difficult angle for  TEP changes, the decision was made to delay the TEP.      We then turned our attention to the pharyngeal closure. The neopharynx was closed in straight line fashion. A modified [place] stitch was used to close the base of tongue to itself superiorly. Inferiorly a modified [place] stitch using a 3-0 vicryl was used to close the pharyngeal mucosa to itself. The closure was tested with dilute hydrogen peroxide which did not showed a leak.  A second layer of closure was performed along the entire base of tongue using the adjacent tongue base tissue and then the constrictors were used to reinforce the pharyngeal closure inferiorly.  We took care to ensure that the closure was not too tight to cause stricturing.      Hemostasis was again ensured in the neck. The neck was irrigated with a liter of dilute betadine. Four NERY drains are placed in the neck, 2 in each lateral neck gutter, and 2 in the medial neck along the pharynx.  The drains were secured with a 3-0 nylon suture.  The incision was closed deeply with a buried interrupted 3-0 Vicryl.  The skin was then closed with a running 5-0 prolene.  The posterior wall of the stoma was matured to the neck skin using buried 3-0 Vicryl followed by a running 4-0 chromic.    The patient had elected to undergo dental extractions. He had a few remaining teeth and then multiple broken teeth. The remaining 7 teeth were extracted. The broken roots of 6 teeth were removed. All of the teeth were handed over to nursing for permanent pathology. Gelfoam was placed within the extraction sites. The alveolar mucosa was closed with interrupted 3-0 vicyl in horizontal mattress fashion.      The patient was handed back to Anesthesia for extubation and taken to the PACU in stable condition.  The patient tolerated the procedure well with no immediate complications.     I was present for and participated in the entire procedure.         Caron Wang MD    Department of  Otolaryngology

## 2020-08-26 ENCOUNTER — APPOINTMENT (OUTPATIENT)
Dept: SPEECH THERAPY | Facility: CLINIC | Age: 70
DRG: 011 | End: 2020-08-26
Attending: STUDENT IN AN ORGANIZED HEALTH CARE EDUCATION/TRAINING PROGRAM
Payer: MEDICARE

## 2020-08-26 ENCOUNTER — APPOINTMENT (OUTPATIENT)
Dept: OCCUPATIONAL THERAPY | Facility: CLINIC | Age: 70
DRG: 011 | End: 2020-08-26
Attending: STUDENT IN AN ORGANIZED HEALTH CARE EDUCATION/TRAINING PROGRAM
Payer: MEDICARE

## 2020-08-26 LAB
ALBUMIN SERPL-MCNC: 1.9 G/DL (ref 3.4–5)
ALBUMIN SERPL-MCNC: 2 G/DL (ref 3.4–5)
ALP SERPL-CCNC: 73 U/L (ref 40–150)
ALT SERPL W P-5'-P-CCNC: 23 U/L (ref 0–70)
ANION GAP SERPL CALCULATED.3IONS-SCNC: 6 MMOL/L (ref 3–14)
AST SERPL W P-5'-P-CCNC: 26 U/L (ref 0–45)
BILIRUB SERPL-MCNC: 0.6 MG/DL (ref 0.2–1.3)
BUN SERPL-MCNC: 10 MG/DL (ref 7–30)
CA-I BLD-MCNC: 4.2 MG/DL (ref 4.4–5.2)
CA-I BLD-MCNC: 4.3 MG/DL (ref 4.4–5.2)
CALCIUM SERPL-MCNC: 7 MG/DL (ref 8.5–10.1)
CHLORIDE SERPL-SCNC: 113 MMOL/L (ref 94–109)
CO2 SERPL-SCNC: 25 MMOL/L (ref 20–32)
CREAT SERPL-MCNC: 0.8 MG/DL (ref 0.66–1.25)
ERYTHROCYTE [DISTWIDTH] IN BLOOD BY AUTOMATED COUNT: 14.6 % (ref 10–15)
GFR SERPL CREATININE-BSD FRML MDRD: >90 ML/MIN/{1.73_M2}
GLUCOSE BLDC GLUCOMTR-MCNC: 109 MG/DL (ref 70–99)
GLUCOSE BLDC GLUCOMTR-MCNC: 118 MG/DL (ref 70–99)
GLUCOSE SERPL-MCNC: 114 MG/DL (ref 70–99)
HCT VFR BLD AUTO: 39.2 % (ref 40–53)
HGB BLD-MCNC: 12.6 G/DL (ref 13.3–17.7)
INTERPRETATION ECG - MUSE: NORMAL
MAGNESIUM SERPL-MCNC: 2.5 MG/DL (ref 1.6–2.3)
MAGNESIUM SERPL-MCNC: 2.6 MG/DL (ref 1.6–2.3)
MCH RBC QN AUTO: 31 PG (ref 26.5–33)
MCHC RBC AUTO-ENTMCNC: 32.1 G/DL (ref 31.5–36.5)
MCV RBC AUTO: 96 FL (ref 78–100)
PHOSPHATE SERPL-MCNC: 2.9 MG/DL (ref 2.5–4.5)
PHOSPHATE SERPL-MCNC: 3.4 MG/DL (ref 2.5–4.5)
PLATELET # BLD AUTO: 244 10E9/L (ref 150–450)
POTASSIUM SERPL-SCNC: 3.9 MMOL/L (ref 3.4–5.3)
POTASSIUM SERPL-SCNC: 4.3 MMOL/L (ref 3.4–5.3)
PREALB SERPL IA-MCNC: 11 MG/DL (ref 15–45)
PROT SERPL-MCNC: 4.6 G/DL (ref 6.8–8.8)
PTH-INTACT SERPL-MCNC: 34 PG/ML (ref 18–80)
RBC # BLD AUTO: 4.07 10E12/L (ref 4.4–5.9)
SODIUM SERPL-SCNC: 144 MMOL/L (ref 133–144)
TSH SERPL DL<=0.005 MIU/L-ACNC: 0.91 MU/L (ref 0.4–4)
WBC # BLD AUTO: 12.3 10E9/L (ref 4–11)

## 2020-08-26 PROCEDURE — 83735 ASSAY OF MAGNESIUM: CPT | Performed by: OTOLARYNGOLOGY

## 2020-08-26 PROCEDURE — 83970 ASSAY OF PARATHORMONE: CPT | Performed by: STUDENT IN AN ORGANIZED HEALTH CARE EDUCATION/TRAINING PROGRAM

## 2020-08-26 PROCEDURE — 99232 SBSQ HOSP IP/OBS MODERATE 35: CPT | Performed by: PHYSICIAN ASSISTANT

## 2020-08-26 PROCEDURE — 99207 ZZC CONSULT E&M CHANGED TO SUBSEQUENT LEVEL: CPT | Performed by: PHYSICIAN ASSISTANT

## 2020-08-26 PROCEDURE — 40000047 ZZH STATISTIC CTO2 CONT OXYGEN TECH TIME EA 90 MIN

## 2020-08-26 PROCEDURE — 25800030 ZZH RX IP 258 OP 636: Performed by: PHYSICIAN ASSISTANT

## 2020-08-26 PROCEDURE — 82330 ASSAY OF CALCIUM: CPT | Performed by: ORTHOPAEDIC SURGERY

## 2020-08-26 PROCEDURE — 82330 ASSAY OF CALCIUM: CPT | Performed by: STUDENT IN AN ORGANIZED HEALTH CARE EDUCATION/TRAINING PROGRAM

## 2020-08-26 PROCEDURE — 92522 EVALUATE SPEECH PRODUCTION: CPT | Mod: GN

## 2020-08-26 PROCEDURE — 85027 COMPLETE CBC AUTOMATED: CPT | Performed by: STUDENT IN AN ORGANIZED HEALTH CARE EDUCATION/TRAINING PROGRAM

## 2020-08-26 PROCEDURE — 00000146 ZZHCL STATISTIC GLUCOSE BY METER IP

## 2020-08-26 PROCEDURE — 25000128 H RX IP 250 OP 636: Performed by: STUDENT IN AN ORGANIZED HEALTH CARE EDUCATION/TRAINING PROGRAM

## 2020-08-26 PROCEDURE — 82040 ASSAY OF SERUM ALBUMIN: CPT | Performed by: STUDENT IN AN ORGANIZED HEALTH CARE EDUCATION/TRAINING PROGRAM

## 2020-08-26 PROCEDURE — 25800030 ZZH RX IP 258 OP 636: Performed by: STUDENT IN AN ORGANIZED HEALTH CARE EDUCATION/TRAINING PROGRAM

## 2020-08-26 PROCEDURE — 84134 ASSAY OF PREALBUMIN: CPT | Performed by: STUDENT IN AN ORGANIZED HEALTH CARE EDUCATION/TRAINING PROGRAM

## 2020-08-26 PROCEDURE — 12000001 ZZH R&B MED SURG/OB UMMC

## 2020-08-26 PROCEDURE — 84100 ASSAY OF PHOSPHORUS: CPT | Performed by: OTOLARYNGOLOGY

## 2020-08-26 PROCEDURE — 25000132 ZZH RX MED GY IP 250 OP 250 PS 637: Mod: GY | Performed by: INTERNAL MEDICINE

## 2020-08-26 PROCEDURE — 97535 SELF CARE MNGMENT TRAINING: CPT | Mod: GO | Performed by: OCCUPATIONAL THERAPIST

## 2020-08-26 PROCEDURE — 36415 COLL VENOUS BLD VENIPUNCTURE: CPT | Performed by: OTOLARYNGOLOGY

## 2020-08-26 PROCEDURE — 25000132 ZZH RX MED GY IP 250 OP 250 PS 637: Mod: GY | Performed by: PHYSICIAN ASSISTANT

## 2020-08-26 PROCEDURE — 84443 ASSAY THYROID STIM HORMONE: CPT | Performed by: STUDENT IN AN ORGANIZED HEALTH CARE EDUCATION/TRAINING PROGRAM

## 2020-08-26 PROCEDURE — 80053 COMPREHEN METABOLIC PANEL: CPT | Performed by: STUDENT IN AN ORGANIZED HEALTH CARE EDUCATION/TRAINING PROGRAM

## 2020-08-26 PROCEDURE — 84100 ASSAY OF PHOSPHORUS: CPT | Performed by: STUDENT IN AN ORGANIZED HEALTH CARE EDUCATION/TRAINING PROGRAM

## 2020-08-26 PROCEDURE — 25000132 ZZH RX MED GY IP 250 OP 250 PS 637: Mod: GY | Performed by: STUDENT IN AN ORGANIZED HEALTH CARE EDUCATION/TRAINING PROGRAM

## 2020-08-26 PROCEDURE — 25800030 ZZH RX IP 258 OP 636: Performed by: INTERNAL MEDICINE

## 2020-08-26 PROCEDURE — 25000125 ZZHC RX 250: Performed by: STUDENT IN AN ORGANIZED HEALTH CARE EDUCATION/TRAINING PROGRAM

## 2020-08-26 PROCEDURE — 25000128 H RX IP 250 OP 636: Performed by: INTERNAL MEDICINE

## 2020-08-26 PROCEDURE — 99291 CRITICAL CARE FIRST HOUR: CPT | Performed by: INTERNAL MEDICINE

## 2020-08-26 PROCEDURE — 40000275 ZZH STATISTIC RCP TIME EA 10 MIN

## 2020-08-26 PROCEDURE — 97165 OT EVAL LOW COMPLEX 30 MIN: CPT | Mod: GO | Performed by: OCCUPATIONAL THERAPIST

## 2020-08-26 PROCEDURE — 36415 COLL VENOUS BLD VENIPUNCTURE: CPT | Performed by: ORTHOPAEDIC SURGERY

## 2020-08-26 PROCEDURE — 27210429 ZZH NUTRITION PRODUCT INTERMEDIATE LITER

## 2020-08-26 PROCEDURE — 84132 ASSAY OF SERUM POTASSIUM: CPT | Performed by: OTOLARYNGOLOGY

## 2020-08-26 PROCEDURE — 92507 TX SP LANG VOICE COMM INDIV: CPT | Mod: GN

## 2020-08-26 PROCEDURE — 83735 ASSAY OF MAGNESIUM: CPT | Performed by: STUDENT IN AN ORGANIZED HEALTH CARE EDUCATION/TRAINING PROGRAM

## 2020-08-26 PROCEDURE — 97530 THERAPEUTIC ACTIVITIES: CPT | Mod: GO | Performed by: OCCUPATIONAL THERAPIST

## 2020-08-26 PROCEDURE — 25000128 H RX IP 250 OP 636: Performed by: OTOLARYNGOLOGY

## 2020-08-26 PROCEDURE — 40000014 ZZH STATISTIC ARTERIAL MONITORING DAILY

## 2020-08-26 RX ORDER — MAGNESIUM SULFATE HEPTAHYDRATE 40 MG/ML
2 INJECTION, SOLUTION INTRAVENOUS DAILY PRN
Status: DISCONTINUED | OUTPATIENT
Start: 2020-08-26 | End: 2020-09-01 | Stop reason: HOSPADM

## 2020-08-26 RX ORDER — SODIUM CHLORIDE, SODIUM LACTATE, POTASSIUM CHLORIDE, CALCIUM CHLORIDE 600; 310; 30; 20 MG/100ML; MG/100ML; MG/100ML; MG/100ML
INJECTION, SOLUTION INTRAVENOUS CONTINUOUS
Status: DISCONTINUED | OUTPATIENT
Start: 2020-08-26 | End: 2020-09-01 | Stop reason: HOSPADM

## 2020-08-26 RX ORDER — POTASSIUM CHLORIDE 1.5 G/1.58G
20-40 POWDER, FOR SOLUTION ORAL
Status: DISCONTINUED | OUTPATIENT
Start: 2020-08-26 | End: 2020-09-01 | Stop reason: HOSPADM

## 2020-08-26 RX ORDER — DEXTROSE MONOHYDRATE 100 MG/ML
INJECTION, SOLUTION INTRAVENOUS CONTINUOUS PRN
Status: DISCONTINUED | OUTPATIENT
Start: 2020-08-26 | End: 2020-09-01 | Stop reason: HOSPADM

## 2020-08-26 RX ORDER — METOPROLOL TARTRATE 25 MG/1
25 TABLET, FILM COATED ORAL EVERY 6 HOURS
Status: DISCONTINUED | OUTPATIENT
Start: 2020-08-26 | End: 2020-09-01 | Stop reason: HOSPADM

## 2020-08-26 RX ORDER — POTASSIUM CHLORIDE 750 MG/1
20-40 TABLET, EXTENDED RELEASE ORAL
Status: DISCONTINUED | OUTPATIENT
Start: 2020-08-26 | End: 2020-09-01 | Stop reason: HOSPADM

## 2020-08-26 RX ORDER — POTASSIUM CHLORIDE 29.8 MG/ML
20 INJECTION INTRAVENOUS
Status: DISCONTINUED | OUTPATIENT
Start: 2020-08-26 | End: 2020-09-01 | Stop reason: HOSPADM

## 2020-08-26 RX ORDER — CHLORHEXIDINE GLUCONATE ORAL RINSE 1.2 MG/ML
15 SOLUTION DENTAL 3 TIMES DAILY
Status: DISCONTINUED | OUTPATIENT
Start: 2020-08-26 | End: 2020-09-01 | Stop reason: HOSPADM

## 2020-08-26 RX ORDER — CALCIUM CARBONATE 500 MG/1
1000 TABLET, CHEWABLE ORAL ONCE
Status: COMPLETED | OUTPATIENT
Start: 2020-08-26 | End: 2020-08-26

## 2020-08-26 RX ORDER — AMOXICILLIN 250 MG
1 CAPSULE ORAL 2 TIMES DAILY
Status: DISCONTINUED | OUTPATIENT
Start: 2020-08-26 | End: 2020-09-01 | Stop reason: HOSPADM

## 2020-08-26 RX ORDER — POTASSIUM CHLORIDE 7.45 MG/ML
10 INJECTION INTRAVENOUS
Status: DISCONTINUED | OUTPATIENT
Start: 2020-08-26 | End: 2020-09-01 | Stop reason: HOSPADM

## 2020-08-26 RX ORDER — MAGNESIUM SULFATE HEPTAHYDRATE 40 MG/ML
4 INJECTION, SOLUTION INTRAVENOUS EVERY 4 HOURS PRN
Status: DISCONTINUED | OUTPATIENT
Start: 2020-08-26 | End: 2020-09-01 | Stop reason: HOSPADM

## 2020-08-26 RX ORDER — AMOXICILLIN 250 MG
2 CAPSULE ORAL 2 TIMES DAILY
Status: DISCONTINUED | OUTPATIENT
Start: 2020-08-26 | End: 2020-09-01 | Stop reason: HOSPADM

## 2020-08-26 RX ORDER — POTASSIUM CL/LIDO/0.9 % NACL 10MEQ/0.1L
10 INTRAVENOUS SOLUTION, PIGGYBACK (ML) INTRAVENOUS
Status: DISCONTINUED | OUTPATIENT
Start: 2020-08-26 | End: 2020-09-01 | Stop reason: HOSPADM

## 2020-08-26 RX ADMIN — WHITE PETROLATUM: 1.75 OINTMENT TOPICAL at 20:29

## 2020-08-26 RX ADMIN — POTASSIUM CHLORIDE 20 MEQ: 1.5 POWDER, FOR SOLUTION ORAL at 02:22

## 2020-08-26 RX ADMIN — DOCUSATE SODIUM 50 MG AND SENNOSIDES 8.6 MG 2 TABLET: 8.6; 5 TABLET, FILM COATED ORAL at 20:28

## 2020-08-26 RX ADMIN — POTASSIUM CHLORIDE 20 MEQ: 1.5 POWDER, FOR SOLUTION ORAL at 06:13

## 2020-08-26 RX ADMIN — BACITRACIN: 500 OINTMENT TOPICAL at 12:30

## 2020-08-26 RX ADMIN — MULTIVITAMIN 15 ML: LIQUID ORAL at 15:51

## 2020-08-26 RX ADMIN — CALCIUM CARBONATE (ANTACID) CHEW TAB 500 MG 2500 MG: 500 CHEW TAB at 20:28

## 2020-08-26 RX ADMIN — ACETAMINOPHEN ORAL SOLUTION 650 MG: 325 SOLUTION ORAL at 16:21

## 2020-08-26 RX ADMIN — OXYCODONE HYDROCHLORIDE 5 MG: 5 TABLET ORAL at 02:26

## 2020-08-26 RX ADMIN — CALCIUM CARBONATE (ANTACID) CHEW TAB 500 MG 1000 MG: 500 CHEW TAB at 07:56

## 2020-08-26 RX ADMIN — OXYCODONE HYDROCHLORIDE 5 MG: 5 TABLET ORAL at 12:30

## 2020-08-26 RX ADMIN — BACITRACIN: 500 OINTMENT TOPICAL at 05:15

## 2020-08-26 RX ADMIN — CHLORHEXIDINE GLUCONATE 0.12% ORAL RINSE 15 ML: 1.2 LIQUID ORAL at 20:29

## 2020-08-26 RX ADMIN — AMPICILLIN SODIUM AND SULBACTAM SODIUM 3 G: 2; 1 INJECTION, POWDER, FOR SOLUTION INTRAMUSCULAR; INTRAVENOUS at 23:15

## 2020-08-26 RX ADMIN — ACETAMINOPHEN ORAL SOLUTION 650 MG: 325 SOLUTION ORAL at 20:28

## 2020-08-26 RX ADMIN — AMPICILLIN SODIUM AND SULBACTAM SODIUM 3 G: 2; 1 INJECTION, POWDER, FOR SOLUTION INTRAMUSCULAR; INTRAVENOUS at 11:23

## 2020-08-26 RX ADMIN — PHENYLEPHRINE HYDROCHLORIDE 0.5 MCG/KG/MIN: 10 INJECTION INTRAVENOUS at 02:19

## 2020-08-26 RX ADMIN — ONDANSETRON 4 MG: 2 INJECTION INTRAMUSCULAR; INTRAVENOUS at 04:17

## 2020-08-26 RX ADMIN — ACETAMINOPHEN ORAL SOLUTION 650 MG: 325 SOLUTION ORAL at 09:34

## 2020-08-26 RX ADMIN — OMEPRAZOLE 20 MG: KIT at 07:57

## 2020-08-26 RX ADMIN — DOCUSATE SODIUM 50 MG AND SENNOSIDES 8.6 MG 1 TABLET: 8.6; 5 TABLET, FILM COATED ORAL at 07:56

## 2020-08-26 RX ADMIN — OXYCODONE HYDROCHLORIDE 5 MG: 5 TABLET ORAL at 18:10

## 2020-08-26 RX ADMIN — CALCIUM CARBONATE (ANTACID) CHEW TAB 500 MG 2500 MG: 500 CHEW TAB at 17:34

## 2020-08-26 RX ADMIN — ONDANSETRON 4 MG: 2 INJECTION INTRAMUSCULAR; INTRAVENOUS at 15:52

## 2020-08-26 RX ADMIN — ACETAMINOPHEN ORAL SOLUTION 650 MG: 325 SOLUTION ORAL at 04:17

## 2020-08-26 RX ADMIN — SODIUM CHLORIDE, POTASSIUM CHLORIDE, SODIUM LACTATE AND CALCIUM CHLORIDE: 600; 310; 30; 20 INJECTION, SOLUTION INTRAVENOUS at 20:29

## 2020-08-26 RX ADMIN — AMPICILLIN SODIUM AND SULBACTAM SODIUM 3 G: 2; 1 INJECTION, POWDER, FOR SOLUTION INTRAMUSCULAR; INTRAVENOUS at 16:21

## 2020-08-26 RX ADMIN — ONDANSETRON 4 MG: 2 INJECTION INTRAMUSCULAR; INTRAVENOUS at 20:29

## 2020-08-26 RX ADMIN — BACITRACIN: 500 OINTMENT TOPICAL at 20:28

## 2020-08-26 RX ADMIN — MAGNESIUM SULFATE IN WATER 4 G: 40 INJECTION, SOLUTION INTRAVENOUS at 02:22

## 2020-08-26 RX ADMIN — AMPICILLIN SODIUM AND SULBACTAM SODIUM 3 G: 2; 1 INJECTION, POWDER, FOR SOLUTION INTRAMUSCULAR; INTRAVENOUS at 05:15

## 2020-08-26 RX ADMIN — CALCIUM GLUCONATE 1 G: 98 INJECTION, SOLUTION INTRAVENOUS at 09:34

## 2020-08-26 RX ADMIN — METOPROLOL TARTRATE 25 MG: 25 TABLET, FILM COATED ORAL at 20:28

## 2020-08-26 RX ADMIN — ONDANSETRON 4 MG: 2 INJECTION INTRAMUSCULAR; INTRAVENOUS at 09:33

## 2020-08-26 RX ADMIN — CHLORHEXIDINE GLUCONATE 0.12% ORAL RINSE 15 ML: 1.2 LIQUID ORAL at 07:56

## 2020-08-26 ASSESSMENT — MIFFLIN-ST. JEOR: SCORE: 1594.25

## 2020-08-26 ASSESSMENT — ACTIVITIES OF DAILY LIVING (ADL)
ADLS_ACUITY_SCORE: 12
ADLS_ACUITY_SCORE: 14
ADLS_ACUITY_SCORE: 14
IADL_COMMENTS: FAMILY ABLE TO ASSIST WITH IADLS PRN
ADLS_ACUITY_SCORE: 14
ADLS_ACUITY_SCORE: 14
PREVIOUS_RESPONSIBILITIES: MEAL PREP;HOUSEKEEPING;LAUNDRY
ADLS_ACUITY_SCORE: 15

## 2020-08-26 NOTE — ANESTHESIA POSTPROCEDURE EVALUATION
Anesthesia POST Procedure Evaluation    Patient: Evin Sterling   MRN:     [id number] Gender:   male   Age:    70 year old :      1950        Preoperative Diagnosis: Laryngeal cancer (H) [C32.9]   Procedure(s):  INSERTION, PEG TUBE  TOTAL LARYNGECTOMY WITH LEFT HEMITHYROIDECTOMY  Bilateral neck dissection  DIRECT LARYNGOSCOPY  DENTAL EXTRACTION OF TEETH x 13   Postop Comments: No value filed.     Anesthesia Type: General       Disposition: Admission; ICU            ICU Sign Out: Anesthesiologist/ICU physician sign out WAS performed   Postop Pain Control: Uneventful            Sign Out: Well controlled pain   PONV: No   Neuro/Psych: Uneventful            Sign Out: Acceptable/Baseline neuro status   Airway/Respiratory: Uneventful            Sign Out: AIRWAY IN SITU/Resp. Support   CV/Hemodynamics: Uneventful            Sign Out: Acceptable CV status   Other NRE: NONE   DID A NON-ROUTINE EVENT OCCUR? No         Last Anesthesia Record Vitals:  CRNA VITALS  2020 - 2020             Pulse:  74    ART BP:  126/56    ART Mean:  91    SpO2:  100 %    EKG:  Atrial flutter          Last PACU Vitals:  Vitals Value Taken Time   /82 2020  9:20 PM   Temp 36.3  C (97.4  F) 2020  9:00 PM   Pulse 121 2020  9:29 PM   Resp 11 2020  9:29 PM   SpO2 99 % 2020  9:29 PM   Temp src     NIBP     Pulse     SpO2     Resp     Temp     Ht Rate     Temp 2     Vitals shown include unvalidated device data.      Electronically Signed By: Christopher J. Behrens, MD, 2020, 9:30 PM

## 2020-08-26 NOTE — PROGRESS NOTES
SURGICAL ICU PROGRESS NOTE  August 26, 2020      CO-MORBIDITIES:   Laryngeal cancer (H)    ASSESSMENT: Evin Sterling is a 70 year old male with a PMH significant for heart failure with reduced EF, hypertension, atrial fibrillation, GERD and a laryngeal mass. He is now POD # 1 s/p total laryngectomy with left hemithyroidectomy, bilateral neck disscetion, teeth extraction, and PEG tube placement.  He tolerated the procedure well with an EBL of 50mls.  He was transferred to the SICU due to the inability to wean the phenylephrine.    Major Plans Today  - 1g Calcium gluconate  - Wean phenylephrine drip as able  - Transfer out of ICU once stable off drip  - Nutrition consult- start TF today  - Remove arterial line    PLAN:  Neuro/ pain/ sedation:  #Acute post-operative pain  - Monitor neurological status.   - Scheduled tylenol with PRN, and Dilaudid and Oxycodone PRN for pain.  - Delirium precautions: promote sleep/wake cycle    Pulmonary care:   #S/P laryngectomy 2/2 laryngeal mass/cancer  #Laryngostomy  - Intubation should be done through laryngostomy site  - Trach dome with humidified oxygen    Cardiovascular:    #Distrubutive shock 2/2 prolonged surgery; improving  #Hx Atrial fibrillation  #Hx HFrEF, EF < 30% (8/3)  #Hx HTN  - Monitor hemodynamic status.   - Phenylephrine for goal MAP >65  - Holding PTA anti-hypertensives while acutely hypotensive    GI care:   #S/P PEG placement  #Hx GERD  - NPO except ice chips and medications.  - Will discuss today with thoracic when to use PEG for nutrition  - Bowel regimen  - PPI    Fluids/ Electrolytes/ Nutrition:   #Hypocalcemia  - 1g calcium gluconate  - LR @75 for IV fluid hydration  - Nutrition consult after ok from thoracic   - Electrolyte replacement protocol    Renal/ Fluid Balance:    - Urine output is adequate so far.  - I/O Net +3L (8/25) uop 4L (8/25) 625mls so far today  - Voiding per urinal  - Will continue to monitor intake and output.    Endocrine:    #S/P  hemithyroidectomy  - TSH 0.91   - No management indication.   - Continue to monitor, goal glucose <180    ID/ Antibiotics:  - Prophylactic Unasyn x8 doses  - WBC trending down  - Afebrile    Heme:     No acute issues  - Hemoglobin stable 12.6 (13.3)    Prophylaxis:    - Mechanical prophylaxis for DVT: SCD's  - PPI    Lines/ tubes/ drains:  - PIV  - L radial Arterial line-- remove today  - PEG tube  - NERY drains x4 >> 2 right and 2 left neck  - Laryngostomy    Disposition:  - Transfer to floor today once stable off phenylephrine    Patient seen, findings and plan discussed with surgical ICU staff, Dr. Beebe.  Critical care time: 30 minutes    Ghazal Hammond NP-C    TODAY'S PROGRESS:   Patient transferred to the SICU last evening for hypotension requiring small dose of phenylephrine post-operatively.  Phenyl drip was off at 0730 and was subsequently turned back on for some orthostatic hypotension when getting out of bed this AM. Since then has been turned off again.     ====================================    SUBJECTIVE:   Patient is resting in bed with humidified oxygen at laryngostomy site.  He appears comfortable, answers questions by nodding yes and no, unable to verbalize d/t laryngectomy.  Denies pain/n/v.      OBJECTIVE:   1. VITAL SIGNS:   Temp:  [97.4  F (36.3  C)-98.6  F (37  C)] 98.2  F (36.8  C)  Pulse:  [] 77  Resp:  [11-29] 16  BP: ()/(51-82) 111/60  MAP:  [46 mmHg-93 mmHg] 69 mmHg  Arterial Line BP: ()/(33-75) 99/57  SpO2:  [91 %-100 %] 95 %  Resp: 16      2. INTAKE/ OUTPUT:   I/O last 3 completed shifts:  In: 7250 [I.V.:7000]  Out: 4245 [Urine:4085; Drains:110; Blood:50]    3. PHYSICAL EXAMINATION:     GEN: Lying in bed comfortable, awake and alert  EYES: PERRL, Anicteric sclera.   HEENT:  Normocephalic, laryngostomy, NERY drains x4, 2 on each side of neck  CV: RRR, no gallops, rubs, or murmurs  PULM/CHEST: Clear breath sounds bilaterally without rhonchi, crackles or wheeze,  symmetric chest rise  GI: Diminished bowel sounds, soft, non-tender, no rebound tenderness or guarding, no masses, PEG tube in place  : Voiding per urinal  EXTREMITIES: no peripheral edema, moving all extremities, peripheral pulses intact  NEURO: Cranial nerves II-XII grossly intact, no motor-sensory deficits noted  SKIN: No rashes, sores or ulcerations  PSYCH:  Affect: appropriate       4. INVESTIGATIONS:   Arterial Blood Gases   Recent Labs   Lab 08/25/20 1839 08/25/20  1409 08/25/20  1128   PH 7.41 7.46* 7.46*   PCO2 43 37 36   PO2 239* 174* 131*   HCO3 27 27 26     Complete Blood Count   Recent Labs   Lab 08/26/20  0626 08/25/20  2319 08/25/20 1839 08/25/20  1409   WBC 12.3* 17.0*  --   --    HGB 12.6* 13.3 14.3 13.5    274  --   --      Basic Metabolic Panel  Recent Labs   Lab 08/26/20 0434 08/25/20 2319 08/25/20 1839 08/25/20  1409    146* 143 142   POTASSIUM 3.9 3.5 4.2 4.2   CHLORIDE 113* 116*  --   --    CO2 25 24  --   --    BUN 10 10  --   --    CR 0.80 0.65*  --   --    * 104* 105* 100*     Liver Function Tests  Recent Labs   Lab 08/26/20 0434 08/25/20  0643   AST 26  --    ALT 23  --    ALKPHOS 73  --    BILITOTAL 0.6  --    ALBUMIN 2.0*  --    INR  --  1.18*     Pancreatic Enzymes  No lab results found in last 7 days.  Coagulation Profile  Recent Labs   Lab 08/25/20  0643   INR 1.18*         5. RADIOLOGY:   No results found for this or any previous visit (from the past 24 hour(s)).    =========================================

## 2020-08-26 NOTE — PLAN OF CARE
No major changes. A-line removed. Stoma cares done POC. Stoma WNL. Pt complaining of mucosa issues, oral cares done PRN, improved discomfort. Using communication board well. Tolerating larytube fine, no issues. Afebrile. VSS, Off mateus since 1045, MAP>65. Pain increasing as afternoon continues, oxycodone and scheduled tylenol given. TF started at 1700 @ 10, advance @ 0200. Voiding approp.  No BM, but passing camila. JPs stripped and drained per order. LR and TKO running through PIVs.     See flowsheets for additional information. Report given to 6A nurse RN, transferred appox 1800, daughter present at time of transfer, belongings sent with patient.

## 2020-08-26 NOTE — PLAN OF CARE
Admitted/transferred from: PACU  Reason for admission/transfer: Requiring a pressor for BP and closer monitoring overnight  2 RN skin assessment: completed by Kiki HERNANDEZ and Estrella HERNANDEZ  Result of skin assessment and interventions/actions: Surgery site and NERY x4. Mouth swollen. Skin otherwise intact.  Height, weight, drug calc weight: Done  Patient belongings (see Flowsheet) 2 bags of belongings brought from PACU.  MDRO education added to care planN/A  ?

## 2020-08-26 NOTE — PLAN OF CARE
Discharge Planner SLP   Patient plan for discharge: none stated   Current status: Pt seen for ST evaluation s/p total laryngectomy, POD#1. Pt provided with laryngectomy pulmonary kit, including larytube (10/55) and HMEs. SLP placed larytube and HME without incident; pt tolerated for 30 minutes with stable O2 sats (97-98%). Pt politely declining training for larytube placement and removal at this time, however training provided for RN who demonstrated independent removal and placement of larytube and HME. Extensive education provided re: anatomical changes s/p laryngectomy, purpose of larytube/HME, HME guidelines, and SLP role and POC while pt is inpatient. RN education provided re: larytube/HME use. Pt and daughter will require ongoing education.    Per ENT, pt clear to wear larytube throughout day as tolerated. Pt can receive O2 via trach dome with HME in place, however, humidity should be off if HME is in place. HME should be changed at least every 24 hours or more frequently if visibly soiled. If pt is demonstrating increased difficulty breathing, remove larytube and HME. Larytube should be cleaned with provided brushes at least daily, or more frequently as needed. SLP to follow daily.    Barriers to return to prior living situation: Stoma/alisa cares, communication, TF  Recommendations for discharge: OP ST at ENT clinic   Rationale for recommendations: Pt with new communication and swallow needs s/p laryngectomy; pt will benefit from ongoing ST targeting these deficits       Entered by: Priscilla Abdi 08/26/2020 10:46 AM

## 2020-08-26 NOTE — PROGRESS NOTES
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 Per ENT, OK to initiate larytube (size 9 or 10) and HME training. OK for straps around neck.    Hearing WNL for conversational level of speech   Vision WNL   Dentition extraction of 13 teeth in OR   Present Hydration/Nutrition Method Gastrostomy;NPO   Living Status living with daughter PTA   Primary Communication Method Written expression   Previous Therapy pre-op counseling completed during previous admission   Prior level of function Swallowing;Communications   Communications communicating via brief finger occlusion and writing   Swallowing previous on regular diet with nectar thick liquids and strict use of chin tuck strategy   Vitals Signs   SpO2 97  (with HME in place)   Preoperative Functioning   Preoperative Functioning Speech;Cognition;Swallowing   Speech unable to voice due to glottic mass-- brief strangled voicing with finger occlusion   Cognition WNL   Swallowing moderate dysphagia PTA-- regular diet and nectar thick liquids with chin tuck    Laryngectomy   Laryngectomy Primary Site Of Tumor;Post-operative Radiation Therapy;Eligibility For Post-op Communication Options   Primary Site Of Tumor squamous cell  carcinoma of larynx   Post-operative Radiation Therapy Yes   Eligibility For Post-op Communication Options   (unclear at this time, however pt hopefuly for TEP)   Postoperative Assessment   Postoperative Assessment Electrolarynx;Insertion/Replacement  (pt seent today for LPK, larytube, and HME)   Electrolarynx   Electrolarynx Comments not appropriate yet per ENT MD   Insertion/replacement   Insertion/replacement Laryngectomy Tube Brand;Laryngectomy Tube Brand Size   Laryngectomy Tube Brand ATOS [employer]    Laryngectomy Tube Brand Size 10/55   Impressions And Recommendations   Impressions And Recommendations Summary;Recommendations;Impressions And Recommendations Comments;Frequency / Duration Of Treatment   Summary Pt seen for ST evaluation s/p total laryngectomy, POD#1. Pt provided with laryngectomy pulmonary kit, including larytube (10/55) and HMEs. SLP placed larytube and HME without incident; pt tolerated for 30 minutes with stable O2 sats (97-98%). Pt politely declining training for larytube placement and removal at this time, however training provided for RN who demonstrated independent removal and placement of larytube and HME. Extensive education provided re: anatomical changes s/p laryngectomy, purpose of larytube/HME, HME guidelines, and SLP role and POC while pt is inpatient. RN education provided re: larytube/HME use. Pt and daughter will require ongoing education.   Recommendations Per ENT, pt clear to wear larytube throughout day as tolerated. Pt can receive O2 via trach dome with HME in place, however, humidity should be off if HME is in place. HME should be changed at least every 24 hours or more frequently if visibly soiled. If pt is demonstrating increased difficulty breathing, remove larytube and HME. Larytube should be cleaned with provided brushes at least daily, or more frequently as needed.    Frequency / Duration Of Treatment daily   General Therapy Interventions   Planned Therapy Interventions  Communication   Intervention Comments SLP to follow daily for LPK training   Equipment   Equipment per ENT, OK for straps   Communication with other professionals   Communication with other professionals Discussed case with ENT MD and RN. Will provide education for pt's daughter upon arrival   Comments   Comments pt will complete ATOS forms and give to SLP next session   Total Evaluation Time   Total Evaluation Time 35

## 2020-08-26 NOTE — CONSULTS
[facility], [place]  Consult Note - Hospitalist Service, Gold night     Date of Admission: 8/25/2020  Consult Requested by: Dr. Gamez  Reason for Consult: Afib, cardiac history     Assessment & Plan   Evin Sterling is a 70 year old male with history of T4N0 SCC of the larynx, HFrEF, chronic afib, HTN, alcohol abuse, and tobacco abuse who was admitted to ENT service 8/25/2020 following laryngectomy. Medicine consulted 8/26 for assistance with medical co management       T4N0 SCC of the larynx: S/p total laryngectomy, hemithyroidectomy, bilateral MRND, extraction of 13 teeth and G tube placement per ENT 8/25. Required brief ICU cares, transferred out to the floor 8/26 when medically stable   - Management per ENT  - Cannot be intubated from above  - Pain management: Scheduled tylenol, oral oxycodone prn with IV dilaudid for breakthrough   - Bowel regimen  - PT/OT    Hemithyroidectomy: 8/25 per ENT in the setting of laryngectomy. Ca 7.0, iCal 4.2. TSH 0.91. Treated with 1 gm Ca gluconate this am per ENT  - Per d/w ENT, they would like to hold off on further iCal checks or calcium gluconate tonight. Will defer to their management   - Low threshold to recheck with changes     Severe protein calorie malnutrition s/p G tube placement 8/25: NG placed during prior admission due to malnutrition. GT placed 8/25, nutrition following and managing TF  - Monitor for refeeding     HFrEF, chronic a fib (s/p ablation 2012), HTN: Echo 8/3/2020 EF <30%, severe LV dilation, severe diffuse hypokinesis, diastolic function not assessed, RV normal. H/o cardiac cath with ablation 2012, no e/o significant CAD. EKG 8/25 A fib with RVR, rate 105. Anticoagulation held in the setting of surgery. CHADSVASC 3. PTA on metoprolol, lisinopril, spironolactone. PTA meds currently on hold per ENT. BP initially soft to 60/57 this am (?accuracy), since stabilized   - Resume PTA metoprolol per G tube   - Continue to hold  spironolactone and lisinopril for now  - Decrease IVF to 50 ml/hr overnight, will likely stop in the am at TF increased  - Resume Xarelto once safe from surgical standpoint   - 2 gm Na restriction, 2L fluid restriction   - Daily weights, strict I&Os    Urinary retention: New onset during 8/5/2020 admission. Required intermittent straight cath and urology was consulted. Started on Flomax during prior admission with good improvement. Flomax currently on hold due to NPO status. No e/o urinary retention thus far  - Resume Flomax via GT  - Strict I&Os for now   - Straight cath prn     H/o etoh abuse: No current use     GERD: Continue PPI      The patient's care was discussed with the patient, family, ENT, and attending physician, Dr. Paula Zhang PA-C  [facility], [place]  Pager: [phone]  Please see sticky note for cross cover information  ______________________________________________________________________    Chief Complaint   Heart failure     History is obtained from the patient and medical record     History of Present Illness    Evin Sterling is a 70 year old male with history of T4N0 SCC of the larynx, HFrEF, chronic afib, HTN, alcohol abuse, and tobacco abuse who was admitted to ENT service 8/25/2020 following laryngectomy. Medicine consulted 8/26 for assistance with medical co management     Patient provides HPI information via written information and daughter. Patient reports pain is his biggest concern but is managed adequately. He is also quite thirsty and struggling with strict NPO status. Had a headache this morning. Woke up a little confused but is no longer confused. Denies chest pain, dyspnea, palpitations. No new swelling in the hands or feet. No difficulty with deep breathing. No cough. Tolerating tube feeds without issue. No N/V. Passing gas, no BM since surgery. Has been able to void since surgery. Has been off Flomax this admission. Feels  systemically weak all over.     Review of Systems   The 10 point Review of Systems is negative other than noted in the HPI or here.     Past Medical History    I have reviewed this patient's medical history and updated it with pertinent information if needed.   Past Medical History:   Diagnosis Date     CHF (congestive heart failure) (H)      GERD (gastroesophageal reflux disease)      Hypertension        Past Surgical History   I have reviewed this patient's surgical history and updated it with pertinent information if needed.  Past Surgical History:   Procedure Laterality Date     DISSECTION RADICAL NECK BILATERAL Bilateral 2020    Procedure: Bilateral neck dissection;  Surgeon: Caron Wang MD;  Location: [facility] OR     ENDOSCOPIC INSERTION TUBE GASTROSTOMY N/A 2020    Procedure: INSERTION, PEG TUBE;  Surgeon: Reuben Hastings MD;  Location: [facility] OR     HERNIA REPAIR, INGUINAL RT/LT       HERNIA REPAIR, UMBILICAL       LARYNGECTOMY N/A 2020    Procedure: TOTAL LARYNGECTOMY WITH LEFT HEMITHYROIDECTOMY;  Surgeon: Caron Wang MD;  Location: [facility] OR     LARYNGOSCOPY N/A 2020    Procedure: DIRECT LARYNGOSCOPY;  Surgeon: Caron Wang MD;  Location: [facility]     LARYNGOSCOPY WITH BIOPSY(IES) N/A 8/3/2020    Procedure: LARYNGOSCOPY WITH BIOPSY, TRACHEOSTOMY, NG TUBE PLACEMENT;  Surgeon: Caron Wang MD;  Location: [facility]     ODONTECTOMY N/A 2020    Procedure: DENTAL EXTRACTION OF TEETH x 13;  Surgeon: Caron Wang MD;  Location: [facility]     TRACHEOSTOMY N/A 8/3/2020    Procedure: TRACHEOSTOMY;  Surgeon: Caron Wang MD;  Location: [facility]       Social History   I have reviewed this patient's social history and updated it with pertinent information if needed.  Social History     Tobacco Use     Smoking status: Former Smoker     Packs/day: 1.00     Years: 20.00     Pack years: 20.00     Last attempt to quit:      Years since quittin.6     Smokeless tobacco: Former  User   Substance Use Topics     Alcohol use: Not Currently     Drug use: Not Currently       Family History   I have reviewed this patient's family history and updated it with pertinent information if needed.   Family History   Problem Relation Age of Onset     Anesthesia Reaction No family hx of      Deep Vein Thrombosis (DVT) No family hx of        Medications   I have reviewed this patient's current medications    Allergies   No Known Allergies    Physical Exam   Vital Signs: Temp: 98.2  F (36.8  C) Temp src: Oral BP: 102/77 Pulse: 86   Resp: 12 SpO2: 96 % O2 Device: None (Room air) Oxygen Delivery: Others (comment)(LaryTube HME)  Weight: 180 lbs 5.38 oz    General Appearance: Alert and oriented x3, family bedside. Communicates via writing pag  HEENT: Anicteric sclera, trached. 4 NERY drains in place with serosanguineous drainage   Respiratory: Breathing comfortably on room air, lungs CTAB without wheezing or crackles   Cardiovascular: Irregular rhythm, regular rate. No murmur noted. Unable to assess JVP due to trach, dressings, recent surgery   GI: Abdomen soft, non-tender with active bowel sounds. No guarding or rebound. GT in place without surrounding erythema or edema  Lymph/Hematologic: No peripheral edema, distal pulses palpable   Skin: No rash or jaundice   Musculoskeletal: Moves all extremities   Neurologic: No focal deficits, CN II-XII grossly intact      Data   Results for orders placed or performed during the hospital encounter of 08/25/20 (from the past 24 hour(s))   Arterial Panel   Result Value Ref Range    pH Arterial 7.41 7.35 - 7.45 pH    pCO2 Arterial 43 35 - 45 mm Hg    pO2 Arterial 239 (H) 80 - 105 mm Hg    Bicarbonate Arterial 27 21 - 28 mmol/L    Base Excess Art 2.2 mmol/L    FIO2 40     Sodium 143 133 - 144 mmol/L    Potassium 4.2 3.4 - 5.3 mmol/L    Hemoglobin 14.3 13.3 - 17.7 g/dL    Glucose 105 (H) 70 - 99 mg/dL    Calcium Ionized Whole Blood 4.5 4.4 - 5.2 mg/dL   EKG 12-lead, complete    Result Value Ref Range    Interpretation ECG Click View Image link to view waveform and result    Glucose by meter   Result Value Ref Range    Glucose 103 (H) 70 - 99 mg/dL   CBC with platelets   Result Value Ref Range    WBC 17.0 (H) 4.0 - 11.0 10e9/L    RBC Count 4.32 (L) 4.4 - 5.9 10e12/L    Hemoglobin 13.3 13.3 - 17.7 g/dL    Hematocrit 41.7 40.0 - 53.0 %    MCV 97 78 - 100 fl    MCH 30.8 26.5 - 33.0 pg    MCHC 31.9 31.5 - 36.5 g/dL    RDW 14.5 10.0 - 15.0 %    Platelet Count 274 150 - 450 10e9/L   Basic metabolic panel   Result Value Ref Range    Sodium 146 (H) 133 - 144 mmol/L    Potassium 3.5 3.4 - 5.3 mmol/L    Chloride 116 (H) 94 - 109 mmol/L    Carbon Dioxide 24 20 - 32 mmol/L    Anion Gap 5 3 - 14 mmol/L    Glucose 104 (H) 70 - 99 mg/dL    Urea Nitrogen 10 7 - 30 mg/dL    Creatinine 0.65 (L) 0.66 - 1.25 mg/dL    GFR Estimate >90 >60 mL/min/[1.73_m2]    GFR Estimate If Black >90 >60 mL/min/[1.73_m2]    Calcium 6.7 (L) 8.5 - 10.1 mg/dL   Magnesium   Result Value Ref Range    Magnesium 1.5 (L) 1.6 - 2.3 mg/dL   Phosphorus   Result Value Ref Range    Phosphorus 3.4 2.5 - 4.5 mg/dL   Calcium ionized whole blood   Result Value Ref Range    Calcium Ionized Whole Blood 4.3 (L) 4.4 - 5.2 mg/dL   Comprehensive metabolic panel   Result Value Ref Range    Sodium 144 133 - 144 mmol/L    Potassium 3.9 3.4 - 5.3 mmol/L    Chloride 113 (H) 94 - 109 mmol/L    Carbon Dioxide 25 20 - 32 mmol/L    Anion Gap 6 3 - 14 mmol/L    Glucose 114 (H) 70 - 99 mg/dL    Urea Nitrogen 10 7 - 30 mg/dL    Creatinine 0.80 0.66 - 1.25 mg/dL    GFR Estimate >90 >60 mL/min/[1.73_m2]    GFR Estimate If Black >90 >60 mL/min/[1.73_m2]    Calcium 7.0 (L) 8.5 - 10.1 mg/dL    Bilirubin Total 0.6 0.2 - 1.3 mg/dL    Albumin 2.0 (L) 3.4 - 5.0 g/dL    Protein Total 4.6 (L) 6.8 - 8.8 g/dL    Alkaline Phosphatase 73 40 - 150 U/L    ALT 23 0 - 70 U/L    AST 26 0 - 45 U/L   Magnesium   Result Value Ref Range    Magnesium 2.6 (H) 1.6 - 2.3 mg/dL    Parathyroid Hormone Intact   Result Value Ref Range    Parathyroid Hormone Intact 34 18 - 80 pg/mL   Phosphorus   Result Value Ref Range    Phosphorus 3.4 2.5 - 4.5 mg/dL   Prealbumin   Result Value Ref Range    Prealbumin 11 (L) 15 - 45 mg/dL   TSH with free T4 reflex   Result Value Ref Range    TSH 0.91 0.40 - 4.00 mU/L   Albumin level   Result Value Ref Range    Albumin 1.9 (L) 3.4 - 5.0 g/dL   CBC with platelets   Result Value Ref Range    WBC 12.3 (H) 4.0 - 11.0 10e9/L    RBC Count 4.07 (L) 4.4 - 5.9 10e12/L    Hemoglobin 12.6 (L) 13.3 - 17.7 g/dL    Hematocrit 39.2 (L) 40.0 - 53.0 %    MCV 96 78 - 100 fl    MCH 31.0 26.5 - 33.0 pg    MCHC 32.1 31.5 - 36.5 g/dL    RDW 14.6 10.0 - 15.0 %    Platelet Count 244 150 - 450 10e9/L   Glucose by meter   Result Value Ref Range    Glucose 118 (H) 70 - 99 mg/dL   Glucose by meter   Result Value Ref Range    Glucose 109 (H) 70 - 99 mg/dL   Calcium, ionized whole blood (1200)   Result Value Ref Range    Calcium Ionized Whole Blood 4.2 (L) 4.4 - 5.2 mg/dL   Magnesium   Result Value Ref Range    Magnesium 2.5 (H) 1.6 - 2.3 mg/dL   Potassium   Result Value Ref Range    Potassium 4.3 3.4 - 5.3 mmol/L   Phosphorus   Result Value Ref Range    Phosphorus 2.9 2.5 - 4.5 mg/dL

## 2020-08-26 NOTE — PROGRESS NOTES
[facility]  CRITICAL CARE STAFF NOTE  08/26/20    Brief patient summary:    Mr. Evin Chauhan is a 70-year-old male with a past medical history significant for heart failure with reduced ejection fraction (EF less than 30% on echo obtained 8/3/2020), and hypertension who is POD #0 s/p total laryngectomy w L. Hemithyroidectomy, bilateral neck dissection, teeth extraction, and peg tube placement.  He tolerated the procedure well with EBL of 50cc.  Due to inability to wean pheynlephrine, he was transferred to the SICU.     Interim history:  Pt resting comfortably upon arrival to SICU.  Denies lightheadedness, dizziness, or shortness of breath.     Acute critical care issues and supportive interventions managed by me today include:     Post op hypotension requiring phenylephrine: continue to titrate this down to off overnight with a goal MAP of 65. Hold home antihypertensives.  If unable to wean overnight, may need to consider central access.      HFrEF: new finding on pre-op ECHO.  He is currently receiving LR at 75cc/h.  Goak K=4, Mg =2    Airway: CANNOT be intubated from above, needs to be intubated through laryngectomy if necessary. Not necessary at this time.       Rest per resident note from today.    The patient was seen and examined with the resident/fellow physician.  We have discussed the patient in detail and I agree with the findings, assessment, and plan as documented when this note was cosigned on this day. The plan was formulated in conjunction with pharmacy, ICU nurses, and respiratory therapist. I have evaluated all laboratory values and imaging studies for the past 24 hours. I have reviewed all the consults that have been ordered and are active for this patient.      Critical Care Time: 30 min.  I spent this time (excluding procedures) personally providing and directing critical care services at the bedside and on the critical care unit.      Jennifer Valencia MD  Assistant  Professor of Medicine  Department of Pulmonary, Allergy, Critical Care and Sleep Medicine   [facility]  Pager: [phone]

## 2020-08-26 NOTE — PROGRESS NOTES
CLINICAL NUTRITION SERVICES - ASSESSMENT NOTE     Nutrition Prescription    RECOMMENDATIONS FOR MDs/PROVIDERS TO ORDER:  Fluids and bowel regimen per team  Ability to transition to bolus TF, recommendations below.    Malnutrition Status:    Non-severe malnutrition in the context of acute on chronic illness    Recommendations already ordered by Registered Dietitian (RD):  1. Once new TF is placed and confirmed by XR, begin TF with Isosource 1.5 @ 10 ml/hr and adv by 10 ml Q8, ( ONLY advance if K+/mg++ WNL and Phos >1.9) to goal @ 60 ml/hr.       - Isosource 1.5 @ goal 60 ml/hr (1440 ml/day) to provide 2160 kcals (27 kcal/kg/day), 98 g PRO (1.2 g/kg/day), 1094 ml free H2O, 253 g CHO and 22 g Fiber daily.  Enteral Access: New PEG      2. Monitor K+/Mg++/Phos daily with TF start and advancement to goal infusion to evaluate for refeeding risk, replace per protocol       3. Order free water flushes 30 ml every 4 hours for tube patency.      4. Recommend Certavite (15 ml/day via FT) to ensure adequate micronutrient needs being met.      Future/Additional Recommendations:  Tolerance to continuous TF and ability to transition to bolus TF as follows:   Isosource 1.5, 2 cans (500 ml) TID = 6 cans daily (1500 ml/day) = 2250 kcals (28 kcal/kg), 102 g PRO (1.   g/kg/day), 1140 ml H2O, 264 g CHO and 23 g Fiber daily.    ** NOTE: Upon previous admission, pt had tolerance issues to 6 cans/day 2/2 to early satiety and fullness. If needed, recommend 6 cans daily spread out throughout the day vs 2 cans TID.     If pt continues to not be able to tolerate 6 cans of Isosource daily, could trial Twocal HN as follows: 4 cans daily ( 2 cans BID vs 1 can QID. This regimen provides: TwoCal HN @ 40 mL/hr (960 mL/day) to provide 1920 kcals (24 kcal/kg/day), 81 g PRO (1 g/kg/day), 672 mL H2O, 210 g CHO and 5 g Fiber daily. + 1-2 packets Prosource      REASON FOR ASSESSMENT  Evin Sterling is a/an 70 year old male assessed by the dietitian for  "Provider Order - Registered Dietitian to Assess and Order TF per Medical Nutrition Therapy Protocol    NUTRITION HISTORY  Procedures:   8/25: total laryngectomy with left hemithyroidectomy, bilateral neck disscetion, teeth extraction, and PEG tube placement.     Patient was last seen by RD on 8/11/2020. Nutrition Support via NGT: Pt transitioned from continuous TF to bolus TFs on 8/7 with initial goal of 6 cans per day of Isosource 1.5. However, d/t pt complaining of fullness/difficulty fitting in all cans, TF vol was decreased to 4 cans per day ( 2 cans per feeding x 2 feedings per day) on 8/10.  Regimen provides 1000 ml, 1500 kcal (17 kcals/kg), 68 g PRO (0.8 g PRO/kg), 760 ml free water, 176 g CHO, 15 g fiber daily.    Per chart review, pt went home with NGT and was receiving all PO and meds.    Per pt, reports that he was tolerating 4 cans of Isosource at home well, however was also eating. He reported minimal PO intakes as the fear of aspiration made him cautious of PO intakes. Family in room did report he did not always do 4 cans of TF if he ate well that day. He also reported that the significant weight loss he thinks is 2/2 to his tumor. Reports he has not thrown up since 1994 and that he will be OK with the TF.     CURRENT NUTRITION ORDERS  Diet: NPO    LABS  Labs reviewed    MEDICATIONS  Medications reviewed    ANTHROPOMETRICS  Height: 177.8 cm (5' 10\")  Most Recent Weight: 81.8 kg (180 lb 5.4 oz)    IBW: 75.5 kg  BMI: Overweight BMI 25-29.9  Weight History: Pt reports a 15# wt loss in one month, which would equate to 7% wt loss in one month.   Wt Readings from Last 10 Encounters:   08/26/20 81.8 kg (180 lb 5.4 oz)   08/11/20 86.6 kg (191 lb)   07/31/20 86.2 kg (190 lb)   07/31/20 85.3 kg (188 lb)     Dosing Weight: 79 kg (adjusted based on weight of 79.2 kg upon admit on 8/25)    ASSESSED NUTRITION NEEDS  Estimated Energy Needs: 8124-1651 kcals/day (25 - 30 kcals/kg)  Justification: Maintenance  Estimated " Protein Needs:  grams protein/day (1.2 - 1.5 grams of pro/kg)  Justification: Hypercatabolism with acute illness, Increased needs and Post-op  Estimated Fluid Needs: 1 mL/kcal/day  Justification: Maintenance and Per provider pending fluid status    PHYSICAL FINDINGS  See malnutrition section below.    MALNUTRITION  % Intake: < 75% for >/= 1 month (non-severe)  % Weight Loss: > 5% in 1 month (severe)  Subcutaneous Fat Loss: Upper arm:  mild  Muscle Loss: Thoracic region (clavicle, acromium bone, deltoid, trapezius, pectoral):  mild, Upper arm (bicep, tricep):  mild and Lower arm  (forearm):  mild  Fluid Accumulation/Edema: None noted  Malnutrition Diagnosis: Non-severe malnutrition in the context of acute on chronic illness    NUTRITION DIAGNOSIS  Inadequate oral intake related to post ENT surgery inhibiting PO intakes as evidenced by need for full nutrition needs via NGT      INTERVENTIONS  Implementation  Nutrition Education: Provided education on role of RD and nutrition POC   Enteral Nutrition - Initiate     Goals  Total avg nutritional intake to meet a minimum of 25 kcal/kg and 1.5 g PRO/kg daily (per dosing wt 79 kg).     Monitoring/Evaluation  Progress toward goals will be monitored and evaluated per protocol.      Amita Leroy, MARYJANE, MS, LD  SICU: [phone]

## 2020-08-26 NOTE — PLAN OF CARE
Major Shift Events:  Pt arrived to 4A ~2230. Requiring .5 of mateus for low blood pressure.   Plan: Wean of pressor and transfer upstairs. Continue to monitor pt for acute changes.   For vital signs and complete assessments, please see documentation flowsheets.

## 2020-08-26 NOTE — PROGRESS NOTES
"Otolaryngology Progress Note  August 26, 2020  24 hr events:   - went to OR for g tube placement, total laryngectomy, hemithyroidectomy, bilateral MRND and extraction of 13 teeth  - went to ICU because he was requiring pressors after surgery    S: Low BP overnight requiring pressors and now down to 0.5 of phenylephrine. Held pta BP medications. No nausea. Pain controlled.     O: /60 (BP Location: Left arm)   Pulse 73   Temp 98.2  F (36.8  C) (Oral)   Resp 16   Ht 1.778 m (5' 10\")   Wt 81.8 kg (180 lb 5.4 oz)   SpO2 95%   BMI 25.88 kg/m     General: Alert and oriented x 3, No acute distress   HEENT: EOMI. HB 1/6. Stoma patent with small amount of crusting at incision edges. Neck incision c/d/i. No significant swelling of neck to suggest hematoma/seroma. Neck drains intact with sanguinous drainage in all 4 bulbs. Dental extraction sites are sutured and healing as expected. No intraoral bleeding or purulence.    Pulmonary: Trach dome. No accessory muscle use.    Intake/Output Summary (Last 24 hours) at 8/26/2020 0619  Last data filed at 8/26/2020 0500  Gross per 24 hour   Intake 8648.7 ml   Output 4692 ml   Net 3956.7 ml     NERY drain output(s): (last 24 hours)/(last shift)  1 right lateral neck- 30/19 (49)  2 right medial neck- 25/32 (57)  3 left medial neck- 30/30 (60)  4 left lateral neck- 25/16 (41)    LABS:  ROUTINE IP LABS (Last four results)  BMP  Recent Labs   Lab 08/26/20  0434 08/25/20 2319 08/25/20 1839 08/25/20  1409    146* 143 142   POTASSIUM 3.9 3.5 4.2 4.2   CHLORIDE 113* 116*  --   --    DONNIE 7.0* 6.7*  --   --    CO2 25 24  --   --    BUN 10 10  --   --    CR 0.80 0.65*  --   --    * 104* 105* 100*     CBC  Recent Labs   Lab 08/26/20  0626 08/25/20 2319 08/25/20 1839 08/25/20  1409   WBC 12.3* 17.0*  --   --    RBC 4.07* 4.32*  --   --    HGB 12.6* 13.3 14.3 13.5   HCT 39.2* 41.7  --   --    MCV 96 97  --   --    MCH 31.0 30.8  --   --    MCHC 32.1 31.9  --   --    RDW 14.6 " "14.5  --   --     274  --   --      INR  Recent Labs   Lab 08/25/20  0643   INR 1.18*   TSH 0.91, phos 3.4, PTH 34, Mg 2.6, Ical 4.3. Prealbumin pending.     A/P: Evin Sterling is a 70 year old male with a past medical history of CHF (EF<30%), afib, htn and alcohol and tobacco dependence who presented with T4N0 SCC of the larynx, now s/p awake trach, DL bx on 8/3 and s/p PEG placement, DL, TL with b/l MRND and left hemithyroidectomy, extraction of 13 teeth on 8/25.    Neuro: elizabeth. Tylenol, PRN oxycodone and diluadid.      HEENT:   Routine laryngectomy Cares:   1. Clean and remove crusting at stoma opening three times daily and as needed to keep dried secretions from accumulating and falling into airway.  2. If you have a laryngectomy tube (small silicone tube that sits in the stoma), remove tube to clean with soap and water three times daily and as needed.   3. Lavage stoma with 2-3 mL sterile saline every 8 hours and PRN. Allow patient to clear lavage and secretions with cough. Assist with suctioning as needed.   4. Suction laryngectomy site as needed with red suarez catheter.   5. Humidity is very important to keep the airway moist and secretions thin. Wear either an HME device or humidified air via trach dome collar at all times.      -incision cares bacitracin x24hrs then aquaphor  - JPx4 to bulb suction, strip/record output q8hrs. Even if zero record \"0\"  - peridex, would be okay with mouth swabs if complaining of dry mouth     Respiratory: laryngectomy and CANNOT be intubated from above. HTD     CV/ Heme: hypotensive and tachycardic after OR 8/25, required phenylephrine. Weaning as tolerated, per SICU.  - PTA lisinopril, metoprolol, spironolactone all held     GI/FEN: low ical - tums through g-tube  - NPO, G-tube, TF per nutrition, okay to start today. LR 75cc/hr, scheduled zofran x48h. Elizabeth. Sennkot, PRN miralax     :  morales out, PTA tamsulosin held d/t low BP, (hx of post-op urinary " retention)     Endo: SHE     ID:  unasyn x48hr     Consults: PT/OT, SLP (HME, alisa kit), PLC (laryngectomy, PEG)    Dispo: pending clinic improvement     -- Patient and above plan discussed with Dr. Felipe Gamez MD   Otolaryngology-Head & Neck Surgery  Please contact ENT with questions by dialing [phone] and entering job code [phone] when prompted

## 2020-08-26 NOTE — ANESTHESIA CARE TRANSFER NOTE
Patient: Evin Sterling    Procedure(s):  INSERTION, PEG TUBE  TOTAL LARYNGECTOMY WITH LEFT HEMITHYROIDECTOMY  Bilateral neck dissection  DIRECT LARYNGOSCOPY  DENTAL EXTRACTION OF TEETH x 13    Diagnosis: Laryngeal cancer (H) [C32.9]  Diagnosis Additional Information: No value filed.    Anesthesia Type:   General     Note:  Airway :Other (See Comments)  Patient transferred to:PACU  Comments: O2 blowby over stoma site.  VSS. Remains on mateus gtt.  Report to RN at bedside.Handoff Report: Identifed the Patient, Identified the Reponsible Provider, Reviewed the pertinent medical history, Discussed the surgical course, Reviewed Intra-OP anesthesia mangement and issues during anesthesia, Set expectations for post-procedure period and Allowed opportunity for questions and acknowledgement of understanding      Vitals: (Last set prior to Anesthesia Care Transfer)    CRNA VITALS  8/25/2020 1925 - 8/25/2020 1955 8/25/2020             Pulse:  74    ART BP:  126/56    ART Mean:  91    SpO2:  100 %                Electronically Signed By: MARIBETH Cervantes CRNA  August 25, 2020  7:55 PM

## 2020-08-26 NOTE — OP NOTE
Preoperative diagnosis:                         Laryngeal cancer  Postoperative diagnosis:                      Laryngeal cancer    Procedure:   1. EGD  2. PEG insertion    Anesthesia: General   Surgeon: Reuben Hastings   Resident surgeon: Augustus Agee  EBL:  Less than 5 cc    Complications: none immediate  Findings:  PEG at 3 cm at the skin     Description of procedure  The patient was brought to the room and placed supine upon the table.  After confirming the patient's identity and the consent, appropriate monitoring devices were placed.  General anesthesia was administered and the patient was intubated.   The endoscope was passed into the posterior pharynx and into the esophagus without difficulty.   The GE junction was located at 40 cm from the incisors and there was no evidence of a hernia.  The stomach was easily entered.  The stomach was insufflated and the proposed PEG site showed 1 to 1 transmission of pressure.  The finder needle entered the stomach easily and a snare was used to pull the guide wire out of the mouth.  A 20 Fr PEG tube was then passed through the esophagus into the stomach and the PEG was secured with a bumper at 3 cm at the skin.  The PEG appeared appropriately placed against the stomach wall.  The air was aspirated and the endoscope was withdrawn.  A sterile dressing was placed.  The catheter was placed to gravity drainage.

## 2020-08-26 NOTE — OR NURSING
Spoke with MDA Behrens at 2045. Patient tachycardic, 130-140s. MDA Behrens came to bedside and assessed. Aware that art line is inconsistent and is reading significantly lower than cuff pressure, okay to go with manual cuff pVORB given to give 250 albumin. With albumin, heart rate decreased for short period of time. Able to briefly come off pressors.     Spoke with MDA Behrens at 2145, -120s. Do you want to give more fluid? Per DECLAN, cautionary with fluid due to cardiac status. Will sign out and send to ICU. Receiving RN aware of amount of fluids received.

## 2020-08-26 NOTE — BRIEF OP NOTE
[facility], [place]  Brief Operative Note    Pre-operative diagnosis: Laryngeal cancer (H) [C32.9]  Post-operative diagnosis Same as pre-operative diagnosis    Procedure: Procedure(s):  INSERTION, PEG TUBE  TOTAL LARYNGECTOMY WITH LEFT HEMITHYROIDECTOMY  Bilateral neck dissection  DIRECT LARYNGOSCOPY  DENTAL EXTRACTION OF TEETH x 13  Surgeon: Surgeon(s) and Role:  Panel 1:     * Reuben Hastings MD - Primary  Panel 2:     * Caron Wang MD - Primary     * Joy Angeles MD - Resident - Assisting     * Darvin Gamez MD - Resident - Assisting  Anesthesia: General   Estimated blood loss: 50 ml    Drains:  NERY drain 1: right lateral neck   NERY drain 2: right medial neck   NERY drain 3: left medial neck   NERY drain 4: left lateral neck     Specimens:   ID Type Source Tests Collected by Time Destination   A : Right level 2A neck dissection Tissue Other SURGICAL PATHOLOGY EXAM Caron Wang MD 8/25/2020 11:39 AM    B : Right level 3 neck dissection Tissue Other SURGICAL PATHOLOGY EXAM Caron Wang MD 8/25/2020 11:39 AM    C : Right level 4 neck dissection Tissue Other SURGICAL PATHOLOGY EXAM Caron Wang MD 8/25/2020 11:39 AM    D : Right 2B neck dissection Tissue Other SURGICAL PATHOLOGY EXAM Caron Wang MD 8/25/2020 11:44 AM    E : Left level 2A neck dissection Tissue Neck SURGICAL PATHOLOGY EXAM Caron Wang MD 8/25/2020 12:37 PM    F : Left level 3 neck dissection Tissue Neck SURGICAL PATHOLOGY EXAM Caron Wang MD 8/25/2020 12:40 PM    G : Left level 4 neck dissection Tissue Neck SURGICAL PATHOLOGY EXAM Caron Wang MD 8/25/2020 12:40 PM    H : Left level 2B neck dissection Tissue Neck SURGICAL PATHOLOGY EXAM Caron Wang MD 8/25/2020  1:13 PM    I : Sternal Hyoid Biopsy Tissue Other SURGICAL PATHOLOGY EXAM Caron Wang MD 8/25/2020  1:24 PM    J : Right vallecula mucosal margin Tissue Other SURGICAL PATHOLOGY EXAM Felipe  Caron Singh MD 8/25/2020  2:51 PM    K : left  vallecula mucosal margin Tissue Other SURGICAL PATHOLOGY EXAM Caron Wang MD 8/25/2020  2:52 PM    L : right pharyngeal wall mucosal margin Tissue Other SURGICAL PATHOLOGY EXAM Caron Wang MD 8/25/2020  2:52 PM    M : post cricoid mucosal margin Tissue Other SURGICAL PATHOLOGY EXAM Caron Wang MD 8/25/2020  2:52 PM    N : left pharyngeal wall mucosal margin Tissue Other SURGICAL PATHOLOGY EXAM Caron Wang MD 8/25/2020  2:53 PM    O : posterior tracheal wall mucosal margin Tissue Other SURGICAL PATHOLOGY EXAM Caron Wang MD 8/25/2020  2:53 PM    P : left anterior tracheal wall mucosal margin Tissue Other SURGICAL PATHOLOGY EXAM Caron Wang MD 8/25/2020  2:53 PM    Q : right anterior tracheal wall mucosal margin Tissue Other SURGICAL PATHOLOGY EXAM Caron Wang MD 8/25/2020  2:54 PM    R : total laryngectomy with left hemithyroid Tissue Other SURGICAL PATHOLOGY EXAM Caron Wang MD 8/25/2020  3:19 PM    S : teeth x 13 Other (specify in comments) Other SURGICAL PATHOLOGY EXAM Caron Wang MD 8/25/2020  3:20 PM      Findings:   Please see operative report.   Complications: None.  Implants: * No implants in log *      Neuro:   -kishor. Tylenol, PRN oxycodone and diluadid     HEENT:   - laryngectomy cares  Routine laryngectomy Cares:   1. Clean and remove crusting at stoma opening three times daily and as needed to keep dried secretions from accumulating and falling into airway.  2. If you have a laryngectomy tube (small silicone tube that sits in the stoma), remove tube to clean with soap and water three times daily and as needed.   3. Lavage stoma with 2-3 mL sterile saline every 8 hours and PRN. Allow patient to clear lavage and secretions with cough. Assist with suctioning as needed.   4. Suction laryngectomy site as needed with red suarez catheter.   5. Humidity is very important to keep the airway  moist and secretions thin. Wear either an HME device or humidified air via trach dome collar at all times.     -incision cares bacitracin x24hrs then aquaphor  - JPx4 to bulb suction, please strip and record drain output q8hrs. Even if zero please record     Respiratory:   - patient has laryngectomy and CANNOT be intubated from above  - trach dome at all times with humidified oxygen     CV/ Heme  - daily CBC  - PTA lisinopril, metoprolol, spironolactone     GI/FEN:   - NPO  - lactated ringers 75cc/hr  - s/p PEG: OK to use for meds, hold TF until tomorrow   - scheduled zofran x48 hr  - Nutrition consult tomorrow   - kishor. Sennkot, PRN miralax  - POD1 albumin, prealbumin, TSH, iCal, PTH   - daily BMP, Mg, Phos    :   - morales out  - PTA flomax (hx of post-op urinary retention)  - PRN bladder scan     Endo:   - SHE    ID:   - unasyn x48hr  - daily CBC    Consults:   - PT/OT  - SLP: for HME and alisa brambila  - PLC: laryngectomy and PEG     Joy Hi, PGY5   Otolaryngology- Head and Neck Surgery

## 2020-08-26 NOTE — PLAN OF CARE
OT 4A: Evaluation complete and treatment initiated.  Discharge Planner OT   Patient plan for discharge: home with assist  Current status: Pt is alert, communicating appropriately via gestures and writing, SBA-CGA for bed mobility, transfers and ambulation (approx 400ft with 1 UE support on IV pole).  BPs soft initially upon sitting with MAPs in 50s while off pressors.  RN restarted pressors with improvement to MAPs >65 for remainder of activitty.  Barriers to return to prior living situation: post surgical considerations, pain, deconditioning  Recommendations for discharge: Home with assist  Rationale for recommendations: Anticipate pt will continue to progress well with skilled therapy intervention while inpatient and be able to safely discharge to home with family assist prn once medically appropriate.         Entered by: Mellisa Bond 08/26/2020 10:23 AM

## 2020-08-26 NOTE — PHARMACY-CONSULT NOTE
Pharmacy Tube Feeding Consult    Medication reviewed for administration by feeding tube and for potential food/drug interactions.    Recommendation: No changes are needed at this time, however, if tamsulosin is resumed will need to be interchanged to doxazosin 1mg daily (tamsulosin cannot be administered via feeding tube)     Pharmacy will continue to follow as new medications are ordered.    Ebenezer Ocampo, OlindaD, BCPS

## 2020-08-26 NOTE — PROGRESS NOTES
08/26/20 0900   Quick Adds   Type of Visit Initial Occupational Therapy Evaluation   Living Environment   Lives With child(santosh), adult   Living Arrangements house   Home Accessibility stairs within home   Number of Stairs, Within Home, Primary 7   Stair Railings, Within Home, Primary   (yes)   Transportation Anticipated family or friend will provide   Living Environment Comment Pt lives in a split level home with his adult daughter, reports he primarily uses the lower level of the home- bed/bath on lower level but able to navigate stairs independently at baseline   Self-Care   Usual Activity Tolerance good   Current Activity Tolerance moderate   Regular Exercise Yes   Activity/Exercise Type walking   Equipment Currently Used at Home none   Activity/Exercise/Self-Care Comment Pt was previously independent with all basic mobility and self cares.  pt reports he has been very into fitness his whole life and tries to continue to exercise as able.  recently has been primarily walking for exercise.  pt daughter is currently home and able to assist prn   Functional Level   Ambulation 0-->independent   Transferring 0-->independent   Toileting 0-->independent   Bathing 0-->independent   Dressing 0-->independent   Cognition 0 - no cognition issues reported   Fall history within last six months no   Which of the above functional risks had a recent onset or change? none       Present no   Language english   General Information   Onset of Illness/Injury or Date of Surgery - Date 08/25/20   Referring Physician Sheridan Navarro MD   Patient/Family Goals Statement Return to home   Additional Occupational Profile Info/Pertinent History of Current Problem Evin Sterling is a 70 year old male with a past medical history of CHF (EF<30%), afib, htn and alcohol and tobacco dependence who presented with T4N0 SCC of the larynx, now s/p awake trach, DL bx on 8/3 and s/p PEG placement, DL, TL with b/l MRND and left  "hemithyroidectomy, extraction of 13 teeth on 8/25.   Precautions/Limitations no known precautions/limitations   General Observations Pt pleasant and agreeable; able to communicate via gestures and writing; on RA; devon x 4, L radial a-line, 1 pressor   General Info Comments Activity: ambulate with assist   Cognitive Status Examination   Orientation orientation to person, place and time   Level of Consciousness alert   Follows Commands (Cognition) WNL   Memory intact   Attention No deficits were identified   Cognitive Comment no acute cognitive concerns noted;  Pt does state feeling slightly disoriented to time of day/\"a little groggy\" post surgically though very conversationally appropriate   Visual Perception   Visual Perception Wears glasses   Visual Perception Comments Pt reports noticing a slight deficit in depth perception post surgically; will continue to monitor   Sensory Examination   Sensory Quick Adds No deficits were identified   Pain Assessment   Patient Currently in Pain   (discomfort and surgical sites)   Range of Motion (ROM)   ROM Comment BUE WFL; mild deficit at end range shoulder flexion/abduction bilaterally 2/2 discomfort   Strength   Strength Comments not formally assessed in BUE: however, demonstrates good functional strength with activity; previously active prior to surgery   Mobility   Bed Mobility Bed mobility skill: Supine to sit   Bed Mobility Skill: Supine to Sit   Level of [place]: Supine/Sit stand-by assist   Physical Assist/Nonphysical Assist: Supine/Sit verbal cues  (assist for lines)   Transfer Skill: Bed to Chair/Chair to Bed   Level of [place]: Bed to Chair contact guard   Transfer Skill: Sit to Stand   Level of [place]: Sit/Stand contact guard   Balance   Balance Comments no LOB with transfers and functional ambulation using 1 UE on IV pole   Lower Body Dressing   Level of [place]: Dress Lower Body moderate assist (50% patients effort)   Grooming   Level of " [place]: Grooming stand-by assist   Instrumental Activities of Daily Living (IADL)   Previous Responsibilities meal prep;housekeeping;laundry   IADL Comments family able to assist with IADLs prn   Activities of Daily Living Analysis   Impairments Contributing to Impaired Activities of Daily Living pain;post surgical precautions;ROM decreased;strength decreased   General Therapy Interventions   Planned Therapy Interventions ADL retraining;IADL retraining;bed mobility training;ROM;strengthening;transfer training;home program guidelines   Clinical Impression   Criteria for Skilled Therapeutic Interventions Met yes, treatment indicated   OT Diagnosis decreased activity tolerance and independence with ADLs   Influenced by the following impairments pain, post surgical precautions, decreased activity tolerance   Assessment of Occupational Performance 5 or more Performance Deficits   Identified Performance Deficits bed mobility, transfers, toileting, dressing, bathing, mobility   Clinical Decision Making (Complexity) Low complexity   Therapy Frequency 5x/week   Predicted Duration of Therapy Intervention (days/wks) 9/1/20   Anticipated Discharge Disposition Home with Assist   Risks and Benefits of Treatment have been explained. Yes   Patient, Family & other staff in agreement with plan of care Yes   Total Evaluation Time   Total Evaluation Time (Minutes) 5

## 2020-08-26 NOTE — H&P
SURGICAL ICU ADMISSION NOTE  08/26/2020      PRIMARY TEAM: ENT  PRIMARY PHYSICIAN: Dr. Wang  REASON FOR CRITICAL CARE ADMISSION: Pressor needs, hemodynamic monitoring   ADMITTING PHYSICIAN: Dr. Valencia  Date of Service (when I saw the patient): 08/26/2020    ASSESSMENT:  Evin Sterling is a 70 year old male with PMH of atrial fibrillation, CHF (EF 30%), HTN, GERD, and a laryngeal mass who was admitted on 8/25/2020 to the SICU s/p total laryngectomy w/ left hemithyroidectomy, bilateral neck dissection, extraction of teeth and PEG tube placement for ongoing pressor requirements.     PLAN:    Neurological:  # Acute post-operative pain   - Monitor neurological status. Delirium preventions and precautions.   - Pain: Elizabeth tylenol, prn tylenol, dilaudid prn, oxy prn     Pulmonary:   # s/p laryngectomy  # laryngostomy  - If patient were to need intubation, would need to be performed through laryngostomy site  - Trach dome at all times with humidified oxygen  - Supplemental oxygen to keep saturation above 92 %.    Cardiovascular:    # Shock-distributive  # Atrial fibrillation  # CHF (EF <30%)   - Monitor hemodynamic status.  - wean phenylephrine as tolerated, ok to continue via PIV as long as requirement downtrending   - hold all home antihypertensives     Gastroenterology/Nutrition:  # s/p PEG  - NPO for now   - Bowel reg     Fluids/Electrolytes/Renal:   # SHE  - LR at 75 for IV fluid hydration  - electrolyte replacement protocol - lytes WNL   - monitor UOP     Endocrine:  # Stress hyperglycemia   - No management indication for now  - Goal to keep BG< 180 for optimal wound healing     ID:  # periop prophylaxis with Unasyn   - trend WBC and fever curve     Heme:     # SHE   - Hemoglobin WNL 13.3  - Transfuse if hgb <7.0 or signs/symptoms of hypoperfusion. Monitor and trend.     Musculoskeletal:   # Weakness and deconditioning of critical illness   - Physical and occupational therapy consult     General Cares/Prophylaxis:     DVT Prophylaxis: Pneumatic Compression Devices  GI Prophylaxis: Not indicated  Restraints: Restraints for medical healing needed: NO    Lines/ tubes/ drains:  - leonardo stoma, peg tube, PIVs, art line, JPs     Disposition:  -  Surgical ICU.     Patient seen, findings and plan discussed with surgical ICU staff, Dr. Valencia.    Zamzam Scruggs MD  Surgery Resident PGY-2  Pg [phone]    - - - - - - - - - - - - - - - - - - - - - - - - - - - - - - - - - - - - - - - - - - - - - -   HISTORY PRESENTING ILLNESS: Mr. Sterling is a 69 yo M with a PMH of CHF (EF<30%), atrial fibrillation, HTN, GERD, and a laryngeal mass. He previously has had roughness in his voice for many years and attributed this to GERD. 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 He recently was admitted and underwent tracheostomy placement on 8/3. Today he was admitted for planned procedure and is now s/p total laryngectomy w/ left hemithyroidectomy, bilateral neck dissection, and PEG tube placement. He required admission to the SICU due to ongoing pressor requirements.    Presently feels okay, indicates some abdominal discomfort at new PEG site.  Otherwise indicates no CP, SOB.  Communicating with shrugging and facial expressions.    REVIEW OF SYSTEMS: unable to obtain 2/2 laryngectomy status     PAST MEDICAL HISTORY:   Past Medical History:   Diagnosis Date     CHF (congestive heart failure) (H)      GERD (gastroesophageal reflux disease)      Hypertension      SURGICAL HISTORY:   Past Surgical History:   Procedure Laterality Date     HERNIA REPAIR, INGUINAL RT/LT       HERNIA REPAIR, UMBILICAL       LARYNGOSCOPY WITH BIOPSY(IES) N/A 8/3/2020    Procedure: LARYNGOSCOPY WITH BIOPSY, TRACHEOSTOMY, NG TUBE PLACEMENT;  Surgeon: Caron Wang MD;  Location: [facility] OR     TRACHEOSTOMY N/A 8/3/2020    Procedure: TRACHEOSTOMY;  Surgeon: Caron Wang MD;  Location: [facility] OR     SOCIAL HISTORY:   Social History     Tobacco Use     Smoking status:  Former Smoker     Packs/day: 1.00     Years: 20.00     Pack years: 20.00     Last attempt to quit:      Years since quittin.6     Smokeless tobacco: Former User   Substance Use Topics     Alcohol use: Not Currently     Drug use: Not Currently       FAMILY HISTORY:   Family History   Problem Relation Age of Onset     Anesthesia Reaction No family hx of      Deep Vein Thrombosis (DVT) No family hx of      ALLERGIES:   No Known Allergies    MEDICATIONS:  No current facility-administered medications on file prior to encounter.   acetaminophen (TYLENOL) 32 mg/mL liquid, 20.3 mLs (650 mg) by Per Feeding Tube route every 4 hours as needed for pain  carboxymethylcellulose PF (REFRESH PLUS) 0.5 % ophthalmic solution, Place 1 drop into both eyes 4 times daily as needed for dry eyes  lisinopril (ZESTRIL) 10 MG tablet, Take 10 mg by mouth daily  metoprolol (LOPRESSOR) 10 mg/mL SUSP, 2.5 mLs (25 mg) by Per Feeding Tube route every 6 hours  multivitamins w/minerals (CERTAVITE) liquid, 15 mLs by Per Feeding Tube route daily  sennosides (SENOKOT) 8.8 MG/5ML syrup, 5 mLs by Per Feeding Tube route nightly as needed for constipation  spironolactone (ALDACTONE) 25 MG tablet, 0.5 tablets (12.5 mg) by Per Feeding Tube route daily  tamsulosin (FLOMAX) 0.4 MG capsule, Take 1 capsule (0.4 mg) by mouth daily  ondansetron (ZOFRAN) 4 MG tablet, Take 4 mg by mouth every 8 hours as needed for nausea  order for DME, Equipment being ordered: Tracheostomy supplies    0.9% sodium chloride 1000 mL bottles  Box split 4x4 gauze sponges  Trach kits with brushes  Velcro trach ties  3 cc 0.9% sodium chloride lavages for trach suctioning  Large gloves  Cool mist humidity via trach dome  6-0 Shiley disposable inner cannulas    Diagnosis: laryngeal cancer  order for DME, Equipment being ordered:   Portable suction machine   14 French suction catheters  [place] suction tips    Diagnosis: laryngeal cancer  order for DME, Equipment being ordered:  Nasogastric bolus tube feeding supplies  Formula: Isosource 1.5, 6 cans per day  Gravity feeding bags  60 mL syringes    Treatment Diagnosis: laryngeal cancer  oxyCODONE (ROXICODONE) 5 MG/5ML solution, 5-10 mLs (5-10 mg) by Per Feeding Tube route every 4 hours as needed for moderate to severe pain  polyethylene glycol (MIRALAX) 17 g packet, Take 17 g by mouth 2 times daily as needed for constipation      PHYSICAL EXAMINATION:  Temp:  [97.4  F (36.3  C)-98.6  F (37  C)] 98.2  F (36.8  C)  Pulse:  [] 82  Resp:  [11-29] 16  BP: ()/(51-99) 111/60  MAP:  [46 mmHg-93 mmHg] 70 mmHg  Arterial Line BP: ()/(33-75) 99/54  SpO2:  [91 %-100 %] 94 %  General: awake, alert, interacting appropriately   HEENT: scant crusted blood around lips   Neck: bilateral neck incisions c/d/i with sutures, NERY x4 serosanguinous output, laryngectomy site clean and breathing with trach dome   Neuro: NAD, interacting appropriately, CN 2-12 grossly intact   Pulm/Resp: Clear breath sounds bilaterally without rhonchi, crackles or wheeze,  breathing non-labored via laryngectomy stoma   CV: irregularly irregular and tachycardic to 110s   Abdomen: Soft, non-distended, non-tender aside from at LUQ PEG; surrounding skin c/d/i, no rebound tenderness or guarding, no masses  : morales catheter in place, urine yellow and clear  Incisions/Skin: bilateral neck incisions  MSK/Extremities: no peripheral edema, moving all extremities, peripheral pulses intact     LABS: Reviewed.   Arterial Blood Gases   Recent Labs   Lab 08/25/20 1839 08/25/20  1409 08/25/20  1128   PH 7.41 7.46* 7.46*   PCO2 43 37 36   PO2 239* 174* 131*   HCO3 27 27 26     Complete Blood Count   Recent Labs   Lab 08/25/20  2319 08/25/20  1839 08/25/20  1409 08/25/20  1128   WBC 17.0*  --   --   --    HGB 13.3 14.3 13.5 12.4*     --   --   --      Basic Metabolic Panel  Recent Labs   Lab 08/26/20  0434 08/25/20  2319 08/25/20  1839 08/25/20  1409    146* 143 142    POTASSIUM 3.9 3.5 4.2 4.2   CHLORIDE 113* 116*  --   --    CO2 25 24  --   --    BUN 10 10  --   --    CR 0.80 0.65*  --   --    * 104* 105* 100*     Liver Function Tests  Recent Labs   Lab 08/26/20  0434 08/25/20  0643   AST 26  --    ALT 23  --    ALKPHOS 73  --    BILITOTAL 0.6  --    ALBUMIN 2.0*  --    INR  --  1.18*     Pancreatic Enzymes  No lab results found in last 7 days.  Coagulation Profile  Recent Labs   Lab 08/25/20  0643   INR 1.18*       IMAGING:  No results found for this or any previous visit (from the past 24 hour(s)).        \

## 2020-08-26 NOTE — PLAN OF CARE
4A PT Defer: per OT patient ambulating SBA with single UE support on IV pole. Patient will have family support upon return home. OT to continue to follow while in house.     PT -   Discharge Planner PT   Patient plan for discharge: home  Current status: SBA for ambulation per OT  Barriers to return to prior living situation: medical status  Recommendations for discharge: defer to OT  Rationale for recommendations: no acute PT needs per OT  Entered by: Delfino Clancy 08/26/2020 10:00 AM

## 2020-08-27 ENCOUNTER — APPOINTMENT (OUTPATIENT)
Dept: OCCUPATIONAL THERAPY | Facility: CLINIC | Age: 70
DRG: 011 | End: 2020-08-27
Attending: THORACIC SURGERY (CARDIOTHORACIC VASCULAR SURGERY)
Payer: MEDICARE

## 2020-08-27 ENCOUNTER — APPOINTMENT (OUTPATIENT)
Dept: SPEECH THERAPY | Facility: CLINIC | Age: 70
DRG: 011 | End: 2020-08-27
Attending: THORACIC SURGERY (CARDIOTHORACIC VASCULAR SURGERY)
Payer: MEDICARE

## 2020-08-27 LAB
ANION GAP SERPL CALCULATED.3IONS-SCNC: 4 MMOL/L (ref 3–14)
BUN SERPL-MCNC: 14 MG/DL (ref 7–30)
CA-I BLD-MCNC: 4.2 MG/DL (ref 4.4–5.2)
CA-I BLD-MCNC: 4.3 MG/DL (ref 4.4–5.2)
CALCIUM SERPL-MCNC: 7.8 MG/DL (ref 8.5–10.1)
CHLORIDE SERPL-SCNC: 110 MMOL/L (ref 94–109)
CO2 SERPL-SCNC: 29 MMOL/L (ref 20–32)
CREAT SERPL-MCNC: 0.79 MG/DL (ref 0.66–1.25)
ERYTHROCYTE [DISTWIDTH] IN BLOOD BY AUTOMATED COUNT: 14.7 % (ref 10–15)
GFR SERPL CREATININE-BSD FRML MDRD: >90 ML/MIN/{1.73_M2}
GLUCOSE BLDC GLUCOMTR-MCNC: 124 MG/DL (ref 70–99)
GLUCOSE SERPL-MCNC: 121 MG/DL (ref 70–99)
HCT VFR BLD AUTO: 35.9 % (ref 40–53)
HGB BLD-MCNC: 11.2 G/DL (ref 13.3–17.7)
MAGNESIUM SERPL-MCNC: 2.1 MG/DL (ref 1.6–2.3)
MCH RBC QN AUTO: 30.9 PG (ref 26.5–33)
MCHC RBC AUTO-ENTMCNC: 31.2 G/DL (ref 31.5–36.5)
MCV RBC AUTO: 99 FL (ref 78–100)
PHOSPHATE SERPL-MCNC: 2.3 MG/DL (ref 2.5–4.5)
PLATELET # BLD AUTO: 150 10E9/L (ref 150–450)
POTASSIUM SERPL-SCNC: 4 MMOL/L (ref 3.4–5.3)
RBC # BLD AUTO: 3.62 10E12/L (ref 4.4–5.9)
SODIUM SERPL-SCNC: 144 MMOL/L (ref 133–144)
WBC # BLD AUTO: 7.2 10E9/L (ref 4–11)

## 2020-08-27 PROCEDURE — 82330 ASSAY OF CALCIUM: CPT | Performed by: OTOLARYNGOLOGY

## 2020-08-27 PROCEDURE — 80048 BASIC METABOLIC PNL TOTAL CA: CPT | Performed by: OTOLARYNGOLOGY

## 2020-08-27 PROCEDURE — 25000128 H RX IP 250 OP 636: Performed by: STUDENT IN AN ORGANIZED HEALTH CARE EDUCATION/TRAINING PROGRAM

## 2020-08-27 PROCEDURE — 25800030 ZZH RX IP 258 OP 636: Performed by: INTERNAL MEDICINE

## 2020-08-27 PROCEDURE — 25000128 H RX IP 250 OP 636: Performed by: OTOLARYNGOLOGY

## 2020-08-27 PROCEDURE — 40000893 ZZH STATISTIC PT IP EVAL DEFER: Performed by: REHABILITATION PRACTITIONER

## 2020-08-27 PROCEDURE — 92507 TX SP LANG VOICE COMM INDIV: CPT | Mod: GN

## 2020-08-27 PROCEDURE — 27210429 ZZH NUTRITION PRODUCT INTERMEDIATE LITER: Performed by: DIETITIAN, REGISTERED

## 2020-08-27 PROCEDURE — 83735 ASSAY OF MAGNESIUM: CPT | Performed by: OTOLARYNGOLOGY

## 2020-08-27 PROCEDURE — 25000132 ZZH RX MED GY IP 250 OP 250 PS 637: Mod: GY | Performed by: STUDENT IN AN ORGANIZED HEALTH CARE EDUCATION/TRAINING PROGRAM

## 2020-08-27 PROCEDURE — 25000132 ZZH RX MED GY IP 250 OP 250 PS 637: Mod: GY | Performed by: PHYSICIAN ASSISTANT

## 2020-08-27 PROCEDURE — 25000125 ZZHC RX 250: Performed by: INTERNAL MEDICINE

## 2020-08-27 PROCEDURE — 36415 COLL VENOUS BLD VENIPUNCTURE: CPT | Performed by: OTOLARYNGOLOGY

## 2020-08-27 PROCEDURE — 00000146 ZZHCL STATISTIC GLUCOSE BY METER IP

## 2020-08-27 PROCEDURE — 84100 ASSAY OF PHOSPHORUS: CPT | Performed by: OTOLARYNGOLOGY

## 2020-08-27 PROCEDURE — 25800030 ZZH RX IP 258 OP 636: Performed by: PHYSICIAN ASSISTANT

## 2020-08-27 PROCEDURE — 99232 SBSQ HOSP IP/OBS MODERATE 35: CPT | Performed by: INTERNAL MEDICINE

## 2020-08-27 PROCEDURE — 12000001 ZZH R&B MED SURG/OB UMMC

## 2020-08-27 PROCEDURE — 85027 COMPLETE CBC AUTOMATED: CPT | Performed by: OTOLARYNGOLOGY

## 2020-08-27 PROCEDURE — 97530 THERAPEUTIC ACTIVITIES: CPT | Mod: GO

## 2020-08-27 RX ORDER — DOXAZOSIN 1 MG/1
1 TABLET ORAL DAILY
Status: DISCONTINUED | OUTPATIENT
Start: 2020-08-27 | End: 2020-09-01 | Stop reason: HOSPADM

## 2020-08-27 RX ADMIN — CALCIUM CARBONATE (ANTACID) CHEW TAB 500 MG 2500 MG: 500 CHEW TAB at 13:48

## 2020-08-27 RX ADMIN — ENOXAPARIN SODIUM 40 MG: 40 INJECTION SUBCUTANEOUS at 10:17

## 2020-08-27 RX ADMIN — ACETAMINOPHEN ORAL SOLUTION 650 MG: 325 SOLUTION ORAL at 16:33

## 2020-08-27 RX ADMIN — METOPROLOL TARTRATE 25 MG: 25 TABLET, FILM COATED ORAL at 01:20

## 2020-08-27 RX ADMIN — SODIUM PHOSPHATE, MONOBASIC, MONOHYDRATE AND SODIUM PHOSPHATE, DIBASIC, ANHYDROUS 15 MMOL: 276; 142 INJECTION, SOLUTION INTRAVENOUS at 18:04

## 2020-08-27 RX ADMIN — ONDANSETRON 4 MG: 2 INJECTION INTRAMUSCULAR; INTRAVENOUS at 04:33

## 2020-08-27 RX ADMIN — SODIUM CHLORIDE, POTASSIUM CHLORIDE, SODIUM LACTATE AND CALCIUM CHLORIDE: 600; 310; 30; 20 INJECTION, SOLUTION INTRAVENOUS at 04:40

## 2020-08-27 RX ADMIN — CALCIUM CARBONATE (ANTACID) CHEW TAB 500 MG 2500 MG: 500 CHEW TAB at 20:11

## 2020-08-27 RX ADMIN — AMPICILLIN SODIUM AND SULBACTAM SODIUM 3 G: 2; 1 INJECTION, POWDER, FOR SOLUTION INTRAMUSCULAR; INTRAVENOUS at 10:17

## 2020-08-27 RX ADMIN — DOCUSATE SODIUM 50 MG AND SENNOSIDES 8.6 MG 2 TABLET: 8.6; 5 TABLET, FILM COATED ORAL at 08:12

## 2020-08-27 RX ADMIN — DOCUSATE SODIUM 50 MG AND SENNOSIDES 8.6 MG 2 TABLET: 8.6; 5 TABLET, FILM COATED ORAL at 20:12

## 2020-08-27 RX ADMIN — CHLORHEXIDINE GLUCONATE 0.12% ORAL RINSE 15 ML: 1.2 LIQUID ORAL at 20:11

## 2020-08-27 RX ADMIN — CHLORHEXIDINE GLUCONATE 0.12% ORAL RINSE 15 ML: 1.2 LIQUID ORAL at 08:14

## 2020-08-27 RX ADMIN — METOPROLOL TARTRATE 25 MG: 25 TABLET, FILM COATED ORAL at 20:16

## 2020-08-27 RX ADMIN — ONDANSETRON 4 MG: 2 INJECTION INTRAMUSCULAR; INTRAVENOUS at 16:33

## 2020-08-27 RX ADMIN — AMPICILLIN SODIUM AND SULBACTAM SODIUM 3 G: 2; 1 INJECTION, POWDER, FOR SOLUTION INTRAMUSCULAR; INTRAVENOUS at 16:33

## 2020-08-27 RX ADMIN — ACETAMINOPHEN ORAL SOLUTION 650 MG: 325 SOLUTION ORAL at 21:50

## 2020-08-27 RX ADMIN — ACETAMINOPHEN ORAL SOLUTION 650 MG: 325 SOLUTION ORAL at 04:33

## 2020-08-27 RX ADMIN — WHITE PETROLATUM: 1.75 OINTMENT TOPICAL at 20:13

## 2020-08-27 RX ADMIN — ACETAMINOPHEN ORAL SOLUTION 650 MG: 325 SOLUTION ORAL at 10:17

## 2020-08-27 RX ADMIN — Medication 3 MG: at 20:46

## 2020-08-27 RX ADMIN — ONDANSETRON 4 MG: 2 INJECTION INTRAMUSCULAR; INTRAVENOUS at 08:14

## 2020-08-27 RX ADMIN — DOXAZOSIN 1 MG: 1 TABLET ORAL at 08:12

## 2020-08-27 RX ADMIN — CALCIUM CARBONATE (ANTACID) CHEW TAB 500 MG 2500 MG: 500 CHEW TAB at 10:17

## 2020-08-27 RX ADMIN — OMEPRAZOLE 20 MG: KIT at 08:12

## 2020-08-27 RX ADMIN — Medication 3 MG: at 00:36

## 2020-08-27 RX ADMIN — WHITE PETROLATUM: 1.75 OINTMENT TOPICAL at 13:48

## 2020-08-27 RX ADMIN — WHITE PETROLATUM: 1.75 OINTMENT TOPICAL at 04:34

## 2020-08-27 RX ADMIN — CHLORHEXIDINE GLUCONATE 0.12% ORAL RINSE 15 ML: 1.2 LIQUID ORAL at 13:48

## 2020-08-27 RX ADMIN — MULTIVITAMIN 15 ML: LIQUID ORAL at 08:12

## 2020-08-27 RX ADMIN — AMPICILLIN SODIUM AND SULBACTAM SODIUM 3 G: 2; 1 INJECTION, POWDER, FOR SOLUTION INTRAMUSCULAR; INTRAVENOUS at 04:33

## 2020-08-27 ASSESSMENT — ACTIVITIES OF DAILY LIVING (ADL)
ADLS_ACUITY_SCORE: 15

## 2020-08-27 NOTE — PLAN OF CARE
OT/6A:  Discharge Planner OT   Patient plan for discharge: Did not discuss.   Current status: Patient initially agreeable and wanting to progress ambulation. Preparing lines and environment for safety. IND with transfers; SBA for brief ambulation in room. Patient abruptly becoming upset and requesting to discontinue. Wanting to speak with ENT team. Educated that team will likely be rounding this afternoon.   Barriers to return to prior living situation: Medical Status, Post-surgical precautions  Recommendations for discharge: Anticipate home with assist.   Rationale for recommendations: Anticipate pt will continue to progress well with skilled therapy intervention while inpatient and be able to safely discharge to home with family assist prn once medically appropriate.       Entered by: Shelly Guerrero 08/27/2020 4:36 PM

## 2020-08-27 NOTE — PLAN OF CARE
Status: POD #2 s/p total laryngectomy w/ left hemithyroidectomy, bilateral neck dissection, extraction of 13 teeth and PEG tube placement for extraction of laryngeal mass   Vitals: VSS ex tachy intermittent. On CCM, with a-fib   Neuros: Writes to communicate, intact. Generalized weakness   IV: PIV x2, TKO   Resp/trach: Laryngeal stoma with HME in place overnight, satting >95% on RA. Lavage x1 with good productive cough. Suctions self orally.   Diet: NPO. TF @ 20 ml/hr, can advance @ 0900, tolerating well. Strict I+O in place   Bowel status: No BM since surgery   : Voiding with PVR of 96 ml  Skin: Blanching redness to bottom, weight shift encouraged. Neck incision with sutured reddened with slight edema but looks good, cleansed per order. JPx4 to neck.  Pain: Generalized discomfort controlled with scheduled Tylenol   Activity: SBA d/t lines   Social: Daughter primary visitor   Plan: Continue to monitor and follow POC

## 2020-08-27 NOTE — PLAN OF CARE
PT-6A- PT Consult Order received, per chart review and communication with interdisciplinary care team, pt is mobilizing well and has no skilled need for PT at this time. Pt will defer evaluation at this time.     Discharge Planner PT   Patient plan for discharge: Home with Assist  Current status: Pt is ambulating ~ 400 feet in OT sessions.   Barriers to return to prior living situation: medical status, post-surgical precautions  Recommendations for discharge: Per OT, home with assist  Rationale for recommendations: Per OT notes pt is mobilizing well, anticipate pt will be safe to discharge home.        Entered by: Brigida Lauren 08/27/2020 11:06 AM

## 2020-08-27 NOTE — PLAN OF CARE
Discharge Planner SLP   Patient plan for discharge: none stated   Current status: Pt seen for LPK training with daughter present. After initial training, pt was able to independently insert and remove larytube and place HME. Pt also demonstrated ability to clean larytube after initial training. Pt continues to tolerate larytube and HME placement without difficulty.     Per ENT, pt clear to wear larytube throughout day as tolerated. Pt can receive O2 via trach dome with HME in place, however, humidity should be off if HME is in place. HME should be changed at least every 24 hours or more frequently if visibly soiled. If pt is demonstrating increased difficulty breathing, remove larytube and HME. Larytube should be cleaned with provided brushes at least daily, or more frequently as needed. SLP to follow daily.    Barriers to return to prior living situation: Stoma/alisa cares, communication, TF  Recommendations for discharge: home with OP ST follow at ENT clinic   Rationale for recommendations: Pt with new communication and swallow needs s/p laryngectomy; pt will benefit from ongoing ST targeting these deficits.       Entered by: Priscilla Abdi 08/27/2020 2:59 PM

## 2020-08-27 NOTE — PROGRESS NOTES
"Otolaryngology Progress Note  August 26, 2020    S: Small amount of nausea with TF to 20. BP improved since being in the ICU, now off pressors.    O: /69   Pulse 92   Temp 98.2  F (36.8  C) (Oral)   Resp 14   Ht 1.778 m (5' 10\")   Wt 81.8 kg (180 lb 5.4 oz)   SpO2 96%   BMI 25.88 kg/m     General: Alert and oriented x 3, No acute distress   HEENT: EOMI. HB 1/6. Stoma patent with small amount of crusting at incision edges and stoma is deep. Neck incision c/d/i. No significant swelling of neck to suggest hematoma/seroma. Neck drains intact with sanguinous drainage in all 4 bulbs. Dental extraction sites are sutured and healing as expected. No intraoral bleeding or purulence.    Pulmonary: Trach dome. No accessory muscle use.    NERY drain output(s): (last 24 hours)/(last shift)  1 right lateral neck- 35/20/6(61)  2 right medial neck- 31/25/6 (62)  3 left medial neck- 34/20/15 (69)  4 left lateral neck- 16/12.5/8 (36.5)    LABS:  ROUTINE IP LABS (Last four results)  BMP  Recent Labs   Lab 08/26/20  1510 08/26/20  0434 08/25/20 2319 08/25/20 1839 08/25/20  1409   NA  --  144 146* 143 142   POTASSIUM 4.3 3.9 3.5 4.2 4.2   CHLORIDE  --  113* 116*  --   --    DONNIE  --  7.0* 6.7*  --   --    CO2  --  25 24  --   --    BUN  --  10 10  --   --    CR  --  0.80 0.65*  --   --    GLC  --  114* 104* 105* 100*     CBC  Recent Labs   Lab 08/26/20  0626 08/25/20 2319 08/25/20 1839 08/25/20  1409   WBC 12.3* 17.0*  --   --    RBC 4.07* 4.32*  --   --    HGB 12.6* 13.3 14.3 13.5   HCT 39.2* 41.7  --   --    MCV 96 97  --   --    MCH 31.0 30.8  --   --    MCHC 32.1 31.9  --   --    RDW 14.6 14.5  --   --     274  --   --      INR  Recent Labs   Lab 08/25/20  0643   INR 1.18*   Prior labs: TSH 0.91, albumin 1.9, prealbumin 11, PTH 34    A/P: Evin Sterling is a 70 year old male with a past medical history of CHF (EF<30%), afib, htn and alcohol and tobacco dependence who presented with T4N0 SCC of the larynx, now s/p " "awake trach, DL bx on 8/3 and s/p PEG placement, DL, TL with b/l MRND and left hemithyroidectomy, extraction of 13 teeth on 8/25.    Neuro: kishor. Tylenol, PRN oxycodone and diluadid.      HEENT:   Routine laryngectomy Cares:   1. Clean and remove crusting at stoma opening three times daily and as needed to keep dried secretions from accumulating and falling into airway.  2. If you have a laryngectomy tube (small silicone tube that sits in the stoma), remove tube to clean with soap and water three times daily and as needed.   3. Lavage stoma with 2-3 mL sterile saline every 8 hours and PRN. Allow patient to clear lavage and secretions with cough. Assist with suctioning as needed.   4. Suction laryngectomy site as needed with red suarez catheter.   5. Humidity is very important to keep the airway moist and secretions thin. Wear either an HME device or humidified air via trach dome collar at all times.      -incision cares bacitracin x24hrs then aquaphor  - JPx4 to bulb suction, strip/record output q8hrs. Even if zero record \"0\"  - peridex, would be okay with mouth swabs if complaining of dry mouth     Respiratory: laryngectomy and CANNOT be intubated from above. HTD. Larytube & HME.      CV/ Heme: hypotensive and tachycardic after OR 8/25, required phenylephrine. Weaning as tolerated, per SICU.  - medicine consult, recs: resume PTA metoprolol via GT, continue holding spironolactone and lisinopril, decrease IVF to 50 ml/hr, consider stopping in AM     GI/FEN: NPO, G-tube, TF @20 w/ goal 60 and some nausea. LR 50cc/hr, scheduled zofran x48h. Scheduled senna-docusate, PRN miralax. No BM yet.     :  morales out, hx of post-op urinary retention  - per medicine, OK to restart flomax via GT, switched to cardura per pharmacy to be put in Gtube.      Endo: ICal low. Tums 2.5g TID     ID:  unasyn x48hr (complete 8/27)    Ppx: SCDs, may add lovenox     Consults: PT/OT, SLP (HME, alisa kit), PLC (laryngectomy, PEG) to be " scheduled   - met w/SLP 8/26    Dispo: pending clinic improvement     -- Patient and above plan to be discussed with Dr. Felipe Gamez MD   Otolaryngology-Head & Neck Surgery  Please contact ENT with questions by dialing [phone] and entering job code [phone] when prompted

## 2020-08-27 NOTE — PLAN OF CARE
Arrived from:  4A  Belongings/meds:  Cell phone, writing pad remain w/ pt  2 RN Skin Assessment Completed by:  Mansi CONDE and Aurora MENDOZA  Non-intact findings documented (yes/no/NA): Blanchable redness to bottom, barrier cream applied  Status: now s/p awake trach, DL bx on 8/3 and s/p PEG placement, DL, TL with b/l MRND and left hemithyroidectomy, extraction of 13 teeth on 8/25.  Vitals: CCM, VSS, tachy int.  Neuros: A/Ox4. 5/5.   IV: PIV TKO in between antibiotics, PIV running LR @ 50ml/hr  Resp/trach: Dilcia tube in w/ HME. Sats well on RA. Lavage Q8hrs. Using suction orally for secretions.   Diet: TF running through PEG @10 (goal of 60). 2000ml fluid restrict, 2G Na diet. Stict I&O's  Bowel status: no BM   : Voiding spontaneously  Skin: Bilateral neck incisions, bacitracin applied. NERY x4, 2 on each side of neck. Blanchable redness to bottom, barrier cream applied  Pain: Oxy x1 for pain   Activity: Up SBA  Social: Daughter Jesica designated visitor helpful w/ cares  Plan: Continue to follow POC

## 2020-08-27 NOTE — PROGRESS NOTES
[facility], [place]    Consult Note - Hospitalist Service, Gold 1       Date of Admission:  8/25/2020  Assessment & Plan   Evin Sterling is a 70 year old male with history of T4N0 SCC of the larynx, HFrEF, chronic afib, HTN, alcohol abuse, and tobacco abuse who was admitted to ENT service 8/25/2020 following laryngectomy. Medicine consulted 8/26 for assistance with medical co management. BP continues to improve today.    Brief recommendations:  - ok to restart home lisinopril 10mg daily     T4N0 SCC of the larynx: S/p total laryngectomy, hemithyroidectomy, bilateral MRND, extraction of 13 teeth and G tube placement per ENT 8/25. Required brief ICU cares, transferred out to the floor 8/26  - Management per ENT  - Cannot be intubated from above  - Pain management: Scheduled tylenol, oral oxycodone prn with IV dilaudid for breakthrough   - Bowel regimen  - PT/OT     Hemithyroidectomy:   - continue tums     Severe protein calorie malnutrition s/p G tube placement 8/25: NG placed during prior admission due to malnutrition. GT placed 8/25, nutrition following and managing TF  - Monitor Mag, Phos daily with tube feeds for refeeding      HFrEF, chronic a fib (s/p ablation 2012), HTN: Echo 8/3/2020 EF <30%, severe LV dilation, severe diffuse hypokinesis, diastolic function not assessed, RV normal. H/o cardiac cath with ablation 2012, no e/o significant CAD. EKG 8/25 A fib with RVR, rate 105. Anticoagulation held in the setting of surgery. CHADSVASC 3. PTA on metoprolol, lisinopril, spironolactone. . BP improved today, appears closer to baseline  - Continue PTA metoprolol per G tube   - restart PTA lisinopril 10mg dialy  - Continue to hold spironolactone and lisinopril for now  - Decrease IVF to 50 ml/hr overnight, will likely stop in the am at TF increased  - Resume Xarelto once safe from surgical standpoint   - 2 gm Na restriction, 2L fluid restriction   - Daily weights, strict I&Os     Urinary  retention: New onset during 8/5/2020 admission. Required intermittent straight cath and urology was consulted. Started on Flomax during prior admission with good improvement. Flomax currently on hold due to NPO status. No e/o urinary retention thus far  - Resume Flomax via GT  - Strict I&Os for now   - Straight cath prn      H/o etoh abuse: No current use     GERD: Continue PPI       The patient's care was discussed with the Patient.    Yuniel Tompkins MD  Hospitalist Service, [address], [place]  Pager: [phone]   Please see sticky note for cross cover information  ______________________________________________________________________    Interval History   Patient feels stronger today. He feels well, nausea is more manageable with tube feeds and he is active and mobile. He denies fevers, chills, shortness of breath and chest pains.     Data reviewed today: I reviewed all medications, new labs and imaging results over the last 24 hours. I personally reviewed no images or EKG's today.    Physical Exam   Vital Signs: Temp: 97.6  F (36.4  C) Temp src: Oral BP: 102/67 Pulse: 84   Resp: 16 SpO2: 97 % O2 Device: None (Room air)(HME) Oxygen Delivery: Others (comment)(LaryTube HME)  Weight: 180 lbs 5.38 oz  Gen: NAD, sitting comfortably in bed, NERY drains in neck with sero sanguinous drainage  Eyes:EOMI  Mouth: OP clear, no lesions  Neck: No cervical LAD  CV: RRR, no murmurs, 2+ radial pulses  RESP: CTA bilaterally, no w/r/c  Abd: soft, nontender, nondistended, G-tube site c/d/i  Ext: no edema bilaterally     Data   Recent Labs   Lab 08/27/20  0623 08/26/20  1510 08/26/20  0626 08/26/20  0434 08/25/20  2319  08/25/20  0643   WBC 7.2  --  12.3*  --  17.0*  --   --    HGB 11.2*  --  12.6*  --  13.3   < > 13.1*   MCV 99  --  96  --  97  --   --      --  244  --  274  --   --    INR  --   --   --   --   --   --  1.18*     --   --  144 146*   < >  --    POTASSIUM 4.0 4.3  --   3.9 3.5   < > 4.2   CHLORIDE 110*  --   --  113* 116*  --   --    CO2 29  --   --  25 24  --   --    BUN 14  --   --  10 10  --   --    CR 0.79  --   --  0.80 0.65*  --   --    ANIONGAP 4  --   --  6 5  --   --    DONNIE 7.8*  --   --  7.0* 6.7*  --   --    *  --   --  114* 104*   < >  --    ALBUMIN  --   --  1.9* 2.0*  --   --   --    PROTTOTAL  --   --   --  4.6*  --   --   --    BILITOTAL  --   --   --  0.6  --   --   --    ALKPHOS  --   --   --  73  --   --   --    ALT  --   --   --  23  --   --   --    AST  --   --   --  26  --   --   --     < > = values in this interval not displayed.

## 2020-08-27 NOTE — PLAN OF CARE
Status: POD #2 s/p total laryngectomy w/ left hemithyroidectomy, bilateral neck dissection, extraction of 13 teeth and PEG tube placement for extraction of laryngeal mass    Vitals: VSS stable on RA with HME in place. Soft Bp's.  Neuros: Neuros intact. Writes to communicate. Some generalized weakness.  IV: 2 PIV; 1 TKO between abx, 1 LR @ 50mL/hr.  Resp/trach: Lung sounds clear. Good cough. Lavaged x1 per orders. Oral suction independent.   Diet: NPO with fluid restriction. TF @ 30mL/hr and tolerating. Can advance around 1900.  Bowel status: no BM since surgery  : Voiding spontaneously  Skin: Blanchable redness on bottom. Neck incision sutured, slight edema and minimal redness. Cleansed with normal saline and aquaphor applied.  Pain: pain controlled with tylenol.  Activity: SBA for lines.  Social: Daughter at bedside.   Plan: Had some alisa teaching with speech today (see note) Encourage independence with HME changes. Continue to monitor and follow POC.

## 2020-08-28 ENCOUNTER — APPOINTMENT (OUTPATIENT)
Dept: SPEECH THERAPY | Facility: CLINIC | Age: 70
DRG: 011 | End: 2020-08-28
Attending: THORACIC SURGERY (CARDIOTHORACIC VASCULAR SURGERY)
Payer: MEDICARE

## 2020-08-28 ENCOUNTER — APPOINTMENT (OUTPATIENT)
Dept: EDUCATION SERVICES | Facility: CLINIC | Age: 70
End: 2020-08-28
Attending: STUDENT IN AN ORGANIZED HEALTH CARE EDUCATION/TRAINING PROGRAM

## 2020-08-28 ENCOUNTER — APPOINTMENT (OUTPATIENT)
Dept: OCCUPATIONAL THERAPY | Facility: CLINIC | Age: 70
DRG: 011 | End: 2020-08-28
Attending: THORACIC SURGERY (CARDIOTHORACIC VASCULAR SURGERY)
Payer: MEDICARE

## 2020-08-28 LAB
ANION GAP SERPL CALCULATED.3IONS-SCNC: 4 MMOL/L (ref 3–14)
BUN SERPL-MCNC: 12 MG/DL (ref 7–30)
CA-I BLD-MCNC: 4.1 MG/DL (ref 4.4–5.2)
CALCIUM SERPL-MCNC: 7.8 MG/DL (ref 8.5–10.1)
CHLORIDE SERPL-SCNC: 109 MMOL/L (ref 94–109)
CO2 SERPL-SCNC: 28 MMOL/L (ref 20–32)
COPATH REPORT: NORMAL
CREAT SERPL-MCNC: 0.71 MG/DL (ref 0.66–1.25)
ERYTHROCYTE [DISTWIDTH] IN BLOOD BY AUTOMATED COUNT: 14.6 % (ref 10–15)
GFR SERPL CREATININE-BSD FRML MDRD: >90 ML/MIN/{1.73_M2}
GLUCOSE SERPL-MCNC: 102 MG/DL (ref 70–99)
HCT VFR BLD AUTO: 36.6 % (ref 40–53)
HGB BLD-MCNC: 11 G/DL (ref 13.3–17.7)
MAGNESIUM SERPL-MCNC: 2.1 MG/DL (ref 1.6–2.3)
MCH RBC QN AUTO: 30.6 PG (ref 26.5–33)
MCHC RBC AUTO-ENTMCNC: 30.1 G/DL (ref 31.5–36.5)
MCV RBC AUTO: 102 FL (ref 78–100)
PHOSPHATE SERPL-MCNC: 2.6 MG/DL (ref 2.5–4.5)
PLATELET # BLD AUTO: 145 10E9/L (ref 150–450)
POTASSIUM SERPL-SCNC: 3.7 MMOL/L (ref 3.4–5.3)
RBC # BLD AUTO: 3.6 10E12/L (ref 4.4–5.9)
SODIUM SERPL-SCNC: 142 MMOL/L (ref 133–144)
WBC # BLD AUTO: 5.6 10E9/L (ref 4–11)

## 2020-08-28 PROCEDURE — 25000132 ZZH RX MED GY IP 250 OP 250 PS 637: Mod: GY | Performed by: STUDENT IN AN ORGANIZED HEALTH CARE EDUCATION/TRAINING PROGRAM

## 2020-08-28 PROCEDURE — 85027 COMPLETE CBC AUTOMATED: CPT | Performed by: OTOLARYNGOLOGY

## 2020-08-28 PROCEDURE — 40000275 ZZH STATISTIC RCP TIME EA 10 MIN

## 2020-08-28 PROCEDURE — 97530 THERAPEUTIC ACTIVITIES: CPT | Mod: GO

## 2020-08-28 PROCEDURE — 84100 ASSAY OF PHOSPHORUS: CPT | Performed by: OTOLARYNGOLOGY

## 2020-08-28 PROCEDURE — 82330 ASSAY OF CALCIUM: CPT | Performed by: OTOLARYNGOLOGY

## 2020-08-28 PROCEDURE — 25800030 ZZH RX IP 258 OP 636: Performed by: PHYSICIAN ASSISTANT

## 2020-08-28 PROCEDURE — 97535 SELF CARE MNGMENT TRAINING: CPT | Mod: GO

## 2020-08-28 PROCEDURE — 83735 ASSAY OF MAGNESIUM: CPT | Performed by: OTOLARYNGOLOGY

## 2020-08-28 PROCEDURE — 80048 BASIC METABOLIC PNL TOTAL CA: CPT | Performed by: OTOLARYNGOLOGY

## 2020-08-28 PROCEDURE — 25000128 H RX IP 250 OP 636: Performed by: STUDENT IN AN ORGANIZED HEALTH CARE EDUCATION/TRAINING PROGRAM

## 2020-08-28 PROCEDURE — 36415 COLL VENOUS BLD VENIPUNCTURE: CPT | Performed by: OTOLARYNGOLOGY

## 2020-08-28 PROCEDURE — 25000132 ZZH RX MED GY IP 250 OP 250 PS 637: Mod: GY | Performed by: PHYSICIAN ASSISTANT

## 2020-08-28 PROCEDURE — 92507 TX SP LANG VOICE COMM INDIV: CPT | Mod: GN

## 2020-08-28 PROCEDURE — 99231 SBSQ HOSP IP/OBS SF/LOW 25: CPT | Performed by: INTERNAL MEDICINE

## 2020-08-28 PROCEDURE — 27210429 ZZH NUTRITION PRODUCT INTERMEDIATE LITER: Performed by: DIETITIAN, REGISTERED

## 2020-08-28 PROCEDURE — 12000001 ZZH R&B MED SURG/OB UMMC

## 2020-08-28 RX ORDER — BISACODYL 10 MG
10 SUPPOSITORY, RECTAL RECTAL DAILY PRN
Status: DISCONTINUED | OUTPATIENT
Start: 2020-08-28 | End: 2020-09-01 | Stop reason: HOSPADM

## 2020-08-28 RX ORDER — MAGNESIUM CARB/ALUMINUM HYDROX 105-160MG
296 TABLET,CHEWABLE ORAL
Status: DISCONTINUED | OUTPATIENT
Start: 2020-08-28 | End: 2020-09-01 | Stop reason: HOSPADM

## 2020-08-28 RX ORDER — POLYETHYLENE GLYCOL 3350 17 G/17G
17 POWDER, FOR SOLUTION ORAL 2 TIMES DAILY
Status: DISCONTINUED | OUTPATIENT
Start: 2020-08-28 | End: 2020-09-01 | Stop reason: HOSPADM

## 2020-08-28 RX ORDER — OXYCODONE HCL 5 MG/5 ML
5-10 SOLUTION, ORAL ORAL EVERY 4 HOURS PRN
Qty: 420 ML | Refills: 0 | Status: SHIPPED | OUTPATIENT
Start: 2020-08-28 | End: 2022-03-07

## 2020-08-28 RX ORDER — CALCITRIOL 1 UG/ML
0.25 SOLUTION ORAL 3 TIMES DAILY
Status: DISCONTINUED | OUTPATIENT
Start: 2020-08-28 | End: 2020-09-01

## 2020-08-28 RX ADMIN — CALCITRIOL 0.25 MCG: 1 SOLUTION ORAL at 14:56

## 2020-08-28 RX ADMIN — OMEPRAZOLE 20 MG: KIT at 08:06

## 2020-08-28 RX ADMIN — ACETAMINOPHEN ORAL SOLUTION 650 MG: 325 SOLUTION ORAL at 22:23

## 2020-08-28 RX ADMIN — SODIUM CHLORIDE, POTASSIUM CHLORIDE, SODIUM LACTATE AND CALCIUM CHLORIDE: 600; 310; 30; 20 INJECTION, SOLUTION INTRAVENOUS at 00:11

## 2020-08-28 RX ADMIN — MULTIVITAMIN 15 ML: LIQUID ORAL at 08:06

## 2020-08-28 RX ADMIN — DOCUSATE SODIUM 50 MG AND SENNOSIDES 8.6 MG 2 TABLET: 8.6; 5 TABLET, FILM COATED ORAL at 08:06

## 2020-08-28 RX ADMIN — POLYETHYLENE GLYCOL 3350 17 G: 17 POWDER, FOR SOLUTION ORAL at 08:06

## 2020-08-28 RX ADMIN — CALCIUM CARBONATE (ANTACID) CHEW TAB 500 MG 2500 MG: 500 CHEW TAB at 08:05

## 2020-08-28 RX ADMIN — CALCITRIOL 0.25 MCG: 1 SOLUTION ORAL at 10:48

## 2020-08-28 RX ADMIN — CALCITRIOL 0.25 MCG: 1 SOLUTION ORAL at 21:11

## 2020-08-28 RX ADMIN — Medication 3 MG: at 21:11

## 2020-08-28 RX ADMIN — METOPROLOL TARTRATE 25 MG: 25 TABLET, FILM COATED ORAL at 21:12

## 2020-08-28 RX ADMIN — ACETAMINOPHEN 650 MG: 325 TABLET, FILM COATED ORAL at 08:57

## 2020-08-28 RX ADMIN — ENOXAPARIN SODIUM 40 MG: 40 INJECTION SUBCUTANEOUS at 08:06

## 2020-08-28 RX ADMIN — METOPROLOL TARTRATE 25 MG: 25 TABLET, FILM COATED ORAL at 08:12

## 2020-08-28 RX ADMIN — METOPROLOL TARTRATE 25 MG: 25 TABLET, FILM COATED ORAL at 03:03

## 2020-08-28 RX ADMIN — METOPROLOL TARTRATE 25 MG: 25 TABLET, FILM COATED ORAL at 13:29

## 2020-08-28 RX ADMIN — CHLORHEXIDINE GLUCONATE 0.12% ORAL RINSE 15 ML: 1.2 LIQUID ORAL at 14:44

## 2020-08-28 RX ADMIN — WHITE PETROLATUM: 1.75 OINTMENT TOPICAL at 04:53

## 2020-08-28 RX ADMIN — CHLORHEXIDINE GLUCONATE 0.12% ORAL RINSE 15 ML: 1.2 LIQUID ORAL at 21:12

## 2020-08-28 RX ADMIN — WHITE PETROLATUM: 1.75 OINTMENT TOPICAL at 21:15

## 2020-08-28 RX ADMIN — DOXAZOSIN 1 MG: 1 TABLET ORAL at 08:12

## 2020-08-28 RX ADMIN — ACETAMINOPHEN ORAL SOLUTION 650 MG: 325 SOLUTION ORAL at 16:37

## 2020-08-28 RX ADMIN — DOCUSATE SODIUM 50 MG AND SENNOSIDES 8.6 MG 2 TABLET: 8.6; 5 TABLET, FILM COATED ORAL at 21:12

## 2020-08-28 RX ADMIN — CALCIUM CARBONATE (ANTACID) CHEW TAB 500 MG 2500 MG: 500 CHEW TAB at 13:29

## 2020-08-28 RX ADMIN — WHITE PETROLATUM: 1.75 OINTMENT TOPICAL at 13:37

## 2020-08-28 RX ADMIN — CHLORHEXIDINE GLUCONATE 0.12% ORAL RINSE 15 ML: 1.2 LIQUID ORAL at 08:06

## 2020-08-28 ASSESSMENT — ACTIVITIES OF DAILY LIVING (ADL)
ADLS_ACUITY_SCORE: 16
ADLS_ACUITY_SCORE: 15
ADLS_ACUITY_SCORE: 16
ADLS_ACUITY_SCORE: 16

## 2020-08-28 NOTE — PROGRESS NOTES
[facility], [place]    Medicine Consult Note - Hospitalist Service, Gold 1       Date of Admission:  8/25/2020  Assessment & Plan   Evin Sterling is a 70 year old male with history of T4N0 SCC of the larynx, HFrEF, chronic afib, HTN, alcohol abuse, and tobacco abuse who was admitted to ENT service 8/25/2020 following laryngectomy. Medicine consulted 8/26 for assistance with medical co management.     T4N0 SCC of the larynx: S/p total laryngectomy, hemithyroidectomy, bilateral MRND, extraction of 13 teeth and G tube placement per ENT 8/25. Required brief ICU cares, transferred out to the floor 8/26  - Management per ENT  - Cannot be intubated from above  - Pain management: Scheduled tylenol, oral oxycodone prn with IV dilaudid for breakthrough   - Bowel regimen  - PT/OT     Hemithyroidectomy:   - continue tums  - agree with calcitriol + tums given consistently lower ical.     Severe protein calorie malnutrition s/p G tube placement 8/25: NG placed during prior admission due to malnutrition. GT placed 8/25, nutrition following and managing TF  - Monitor Mag, Phos daily with tube feeds for refeeding      HFrEF, chronic a fib (s/p ablation 2012), HTN: Echo 8/3/2020 EF <30%, severe LV dilation, severe diffuse hypokinesis, diastolic function not assessed, RV normal. H/o cardiac cath with ablation 2012, no e/o significant CAD. EKG 8/25 A fib with RVR, rate 105. Anticoagulation held in the setting of surgery. CHADSVASC 3. PTA on metoprolol, lisinopril, spironolactone. . BP improved today, appears closer to baseline  - Continue PTA metoprolol per G tube  (hold for SBP <100)  - Continue PTA lisinopril 10mg dialy (hold for SBP <100)  - Continue to hold spironolactone for now  - Decrease IVF to 50 ml/hr overnight, will likely stop in the am at TF increased  - Resume Xarelto once safe from surgical standpoint   - 2 gm Na restriction, 2L fluid restriction   - Daily weights, strict I&Os     Urinary  retention: New onset during 8/5/2020 admission. Required intermittent straight cath and urology was consulted. Started on Flomax during prior admission with good improvement. Flomax currently on hold due to NPO status. No e/o urinary retention thus far  - Flomax via GT      H/o etoh abuse: No current use     GERD: Continue PPI       The patient's care was discussed with the Patient and Primary team.    Yuniel Tompkins MD  Hospitalist Service, [address], [place]  Pager: [phone]   Please see sticky note for cross cover information  ______________________________________________________________________    Interval History   Patient doing well, tolerating therapy and tube feeds. He has no complaints currently    Data reviewed today: I reviewed all medications, new labs and imaging results over the last 24 hours. I personally reviewed no images or EKG's today.    Physical Exam   Vital Signs: Temp: 98.3  F (36.8  C) Temp src: Oral BP: (!) 120/93 Pulse: 82   Resp: 18 SpO2: 98 % O2 Device: None (Room air)    Weight: 180 lbs 5.38 oz  Gen: NAD, sitting comfortably in bed, NERY drains in place  Eyes: conjuctiva clear  CV: RRR, no murmurs, 2+ radial pulses  RESP: CTA bilaterally, no w/r/c  Abd: soft, nontender, nondistended    Data   Recent Labs   Lab 08/28/20  0558 08/27/20  0623 08/26/20  1510 08/26/20  0626 08/26/20  0434  08/25/20  0643   WBC 5.6 7.2  --  12.3*  --    < >  --    HGB 11.0* 11.2*  --  12.6*  --    < > 13.1*   * 99  --  96  --    < >  --    * 150  --  244  --    < >  --    INR  --   --   --   --   --   --  1.18*    144  --   --  144   < >  --    POTASSIUM 3.7 4.0 4.3  --  3.9   < > 4.2   CHLORIDE 109 110*  --   --  113*   < >  --    CO2 28 29  --   --  25   < >  --    BUN 12 14  --   --  10   < >  --    CR 0.71 0.79  --   --  0.80   < >  --    ANIONGAP 4 4  --   --  6   < >  --    DONNIE 7.8* 7.8*  --   --  7.0*   < >  --    * 121*  --   --  114*    < >  --    ALBUMIN  --   --   --  1.9* 2.0*  --   --    PROTTOTAL  --   --   --   --  4.6*  --   --    BILITOTAL  --   --   --   --  0.6  --   --    ALKPHOS  --   --   --   --  73  --   --    ALT  --   --   --   --  23  --   --    AST  --   --   --   --  26  --   --     < > = values in this interval not displayed.

## 2020-08-28 NOTE — PLAN OF CARE
Status: POD #3 s/p total laryngectomy w/ left hemithyroidectomy, bilateral neck dissection, extraction of 13 teeth and PEG tube placement for extraction of laryngeal mass   Vitals: VSS ex tachy intermittent. On CCM, hx a-fib    Neuros: Writes to communicate, intact. Generalized weakness   IV: PIV x2, infusing LR @ 50 ml/hr and TKO   Resp/trach: Laryngeal stoma with HME in place overnight, satting >95% on RA. Lavage x1 with good productive cough. Suctions self orally  Diet: NPO. TF @ 50 ml/hr, can advance @ 1100, tolerating well. Strict I+O in place   Bowel status: No BM since surgery   : Voiding   Skin: Blanching redness to bottom, weight shift encouraged. Neck incision with sutures, reddened with slight edema but looks good, cleansed per order. JPx4 to neck.  Pain: Generalized discomfort controlled with scheduled Tylenol   Activity: SBA    Social: Daughter primary visitor   Plan: Extra alisa tube placed in room. Encourage own cares. Continue to monitor and follow POC

## 2020-08-28 NOTE — PROGRESS NOTES
"Otolaryngology Progress Note  August 28, 2020  24 hr events:   - resumed metoprolol (held in PM for soft BP)  - BP improved   - advancing TF (at 50)  - phos replaced     S: breathing well, suctioning frequently. Does feel he has thick oral secretions. No significant nausea with advancing TF. Feels BM is coming. Pain controlled. Slept much better last night with early melatonin dose.     O: /84   Pulse 84   Temp 98.2  F (36.8  C) (Oral)   Resp 18   Ht 1.778 m (5' 10\")   Wt 81.8 kg (180 lb 5.4 oz)   SpO2 97%   BMI 25.88 kg/m     General: Alert and oriented x 3, sitting up in bed comfortably.    HEENT: EOMI. HB 1/6. Stoma patent with small amount of crusting at incision edges, stoma is deep. Neck incision c/d/i. No significant swelling of neck to suggest hematoma/seroma. Neck drains intact with sanguinous drainage in all 4 bulbs. Dental extraction sites are sutured and healing as expected. No intraoral bleeding or purulence. Some secretions visualized.    Pulmonary: HME in place. No accessory muscle use.    NERY drain output(s): (last 24 hours)/(last shift)  1 right lateral neck- NR/40/13(53)  2 right medial neck- NR/35/6 (41)  3 left medial neck- NR/25/10 (35)  4 left lateral neck- NR/15/8 (23)    LABS:  BMP  Recent Labs   Lab 08/28/20  0558 08/27/20  0623 08/26/20  1510 08/26/20  0434 08/25/20  2319    144  --  144 146*   POTASSIUM 3.7 4.0 4.3 3.9 3.5   CHLORIDE 109 110*  --  113* 116*   DONNIE 7.8* 7.8*  --  7.0* 6.7*   CO2 28 29  --  25 24   BUN 12 14  --  10 10   CR 0.71 0.79  --  0.80 0.65*   * 121*  --  114* 104*     CBC  Recent Labs   Lab 08/28/20  0558 08/27/20  0623 08/26/20  0626 08/25/20  2319   WBC 5.6 7.2 12.3* 17.0*   RBC 3.60* 3.62* 4.07* 4.32*   HGB 11.0* 11.2* 12.6* 13.3   HCT 36.6* 35.9* 39.2* 41.7   * 99 96 97   MCH 30.6 30.9 31.0 30.8   MCHC 30.1* 31.2* 32.1 31.9   RDW 14.6 14.7 14.6 14.5   * 150 244 274     Prior labs: TSH 0.91, albumin 1.9, prealbumin 11, PTH " "34  ICal: 4.1  Phos: 2.6  M.1    A/P: Evin Sterling is a 70 year old male with a past medical history of CHF (EF<30%), afib, htn and alcohol and tobacco dependence who presented with T4N0 SCC of the larynx, now s/p awake trach, DL bx on 8/3 and s/p PEG placement, DL, TL with b/l MRND and left hemithyroidectomy, extraction of 13 teeth on .    Neuro: kishor. Tylenol, PRN oxycodone     HEENT:   Routine laryngectomy Cares:   1. Clean and remove crusting at stoma opening three times daily and as needed to keep dried secretions from accumulating and falling into airway.  2. If you have a laryngectomy tube (small silicone tube that sits in the stoma), remove tube to clean with soap and water three times daily and as needed.   3. Lavage stoma with 2-3 mL sterile saline every 8 hours and PRN. Allow patient to clear lavage and secretions with cough. Assist with suctioning as needed.   4. Suction laryngectomy site as needed with red suarez catheter.   5. Humidity is very important to keep the airway moist and secretions thin. Wear either an HME device or humidified air via trach dome collar at all times.      -incision cares bacitracin x24hrs then aquaphor  - JPx4 to bulb suction, strip/record output q8hrs. Even if zero record \"0\"  - Peridex TID, okay with mouth swabs by RN if complaining of dry mouth     Respiratory: laryngectomy and CANNOT be intubated from above.   - Larytube & HME in place      CV/ Heme: BP stabilizing, resumed metoprolol. Per med OK to resume lisinopril (held dose yesterday d/t soft BP)  - medicine consult, recs: resume PTA metoprolol, resume lisinopril  - continue holding spironolactone  - IVF TKO     GI/FEN: No BM yet, PRNs available. Tolerating TF advancement   - strict NPO, G-tube in place,  -  TF approaching goal (60). Will plan to keep TF at 60mL/hr for 24 hours prior to holding before bolus   - s/p scheduled zofran x48h. Now PRN   - Scheduled senna-docusate, miralax. PRN MoM, mag citrate, " suppository. No BM yet.     :  morales out, hx of post-op urinary retention. UOP has been appropriate. Started cardura  - continue cardura      Endo: iCal remains low. Phos replaced yesterday, OK today.   - Tums 2.5g QID  - add calcitriol 0.25 TID  - repeat PTH tomorrow      ID:  unasyn x48hr (complete 8/27)    Ppx: SCDs, lovenox started 8/27     Consults: PT/OT, SLP (HME, alisa kit), PLC (laryngectomy, PEG) 8/28 and 8/30  - met w/SLP 8/26    Dispo: pending clinic improvement     -- Patient and above plan to be discussed with Dr. Felipe Navarro MD   Otolaryngology-Head & Neck Surgery  Please contact ENT with questions by dialing [phone] and entering job code [phone] when prompted

## 2020-08-28 NOTE — PLAN OF CARE
Status:  POD #3 s/p total laryngectomy w/ left hemithyroidectomy, bilateral neck dissection, extraction of 13 teeth and PEG tube placement for extraction of laryngeal mass   Vitals: VSS w/HME, on CCM  Neuros: Alert & oriented x4, writes to communicate, GW   IV: PIV TKO  Resp/trach: Denies SOB, alisa tube in place, cares completed, lavaged x1, suctioned x1, pt independently suctioning orally   Diet: NPO, TF infusing at goal of 60mL/hr  Bowel status: BS+, small BM   : Voiding without difficulty  Skin: Blanchable redness to bottom, neck incision with sutures, reddened with slight edema, NERY x4 to neck   Pain: Pt reporting soreness around neck, improved with PRN Tylenol  Activity: Up SBA   Social: Daughter at bedside, involved in cares   Plan: Continue to monitor and follow POC , PLC TF completed this shift, encourage own cares, switching to bolus feeds tomorrow afternoon

## 2020-08-28 NOTE — PROGRESS NOTES
BRIEF PROGRESS NOTE    PEG tube and the insertion site check complete.  Tube in appropriate position and with appropriate tension.  No erythema or induration.  Pt educated on how to maintain appropriate tension and demonstrated understanding.  No other scheduled PEG follow up necessary.    John Hinkle PA-C  P: [phone]

## 2020-08-28 NOTE — PROGRESS NOTES
Care Coordinator Progress Note    Admission Date/Time:  8/25/2020  Attending MD:  Dr Caron Wang    Data  Chart reviewed, discussed with interdisciplinary team. In 6A Discharge Rounds Dr Navarro reported pt had a low calcium; on medication for BP. Almost at tube feeding goal; will keep on continuous feedings at goal rate for 24 hours before starting gravity feedings. Had some nausea to begin with. Drains. Anticipate discharge on Sunday, 8-30.      Patient was admitted for:  Squamous cell carcinoma of larynx  Procedure:    Direct laryngoscopy   Dental extractions x 13  Total laryngectomy  Left hemithyroidectomy  Bilateral neck dissections (levels II-IV)  Cricopharyngeal myotomy    Past history includes:  A-fib; CHF; HTN; GERD.     Concerns with insurance coverage for discharge needs: Limited coverage.  Pt's insurance is Medicare Part A. Medicare Part A does not cover home care or home medical supplies. Home medical supplies are private pay.     Current Living Situation: Patient lives with family.  Support System: Supportive  Services Involved: DME  Transportation at Discharge: Family or friend will provide  Transportation to Medical Appointments:   - Daughter  Barriers to Discharge: None    Coordination of Care and Referrals  Pt known to me from previous admission. He was discharged at that time with a new trach & NG tube for tube feedings. Trach and tube feeding supplies were ordered from [facility]. Pt only has Medicare Part A. Pt was over assets and did not qualify for MA. Home supplies were private pay.     Introduced myself to pt. He was alert, sitting of edge of bed. Confirmed with pt he still has suction, humidity and tube feeding supplies at home. Pt denied any other needs.     Called [facility] and confirmed they did dispense Isosource 1.5 tube feeding, suction and humidity supplies to pt on his previous admission.       Assessment  Pt s/p recent trach & NG tube placement now admitted for a  laryngectomy & new g-tube placement. Will need laryngectomy & g-tube teaching. Pt needs to be independent with cares prior to discharge. No insurance coverage for outpt or home care services.     Plan  Anticipated Discharge Date:  8-30 if medically stable, tolerating tube feeding; independent with g-tube & aryngectomy cares.    Anticipated Discharge Plan:  Discharge home with daughter.   --Pt to practice laryngectomy & g-tube cares with nursing at the bedside.   --RNCC will send [facility] script to [facility]. ([facility] will provide the HMEs in the future)  --Pt already has suction, humidity and tube feeding supplies at home. Will not need to wait on any deliveries for hospital discharge.        Ale Combs RN Care Coordinator  Unit 6[address], [facility]

## 2020-08-28 NOTE — PLAN OF CARE
Discharge Planner SLP   Patient plan for discharge: none stated   Current status: Pt seen for f/u this afternoon. Reportedly has been tolerating alisa tube and HME well, but does have an increase in secretions today. Per ENT, pt is ok to wear larytube throughout day as tolerated. Pt can receive O2 via trach dome with HME in place, however, humidity should be off if HME is in place. HME should be changed at least every 24 hours or more frequently if visibly soiled. If pt is demonstrating increased difficulty breathing, remove larytube and HME. Larytube should be cleaned with provided brushes at least daily, or more frequently as needed. SLP to follow daily.  Barriers to return to prior living situation: Stoma/alisa cares, communication, TF  Recommendations for discharge: home with OP ST follow at ENT clinic   Rationale for recommendations: Pt with new communication and swallow needs s/p laryngectomy; pt will benefit from ongoing ST targeting these deficits.       Entered by: Anette Pugh 08/28/2020 5:23 PM

## 2020-08-28 NOTE — PROGRESS NOTES
"CLINICAL NUTRITION SERVICES - BRIEF NOTE      Nutrition Prescription     RECOMMENDATIONS FOR MDs/PROVIDERS TO ORDER:  None at this time      Recommendations already ordered by Registered Dietitian (RD):  Isosource 1.5 @ goal 60 ml/hr (1440 ml/day) to provide 2160 kcals (27 kcal/kg/day), 98 g PRO (1.2 g/kg/day), 1094 ml free H2O, 253 g CHO and 22 g Fiber daily.    Future/Additional Recommendations:  When medically able to transition to bolus- due to history of intolerance, recommend the following schedule:   1. Stop continuous TF for 1-2 hrs to let stomach empty prior to starting gravity feeding. Begin 1st gravity feeding @ 125 mL x 1 feedings (separate 3-4 hrs apart). If tolerated, increase vol to 250 mL x 2 feedings and then increase to 375 mL x 2 feedings. If tolerated increase to goal vol of 500 ml x 3feedings per day.   Goal is for patient to receive 6 cans Isosource 1.5 per day while NPO or taking minimal PO.  --Consider 1.5 cans x4 feedings per day if patient does not tolerate 2 cans x3 per day.      2. Flush with 125 mL of H20 before and after each feeding if TF to provide 2140 mL free water per day. Do not give feedings at night while pt asleep (during the day only).    3. If patient continues to have nausea with 6 cans isosource, trial Twocal HN as follows: 4 cans daily ( 2 cans BID vs 1 can QID. This regimen provides: 948 mL/day,  1896 kcals (24 kcal/kg/day), 79 g PRO (1 g/kg/day), 663mL H2O, 207 g CHO and 5 g Fiber daily. Recommend addition of 1-2 packets Prosource (40 calories and 11 g protein each).   Will need additional water flushes due to concentrated formula       *Please see full assessment note from 8/26/20    New Findings:  Nutrition support @ 50 mL/hr- goal of 60 mL/hr continuous. No nausea per nursing today. Per medicine note yesterday,\"nausea is more manageable with tube feeds\"  Labs: reviewed     Interventions  Enteral Nutrition -Continue     RD to follow per protocol.    Courtney Lin RD, " LD  6B pager: [phone]

## 2020-08-28 NOTE — CONSULTS
Dorian and his daughter Jesica were seen bedside for tube feeding education. He was home on bolus/gravity feeds via an NG prior to this surgery so just needed Gtube cares. They asked good questions  And stated understanding of all information.    Literature given: Handwashing and Skin Care, Caring for Your Tube at Home.

## 2020-08-28 NOTE — PLAN OF CARE
Discharge Planner OT   Patient plan for discharge: home w/ assistance  Current status: Pt demonstrated ability to ambulate functional distance of ~450 ft SBA. SBA while using railing for 12 stairs. O2 stable on RA during session. Educated pt on fall prevention strategies w/ functional mobility/stairs. Pt reporting general fatigue at end of session.   Barriers to return to prior living situation: medical status, post-surgical precautions  Recommendations for discharge: anticipate home w/ assistance  Rationale for recommendations: Pt is progressing well w/ skilled occupational therapy in ADL/IADLs, anticipate transition home w/ family assistance as needed for ADL/IADL support.        Entered by: Kaley Brady 08/28/2020 9:07 AM

## 2020-08-29 ENCOUNTER — APPOINTMENT (OUTPATIENT)
Dept: SPEECH THERAPY | Facility: CLINIC | Age: 70
DRG: 011 | End: 2020-08-29
Attending: THORACIC SURGERY (CARDIOTHORACIC VASCULAR SURGERY)
Payer: MEDICARE

## 2020-08-29 LAB
ANION GAP SERPL CALCULATED.3IONS-SCNC: 2 MMOL/L (ref 3–14)
BUN SERPL-MCNC: 10 MG/DL (ref 7–30)
CA-I BLD-MCNC: 4.4 MG/DL (ref 4.4–5.2)
CALCIUM SERPL-MCNC: 7.9 MG/DL (ref 8.5–10.1)
CHLORIDE SERPL-SCNC: 108 MMOL/L (ref 94–109)
CO2 SERPL-SCNC: 31 MMOL/L (ref 20–32)
CREAT SERPL-MCNC: 0.65 MG/DL (ref 0.66–1.25)
ERYTHROCYTE [DISTWIDTH] IN BLOOD BY AUTOMATED COUNT: 14.6 % (ref 10–15)
GFR SERPL CREATININE-BSD FRML MDRD: >90 ML/MIN/{1.73_M2}
GLUCOSE SERPL-MCNC: 91 MG/DL (ref 70–99)
HCT VFR BLD AUTO: 35.8 % (ref 40–53)
HGB BLD-MCNC: 11 G/DL (ref 13.3–17.7)
MAGNESIUM SERPL-MCNC: 2.1 MG/DL (ref 1.6–2.3)
MCH RBC QN AUTO: 30.9 PG (ref 26.5–33)
MCHC RBC AUTO-ENTMCNC: 30.7 G/DL (ref 31.5–36.5)
MCV RBC AUTO: 101 FL (ref 78–100)
PHOSPHATE SERPL-MCNC: 2.5 MG/DL (ref 2.5–4.5)
PLATELET # BLD AUTO: 159 10E9/L (ref 150–450)
POTASSIUM SERPL-SCNC: 4 MMOL/L (ref 3.4–5.3)
PTH-INTACT SERPL-MCNC: 45 PG/ML (ref 18–80)
RBC # BLD AUTO: 3.56 10E12/L (ref 4.4–5.9)
SODIUM SERPL-SCNC: 142 MMOL/L (ref 133–144)
WBC # BLD AUTO: 4.8 10E9/L (ref 4–11)

## 2020-08-29 PROCEDURE — 25000132 ZZH RX MED GY IP 250 OP 250 PS 637: Mod: GY | Performed by: INTERNAL MEDICINE

## 2020-08-29 PROCEDURE — 92507 TX SP LANG VOICE COMM INDIV: CPT | Mod: GN

## 2020-08-29 PROCEDURE — 25000132 ZZH RX MED GY IP 250 OP 250 PS 637: Mod: GY | Performed by: STUDENT IN AN ORGANIZED HEALTH CARE EDUCATION/TRAINING PROGRAM

## 2020-08-29 PROCEDURE — 83970 ASSAY OF PARATHORMONE: CPT | Performed by: STUDENT IN AN ORGANIZED HEALTH CARE EDUCATION/TRAINING PROGRAM

## 2020-08-29 PROCEDURE — 40000275 ZZH STATISTIC RCP TIME EA 10 MIN

## 2020-08-29 PROCEDURE — 25000132 ZZH RX MED GY IP 250 OP 250 PS 637: Mod: GY | Performed by: PHYSICIAN ASSISTANT

## 2020-08-29 PROCEDURE — 85027 COMPLETE CBC AUTOMATED: CPT | Performed by: OTOLARYNGOLOGY

## 2020-08-29 PROCEDURE — 80048 BASIC METABOLIC PNL TOTAL CA: CPT | Performed by: OTOLARYNGOLOGY

## 2020-08-29 PROCEDURE — 99231 SBSQ HOSP IP/OBS SF/LOW 25: CPT | Performed by: INTERNAL MEDICINE

## 2020-08-29 PROCEDURE — 12000001 ZZH R&B MED SURG/OB UMMC

## 2020-08-29 PROCEDURE — 25000128 H RX IP 250 OP 636: Performed by: STUDENT IN AN ORGANIZED HEALTH CARE EDUCATION/TRAINING PROGRAM

## 2020-08-29 PROCEDURE — 82330 ASSAY OF CALCIUM: CPT | Performed by: OTOLARYNGOLOGY

## 2020-08-29 PROCEDURE — 84100 ASSAY OF PHOSPHORUS: CPT | Performed by: OTOLARYNGOLOGY

## 2020-08-29 PROCEDURE — 83735 ASSAY OF MAGNESIUM: CPT | Performed by: OTOLARYNGOLOGY

## 2020-08-29 PROCEDURE — 36415 COLL VENOUS BLD VENIPUNCTURE: CPT | Performed by: OTOLARYNGOLOGY

## 2020-08-29 RX ORDER — MAGNESIUM HYDROXIDE 1200 MG/15ML
LIQUID ORAL
Status: DISPENSED
Start: 2020-08-29 | End: 2020-08-30

## 2020-08-29 RX ADMIN — METOPROLOL TARTRATE 25 MG: 25 TABLET, FILM COATED ORAL at 08:04

## 2020-08-29 RX ADMIN — WHITE PETROLATUM: 1.75 OINTMENT TOPICAL at 12:23

## 2020-08-29 RX ADMIN — OMEPRAZOLE 20 MG: KIT at 08:03

## 2020-08-29 RX ADMIN — ENOXAPARIN SODIUM 40 MG: 40 INJECTION SUBCUTANEOUS at 08:03

## 2020-08-29 RX ADMIN — CALCITRIOL 0.25 MCG: 1 SOLUTION ORAL at 21:06

## 2020-08-29 RX ADMIN — ACETAMINOPHEN ORAL SOLUTION 650 MG: 325 SOLUTION ORAL at 16:36

## 2020-08-29 RX ADMIN — ACETAMINOPHEN ORAL SOLUTION 650 MG: 325 SOLUTION ORAL at 03:45

## 2020-08-29 RX ADMIN — ACETAMINOPHEN ORAL SOLUTION 650 MG: 325 SOLUTION ORAL at 22:34

## 2020-08-29 RX ADMIN — WHITE PETROLATUM: 1.75 OINTMENT TOPICAL at 21:06

## 2020-08-29 RX ADMIN — CHLORHEXIDINE GLUCONATE 0.12% ORAL RINSE 15 ML: 1.2 LIQUID ORAL at 08:03

## 2020-08-29 RX ADMIN — CALCIUM CARBONATE (ANTACID) CHEW TAB 500 MG 2500 MG: 500 CHEW TAB at 08:04

## 2020-08-29 RX ADMIN — CALCITRIOL 0.25 MCG: 1 SOLUTION ORAL at 08:03

## 2020-08-29 RX ADMIN — METOPROLOL TARTRATE 25 MG: 25 TABLET, FILM COATED ORAL at 02:59

## 2020-08-29 RX ADMIN — CHLORHEXIDINE GLUCONATE 0.12% ORAL RINSE 15 ML: 1.2 LIQUID ORAL at 14:09

## 2020-08-29 RX ADMIN — POTASSIUM CHLORIDE 20 MEQ: 1.5 POWDER, FOR SOLUTION ORAL at 08:03

## 2020-08-29 RX ADMIN — CALCIUM CARBONATE (ANTACID) CHEW TAB 500 MG 2500 MG: 500 CHEW TAB at 16:36

## 2020-08-29 RX ADMIN — Medication 3 MG: at 21:06

## 2020-08-29 RX ADMIN — DOXAZOSIN 1 MG: 1 TABLET ORAL at 08:03

## 2020-08-29 RX ADMIN — WHITE PETROLATUM: 1.75 OINTMENT TOPICAL at 06:12

## 2020-08-29 RX ADMIN — METOPROLOL TARTRATE 25 MG: 25 TABLET, FILM COATED ORAL at 23:42

## 2020-08-29 RX ADMIN — CHLORHEXIDINE GLUCONATE 0.12% ORAL RINSE 15 ML: 1.2 LIQUID ORAL at 21:05

## 2020-08-29 RX ADMIN — CALCIUM CARBONATE (ANTACID) CHEW TAB 500 MG 2500 MG: 500 CHEW TAB at 21:06

## 2020-08-29 RX ADMIN — METOPROLOL TARTRATE 25 MG: 25 TABLET, FILM COATED ORAL at 14:09

## 2020-08-29 RX ADMIN — POLYETHYLENE GLYCOL 3350 17 G: 17 POWDER, FOR SOLUTION ORAL at 21:06

## 2020-08-29 RX ADMIN — CALCIUM CARBONATE (ANTACID) CHEW TAB 500 MG 2500 MG: 500 CHEW TAB at 12:23

## 2020-08-29 RX ADMIN — DOCUSATE SODIUM 50 MG AND SENNOSIDES 8.6 MG 2 TABLET: 8.6; 5 TABLET, FILM COATED ORAL at 21:06

## 2020-08-29 RX ADMIN — CALCITRIOL 0.25 MCG: 1 SOLUTION ORAL at 14:09

## 2020-08-29 RX ADMIN — MULTIVITAMIN 15 ML: LIQUID ORAL at 08:03

## 2020-08-29 RX ADMIN — POLYETHYLENE GLYCOL 3350 17 G: 17 POWDER, FOR SOLUTION ORAL at 08:03

## 2020-08-29 RX ADMIN — ACETAMINOPHEN ORAL SOLUTION 650 MG: 325 SOLUTION ORAL at 10:10

## 2020-08-29 RX ADMIN — LISINOPRIL 10 MG: 10 TABLET ORAL at 08:03

## 2020-08-29 ASSESSMENT — ACTIVITIES OF DAILY LIVING (ADL)
ADLS_ACUITY_SCORE: 14
ADLS_ACUITY_SCORE: 16
ADLS_ACUITY_SCORE: 14
ADLS_ACUITY_SCORE: 16

## 2020-08-29 ASSESSMENT — MIFFLIN-ST. JEOR: SCORE: 1633.55

## 2020-08-29 NOTE — PROGRESS NOTES
"Otolaryngology Progress Note  August 29, 2020     S: No issues overnight.      O: /86 (BP Location: Left arm)   Pulse 68   Temp 98.3  F (36.8  C) (Oral)   Resp 14   Ht 1.778 m (5' 10\")   Wt 85.7 kg (189 lb)   SpO2 97%   BMI 27.12 kg/m                   General: Alert and oriented x 3, sitting up in chair comfortably                 HEENT: EOMI. HB 1/6. Stoma patent with small amount of crusting at incision edges, stoma is deep. Neck incision c/d/i. No significant swelling of neck to suggest hematoma/seroma. Neck drains intact with sanguinous drainage in all 4 bulbs, 1 removed today- right medial. Dental extraction sites are sutured and healing as expected. No intraoral bleeding or purulence. Some secretions visualized.                  Pulmonary: HME in place. No accessory muscle use.     NERY drain output(s): (last 24 hours)/(last shift): reviewed. Right medial removed.      LABS: reviewed. ICal and PTH normal.   Prior labs: TSH 0.91, albumin 1.9, prealbumin 11     A/P: Evin Sterling is a 70 year old male with a past medical history of CHF (EF<30%), afib, htn and alcohol and tobacco dependence who presented with T4N0 SCC of the larynx, now s/p awake trach, DL bx on 8/3 and s/p PEG placement, DL, TL with b/l MRND and left hemithyroidectomy, extraction of 13 teeth on 8/25.     Neuro: kishor. Tylenol, PRN oxycodone     HEENT:   Routine laryngectomy Cares:   1. Clean and remove crusting at stoma opening three times daily and as needed to keep dried secretions from accumulating and falling into airway.  2. If you have a laryngectomy tube (small silicone tube that sits in the stoma), remove tube to clean with soap and water three times daily and as needed.   3. Lavage stoma with 2-3 mL sterile saline every 8 hours and PRN. Allow patient to clear lavage and secretions with cough. Assist with suctioning as needed.   4. Suction laryngectomy site as needed with red suarez catheter.   5. Humidity is very important to " "keep the airway moist and secretions thin. Wear either an HME device or humidified air via trach dome collar at all times.      -incision cares bacitracin x24hrs then aquaphor  - JPx4 to bulb suction, strip/record output q8hrs. Even if zero record \"0\"  - Peridex TID, okay with mouth swabs by RN if complaining of dry mouth     Respiratory: laryngectomy and CANNOT be intubated from above.   - Larytube & HME in place      CV/ Heme: BP stabilizing, resumed metoprolol. Per med OK to resume lisinopril (held dose yesterday d/t soft BP)  - medicine consult, recs: resume PTA metoprolol, resume lisinopril  - continue holding spironolactone  - IVF TKO     GI/FEN: No BM yet, PRNs available. Tolerating TF advancement   - strict NPO, G-tube in place,  -  TF goal (60). Bolus today.   - s/p scheduled zofran x48h. Now PRN   - Scheduled senna-docusate, miralax. PRN MoM, mag citrate, suppository. No BM yet. Would like to wait on more bowel medications today.      :  morales out, hx of post-op urinary retention. UOP has been appropriate. Started cardura  - continue cardura      Endo: iCal normal. PTH normal.   - Tums 2.5g QID  - calcitriol 0.25 TID     ID:  unasyn x48hr (complete 8/27)     Ppx: SCDs, lovenox started 8/27     Consults: PT/OT, SLP (HME, alisa kit), PLC (laryngectomy, PEG) 8/28 and 8/30  - met w/SLP 8/26     Dispo: pending clinic improvement      -- Patient and above plan to be discussed with Dr. Felipe Gamez MD   Otolaryngology-Head & Neck Surgery  Please contact ENT with questions by dialing [phone] and entering job code [phone] when prompted       "

## 2020-08-29 NOTE — PLAN OF CARE
Status: POD4  s/p total laryngectomy with left hemithyroidectomy, bilateral neck dissection, extraction of 13 teeth and PEG tube placement for extraction of laryngeal mass   Vitals: AVSS on CCM  Neuros: A&Ox4   IV: PIV SL  Resp/trach: LS wheezy in lower lobes bilaterally- clearing after SXN, alisa tube with HME in place, cares completed, lavage x2 and shallow sxn x2 for small amounts of tan/ thick secretions, pt independently suctioning orally.   Diet: NPO, TF infusing at goal of 60mL/hr. Bolus feed to start this afternoon  Bowel status: BS+ x4, passing gas - no BM this shift  : VDSP in bedside urinal  Skin: Neck incision and alisa stoma intact with sutures, reddened with slight edema, NERY x4 to neck.   Pain: Neck pain well controlled with scheduled Tylenol  Activity: Up SBA  Plan: PLC on Sunday at 0900 for Alisa cares. Will continue to monitor and follow with POC.

## 2020-08-29 NOTE — PLAN OF CARE
Discharge Planner SLP   Patient plan for discharge: hopeful for disch Tuesday  Current status: Pt seen for f/u this afternoon with his daughter present. He is writing with boogie board as primary communication method at this time. Pt has been demonstrating continuous alisa tube/HME placement on room air with stable vitals and comfortable breathing. Secretion management is improved today. The pt is able to don/doff alisa tube but reports that trach times are difficult for him to self manage. Recommend the patient continue to wear larytube throughout day as tolerated.  HME should be replaced every 24 hours or more frequently if visibly soiled. If pt is demonstrating increased difficulty breathing, remove larytube and HME. Larytube should be cleaned with provided brushes at least daily, or more frequently as needed. SLP is following daily.  Barriers to return to prior living situation: Stoma/alisa cares, communication, TF  Recommendations for discharge: home with OP ST follow at ENT clinic   Rationale for recommendations: Pt with new communication and swallow needs s/p laryngectomy; pt will benefit from ongoing ST targeting these deficits.       Entered by: Anette Pugh 08/29/2020 1:44 PM

## 2020-08-29 NOTE — PLAN OF CARE
Status:  POD #3 s/p total laryngectomy w/ left hemithyroidectomy, bilateral neck dissection, extraction of 13 teeth and PEG tube placement for extraction of laryngeal mass   Vitals: VSS w/HME, on CCM  Neuros: Alert & oriented x4, writes to communicate, GW   IV: PIVx1-SL   Resp/trach: Denies SOB, alisa tube in place, cares completed, Refused suctioning, suctioned x1, pt independently suctioning orally   Diet: NPO, TF infusing at goal of 60mL/hr. Bolus feed to start tomorrow 8/29  Bowel status: BS+ passing gas, no bm this shift   : Voiding without difficulty  Skin: Blanchable redness to bottom, neck incision with sutures, reddened with slight edema, NERY x4 to neck   Pain: Pt reporting soreness around neck, improved with PRN Tylenol.  Activity: Up SBA. Walked around the unit x4   Plan: Continue to monitor and follow POC encourage own cares, switching to bolus feeds tomorrow afternoon

## 2020-08-29 NOTE — PROGRESS NOTES
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 Medicine consulted 8/26 for assistance with medical co management.    Updates:  - ok to resume spironolactone  - ok to stop checking mag and phos they have been stable with tube feeds  - ok to consider stopping daily cbc as values have been stable     T4N0 SCC of the larynx: S/p total laryngectomy, hemithyroidectomy, bilateral MRND, extraction of 13 teeth and G tube placement per ENT 8/25. Required brief ICU cares, transferred out to the floor 8/26  - Management per ENT  - Cannot be intubated from above  - Pain management: Scheduled tylenol, oral oxycodone prn with IV dilaudid for breakthrough   - Bowel regimen  - PT/OT     Hemithyroidectomy:   - continue tums  - agree with calcitriol + tums given consistently lower ical.     Severe protein calorie malnutrition s/p G tube placement 8/25: NG placed during prior admission due to malnutrition. GT placed 8/25, nutrition following and managing TF  - Can stop checking electrolytes for tube feeding as they have been stable     HFrEF, chronic a fib (s/p ablation 2012), HTN: Echo 8/3/2020 EF <30%, severe LV dilation, severe diffuse hypokinesis, diastolic function not assessed, RV normal. H/o cardiac cath with ablation 2012, no e/o significant CAD. EKG 8/25 A fib with RVR, rate 105. Anticoagulation held in the setting of surgery. CHADSVASC 3. PTA on metoprolol, lisinopril, spironolactone. . BP improved today, appears closer to baseline  - Continue PTA metoprolol per G tube  (hold for SBP <100)  - Continue PTA lisinopril 10mg daily per G tube(hold for SBP <100)  - Ok to resume spironolactone daily per G tube  - Resume  Xarelto once safe from surgical standpoint   - 2 gm Na restriction, 2L fluid restriction   - Daily weights, strict I&Os     Urinary retention: New onset during 8/5/2020 admission. Required intermittent straight cath and urology was consulted. Started on Flomax during prior admission with good improvement. No e/o urinary retention thus far  - Flomax via GT      H/o etoh abuse: No current use     GERD: Continue PPI    The patient's care was discussed with the Patient. Communication with the primary team via this note    Yuniel Tompkins MD  Hospitalist Service, [address], [place]  Pager: [phone]   Please see sticky note for cross cover information  ______________________________________________________________________    Interval History   No acute events overnight. Patient is doing well and is confident moving forward with his cares.     Otherwise 4pt ROS is negative    Data reviewed today: I reviewed all medications, new labs and imaging results over the last 24 hours. I personally reviewed no images or EKG's today.    Physical Exam   Vital Signs: Temp: 98.3  F (36.8  C) Temp src: Oral BP: 127/83 Pulse: 80   Resp: 14 SpO2: 97 % O2 Device: None (Room air)    Weight: 189 lbs 0 oz  Gen: NAD, sitting comfortably in chair  Mouth: OP clear, no lesions  Neck: NERY drains with serosanguinous liquid  RESP: CTA bilaterally, no w/r/c  Abd: soft, nontender, nondistended, G-tube site c/d/i    Data   Recent Labs   Lab 08/29/20  0650 08/28/20  0558 08/27/20  0623  08/26/20  0626 08/26/20  0434  08/25/20  0643   WBC 4.8 5.6 7.2  --  12.3*  --    < >  --    HGB 11.0* 11.0* 11.2*  --  12.6*  --    < > 13.1*   * 102* 99  --  96  --    < >  --     145* 150  --  244  --    < >  --    INR  --   --   --   --   --   --   --  1.18*    142 144  --   --  144   < >  --    POTASSIUM 4.0 3.7 4.0   < >  --  3.9   < > 4.2   CHLORIDE 108 109 110*  --   --  113*   < >  --    CO2 31 28 29   --   --  25   < >  --    BUN 10 12 14  --   --  10   < >  --    CR 0.65* 0.71 0.79  --   --  0.80   < >  --    ANIONGAP 2* 4 4  --   --  6   < >  --    DONNIE 7.9* 7.8* 7.8*  --   --  7.0*   < >  --    GLC 91 102* 121*  --   --  114*   < >  --    ALBUMIN  --   --   --   --  1.9* 2.0*  --   --    PROTTOTAL  --   --   --   --   --  4.6*  --   --    BILITOTAL  --   --   --   --   --  0.6  --   --    ALKPHOS  --   --   --   --   --  73  --   --    ALT  --   --   --   --   --  23  --   --    AST  --   --   --   --   --  26  --   --     < > = values in this interval not displayed.

## 2020-08-29 NOTE — PLAN OF CARE
VSS. On CCM, Afib. Scheduled tylenol given for generalized pain. Pt has laryngectomy; suctioned/lavaged x 2. Halley tube in place with HME. Bilateral neck incisions, Aquaphor applied. Neck JPs x 3 with scant amount of drainage. NPO. Tolerating first bolus feed via PEG of 125 ml (next one can be done around 1800). Voiding spontaneously. Had BM this afternoon. Walked halls multiple times with RN and daughter. Plan is to have PLC tomorrow at 0900 for laryngectomy cares. Pt is able to make needs known, communicates by using writing board.

## 2020-08-30 ENCOUNTER — APPOINTMENT (OUTPATIENT)
Dept: SPEECH THERAPY | Facility: CLINIC | Age: 70
DRG: 011 | End: 2020-08-30
Attending: THORACIC SURGERY (CARDIOTHORACIC VASCULAR SURGERY)
Payer: MEDICARE

## 2020-08-30 ENCOUNTER — APPOINTMENT (OUTPATIENT)
Dept: EDUCATION SERVICES | Facility: CLINIC | Age: 70
End: 2020-08-30
Attending: STUDENT IN AN ORGANIZED HEALTH CARE EDUCATION/TRAINING PROGRAM

## 2020-08-30 LAB
ANION GAP SERPL CALCULATED.3IONS-SCNC: 2 MMOL/L (ref 3–14)
BUN SERPL-MCNC: 9 MG/DL (ref 7–30)
CA-I BLD-MCNC: 4.5 MG/DL (ref 4.4–5.2)
CALCIUM SERPL-MCNC: 8.3 MG/DL (ref 8.5–10.1)
CHLORIDE SERPL-SCNC: 107 MMOL/L (ref 94–109)
CO2 SERPL-SCNC: 30 MMOL/L (ref 20–32)
CREAT SERPL-MCNC: 0.66 MG/DL (ref 0.66–1.25)
ERYTHROCYTE [DISTWIDTH] IN BLOOD BY AUTOMATED COUNT: 14.4 % (ref 10–15)
GFR SERPL CREATININE-BSD FRML MDRD: >90 ML/MIN/{1.73_M2}
GLUCOSE SERPL-MCNC: 90 MG/DL (ref 70–99)
HCT VFR BLD AUTO: 35.7 % (ref 40–53)
HGB BLD-MCNC: 11.1 G/DL (ref 13.3–17.7)
MAGNESIUM SERPL-MCNC: 2.1 MG/DL (ref 1.6–2.3)
MCH RBC QN AUTO: 30.8 PG (ref 26.5–33)
MCHC RBC AUTO-ENTMCNC: 31.1 G/DL (ref 31.5–36.5)
MCV RBC AUTO: 99 FL (ref 78–100)
PHOSPHATE SERPL-MCNC: 2.8 MG/DL (ref 2.5–4.5)
PLATELET # BLD AUTO: 154 10E9/L (ref 150–450)
POTASSIUM SERPL-SCNC: 4.2 MMOL/L (ref 3.4–5.3)
RBC # BLD AUTO: 3.6 10E12/L (ref 4.4–5.9)
SODIUM SERPL-SCNC: 139 MMOL/L (ref 133–144)
WBC # BLD AUTO: 4.1 10E9/L (ref 4–11)

## 2020-08-30 PROCEDURE — 83735 ASSAY OF MAGNESIUM: CPT | Performed by: OTOLARYNGOLOGY

## 2020-08-30 PROCEDURE — 25000128 H RX IP 250 OP 636: Performed by: STUDENT IN AN ORGANIZED HEALTH CARE EDUCATION/TRAINING PROGRAM

## 2020-08-30 PROCEDURE — 82330 ASSAY OF CALCIUM: CPT | Performed by: OTOLARYNGOLOGY

## 2020-08-30 PROCEDURE — 84100 ASSAY OF PHOSPHORUS: CPT | Performed by: OTOLARYNGOLOGY

## 2020-08-30 PROCEDURE — 92507 TX SP LANG VOICE COMM INDIV: CPT | Mod: GN

## 2020-08-30 PROCEDURE — 85027 COMPLETE CBC AUTOMATED: CPT | Performed by: OTOLARYNGOLOGY

## 2020-08-30 PROCEDURE — 25000132 ZZH RX MED GY IP 250 OP 250 PS 637: Mod: GY | Performed by: PHYSICIAN ASSISTANT

## 2020-08-30 PROCEDURE — 25000132 ZZH RX MED GY IP 250 OP 250 PS 637: Mod: GY | Performed by: STUDENT IN AN ORGANIZED HEALTH CARE EDUCATION/TRAINING PROGRAM

## 2020-08-30 PROCEDURE — 36415 COLL VENOUS BLD VENIPUNCTURE: CPT | Performed by: OTOLARYNGOLOGY

## 2020-08-30 PROCEDURE — 12000001 ZZH R&B MED SURG/OB UMMC

## 2020-08-30 PROCEDURE — 80048 BASIC METABOLIC PNL TOTAL CA: CPT | Performed by: OTOLARYNGOLOGY

## 2020-08-30 RX ADMIN — CALCIUM CARBONATE (ANTACID) CHEW TAB 500 MG 2500 MG: 500 CHEW TAB at 12:05

## 2020-08-30 RX ADMIN — DOXAZOSIN 1 MG: 1 TABLET ORAL at 08:41

## 2020-08-30 RX ADMIN — MULTIVITAMIN 15 ML: LIQUID ORAL at 08:41

## 2020-08-30 RX ADMIN — CALCIUM CARBONATE (ANTACID) CHEW TAB 500 MG 2500 MG: 500 CHEW TAB at 08:41

## 2020-08-30 RX ADMIN — ACETAMINOPHEN ORAL SOLUTION 650 MG: 325 SOLUTION ORAL at 05:26

## 2020-08-30 RX ADMIN — ACETAMINOPHEN ORAL SOLUTION 650 MG: 325 SOLUTION ORAL at 16:20

## 2020-08-30 RX ADMIN — WHITE PETROLATUM: 1.75 OINTMENT TOPICAL at 12:05

## 2020-08-30 RX ADMIN — CHLORHEXIDINE GLUCONATE 0.12% ORAL RINSE 15 ML: 1.2 LIQUID ORAL at 13:55

## 2020-08-30 RX ADMIN — CHLORHEXIDINE GLUCONATE 0.12% ORAL RINSE 15 ML: 1.2 LIQUID ORAL at 21:03

## 2020-08-30 RX ADMIN — ACETAMINOPHEN ORAL SOLUTION 650 MG: 325 SOLUTION ORAL at 22:11

## 2020-08-30 RX ADMIN — LISINOPRIL 10 MG: 10 TABLET ORAL at 08:41

## 2020-08-30 RX ADMIN — CALCITRIOL 0.25 MCG: 1 SOLUTION ORAL at 21:04

## 2020-08-30 RX ADMIN — Medication 3 MG: at 21:04

## 2020-08-30 RX ADMIN — METOPROLOL TARTRATE 25 MG: 25 TABLET, FILM COATED ORAL at 17:56

## 2020-08-30 RX ADMIN — CALCITRIOL 0.25 MCG: 1 SOLUTION ORAL at 13:56

## 2020-08-30 RX ADMIN — Medication 12.5 MG: at 08:41

## 2020-08-30 RX ADMIN — CALCIUM CARBONATE (ANTACID) CHEW TAB 500 MG 2500 MG: 500 CHEW TAB at 21:04

## 2020-08-30 RX ADMIN — CALCITRIOL 0.25 MCG: 1 SOLUTION ORAL at 08:41

## 2020-08-30 RX ADMIN — CHLORHEXIDINE GLUCONATE 0.12% ORAL RINSE 15 ML: 1.2 LIQUID ORAL at 08:41

## 2020-08-30 RX ADMIN — ENOXAPARIN SODIUM 40 MG: 40 INJECTION SUBCUTANEOUS at 08:40

## 2020-08-30 RX ADMIN — METOPROLOL TARTRATE 25 MG: 25 TABLET, FILM COATED ORAL at 05:26

## 2020-08-30 RX ADMIN — WHITE PETROLATUM: 1.75 OINTMENT TOPICAL at 05:27

## 2020-08-30 RX ADMIN — ACETAMINOPHEN ORAL SOLUTION 650 MG: 325 SOLUTION ORAL at 10:47

## 2020-08-30 RX ADMIN — WHITE PETROLATUM: 1.75 OINTMENT TOPICAL at 16:20

## 2020-08-30 RX ADMIN — OMEPRAZOLE 20 MG: KIT at 08:40

## 2020-08-30 ASSESSMENT — MIFFLIN-ST. JEOR: SCORE: 1624.48

## 2020-08-30 ASSESSMENT — ACTIVITIES OF DAILY LIVING (ADL)
ADLS_ACUITY_SCORE: 14
ADLS_ACUITY_SCORE: 13
ADLS_ACUITY_SCORE: 14
ADLS_ACUITY_SCORE: 16
ADLS_ACUITY_SCORE: 13
ADLS_ACUITY_SCORE: 13

## 2020-08-30 ASSESSMENT — PAIN DESCRIPTION - DESCRIPTORS: DESCRIPTORS: ACHING

## 2020-08-30 NOTE — PLAN OF CARE
6A OT: Cancel     Pt had laryngectomy education this AM w/daughter, declined OT activity this PM, stated pt had already walked a few laps, completed ADLs, and is waiting for completion of feeding tube. Will reschedule for tomorrow.

## 2020-08-30 NOTE — PLAN OF CARE
Discharge Planner SLP   Patient plan for discharge: disch home this week  Current status: Pt seen for tx with his daughter present. Pt with good, continuous tolerance of alisa tube/HME on room air and is managing his own cares well. Secretion management remains good. Recommend the patient continue to wear larytube throughout day as tolerated.  HME should be replaced every 24 hours or more frequently if visibly soiled. If pt is demonstrating increased difficulty breathing, remove larytube and HME. Larytube should be cleaned with provided brushes at least daily, or more frequently as needed. SLP is following daily.  Barriers to return to prior living situation: Stoma/alisa cares, communication, TF  Recommendations for discharge: home with OP ST follow at ENT clinic   Rationale for recommendations: Pt with new communication and swallow needs s/p laryngectomy; pt will benefit from ongoing ST targeting these deficits.       Entered by: Anette Pugh 08/30/2020 2:33 PM

## 2020-08-30 NOTE — DISCHARGE INSTRUCTIONS
[facility]  Phone:  [phone]    Portable suction machine & supplies  Humidity  Tube feeding supplies  ______________________________    [facility]  HME (heated moisture exchanger)  Script for HME supplies will be sent to [facility].   ______________________________

## 2020-08-30 NOTE — PROGRESS NOTES
Otolaryngology Progress Note  August 30, 2020     S: No issues overnight.      O: vitals reviewed                 General: Alert and oriented x 3, sitting up in chair comfortably                 HEENT: EOMI. HB 1/6. Stoma patent with small amount of crusting at incision edges, stoma is deep. Neck incision c/d/i. No significant swelling of neck to suggest hematoma/seroma. Neck drains intact with sanguinous drainage in all 3 bulbs, 1 removed today- left lateral. Dental extraction sites are sutured and healing as expected. No intraoral bleeding or purulence. Some secretions visualized.                  Pulmonary: HME in place. No accessory muscle use.     NERY drain output(s): (last 24 hours)/(last shift): reviewed. Left lateral removed.       LABS: reviewed.     A/P: Evin Sterling is a 70 year old male with a past medical history of CHF (EF<30%), afib, htn and alcohol and tobacco dependence who presented with T4N0 SCC of the larynx, now s/p awake trach, DL bx on 8/3 and s/p PEG placement, DL, TL with b/l MRND and left hemithyroidectomy, extraction of 13 teeth on 8/25.     Neuro: kishor. Tylenol, PRN oxycodone     HEENT:   Routine laryngectomy Cares:   1. Clean and remove crusting at stoma opening three times daily and as needed to keep dried secretions from accumulating and falling into airway.  2. If you have a laryngectomy tube (small silicone tube that sits in the stoma), remove tube to clean with soap and water three times daily and as needed.   3. Lavage stoma with 2-3 mL sterile saline every 8 hours and PRN. Allow patient to clear lavage and secretions with cough. Assist with suctioning as needed.   4. Suction laryngectomy site as needed with red suarez catheter.   5. Humidity is very important to keep the airway moist and secretions thin. Wear either an HME device or humidified air via trach dome collar at all times.      -incision cares bacitracin x24hrs then aquaphor  - JPx4 to bulb suction, strip/record output  "q8hrs. Even if zero record \"0\"  - Peridex TID, okay with mouth swabs by RN if complaining of dry mouth     Respiratory: laryngectomy and CANNOT be intubated from above.   - Larytube & HME in place      CV/ Heme: BP stabilizing, resumed metoprolol. Per med OK to resume lisinopril (held dose yesterday d/t soft BP)  - medicine consult, recs: resume PTA metoprolol, resume lisinopril, resume spirinolactone  - IVF TKO     GI/FEN: No BM yet, PRNs available. Tolerating TF advancement   - strict NPO, G-tube in place,  -  TF bolusing  - s/p scheduled zofran x48h. Now PRN   - Scheduled senna-docusate, miralax. PRN MoM, mag citrate, suppository. LBM 7/29.      :  morales out, hx of post-op urinary retention. UOP has been appropriate. Started cardura  - continue cardura      Endo: iCal normal. PTH normal.   - Tums 2.5g QID switched to TID  - calcitriol 0.25 TID     ID:  unasyn x48hr (complete 8/27)     Ppx: SCDs, lovenox started 8/27     Consults: PT/OT, SLP (HME, alisa kit), PLC (laryngectomy, PEG) 8/28 and 8/30  - met w/SLP 8/26     Dispo: pending clinic improvement, likely next 1-2 days.     -- Patient and above plan discussed with Dr. Felipe Gamez MD   Otolaryngology-Head & Neck Surgery  Please contact ENT with questions by dialing [phone] and entering job code [phone] when prompted  "

## 2020-08-30 NOTE — PLAN OF CARE
Status: POD4  s/p total laryngectomy with left hemithyroidectomy, bilateral neck dissection, extraction of 13 teeth and PEG tube placement for extraction of laryngeal mass   Vitals: VSS on RA. HME in place. On CCM.  Neuros: Alert and oriented x4. Denies N/T. 5/5 throughout. Writing and typing to communicate.   IV: PIV saline locked.   Resp/trach: Dilcia tube in place. Suctioned and lavaged x2; tube cleaned x1. Site care done per orders. HME replaced per patient request. Oral suction done independently.   Diet: NPO; 1.5 cans today. Tolerated second bolus of 250mL. Next bolus can be done around midnight.  Bowel status: BS+ last BM 8/29.  : Voiding without difficulty.  Skin: Blanchable redness on bottom. Neck incision sutured with some localized swelling and minimal redness. Peg site WDL & cleansed with normal saline.   Pain: Managed with scheduled tylenol.  Activity: Ambulated halls independently this shift.  Social: Daughter at bedside.  Plan: Dilcia PLC tomorrow at 0900. Continue to monitor and follow POC.

## 2020-08-30 NOTE — PLAN OF CARE
Status: POD5  s/p total laryngectomy with left hemithyroidectomy, bilateral neck dissection, extraction of 13 teeth and PEG tube placement for extraction of laryngeal mass   Vitals: AVSS on CCM  Neuros: A&Ox4   IV: PIV SL  Resp/trach: LS clear throughout, alisa tube with HME in place, cares completed, lavage x1, no sxn needed- pt clears secretions with lavage; secretions are clear and thin, pt independently suctioning orally.   Diet: NPO, Bolus of 1 can will be given at 0700 per pt  Bowel status: BS+ x4, passing gas - last BM yesterday  : VDSP in bedside urinal  Skin: Neck incision and alisa stoma intact with sutures, reddened with slight edema, NERY x3 to neck.   Pain: Neck pain well controlled with scheduled Tylenol  Activity: Up SBA  Plan: PLC today at 0900 for Alisa cares. Will continue to monitor and follow with POC.

## 2020-08-30 NOTE — PLAN OF CARE
VSS. On CCM. Scheduled tylenol given for generalized pain. Pt has laryngectomy; suctioned/lavaged x 2. Halley tube in place with HME. Bilateral neck incisions, Aquaphor applied. Neck JPs x 2 with small amount of drainage. NPO. Tolerating bolus feed via PEG had had 4/6 cans today. Voiding spontaneously. Had BM yesterday. Walked halls multiple times with RN and daughter. Pt and daughter had PLC alisa cares today. Pt was able to demonstrate cares with PEG tube and Laryngectomy. Pt was unable to suction/lavage self d/t unable to see stoma, pt can change trach ties, insert tube and also place HME independently. Pt is able to make needs known.

## 2020-08-30 NOTE — CONSULTS
Met with patient and daughter Jesica in room for laryngectomy cares. Patient previously had a trach and feels very comfortable with alisa cares, lavage, suctioning. Some hesitation with larytube insertion, but he knows he will get more comfortable over time.     Daughter appreciated suction technique refresher and both verbalized understanding of when to call the doctor.    Literature given: Handwashing and Skin Care,Caring for Yourself After a Laryngectomy.

## 2020-08-31 ENCOUNTER — APPOINTMENT (OUTPATIENT)
Dept: OCCUPATIONAL THERAPY | Facility: CLINIC | Age: 70
DRG: 011 | End: 2020-08-31
Attending: THORACIC SURGERY (CARDIOTHORACIC VASCULAR SURGERY)
Payer: MEDICARE

## 2020-08-31 ENCOUNTER — APPOINTMENT (OUTPATIENT)
Dept: SPEECH THERAPY | Facility: CLINIC | Age: 70
DRG: 011 | End: 2020-08-31
Attending: THORACIC SURGERY (CARDIOTHORACIC VASCULAR SURGERY)
Payer: MEDICARE

## 2020-08-31 ENCOUNTER — TELEPHONE (OUTPATIENT)
Dept: UROLOGY | Facility: CLINIC | Age: 70
End: 2020-08-31

## 2020-08-31 LAB
ANION GAP SERPL CALCULATED.3IONS-SCNC: 4 MMOL/L (ref 3–14)
BUN SERPL-MCNC: 10 MG/DL (ref 7–30)
CA-I BLD-MCNC: 4.5 MG/DL (ref 4.4–5.2)
CALCIUM SERPL-MCNC: 8.2 MG/DL (ref 8.5–10.1)
CHLORIDE SERPL-SCNC: 107 MMOL/L (ref 94–109)
CO2 SERPL-SCNC: 29 MMOL/L (ref 20–32)
CREAT SERPL-MCNC: 0.61 MG/DL (ref 0.66–1.25)
ERYTHROCYTE [DISTWIDTH] IN BLOOD BY AUTOMATED COUNT: 14.3 % (ref 10–15)
GFR SERPL CREATININE-BSD FRML MDRD: >90 ML/MIN/{1.73_M2}
GLUCOSE SERPL-MCNC: 92 MG/DL (ref 70–99)
HCT VFR BLD AUTO: 35 % (ref 40–53)
HGB BLD-MCNC: 10.9 G/DL (ref 13.3–17.7)
MCH RBC QN AUTO: 30.4 PG (ref 26.5–33)
MCHC RBC AUTO-ENTMCNC: 31.1 G/DL (ref 31.5–36.5)
MCV RBC AUTO: 98 FL (ref 78–100)
PLATELET # BLD AUTO: 162 10E9/L (ref 150–450)
POTASSIUM SERPL-SCNC: 4 MMOL/L (ref 3.4–5.3)
RBC # BLD AUTO: 3.59 10E12/L (ref 4.4–5.9)
SODIUM SERPL-SCNC: 140 MMOL/L (ref 133–144)
WBC # BLD AUTO: 3.9 10E9/L (ref 4–11)

## 2020-08-31 PROCEDURE — 25000132 ZZH RX MED GY IP 250 OP 250 PS 637: Mod: GY | Performed by: STUDENT IN AN ORGANIZED HEALTH CARE EDUCATION/TRAINING PROGRAM

## 2020-08-31 PROCEDURE — 25000128 H RX IP 250 OP 636: Performed by: STUDENT IN AN ORGANIZED HEALTH CARE EDUCATION/TRAINING PROGRAM

## 2020-08-31 PROCEDURE — 25000132 ZZH RX MED GY IP 250 OP 250 PS 637: Mod: GY | Performed by: PHYSICIAN ASSISTANT

## 2020-08-31 PROCEDURE — 36415 COLL VENOUS BLD VENIPUNCTURE: CPT | Performed by: OTOLARYNGOLOGY

## 2020-08-31 PROCEDURE — 83735 ASSAY OF MAGNESIUM: CPT | Performed by: OTOLARYNGOLOGY

## 2020-08-31 PROCEDURE — 92507 TX SP LANG VOICE COMM INDIV: CPT | Mod: GN

## 2020-08-31 PROCEDURE — 82330 ASSAY OF CALCIUM: CPT | Performed by: OTOLARYNGOLOGY

## 2020-08-31 PROCEDURE — 12000001 ZZH R&B MED SURG/OB UMMC

## 2020-08-31 PROCEDURE — 97110 THERAPEUTIC EXERCISES: CPT | Mod: GO | Performed by: OCCUPATIONAL THERAPIST

## 2020-08-31 PROCEDURE — 85027 COMPLETE CBC AUTOMATED: CPT | Performed by: OTOLARYNGOLOGY

## 2020-08-31 PROCEDURE — 97530 THERAPEUTIC ACTIVITIES: CPT | Mod: GO | Performed by: OCCUPATIONAL THERAPIST

## 2020-08-31 PROCEDURE — 80048 BASIC METABOLIC PNL TOTAL CA: CPT | Performed by: OTOLARYNGOLOGY

## 2020-08-31 RX ORDER — METOPROLOL TARTRATE 25 MG/1
25 TABLET, FILM COATED ORAL EVERY 6 HOURS
Qty: 60 TABLET | Refills: 0 | Status: SHIPPED | OUTPATIENT
Start: 2020-08-31 | End: 2020-09-01

## 2020-08-31 RX ORDER — SPIRONOLACTONE 25 MG/1
12.5 TABLET ORAL DAILY
Qty: 8 TABLET | Refills: 0 | Status: SHIPPED | OUTPATIENT
Start: 2020-08-31 | End: 2020-09-01

## 2020-08-31 RX ORDER — MINERAL OIL/HYDROPHIL PETROLAT
OINTMENT (GRAM) TOPICAL EVERY 8 HOURS
Qty: 50 G | Refills: 3 | Status: SHIPPED | OUTPATIENT
Start: 2020-08-31 | End: 2022-03-07

## 2020-08-31 RX ORDER — POLYETHYLENE GLYCOL 3350 17 G/17G
17 POWDER, FOR SOLUTION ORAL 2 TIMES DAILY PRN
Qty: 14 PACKET | Refills: 0 | Status: SHIPPED | OUTPATIENT
Start: 2020-08-31 | End: 2022-03-07

## 2020-08-31 RX ORDER — ONDANSETRON 4 MG/1
4 TABLET, ORALLY DISINTEGRATING ORAL EVERY 30 MIN PRN
Qty: 30 TABLET | Refills: 0 | Status: SHIPPED | OUTPATIENT
Start: 2020-08-31 | End: 2020-08-31

## 2020-08-31 RX ORDER — LISINOPRIL 10 MG/1
10 TABLET ORAL DAILY
Qty: 14 TABLET | Refills: 0 | Status: SHIPPED | OUTPATIENT
Start: 2020-09-01 | End: 2020-12-17

## 2020-08-31 RX ORDER — DOXAZOSIN 1 MG/1
1 TABLET ORAL DAILY
Qty: 14 TABLET | Refills: 0 | Status: SHIPPED | OUTPATIENT
Start: 2020-09-01 | End: 2022-03-07

## 2020-08-31 RX ORDER — CALCIUM CARBONATE 500 MG/1
5 TABLET, CHEWABLE ORAL 2 TIMES DAILY
Qty: 105 TABLET | Refills: 0 | Status: SHIPPED | OUTPATIENT
Start: 2020-08-31 | End: 2020-09-14

## 2020-08-31 RX ORDER — ONDANSETRON 4 MG/1
4 TABLET, FILM COATED ORAL EVERY 6 HOURS PRN
Qty: 30 TABLET | Refills: 0 | Status: SHIPPED | OUTPATIENT
Start: 2020-08-31 | End: 2022-03-07

## 2020-08-31 RX ORDER — AMOXICILLIN 250 MG
1 CAPSULE ORAL 2 TIMES DAILY
Qty: 28 TABLET | Refills: 0 | Status: SHIPPED | OUTPATIENT
Start: 2020-08-31 | End: 2022-03-07

## 2020-08-31 RX ORDER — CHLORHEXIDINE GLUCONATE ORAL RINSE 1.2 MG/ML
15 SOLUTION DENTAL 3 TIMES DAILY
Qty: 675 ML | Refills: 0 | Status: SHIPPED | OUTPATIENT
Start: 2020-08-31 | End: 2020-09-15

## 2020-08-31 RX ORDER — CALCITRIOL 1 UG/ML
0.25 SOLUTION ORAL 2 TIMES DAILY
Qty: 3 ML | Refills: 0 | Status: SHIPPED | OUTPATIENT
Start: 2020-09-01 | End: 2020-09-07

## 2020-08-31 RX ORDER — OMEPRAZOLE
20 KIT
Qty: 140 ML | Refills: 0 | Status: SHIPPED | OUTPATIENT
Start: 2020-09-01 | End: 2020-09-15

## 2020-08-31 RX ORDER — CALCITRIOL 1 UG/ML
0.25 SOLUTION ORAL DAILY
Qty: 1.75 ML | Refills: 0 | Status: SHIPPED | OUTPATIENT
Start: 2020-09-08 | End: 2022-03-07

## 2020-08-31 RX ADMIN — CHLORHEXIDINE GLUCONATE 0.12% ORAL RINSE 15 ML: 1.2 LIQUID ORAL at 07:35

## 2020-08-31 RX ADMIN — WHITE PETROLATUM: 1.75 OINTMENT TOPICAL at 00:36

## 2020-08-31 RX ADMIN — WHITE PETROLATUM: 1.75 OINTMENT TOPICAL at 07:45

## 2020-08-31 RX ADMIN — CALCITRIOL 0.25 MCG: 1 SOLUTION ORAL at 16:11

## 2020-08-31 RX ADMIN — ONDANSETRON 4 MG: 2 INJECTION INTRAMUSCULAR; INTRAVENOUS at 10:54

## 2020-08-31 RX ADMIN — ENOXAPARIN SODIUM 40 MG: 40 INJECTION SUBCUTANEOUS at 07:35

## 2020-08-31 RX ADMIN — CHLORHEXIDINE GLUCONATE 0.12% ORAL RINSE 15 ML: 1.2 LIQUID ORAL at 21:17

## 2020-08-31 RX ADMIN — CALCITRIOL 0.25 MCG: 1 SOLUTION ORAL at 21:17

## 2020-08-31 RX ADMIN — LISINOPRIL 10 MG: 10 TABLET ORAL at 07:38

## 2020-08-31 RX ADMIN — CALCITRIOL 0.25 MCG: 1 SOLUTION ORAL at 07:35

## 2020-08-31 RX ADMIN — WHITE PETROLATUM: 1.75 OINTMENT TOPICAL at 16:12

## 2020-08-31 RX ADMIN — MULTIVITAMIN 15 ML: LIQUID ORAL at 07:35

## 2020-08-31 RX ADMIN — Medication 3 MG: at 21:17

## 2020-08-31 RX ADMIN — CALCIUM CARBONATE (ANTACID) CHEW TAB 500 MG 2500 MG: 500 CHEW TAB at 21:08

## 2020-08-31 RX ADMIN — CALCIUM CARBONATE (ANTACID) CHEW TAB 500 MG 2500 MG: 500 CHEW TAB at 07:36

## 2020-08-31 RX ADMIN — Medication 12.5 MG: at 07:36

## 2020-08-31 RX ADMIN — ACETAMINOPHEN ORAL SOLUTION 650 MG: 325 SOLUTION ORAL at 04:01

## 2020-08-31 RX ADMIN — DOXAZOSIN 1 MG: 1 TABLET ORAL at 07:37

## 2020-08-31 RX ADMIN — ACETAMINOPHEN ORAL SOLUTION 650 MG: 325 SOLUTION ORAL at 23:00

## 2020-08-31 RX ADMIN — WHITE PETROLATUM: 1.75 OINTMENT TOPICAL at 23:00

## 2020-08-31 RX ADMIN — METOPROLOL TARTRATE 25 MG: 25 TABLET, FILM COATED ORAL at 00:35

## 2020-08-31 RX ADMIN — CHLORHEXIDINE GLUCONATE 0.12% ORAL RINSE 15 ML: 1.2 LIQUID ORAL at 16:12

## 2020-08-31 RX ADMIN — OMEPRAZOLE 20 MG: KIT at 07:35

## 2020-08-31 RX ADMIN — ACETAMINOPHEN ORAL SOLUTION 650 MG: 325 SOLUTION ORAL at 16:12

## 2020-08-31 ASSESSMENT — ACTIVITIES OF DAILY LIVING (ADL)
ADLS_ACUITY_SCORE: 13

## 2020-08-31 NOTE — PLAN OF CARE
OT/6A:     Discharge Planner OT   Patient plan for discharge: daughter's home with assist   Current status: Provided training and issued handout for post op shoulder ROM within pain free, tolerable range. Pt highly motivated to stay active, has been walking hallways IND. Has no concerns with home discharge and ADL performance   Barriers to return to prior living situation: None from ADL standpoint  Recommendations for discharge: Home with assist   Rationale for recommendations: assist prn with heavier household tasks per precautions       Entered by: Priscilla Whitmore 08/31/2020 4:27 PM

## 2020-08-31 NOTE — PLAN OF CARE
"Status: POD#6 s/p total laryngectomy with left hemithyroidectomy, bilateral neck dissection, extraction of 13 teeth and PEG tube placement for extraction of laryngeal mass   Vitals: VSS on RA with HME in place. Low BP x1 at 1300, MD notified and Metoprolol held. Came up with no interventions.  Neuros: A&Ox4. Reports numbness to chin. 5/5 throughout. Writing and typing to communicate. Tearful and frustrated this afternoon when BP low, stating that he \"is becoming more deconditioned and he will never get to leave and have fresh air and sunshine.\" Enc pt to have TF bolus and a nap with no interruptions, which he felt better afterwards.  IV: L. PIV, SL    Resp/trach: Alisa tube in place with HME. No suction/lavage needed this shift. Suctioned and lavaged x1; tube cleaned x1 by pt.. Site care done per orders by pt. Oral suction done independently. Can do most alisa cares independently; needs help if lavage and suction are required.   Diet: NPO with bolus TF 6 cans/day via PEG, completed 4/6 cans so far today. Sl nausea x1 with relief after Zofran (not at time of bolus).  Bowel status: No BM this shift   : Voiding spontaneously in urinal or in BR  Skin: Blanchable redness on bottom, mepilex in place. Bilateral neck incision SHABANA with sutures and surrounding edema. L. neck JPx1. R. neck NERY removed this am, dressing now in place.  Pain: C/o incisional pain managed with scheduled tylenol  Activity: Up ad dez. Walked in au several times.  Social: Daughter is designated visitor, did not come today.  Plan: Continue to encourage independence with cares. Continue to monitor and follow POC     "

## 2020-08-31 NOTE — TELEPHONE ENCOUNTER
----- Message from Magali Solis RN sent at 8/27/2020  3:21 PM CDT -----  Can you please set him up for a Virtual visit with Dr. Aguirre for his next available. Looks like he's still inpt for now but since Dr. Aguirre is booked out so far we can at least get him on. Thanks!  ----- Message -----  From: Magali Solis RN  Sent: 8/18/2020  To: Magali Solis RN      ----- Message -----  From: Laina Chun MD  Sent: 8/10/2020   8:17 AM CDT  To: LIANNE Land,    Hope you had a nice weekend.     Can you please help arrange a visit with Dr. Aguirre for Mr. Sterling in the next 2-4 weeks? He has a very enlarged prostate and is interested in holep vs prostate artery embolization    Thank you!    Barbara  [phone]

## 2020-08-31 NOTE — PLAN OF CARE
Discharge Planner SLP   Patient plan for discharge: home  Current status: Pt seen for ongoing laryngectomy pulmonary training and education. Pt continues to demonstrate improving comfort/confidence with stoma cares. Only identified issue is reaching around neck to manage neck ties (open/close velcro) due to reduced arm/shoulder ROM. Pt reports this is being addressed by OT. Recommend the patient continue to wear larytube throughout day as tolerated.  HME should be replaced every 24 hours or more frequently if visibly soiled. If pt is demonstrating increased difficulty breathing, remove larytube and HME. Larytube should be cleaned with provided brushes at least daily, or more frequently as needed.   Barriers to return to prior living situation: medical status  Recommendations for discharge: home with OP ST in ENT clinic  Rationale for recommendations: to address communication and swallowing needs as medically appropriate       Entered by: Brie Duque 08/31/2020 11:42 AM

## 2020-08-31 NOTE — PROVIDER NOTIFICATION
"ENT notified of pt's BP 74/48. Noon Metoprolol held. Pt asymptomatic. Immediate return of page and asked to re-check BP shortly and continue to monitor. Triggered sepsis, but pt refused lab draw at this time.   Pt tearful, feeling frustrated that he \"will never get to leave here and he is becoming more deconditioned each day with no sunlight.\" Sat with pt, listened to concerns. Pt satisfied with attempting to take a nap with no interruptions as he did not sleep well last night. Current /74.  "

## 2020-08-31 NOTE — DISCHARGE SUMMARY
Otolaryngology Head and Neck Surgery   Discharge Summary  09/1/20    Date of Admission: 8/25/2020  Date of Discharge: 09/1/2020    Admitting Diagnosis: Laryngeal cancer (H) [C32.9]  Dsicharge Diagnosis: Same    Service: Otolaryngology- Head and Neck Surgery  Attending: Dr. Wang    Procedures:   8/25: PEG placement, direct laryngoscopy, total laryngectomy with bilateral MRND and left hemithyroidectomy, extraction of 13 teeth    HPI: Evin Sterling is a 70 year old male with a past medical history of CHF (EF<30%), afib, htn and alcohol and tobacco dependence who presented with T4N0 SCC of the larynx, formally diagnosed after awake tracheostomy and direct laryngoscopy of 8/3. Given the PET scan and operative findings, his case was recommended in tumor board and he was recommended to undergo the above stated procedure.     Hospital Course: The patient tolerated the procedure well, was extubated in the OR, and transferred to the PACU in good condition. Given the patients pressor requirements in the operating room and inability to wean off them in PACU he was brought to the SICU for close monitoring. In the SICU he was slowly weaned off pressors, with stability in blood pressures. He was started on enteral nutrition POD1, which was slowly advanced via the guidelines provided by nutrition. Later on POD1 he was transferred to the floor. Given his cardiac history and pressor requirements, medicine was consulted for ongoing assistance (recommendations below). SLP was consulted for laryngectomy supplies.  His electrolytes were intermittently replaced and his cardiac medications slowly reintroduced with the guidance of the medicine team. On POD4 he was transitioned to bolus tube feeds, with mild nausea. He completed the appropriate formal tube feeding and laryngectomy cares on POD3 and POD5. Throughout the remainder of his admission he demonstrated independence in these cares.  His drains were sequentially removed with no signs  "of chyle leak or fistula. At discharge, the patient's pain was well controlled, they were voiding on their own, and tolerating all recommended nutrition via G-tube.     Consults:   Medicine    - BP medications were discussed with Dr. Nagy of the medicine team on the day of discharge. Recommendations are as follows:    - Continue lisinopril 10mg daily    - Change metoprolol dosing to 12.5mg BID    - Hold spironolactone     Exam: /76 (BP Location: Right arm)   Pulse 72   Temp 98.2  F (36.8  C) (Oral)   Resp 16   Ht 1.778 m (5' 10\")   Wt 84.8 kg (187 lb)   SpO2 99%   BMI 26.83 kg/m     General: Alert and oriented x 3, sitting up in chair comfortably.                  HEENT: EOMI. HB 1/6. Stoma patent and clean, stoma is deep. Neck incision c/d/i. No significant swelling of neck to suggest hematoma/seroma. Dental extraction sites are sutured and healing as expected. No intraoral bleeding or purulence. Some secretions visualized.                  Pulmonary: Breathing non-labored. HME in place. No accessory muscle use.      Evin Sterling   Home Medication Instructions ANA:[id number]    Printed on:09/01/20 1106   Medication Information                      acetaminophen (TYLENOL) 32 mg/mL liquid  20.3 mLs (650 mg) by Per Feeding Tube route every 4 hours as needed for pain             acetaminophen (TYLENOL) 32 mg/mL liquid  20.3 mLs (650 mg) by Per Feeding Tube route every 6 hours             calcitRIOL (ROCALTROL) 1 MCG/ML solution  0.25 mLs (0.25 mcg) by Per G Tube route 2 times daily for 6 days             calcitRIOL (ROCALTROL) 1 MCG/ML solution  0.25 mLs (0.25 mcg) by Per G Tube route daily for 7 days             calcium carbonate (TUMS) 500 MG chewable tablet  5 tablets (2,500 mg) by Per Feeding Tube route 2 times daily for 14 days , then take 5 tablets by feeding tube once daily for 7 days.             carboxymethylcellulose PF (REFRESH PLUS) 0.5 % ophthalmic solution  Place 1 drop into both eyes 4 " times daily as needed for dry eyes             chlorhexidine (PERIDEX) 0.12 % solution  Swish and spit 15 mLs in mouth 3 times daily for 15 days             doxazosin (CARDURA) 1 MG tablet  1 tablet (1 mg) by Per G Tube route daily for 14 days             lisinopril (ZESTRIL) 10 MG tablet  1 tablet (10 mg) by Per G Tube route daily for 14 days             melatonin 1 MG/ML LIQD liquid  3 mLs (3 mg) by Per NG tube route At Bedtime for 15 days             metoprolol tartrate (LOPRESSOR) 25 MG tablet  0.5 tablets (12.5 mg) by Per G Tube route 2 times daily             mineral oil-hydrophilic petrolatum (AQUAPHOR) external ointment  Apply topically every 8 hours             multivitamins w/minerals (CERTAVITE) liquid  15 mLs by Per Feeding Tube route daily             multivitamins w/minerals (CERTAVITE) liquid  15 mLs by Per Feeding Tube route daily             omeprazole (FIRST-OMEPRAZOLE) 2 MG/ML SUSP  10 mLs (20 mg) by Per G Tube route every morning (before breakfast) for 14 days             ondansetron (ZOFRAN) 4 MG tablet  Take 4 mg by mouth every 8 hours as needed for nausea             ondansetron (ZOFRAN) 4 MG tablet  1 tablet (4 mg) by Per G Tube route every 6 hours as needed for nausea             order for DME  Equipment being ordered: Tracheostomy supplies    0.9% sodium chloride 1000 mL bottles  Box split 4x4 gauze sponges  Trach kits with brushes  Velcro trach ties  3 cc 0.9% sodium chloride lavages for trach suctioning  Large gloves  Cool mist humidity via trach dome  6-0 Shiley disposable inner cannulas    Diagnosis: laryngeal cancer             order for DME  Equipment being ordered:   Portable suction machine   14 French suction catheters  [place] suction tips    Diagnosis: laryngeal cancer             order for DME  Equipment being ordered: Nasogastric bolus tube feeding supplies  Formula: Isosource 1.5, 6 cans per day  Gravity feeding bags  60 mL syringes    Treatment Diagnosis: laryngeal cancer      "        oxyCODONE (ROXICODONE) 5 MG/5ML solution  5-10 mLs (5-10 mg) by Per Feeding Tube route every 4 hours as needed for moderate to severe pain             polyethylene glycol (MIRALAX) 17 g packet  Take 17 g by mouth 2 times daily as needed for constipation             senna-docusate (SENOKOT-S/PERICOLACE) 8.6-50 MG tablet  1 tablet by Per G Tube route 2 times daily             sennosides (SENOKOT) 8.8 MG/5ML syrup  5 mLs by Per Feeding Tube route nightly as needed for constipation                    Reason for your hospital stay    Recovery from surgery with Dr. Wang     Activity    No heavy lifting greater than 10 lbs and no strenuous exercise for 2 weeks or until follow up appointment. No driving while taking narcotic pain medications.     When to contact your care team    If you develop numbness or tingling in your extremities or face please take 2 TUMS and call the ENT clinic [phone].     Please also notify us if you experience wound breakdown, sustained bleeding from the wound site, or increasing redness, swelling, and/or purulent malorodorous discharge from the wound site which may indicate infection. If you feel it is acute, or experience sudden changes in breathing, chest pain, or excessive sleepiness/somnolence please return to the emergency department or call 911. If you have questions or concerns during the day please call ENT clinic and [phone]. If at night you can call [facility] at [phone] and ask for the \"ENT resident on call\".     Wound care and dressings    Keep incisions clean and dry. Apply Aquaphor ointment to incisions three times daily to keep moist. You may only shower from below the nipple line. Do not get any drain site wet until 24 hours after the drain has been removed.     Routine laryngectomy Cares:   1. Clean and remove crusting at stoma opening three times daily and as needed to keep dried secretions from accumulating and falling into airway.  2. If you " have a laryngectomy tube (small silicone tube that sits in the stoma), remove tube to clean with soap and water three times daily and as needed.   3. Lavage stoma with 2-3 mL sterile saline every 8 hours and PRN. Allow patient to clear lavage and secretions with cough. Assist with suctioning as needed.   4. Suction laryngectomy site as needed with red suarez catheter.   5. Humidity is very important to keep the airway moist and secretions thin. Wear either an HME device or humidified air via trach dome collar at all times.     [facility] Follow-up and recommended labs and tests    You were seen here by the medicine team who helped adjust your blood pressure medications. You should continue holding spironolactone and only taking the metorprolol 25mg daily in the morning until follow up with your primary care provider. We recommend you follow-up with your primary care provider within a week to go through these changes. We also recommend you still be seen by the heart failure clinic.     Follow up in ENT clinic with Dr. Wang on 9/4 at 4:20. Please call the clinic with questions/concerns: [phone].    Otolaryngology/ENT Clinic:  [facility]  [address] [place] [id number]     Miscellaneous DME Order    DME Documentation:   Equipment being ordered: Laryngectomy supplies    Cool mist humidity by trach dome  Large gloves  Cotton tip applicators  0.9% sodium chloride 3 mL puffs for lavage  0.9% sodium chloride 1000 mL bottles    Treatment Diagnosis: laryngeal cancer, s/p laryngectomy      I, the undersigned, certify that the above prescribed supplies are medically necessary for this patient and is both reasonable and necessary in reference to accepted standards of medical and necessary in reference to accepted standards of medical practice in the treatment of this patient's condition and is not prescribed as a convenience.     Miscellaneous Order  for DME - ONLY FOR DME    DME Documentation:   Equipment being ordered: Gastric bolus tube feeding supplies via gastrostomy tube  Formula: Isosource 1.5, 6 cans per day  Gravity feeding bags  60 mL syringes    Treatment Diagnosis: laryngeal cancer, s/p laryngectomy    I, the undersigned, certify that the above prescribed supplies are medically necessary for this patient and is both reasonable and necessary in reference to accepted standards of medical and necessary in reference to accepted standards of medical practice in the treatment of this patient's condition and is not prescribed as a convenience.     Diet    Continue your nutrition only through your G-tube, nothing by mouth. You will continue 6 cans of tube feed formula each day ( in the hospital you did 2 cans three times a day, you can also try 1.5 cans x4 feedings per day if the two cans at a time is too filling). Make sure you do 125mL of free water flushes before and after each can.       Disposition: to home with assist. Patient was independent and comfortable in all cares, he completed the appropriate education. All of his questions and concerns were addressed prior to discharge.   - He will follow-up with Dr. Wang on 9/4 at 4:20   - We recommended he follow-up with his PCP within one week     Allyn Cruz PA-C  Otolaryngology-Head & Neck Surgery  Please contact ENT by dialing [phone] and entering job code [phone].        Teaching statement:  I saw the patient prior to discharge. Follow-up on Friday with video swallow study already scheduled.    Caron Wang MD    Department of Otolaryngology

## 2020-08-31 NOTE — PLAN OF CARE
Status: POD#6 s/p total laryngectomy with left hemithyroidectomy, bilateral neck dissection, extraction of 13 teeth and PEG tube placement for extraction of laryngeal mass   Vitals: VSS on RA with HME in place.   Neuros: A&Ox4. Reports numbness to chin. 5/5 throughout. Writing and typing to communicate.   IV: L. PIV, SL    Resp/trach: Alisa tube in place with HME. Suctioned and lavaged x1; tube cleaned x1. Site care done per orders. Oral suction done independently. Can do most alisa cares independently; needs help with lavage and suction.   Diet: NPO with bolus TF 6 cans/day via PEG, completed 6/6 yesterday   Bowel status: No BM this shift   : Voiding spontaneously in urinal   Skin: Blanchable redness on bottom, mepilex in place. Bilateral neck incision SHABANA with sutures and surrounding edema. L. neck JPx1. R. neck JPx1.  Pain: C/o incisional pain managed with scheduled tylenol  Activity: Up ad dez  Social: Daughter is designated visitor  Plan: Continue to encourage independence with cares. Pending clinic improvement, d/c likely next 1-2 days.Continue to monitor and follow POC    *Pt. Did own lavage this morning and emptied one NERY after demonstration. Pt. Expressed anxiety about independently emptying drains and lavaging but feeling more confident with practice. Continue to encourage.

## 2020-08-31 NOTE — PLAN OF CARE
Status: POD#5 s/p total laryngectomy with left hemithyroidectomy, bilateral neck dissection, extraction of 13 teeth and PEG tube placement for extraction of laryngeal mass   Vitals: VSS on RA. Some soft BPs today, MDs notified.  HME in place. CCM discontinued.   Neuros: Alert and oriented x4. Reports numbness to chin, pt stated MD aware. 5/5 throughout. Writing and typing to communicate.   IV: PIV saline locked.   Resp/trach: Alisa tube in place. Suctioned and lavaged x2; tube cleaned x1. Site care done per orders. Oral suction done independently. Can do most alisa cares independently; needs help with lavage and suction.   Diet: NPO; 6/6 cans today and tolerating.   Bowel status: BS+ BMx2 today. Pt reports second one was loose. Bowel meds held.   : Voiding without difficulty.  Skin: Blanchable redness on bottom. Pt reports soreness. Mepilex applied to sacral area and education provided on pressure relief positioning. Neck incision sutured with some localized swelling and minimal redness. NERY x2.  Pain: Managed with scheduled tylenol.  Activity: Ambulated halls independently this shift.  Social: Daughter is designated visitor  Plan: Continue to encourage independence with cares. Continue to monitor and follow POC.

## 2020-08-31 NOTE — PROGRESS NOTES
Otolaryngology Progress Note  August 31, 2020     S: No issues overnight. No nausea with tube feeds, 6/6 cans. Not quite comfortable doing suctioning and all laryngectomy cares.      O: vitals reviewed                 General: Alert and oriented x 3, sitting up in chair comfortably. Reading archeology magazine                  HEENT: EOMI. HB 1/6. Stoma patent with small amount of crusting at incision edges- removed, stoma is deep. Neck incision c/d/i. No significant swelling of neck to suggest hematoma/seroma. Neck drains intact with sanguinous drainage in all 2 bulbs Dental extraction sites are sutured and healing as expected. No intraoral bleeding or purulence. Some secretions visualized.                  Pulmonary: HME in place. No accessory muscle use.     NERY drain output(s): (last 24 hours)/(last shift): reviewed.     LABS: reviewed and WNL     A/P: Evin Sterling is a 70 year old male with a past medical history of CHF (EF<30%), afib, htn and alcohol and tobacco dependence who presented with T4N0 SCC of the larynx, now s/p awake trach, DL bx on 8/3 and s/p PEG placement, DL, TL with b/l MRND and left hemithyroidectomy, extraction of 13 teeth on 8/25.     Neuro: kishor. Tylenol, PRN oxycodone     HEENT:   Routine laryngectomy Cares:   1. Clean and remove crusting at stoma opening three times daily and as needed to keep dried secretions from accumulating and falling into airway.  2. If you have a laryngectomy tube (small silicone tube that sits in the stoma), remove tube to clean with soap and water three times daily and as needed.   3. Lavage stoma with 2-3 mL sterile saline every 8 hours and PRN. Allow patient to clear lavage and secretions with cough. Assist with suctioning as needed.   4. Suction laryngectomy site as needed with red suarez catheter.   5. Humidity is very important to keep the airway moist and secretions thin. Wear either an HME device or humidified air via trach dome collar at all  "times.      -incision cares bacitracin x24hrs then aquaphor  - JPx4 to bulb suction, strip/record output q8hrs. Even if zero record \"0\"  - Peridex TID, okay with mouth swabs by RN if complaining of dry mouth     Respiratory: laryngectomy and CANNOT be intubated from above.   - Larytube & HME in place      CV/ Heme: BP stabilizing, resumed all PTA cardiac meds  - medicine consult, recs: resume PTA metoprolol, lisinopril,spirinolactone  - IVF TKO     GI/FEN: BM, TF bolusing   - strict NPO, G-tube in place,  -  TF bolusing  - s/p scheduled zofran x48h. Now PRN   - Scheduled senna-docusate, miralax. PRN MoM, mag citrate, suppository. LBM 8/30.      :  morales out, hx of post-op urinary retention. UOP has been appropriate. Started cardura  - continue cardura      Endo: iCal normal. PTH normal.   - Tums 2.5g TID  - calcitriol 0.25 TID     ID:  unasyn x48hr (complete 8/27)     Ppx: SCDs, lovenox started 8/27     Consults: PT/OT, SLP (HME, alisa kit), PLC (laryngectomy, PEG) 8/28 and 8/30  - met w/SLP 8/26     Dispo: pending independence with cares     -- Patient and above plan discussed with Dr. Felipe Navarro MD   Otolaryngology-Head & Neck Surgery  Please contact ENT with questions by dialing [phone] and entering job code [phone] when prompted  "

## 2020-09-01 ENCOUNTER — APPOINTMENT (OUTPATIENT)
Dept: OCCUPATIONAL THERAPY | Facility: CLINIC | Age: 70
DRG: 011 | End: 2020-09-01
Attending: THORACIC SURGERY (CARDIOTHORACIC VASCULAR SURGERY)
Payer: MEDICARE

## 2020-09-01 ENCOUNTER — APPOINTMENT (OUTPATIENT)
Dept: SPEECH THERAPY | Facility: CLINIC | Age: 70
DRG: 011 | End: 2020-09-01
Attending: THORACIC SURGERY (CARDIOTHORACIC VASCULAR SURGERY)
Payer: MEDICARE

## 2020-09-01 ENCOUNTER — PATIENT OUTREACH (OUTPATIENT)
Dept: CARE COORDINATION | Facility: CLINIC | Age: 70
End: 2020-09-01

## 2020-09-01 VITALS
WEIGHT: 187 LBS | OXYGEN SATURATION: 99 % | TEMPERATURE: 97.7 F | HEART RATE: 91 BPM | HEIGHT: 70 IN | SYSTOLIC BLOOD PRESSURE: 129 MMHG | BODY MASS INDEX: 26.77 KG/M2 | RESPIRATION RATE: 16 BRPM | DIASTOLIC BLOOD PRESSURE: 97 MMHG

## 2020-09-01 LAB
ANION GAP SERPL CALCULATED.3IONS-SCNC: 5 MMOL/L (ref 3–14)
BUN SERPL-MCNC: 14 MG/DL (ref 7–30)
CA-I BLD-MCNC: 4.8 MG/DL (ref 4.4–5.2)
CALCIUM SERPL-MCNC: 8.8 MG/DL (ref 8.5–10.1)
CHLORIDE SERPL-SCNC: 105 MMOL/L (ref 94–109)
CO2 SERPL-SCNC: 30 MMOL/L (ref 20–32)
CREAT SERPL-MCNC: 0.72 MG/DL (ref 0.66–1.25)
ERYTHROCYTE [DISTWIDTH] IN BLOOD BY AUTOMATED COUNT: 14.8 % (ref 10–15)
GFR SERPL CREATININE-BSD FRML MDRD: >90 ML/MIN/{1.73_M2}
GLUCOSE SERPL-MCNC: 88 MG/DL (ref 70–99)
HCT VFR BLD AUTO: 38.7 % (ref 40–53)
HGB BLD-MCNC: 12.1 G/DL (ref 13.3–17.7)
MCH RBC QN AUTO: 30.9 PG (ref 26.5–33)
MCHC RBC AUTO-ENTMCNC: 31.3 G/DL (ref 31.5–36.5)
MCV RBC AUTO: 99 FL (ref 78–100)
PLATELET # BLD AUTO: 188 10E9/L (ref 150–450)
POTASSIUM SERPL-SCNC: 4.4 MMOL/L (ref 3.4–5.3)
RBC # BLD AUTO: 3.92 10E12/L (ref 4.4–5.9)
SODIUM SERPL-SCNC: 140 MMOL/L (ref 133–144)
WBC # BLD AUTO: 4.2 10E9/L (ref 4–11)

## 2020-09-01 PROCEDURE — 25000132 ZZH RX MED GY IP 250 OP 250 PS 637: Mod: GY | Performed by: PHYSICIAN ASSISTANT

## 2020-09-01 PROCEDURE — 80048 BASIC METABOLIC PNL TOTAL CA: CPT | Performed by: OTOLARYNGOLOGY

## 2020-09-01 PROCEDURE — 97110 THERAPEUTIC EXERCISES: CPT | Mod: GO | Performed by: OCCUPATIONAL THERAPIST

## 2020-09-01 PROCEDURE — 25000128 H RX IP 250 OP 636: Performed by: STUDENT IN AN ORGANIZED HEALTH CARE EDUCATION/TRAINING PROGRAM

## 2020-09-01 PROCEDURE — 85027 COMPLETE CBC AUTOMATED: CPT | Performed by: OTOLARYNGOLOGY

## 2020-09-01 PROCEDURE — 97535 SELF CARE MNGMENT TRAINING: CPT | Mod: GO | Performed by: OCCUPATIONAL THERAPIST

## 2020-09-01 PROCEDURE — 92507 TX SP LANG VOICE COMM INDIV: CPT | Mod: GN

## 2020-09-01 PROCEDURE — 25000132 ZZH RX MED GY IP 250 OP 250 PS 637: Mod: GY | Performed by: STUDENT IN AN ORGANIZED HEALTH CARE EDUCATION/TRAINING PROGRAM

## 2020-09-01 PROCEDURE — 36415 COLL VENOUS BLD VENIPUNCTURE: CPT | Performed by: OTOLARYNGOLOGY

## 2020-09-01 PROCEDURE — 82330 ASSAY OF CALCIUM: CPT | Performed by: OTOLARYNGOLOGY

## 2020-09-01 RX ORDER — METOPROLOL TARTRATE 25 MG/1
12.5 TABLET, FILM COATED ORAL 2 TIMES DAILY
Qty: 30 TABLET | Refills: 0 | Status: SHIPPED | OUTPATIENT
Start: 2020-09-01

## 2020-09-01 RX ORDER — METOPROLOL TARTRATE 25 MG/1
25 TABLET, FILM COATED ORAL DAILY
Qty: 30 TABLET | Refills: 0 | Status: SHIPPED | OUTPATIENT
Start: 2020-09-01 | End: 2020-09-01

## 2020-09-01 RX ORDER — CALCITRIOL 1 UG/ML
0.25 SOLUTION ORAL 2 TIMES DAILY
Status: DISCONTINUED | OUTPATIENT
Start: 2020-09-01 | End: 2020-09-01 | Stop reason: HOSPADM

## 2020-09-01 RX ORDER — MAGNESIUM HYDROXIDE 1200 MG/15ML
LIQUID ORAL
Status: DISCONTINUED
Start: 2020-09-01 | End: 2020-09-01 | Stop reason: HOSPADM

## 2020-09-01 RX ADMIN — WHITE PETROLATUM: 1.75 OINTMENT TOPICAL at 07:59

## 2020-09-01 RX ADMIN — ENOXAPARIN SODIUM 40 MG: 40 INJECTION SUBCUTANEOUS at 07:56

## 2020-09-01 RX ADMIN — CHLORHEXIDINE GLUCONATE 0.12% ORAL RINSE 15 ML: 1.2 LIQUID ORAL at 07:56

## 2020-09-01 RX ADMIN — CALCIUM CARBONATE (ANTACID) CHEW TAB 500 MG 2500 MG: 500 CHEW TAB at 07:58

## 2020-09-01 RX ADMIN — METOPROLOL TARTRATE 25 MG: 25 TABLET, FILM COATED ORAL at 05:09

## 2020-09-01 RX ADMIN — CALCITRIOL 0.25 MCG: 1 SOLUTION ORAL at 07:56

## 2020-09-01 RX ADMIN — LISINOPRIL 10 MG: 10 TABLET ORAL at 07:56

## 2020-09-01 RX ADMIN — DOXAZOSIN 1 MG: 1 TABLET ORAL at 07:58

## 2020-09-01 RX ADMIN — OMEPRAZOLE 20 MG: KIT at 07:56

## 2020-09-01 RX ADMIN — MULTIVITAMIN 15 ML: LIQUID ORAL at 07:56

## 2020-09-01 RX ADMIN — Medication 12.5 MG: at 07:57

## 2020-09-01 ASSESSMENT — ACTIVITIES OF DAILY LIVING (ADL)
ADLS_ACUITY_SCORE: 13

## 2020-09-01 NOTE — PLAN OF CARE
Discharge Planner SLP   Patient plan for discharge: none stated   Current status: Pt seen for ongoing LPK training prior to discharge. Pt was able to remove and clean larytube, place HME, and reinsert larytube independently. Pt required min assist to manage trach ties due to reduced shoulder ROM.     Recommend pt continue to wear larytube throughout day as tolerated. Pt can receive O2 via trach dome with HME in place, however, humidity should be off if HME is in place. HME should be changed at least every 24 hours or more frequently if visibly soiled. If pt is demonstrating increased difficulty breathing, remove larytube and HME. Larytube should be cleaned with provided brushes at least daily, or more frequently as needed. SLP to follow daily.    Barriers to return to prior living situation: stoma/alisa cares, communication, TF  Recommendations for discharge: home with OP ST follow up at ENT clinic   Rationale for recommendations: Pt with new communication and swallow needs s/p laryngectomy; pt will benefit from ongoing ST targeting these deficits       Entered by: Priscilla Abdi 09/01/2020 11:04 AM               Speech Language Therapy Discharge Summary    Reason for therapy discharge:    Discharged to home with outpatient therapy.    Progress towards therapy goal(s). See goals on Care Plan in [facility] electronic health record for goal details.  Goals not met.  Barriers to achieving goals:   discharge from facility.    Therapy recommendation(s):    Continued therapy is recommended.  Rationale/Recommendations:  Continued ST follow up at ENT clinic.

## 2020-09-01 NOTE — PLAN OF CARE
Status: POD #7 s/p total laryngectomy w/ left hemithyroidectomy, bilateral neck dissection, extraction of 13 teeth and PEG tube placement for extraction of laryngeal mass   Vitals: VSS, HME in place on RA. BPs WNL   Neuros: Writes to communicate, intact.  N/T to chin unchanged.  IV: PIV SL  Resp/trach: Laryngeal stoma with HME in place overnight, satting >95% on RA. Lavage x1 with good productive cough. Suctions self orally.   Diet: NPO. Bolus with goal of 6 cans/day. Tolerated all 6 cans yesterday  Bowel status: BS+  : Voiding spontaneously   Skin:  Neck incision with sutures, cleansed per order. JPx1 to neck.  Pain: Mild pain controlled with scheduled Tylenol    Activity: Independent    Social: Daughter is primary visitor   Plan: Potential DC today, encourage own cares. Pt did own cares overnight. Continue to monitor and follow POC

## 2020-09-01 NOTE — PROGRESS NOTES
Head & Neck Tumor Conference Note      Status: current   Staff: Dr. 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 Wang with a glottic mass, stridor, and increased WOB- found to have significantly narrowed airway on exam. He was then admitted for awake tracheostomy, DL with biopsy and NG tube placement 8/3. Pathology and PET discussed which demonstrated SCC mostly centered on the left extending to the anterior commissure and invades the thyroid cartilage and subglottis. There does not appear to be involvement of the neck lymph nodes or the lungs. The patient is s/p TL, bilateral neck dissections (II-IV), left hemithyroidectomy, and dental extractions.      Pertinent PMH: CHF, GERD, HTN, afib     Smoking Hx: former smoker 1ppd 20y, quit 18y ago, no current ETOH use     Imaging:  no new imaging      Pathology:   ORIGINAL REPORT:     SPECIMEN(S):   A: Right level 2A neck dissection   B: Right level 3 neck dissection   C: Right level 4 neck dissection   D: Right level 2B neck dissection   E: Left 2A lymph node neck dissection   F: Left 3 lymph node neck dissection   G: Left 4 lymph node neck dissection   H: Left 2B lymph node neck dissection   I: Sternal hyoid biopsy   J: Right vallecula mucosal margin   K: Left vallecula mucosal margin   L: Right pharyngeal wall mucosa   M: Post cricoid mucosal margin   N: Left pharyngeal wall mucosal margin   O: Posterior tracheal wall mucosal margin   P: Left anterior tracheal wall mucosal margin   Q: Right anterior tracheal wall mucosal margin   R: Total laryngectomy with left  mey thyroid   S: Teeth X13     FINAL DIAGNOSIS:   A. Lymph nodes, right level 2A, neck dissection:   - Three lymph nodes, negative for malignancy (0/3)     B. Lymph nodes, right level 3, neck dissection:   - Twelve lymph nodes, negative for malignancy (0/12)     C. Lymph nodes, right level 4, neck dissection:   - Twenty-six lymph nodes, negative for malignancy (0/26)     D. Lymph nodes, right level 2B, neck dissection:   - Four lymph nodes, negative for malignancy (0/4)     E. Lymph nodes, left 2A, neck dissection:   - Eleven lymph nodes, negative for malignancy (0/11)     F. Lymph nodes, left 3, neck dissection:   - Fifteen lymph nodes, negative for malignancy (0/15)     G. Lymph node, left 4, neck dissection:   - Nine lymph nodes, negative for malignancy (0/9)     H. Lymph node, left 2B, neck dissection:   - Six lymph nodes, negative for malignancy (0/6)     I. Sternal hyoid, biopsy:        - Granulation tissue and necroinflammatory debris with focal atypical    cell, consistent with reactive   change   - No malignancy identified     J. Right vallecula mucosal margin, biopsy:   - Benign squamous mucosa, negative for dysplasia     K. Left vallecula mucosal margin, biopsy:   - Benign squamous mucosa, negative for dysplasia     L. Right pharyngeal wall mucosa, biopsy:   - Benign squamous mucosa, negative for dysplasia     M. Post cricoid mucosal margin, biopsy:   - Benign squamous mucosa, negative for dysplasia     N. Left pharyngeal wall mucosal margin, biopsy:   - Benign squamous mucosa, negative for dysplasia     O. Posterior tracheal wall mucosal margin, biopsy:   - Benign respiratory mucosa, negative for dysplasia     P. Left anterior tracheal wall mucosal margin, biopsy:        - Benign respiratory mucosa and cartilage with focal sialometaplasia,    negative for dysplasia     Q. Right anterior tracheal wall mucosal margin, biopsy:   - Benign respiratory mucosa and cartilage, negative for dysplasia     R.  Larynx and thyroid, total laryngectomy with left hemithyroidectomy:   - Invasive moderately differentiated keratinizing squamous cell carcinoma   - Tumor size: 2.5 cm, involving right and left vocal cords        - The tumor invades to through the thyroid cartilage and focally   extends to the soft tissue   - Benign thyroid tissue without tumor involvement   - Angiolymphatic invasion: Identified   - Perineural invasion: Not identified   - Resection margins free of tumor   - Pathologic staging:   - see synoptic report     S. Teeth x13, removal:   - Teeth as described grossly     COMMENT:   Frozen section interpretations correlated with final diagnoses     Report Name: Larynx (Supraglottis, Glottis, Subglottis)        Status: Submitted Checklist Inst: 1      Last Updated By: Lisa Santiago M.D., PhD, [facility], 08/28/2020   18:38:26   Part(s) Involved:   R: Total laryngectomy with left mey thyroid     Synoptic Report:     SPECIMEN     Procedure:         - Total laryngectomy         Left hemithyroidectomy     TUMOR     Tumor Site:         - Larynx, glottis       Transglottic Extension:           - Present     Tumor Laterality:         - Right         - Left         - Midline     Histologic Type:           - Squamous cell carcinoma, conventional (keratinizing)     Histologic Grade:         - G2: Moderately differentiated     Tumor Focality:         - Unifocal     Tumor Size: 2.5 Centimeters (cm)     Lymphovascular Invasion:         - Present     Perineural Invasion:         - Not identified     MARGINS     Margins:         - Uninvolved by invasive tumor       Distance from Closest Margin (Millimeters):           7 mm       Location of Closest Margin, per Orientation:           anterior soft tissue       Status of Non-Invasive Tumor at Margins:           - Uninvolved by high grade dysplasia / in situ disease         Distance from Closest Margin (Millimeters):             - Cannot be determined - separate margins  "submitted for frozen             sections     LYMPH NODES     Number of Lymph Nodes Involved:         - 0     Number of Lymph Nodes Examined: 86     PATHOLOGIC STAGE CLASSIFICATION (PTNM, AJCC 8TH EDITION)     Primary Tumor (pT):         - pT4a     Regional Lymph Nodes (pN):         - pN0     [facility] August 2019 Agile Release     I have personally reviewed all specimens and/or slides, including the   listed special stains, and used them   with my medical judgement to determine or confirm the final diagnosis.     Electronically signed out by:     Lisa Santiago M.D., PhD, [facility], [place]     CLINICAL HISTORY:   70-year-old man with a T4aN0 squamous cell carcinoma of the larynx.   Imaging with FDG avid enhancing glottic   mass with extension into supra and subglottis and involvement of the   anterior commissure. There is invasion of   the thyroid cartilage anteriorly and arytenoid cartilage posteriorly on   the left. Several reactive appearing   lymph nodes in the left neck. 1.6 cm non-FDG avid enhancing extra-axial   mass overlying the left temporal lobe,   most likely a meningioma.     GROSS:   A: The specimen is received in formalin with proper patient   identification, labeled \"right level 2A neck   dissection\".  The specimen consists of a 8.2 x 2.3 x 1.3 cm portion of   adipose tissue containing two lymph   nodes, 1.3 cm and 2.4 cm in greatest dimension.  Both containing a white   pink unremarkable cut surface.  The   lymph nodes are entirely submitted with additional representative sections    of the fibroadipose tissue.     Summary of Sections:   A1 - one lymph node - bisected   A2-A3 - one lymph node - serially sectioned   A4-A6 - representative fibroadipose tissue     B: The specimen is received in formalin with proper patient   identification, labeled \"right level 3 neck   dissection\".  The specimen consists of a 8.5 x 3.5 x 0.7 cm portion of   adipose tissue containing 12 lymph   nodes ranging from 0.3-1.7 cm in " "greatest dimension.  There are three   lymph nodes greater than 1 cm at 1.0 cm,   1.2 cm and 2.1 cm.  All lymph nodes are submitted.     Summary of Sections:   B1 - five intact lymph nodes   B2 - four intact lymph nodes   B3 - two lymph nodes, differentially inked blue and black and bisected   B4 - one lymph node     C: The specimen is received in formalin with proper patient   identification, labeled \"right level 4 neck   dissection\".  The specimen consists of a 4.6 x 3.5 x 1.7 cm portion of   adipose tissue containing 26 lymph   nodes ranging from 0.2-1.3 cm in greatest dimension.  There are four lymph    nodes greater than 1 cm at 1.0 cm,   1.0 cm, 1.1 cm and 1.3 cm in greatest dimension.  All lymph nodes are   submitted.     Summary of Sections:   C1 - six intact lymph nodes   C2 - six intact lymph nodes   C3 - six intact lymph nodes   C4 - four lymph nodes, one inked and bisected   C5 - two lymph nodes, differentially inked blue and black and bisected   C6 - two lymph nodes, differentially inked blue and black and bisected     D: The specimen is received in formalin with proper patient   identification, labeled \"right 2B neck   dissection\".  The specimen consists of a 2.2 x 1.3 x 0.5 cm portion of   tan-yellow adipose tissue containing   three lymph nodes ranging from 0.3-0.7 cm in greatest dimension.  The   specimen is entirely submitted.     Summary of Sections:   D1 - three intact lymph nodes   D2 - remaining adipose tissue     E: The specimen is received in formalin with proper patient   identification, labeled \"left level 2A neck   dissection\".  The specimen consists of a 5.2 x 4.0 x 1.5 cm portion of   adipose tissue containing 12 lymph   nodes ranging from 0.1-1.3 cm in greatest dimension.  There are four lymph    nodes greater than 1 cm at 1.0 cm,   1.1 cm, 1.2 cm and 1.3 cm.  All lymph nodes are submitted.     Summary of Sections:   E1 - six intact lymph nodes   E2 - four lymph nodes, one inked and " "bisected   E3 - two lymph nodes, differentially inked blue and black and bisected     F: The specimen is received in formalin with proper patient   identification, labeled \"left level 3 neck   dissection\".  The specimen consists of 4.7 x 2.7 x 1.0 cm portion of   adipose tissue containing 15 lymph nodes   ranging from 0.2-1.1 cm.  There is only one lymph node greater than 1 cm   at 1.1 cm.  All lymph nodes are   submitted.     Summary of Sections:   F1 - six intact lymph nodes   F2 - six intact lymph nodes   F3 - three lymph nodes, one inked and bisected     G: The specimen is received in formalin with proper patient   identification, labeled \"left level 4 neck   dissection \".  The specimen consists of a 5.0 x 3.0 x 1.5 cm portion of   adipose tissue containing 13 lymph   nodes ranging from 0.2-1.1 cm in greatest dimension.  There is one lymph   node greater than 1 cm at 1.1 cm.   All lymph nodes are submitted.     Summary of Sections:   G1 - six intact lymph nodes   G2 - five lymph nodes, one inked and bisected   G3 - two lymph nodes, differentially inked blue and black and bisected     H: The specimen is received in formalin with proper patient   identification, labeled \"left level 2B neck   dissection\".  The specimen consists of a 3.0 x 2.2 x 1.0 cm portion of   adipose tissue containing five lymph   nodes ranging from 0.2-1.1 cm in greatest dimension.  There is 1 cm   greater than 1 cm at 1.1 cm.  All lymph   nodes and the remaining fibroadipose tissue is submitted.     Summary of Sections:   H1 - four intact lymph nodes   H2 - one lymph node   H3 - remaining fibroadipose tissue     I: The specimen is received fresh for frozen section, with proper patient   identification, labeled \"sternal   hyoid biopsy\".  It consists of a three pieces of red-tan soft tissue   ranging from 1.3-1.7 cm in greatest   dimension.  The specimen is submitted entirely for frozen section.  The   remainder of the frozen section block " "  is submitted as I1 FS.     J: The specimen is received fresh for frozen section, with proper patient   identification, labeled \"right   vallecula mucosal margin\".  It consists of a 3.0 x 0.3 x 0.2 cm red-tan   soft tissue.  The specimen is   submitted entirely for frozen section.  The remainder of the frozen   section block is submitted as J1 FS.     K: The specimen is received fresh for frozen section, with proper patient   identification, labeled \"left   vallecula mucosal margin\".  It consists of a 3.5 x 0.5 x 0.5 cm red-tan   soft tissue.  The specimen is   submitted entirely for frozen section.  The remainder of the frozen   section block is submitted as K1 FS.     L: The specimen is received fresh for frozen section, with proper patient   identification, labeled \"right   pharyngeal wall mucosal margin\".  It consists of a 1.8 x 0.4 x 0.3 cm   red-tan soft tissue.  The specimen is   submitted entirely for frozen section.  The remainder of the frozen   section block is submitted as L1 FS.     M: The specimen is received fresh for frozen section, with proper patient   identification, labeled \"post   cricoid mucosal margin\".  It consists of a 2.5 x 0.4 x 0.4 cm red-tan soft    tissue.  The specimen is submitted   entirely for frozen section.  The remainder of the frozen section block is    submitted as M1 FS.     N: The specimen is received fresh for frozen section, with proper patient   identification, labeled \"left   pharyngeal wall mucosal margin\".  It consists of a 3.9 x 0.3 x 0.2 cm   red-tan soft tissue.  The specimen is   submitted entirely for frozen section.  The remainder of the frozen   section block is submitted as N1 FS.     O: The specimen is received fresh for frozen section, with proper patient   identification, labeled \"posterior   tracheal wall mucosal margin\".  It consists of a 1.6 x 0.2 x 0.2 cm   red-tan soft tissue.  The specimen is   submitted entirely for frozen section.  The remainder of " "the frozen   section block is submitted as O1 FS.     P: The specimen is received fresh for frozen section, with proper patient   identification, labeled \"left   anterior tracheal wall mucosal margin\".  It consists of two pieces of   red-tan soft tissue, 0.6 cm and 0.8 cm   in greatest dimension.  The specimen is submitted entirely for frozen   section.  The remainder of the frozen   section block is submitted as P1 FS.     Q: The specimen is received fresh for frozen section, with proper patient   identification, labeled \"right   anterior tracheal wall mucosal margin\".  It consists of two pieces of   red-tan soft tissue, 0.4 cm and 0.9 cm   in greatest dimension.  The specimen is submitted entirely for frozen   section.  The remainder of the frozen   section block is submitted as every Q1 FS.     R: The specimen is received fresh with proper patient identification   labeled \"total laryngectomy with left   hemithyroid\".  The specimen consists of a 11.6 x 6.7 x 4.8 cm total with   attached 9.2 x 0.5 x 0.3 cm C-shaped   hyoid bone and left hemithyroidectomy (4.1 cm superior to inferior, 2.2 cm    transversely and 2.3 cm anterior to   posterior).  Additionally on the anterior aspect is a 3.2 x 2.0 cm   tracheostomy which contains direct   communication to the larynx.  The specimen was discussed with the surgeon   and the entire mucosal margins were   submitted previously for frozen section analysis.  The remaining outer   resection margins and thyroid capsule   is inked black.     Upon opening the specimen is remarkable for a 2.5 x 1.8 x 0.3 cm pink red    slightly exophytic mass involving   the left and right aspects of the supraglottis glottis and subglottic   areas.  The mass entirely involves the   left and right vocal cords, involves the left arytenoid cartilage and   potentially involves the right arytenoid   cartilage.  The mass also invades through the thyroid cartilage located at    the anterior commissure " extending   into the soft tissue coming within 0.7 cm of the anterior inked outer   surface.  At the left aspect the mass   invades into the paraglottic space and comes within 0.3 cm of the pyriform    sinus.  Additionally it is 1.6 cm   from the right aryepiglottic fold, 1.7 cm from the left aryepiglottic   fold, 2.4 cm from the stoma and 5.0 cm   from the distal resection margin.  Upon decalcification at the anterior   aspect the mass invades through the   paraglottic space into the inner cortex of the thyroid cartilage with   potential extension through the outer   cortex of the thyroid cartilage into the attached soft tissue.  This area   comes within 1.5 cm from the   anterior inked outer surface.  Additionally the mass invades into the   preepiglottic space with no involvement   of the epiglottis.  The right thyroid lobe is adherent to the wall of the   trachea by white gray to pink- red   fibrous tissue that is extending from the tracheostomy.  The thyroid   parenchyma is pink- red and beefy with no   involvement by the mass.  Representative sections are submitted.  Gross   photographs are taken.     Summary of Sections:   R1 - mass involving left vocal cords   R2 - mass involving right vocal cords   R3 - mass to left aryepiglottic fold and pyriform sinus   R4 - mass to right aryepiglottic fold and pyriform sinus   R5 - mass to tracheostomy   R6 - mass involving left arytenoid cartilage   R7 - mass with potential involvement of right arytenoid cartilage   R8 - tracheal margin en face   R9 - mass to left pyriform sinus   [id number] - mass to thyroid cartilage (composite section bisected,   adjoining ends inked black)   R12DE - mass to thyroid cartilage   R13DE - mass within preepiglottic space and to thyroid cartilage   R14DE - mass to thyroid cartilage (composite section to R13)   R15DE - mass to thyroid cartilage   [id number] - fibrous tissue from tracheostomy to left thyroid lobe     S: The specimen is  "received fresh with proper patient identification   labeled \"teeth x13\".  The specimen   consists of a three canine teeth averaging 3.0 x 0.7 x 0.7 cm, three   incisors averaging 2.3 x 0.6 x 0.4 cm and   two premolars averaging 2.4 x 0.7 x 0.3 cm.  The crown on each is white   with multiple teeth containing   fillings.  The roots are pink with a scant amount of pink red soft tissue.     Additionally received are two   roots, 1.1 x 0.5 x 0.5 cm and 1.6 x 0.6 x 0.3 cm and an aggregate of   disrupted portions of teeth aggregating   1.5 x 1.5 x 0.3 cm.  Gross diagnosis only.  No sections are submitted.     Gross photographs are taken. (Dictated   by: Hany Montes 8/26/2020 12:59 PM)     INTRAOPERATIVE CONSULTATION:   Frozen sections are performed on parts I and J - Q. Please refer to Epic   Result History for the Preliminary   Intraoperative Diagnosis.      Tumor Board Recommendation:   Discussion: The patient's pathology was reviewed and he will need postoperative radiation. The patient has a meningioma and he had not wanted to work this up, however we will discuss this with him in clinic again.     Plan: Radiation     Darvin Gamez MD   Otolaryngology- Head and Neck Surgery     Documentation / Disclaimer Cancer Tumor Board Note  Cancer tumor board recommendations do not override what is determined to be reasonable care and treatment, which is dependent on the circumstances of a patient's case; the patient's medical, social, and personal concerns; and the clinical judgment of the oncologist [physician].  "

## 2020-09-01 NOTE — PROGRESS NOTES
Care Coordinator - Discharge Planning    Admission Date/Time:  8/25/2020  Attending MD:  Shanice Nagy MD     Data  Chart reviewed, discussed with interdisciplinary team.   Patient was admitted for:   1. Post-operative state    2. Laryngeal cancer (H)    3. Laryngeal cancer (H)    4. Essential hypertension    5. HFrEF (heart failure with reduced ejection fraction) (H)       Assessment   Full assessment completed in previous note    D: Plan of care discussed with Medical Team at Interdisciplinary Rounds, plan for patient to discharge today.      I/A: Chart reviewed; met with patient at bedside and introduced role. Patient confirmed he has supplies set up from home and wanted to confirm ties and brushes are included in the order sent to [facility]. Writer called [facility] and confirmed these are included in the order.    Patient also wanted to confirm ATOS will be able to be obtained. Previous RNCC, Ale, sent over script to [facility] in ENT clinic. Confirmed with patient this was done and he will be able to get from OP clinic.     IMM was provided to patient and placed in patient chart.      P: No further RNCC needs at this time. Care Coordinator will remain available for discharge needs that may arise.      Coordination of Care and Referrals: Provided patient/family with options for DME.      Plan  Anticipated Discharge Date:  Today  Anticipated Discharge Plan:  Home with resumption of supplies/services.    Emilie Dos Santos RN, A  Care Coordinator  Phone: [phone] Pager: [phone]  [facility]     To contact Weekend RNCC, page [phone]

## 2020-09-01 NOTE — PLAN OF CARE
Discharge Planner OT   Patient plan for discharge: daughters home w/ assistance  Current status: Demonstrated IND in dressing UB, w/ Dawson for drain management. Educated on LB exercises to promote increase in functional activity level at home. Reviewed hip/knee flexion extension, hip ab/adduction, and squatting exercises within precautions.   Barriers to return to prior living situation: medical status  Recommendations for discharge: home w/ assistance as needed for ADL/IADLs  Rationale for recommendations: Pt is progressing well, will require assistance for heavier household tasks as needed.        Entered by: Kaley Brady 09/01/2020 9:11 AM    Occupational Therapy Discharge Summary    Reason for therapy discharge:    All goals and outcomes met, no further needs identified.    Progress towards therapy goal(s). See goals on Care Plan in [facility] electronic health record for goal details.  Goals met    Therapy recommendation(s):    Continue home exercise program.

## 2020-09-02 NOTE — PROGRESS NOTES
Patient has clinic visit within 24-48 hours of Discharge so no post DC follow up call is needed    He will follow-up with Dr. Wang on 9/4 at 4:20

## 2020-09-02 NOTE — PLAN OF CARE
Status: POD #7 s/p total laryngectomy w/ left hemithyroidectomy, bilateral neck dissection, extraction of 13 teeth and PEG tube placement for extraction of laryngeal mass   Vitals: VSS, HME in place on RA. BPs WNL   Neuros: Writes to communicate, intact.  N/T to chin unchanged.  IV: PIV SL removed prior to discharge  Resp/trach: Laryngeal stoma with HME in place, satting >95% on RA.  Suctions self orally. Practiced [facility] cares with ST and went over discharge instructions/supplies when daughter arrived. Pt and daughter report understanding and comfort with cares at home.  Diet: NPO. Bolus with goal of 6 cans/day. Tolerated all 6 cans yesterday  Bowel status: BS+  : Voiding spontaneously   Skin:  Neck incision with sutures, cleansed per order. JPx1 to neck.  Pain: Mild pain controlled with scheduled Tylenol    Activity: Independent    Social: Daughter is primary visitor   Plan: DC to daughters home at 1345.  If pt left [facility] >2 hours after discharge written he was waiting for his daughter to be part of discharge instructions as he will be staying at her house.            Revision History

## 2020-09-03 ENCOUNTER — TELEPHONE (OUTPATIENT)
Dept: UROLOGY | Facility: CLINIC | Age: 70
End: 2020-09-03

## 2020-09-03 NOTE — TELEPHONE ENCOUNTER
----- Message from Magali Solis RN sent at 9/2/2020  9:00 AM CDT -----  Haile Johnson,    I'm sorry to send this directly to you.  sent it back to me and didn't understand what to do. I sent it back to her and she hasn't done anything with it yet. Can you please help me? Basically he just needs a V V with Dr. Aguirre next available is fine. He may still be inpt. If so, please schedule the appointment and it will be on his discharge paper work.     Thank you.  Magali  ----- Message -----  From: Magali Solis RN  Sent: 9/2/2020  To: Magali Solis RN    Check on this.   ----- Message -----  From: Magali Solis RN  Sent: 8/18/2020  To: Magali Solis RN      ----- Message -----  From: Laian Chun MD  Sent: 8/10/2020   8:17 AM CDT  To: LIANNE Land,    Hope you had a nice weekend.     Can you please help arrange a visit with Dr. Aguirre for Mr. Sterling in the next 2-4 weeks? He has a very enlarged prostate and is interested in holep vs prostate artery embolization    Thank you!    Barbara  [id number]

## 2020-09-03 NOTE — TELEPHONE ENCOUNTER
Left message for pt to call and schedule a virtual visit with Dr. Aguirre to discuss holep vs prostate artery embolization. Next available.

## 2020-09-04 ENCOUNTER — ANCILLARY PROCEDURE (OUTPATIENT)
Dept: GENERAL RADIOLOGY | Facility: CLINIC | Age: 70
End: 2020-09-04
Attending: OTOLARYNGOLOGY

## 2020-09-04 ENCOUNTER — OFFICE VISIT (OUTPATIENT)
Dept: OTOLARYNGOLOGY | Facility: CLINIC | Age: 70
End: 2020-09-04

## 2020-09-04 ENCOUNTER — THERAPY VISIT (OUTPATIENT)
Dept: SPEECH THERAPY | Facility: CLINIC | Age: 70
End: 2020-09-04

## 2020-09-04 ENCOUNTER — TUMOR CONFERENCE (OUTPATIENT)
Dept: ONCOLOGY | Facility: CLINIC | Age: 70
End: 2020-09-04

## 2020-09-04 VITALS
HEIGHT: 69 IN | SYSTOLIC BLOOD PRESSURE: 123 MMHG | BODY MASS INDEX: 25.92 KG/M2 | TEMPERATURE: 98.3 F | WEIGHT: 175 LBS | HEART RATE: 69 BPM | DIASTOLIC BLOOD PRESSURE: 77 MMHG | OXYGEN SATURATION: 100 %

## 2020-09-04 DIAGNOSIS — C32.9 LARYNGEAL CANCER (H): ICD-10-CM

## 2020-09-04 DIAGNOSIS — C32.9 LARYNGEAL CANCER (H): Primary | ICD-10-CM

## 2020-09-04 DIAGNOSIS — R13.12 OROPHARYNGEAL DYSPHAGIA: Primary | ICD-10-CM

## 2020-09-04 RX ORDER — BARIUM SULFATE 400 MG/ML
SUSPENSION ORAL ONCE
Status: ACTIVE | OUTPATIENT
Start: 2020-09-04

## 2020-09-04 RX ORDER — GLYCOPYRROLATE 1 MG/1
1 TABLET ORAL 2 TIMES DAILY
Qty: 30 TABLET | Refills: 1 | Status: SHIPPED | OUTPATIENT
Start: 2020-09-04 | End: 2022-03-07

## 2020-09-04 ASSESSMENT — MIFFLIN-ST. JEOR: SCORE: 1554.17

## 2020-09-04 ASSESSMENT — PAIN SCALES - GENERAL: PAINLEVEL: NO PAIN (0)

## 2020-09-04 NOTE — LETTER
9/4/2020       RE: Evin Sterling  [address] [facility] [address]     Dear Colleague,    Thank you for referring your patient, Evin Sterling, to the [facility] at [facility]. Please see a copy of my visit note below.    Dear Dr. Blankenship:    I had the pleasure of seeing Evin Sterling in follow-up today at the [facility].     History of Present Illness:   Evin Sterling is a 70 year old man with a T4aN0 SCC of the larynx. The patient has about a 4 year history of progressive hoarseness. He was [place] by an [place] [facility] [place] diagnosed with reflux. He had insurance issues and was not able to afford the medication. He had progressive voice changes. He saw Dr Blankenship on 7/28/2020 for consultation at which time he had biphasic stridor and on scope exam was noted to have a large exophytic mass of the glottis without mobility of either cord and a glottic opening of about 5 mm.     He was taken to the OR on 8/3/2020 for an awake trach with DL. Biopsies were consistent with SCC. He had a PET scan which showed the tumor in the larynx with thyroid cartilage erosion but no obvious mariel disease. The patient demonstrated poor swallow function with aspiration on swallow study. Given these findings he was recommended surgery followed by adjuvant radiation.    He was taken to the OR on 8/25/2020 for a DL, total laryngectomy, left hemithyroidectomy, bilateral neck dissections, cricopharyngeal myotomy, dental extractions.. He had PEG tube placement performed by thoracic surgery at the time of surgery. His postoperative course was uncomplicated. His final pathology demonstrated a 2.5 cm invasive moderately differentiated SCC, 2.5 cm involving the right and left cords, invading the thyroid cartilage and focally extending to the soft tissues, benign thyroid, angiolymphatic invasion present, no PNI, negative margins, 0/86 lymph nodes.  His  case was reviewed at tumor conference with recommendation for postoperative radiation.    He had a video swallow study today.  Unfortunately this does show a small leak but no fistula.  He is otherwise doing well.  His pain is controlled.  He is actually been at home independently without issues.  He is tolerating his tube feeds but does notice that he has increased gas.      MEDICATIONS:     Current Outpatient Medications   Medication Sig Dispense Refill     acetaminophen (TYLENOL) 32 mg/mL liquid 20.3 mLs (650 mg) by Per Feeding Tube route every 6 hours 473 mL 1     acetaminophen (TYLENOL) 32 mg/mL liquid 20.3 mLs (650 mg) by Per Feeding Tube route every 4 hours as needed for pain 473 mL 1     calcitRIOL (ROCALTROL) 1 MCG/ML solution 0.25 mLs (0.25 mcg) by Per G Tube route 2 times daily for 6 days 3 mL 0     [START ON 9/8/2020] calcitRIOL (ROCALTROL) 1 MCG/ML solution 0.25 mLs (0.25 mcg) by Per G Tube route daily for 7 days 1.75 mL 0     calcium carbonate (TUMS) 500 MG chewable tablet 5 tablets (2,500 mg) by Per Feeding Tube route 2 times daily for 14 days , then take 5 tablets by feeding tube once daily for 7 days. 105 tablet 0     carboxymethylcellulose PF (REFRESH PLUS) 0.5 % ophthalmic solution Place 1 drop into both eyes 4 times daily as needed for dry eyes 1 Bottle 3     chlorhexidine (PERIDEX) 0.12 % solution Swish and spit 15 mLs in mouth 3 times daily for 15 days 675 mL 0     doxazosin (CARDURA) 1 MG tablet 1 tablet (1 mg) by Per G Tube route daily for 14 days 14 tablet 0     glycopyrrolate (ROBINUL) 1 MG tablet 1 tablet (1 mg) by Per Feeding Tube route 2 times daily 30 tablet 1     lisinopril (ZESTRIL) 10 MG tablet 1 tablet (10 mg) by Per G Tube route daily for 14 days 14 tablet 0     melatonin 1 MG/ML LIQD liquid 3 mLs (3 mg) by Per NG tube route At Bedtime for 15 days 45 mL 0     metoprolol tartrate (LOPRESSOR) 25 MG tablet 0.5 tablets (12.5 mg) by Per G Tube route 2 times daily 30 tablet 0     mineral  oil-hydrophilic petrolatum (AQUAPHOR) external ointment Apply topically every 8 hours 50 g 3     multivitamins w/minerals (CERTAVITE) liquid 15 mLs by Per Feeding Tube route daily 236 mL 1     multivitamins w/minerals (CERTAVITE) liquid 15 mLs by Per Feeding Tube route daily 236 mL 1     omeprazole (FIRST-OMEPRAZOLE) 2 MG/ML SUSP 10 mLs (20 mg) by Per G Tube route every morning (before breakfast) for 14 days 140 mL 0     ondansetron (ZOFRAN) 4 MG tablet 1 tablet (4 mg) by Per G Tube route every 6 hours as needed for nausea 30 tablet 0     order for DME Equipment being ordered: Tracheostomy supplies    0.9% sodium chloride 1000 mL bottles  Box split 4x4 gauze sponges  Trach kits with brushes  Velcro trach ties  3 cc 0.9% sodium chloride lavages for trach suctioning  Large gloves  Cool mist humidity via trach dome  6-0 Shiley disposable inner cannulas    Diagnosis: laryngeal cancer 3 Month 1     order for DME Equipment being ordered:   Portable suction machine   14 French suction catheters  [place]ankeur suction tips    Diagnosis: laryngeal cancer 3 Month 1     order for DME Equipment being ordered: Nasogastric bolus tube feeding supplies  Formula: Isosource 1.5, 6 cans per day  Gravity feeding bags  60 mL syringes    Treatment Diagnosis: laryngeal cancer 3 Month 1     sennosides (SENOKOT) 8.8 MG/5ML syrup 5 mLs by Per Feeding Tube route nightly as needed for constipation 236 mL 0     ondansetron (ZOFRAN) 4 MG tablet Take 4 mg by mouth every 8 hours as needed for nausea       oxyCODONE (ROXICODONE) 5 MG/5ML solution 5-10 mLs (5-10 mg) by Per Feeding Tube route every 4 hours as needed for moderate to severe pain (Patient not taking: Reported on 9/4/2020) 420 mL 0     polyethylene glycol (MIRALAX) 17 g packet Take 17 g by mouth 2 times daily as needed for constipation (Patient not taking: Reported on 9/4/2020) 14 packet 0     senna-docusate (SENOKOT-S/PERICOLACE) 8.6-50 MG tablet 1 tablet by Per G Tube route 2 times daily  (Patient not taking: Reported on 2020) 28 tablet 0       ALLERGIES:  No Known Allergies    HABITS/SOCIAL HISTORY:   Previous alcoholic. Former smoker, quit 12 years ago, previously 1/2 ppd.  Some chewing tobacco use, quit about 30 years ago.    Lives with his son-in-law's father.    Previously worked in construction.    Social History     Socioeconomic History     Marital status:      Spouse name: Not on file     Number of children: Not on file     Years of education: Not on file     Highest education level: Not on file   Occupational History     Not on file   Social Needs     Financial resource strain: Not on file     Food insecurity     Worry: Not on file     Inability: Not on file     Transportation needs     Medical: Not on file     Non-medical: Not on file   Tobacco Use     Smoking status: Former Smoker     Packs/day: 1.00     Years: 20.00     Pack years: 20.00     Last attempt to quit:      Years since quittin.6     Smokeless tobacco: Former User   Substance and Sexual Activity     Alcohol use: Not Currently     Drug use: Not Currently     Sexual activity: Not on file   Lifestyle     Physical activity     Days per week: Not on file     Minutes per session: Not on file     Stress: Not on file   Relationships     Social connections     Talks on phone: Not on file     Gets together: Not on file     Attends [employer] service: Not on file     Active member of club or organization: Not on file     Attends meetings of clubs or organizations: Not on file     Relationship status: Not on file     Intimate partner violence     Fear of current or ex partner: Not on file     Emotionally abused: Not on file     Physically abused: Not on file     Forced sexual activity: Not on file   Other Topics Concern     Not on file   Social History Narrative     Not on file       PAST MEDICAL HISTORY:   Past Medical History:   Diagnosis Date     CHF (congestive heart failure) (H)      GERD (gastroesophageal reflux  "disease)      Hypertension         PAST SURGICAL HISTORY:   Past Surgical History:   Procedure Laterality Date     DISSECTION RADICAL NECK BILATERAL Bilateral 8/25/2020    Procedure: Bilateral neck dissection;  Surgeon: Caron Wang MD;  Location: [facility] OR     ENDOSCOPIC INSERTION TUBE GASTROSTOMY N/A 8/25/2020    Procedure: INSERTION, PEG TUBE;  Surgeon: Reuben Hastings MD;  Location: [facility] OR     HERNIA REPAIR, INGUINAL RT/LT       HERNIA REPAIR, UMBILICAL       LARYNGECTOMY N/A 8/25/2020    Procedure: TOTAL LARYNGECTOMY WITH LEFT HEMITHYROIDECTOMY;  Surgeon: Caron Wang MD;  Location: [facility] OR     LARYNGOSCOPY N/A 8/25/2020    Procedure: DIRECT LARYNGOSCOPY;  Surgeon: Caron Wang MD;  Location: [facility] OR     LARYNGOSCOPY WITH BIOPSY(IES) N/A 8/3/2020    Procedure: LARYNGOSCOPY WITH BIOPSY, TRACHEOSTOMY, NG TUBE PLACEMENT;  Surgeon: Caron Wang MD;  Location: [facility] OR     ODONTECTOMY N/A 8/25/2020    Procedure: DENTAL EXTRACTION OF TEETH x 13;  Surgeon: Caron Wang MD;  Location: [facility] OR     TRACHEOSTOMY N/A 8/3/2020    Procedure: TRACHEOSTOMY;  Surgeon: Caron Wang MD;  Location: [facility] OR       FAMILY HISTORY:    Family History   Problem Relation Age of Onset     Anesthesia Reaction No family hx of      Deep Vein Thrombosis (DVT) No family hx of        REVIEW OF SYSTEMS:  12 point ROS was negative other than the symptoms noted above in the HPI.  Patient Supplied Answers to Review of Systems  [facility] ENT ROS 9/4/2020   Constitutional -   Neurology Dizzy spells   Ears, Nose, Throat Ringing/noise in ears   Cardiopulmonary -   Gastrointestinal/Genitourinary -         PHYSICAL EXAMINATION:   /77   Pulse 69   Temp 98.3  F (36.8  C)   Ht 1.753 m (5' 9\")   Wt 79.4 kg (175 lb)   SpO2 100%   BMI 25.84 kg/m     Patient in no apparent distress  Breathing comfortably on room air  Apron neck incision healing appropriately without any point tenderness, no erythema, no swelling, Prolene sutures " in place  Stoma patent with Dilcia tube and HME in place, PDS sutures still present    PROCEDURES:       RESULTS REVIEWED:     SPECIMEN(S):   A: Right level 2A neck dissection   B: Right level 3 neck dissection   C: Right level 4 neck dissection   D: Right level 2B neck dissection   E: Left 2A lymph node neck dissection   F: Left 3 lymph node neck dissection   G: Left 4 lymph node neck dissection   H: Left 2B lymph node neck dissection   I: Sternal hyoid biopsy   J: Right vallecula mucosal margin   K: Left vallecula mucosal margin   L: Right pharyngeal wall mucosa   M: Post cricoid mucosal margin   N: Left pharyngeal wall mucosal margin   O: Posterior tracheal wall mucosal margin   P: Left anterior tracheal wall mucosal margin   Q: Right anterior tracheal wall mucosal margin   R: Total laryngectomy with left mey thyroid   S: Teeth X13     FINAL DIAGNOSIS:   A. Lymph nodes, right level 2A, neck dissection:   - Three lymph nodes, negative for malignancy (0/3)     B. Lymph nodes, right level 3, neck dissection:   - Twelve lymph nodes, negative for malignancy (0/12)     C. Lymph nodes, right level 4, neck dissection:   - Twenty-six lymph nodes, negative for malignancy (0/26)     D. Lymph nodes, right level 2B, neck dissection:   - Four lymph nodes, negative for malignancy (0/4)     E. Lymph nodes, left 2A, neck dissection:   - Eleven lymph nodes, negative for malignancy (0/11)     F. Lymph nodes, left 3, neck dissection:   - Fifteen lymph nodes, negative for malignancy (0/15)     G. Lymph node, left 4, neck dissection:   - Nine lymph nodes, negative for malignancy (0/9)     H. Lymph node, left 2B, neck dissection:   - Six lymph nodes, negative for malignancy (0/6)     I. Sternal hyoid, biopsy:        - Granulation tissue and necroinflammatory debris with focal atypical    cell, consistent with reactive   change   - No malignancy identified     J. Right vallecula mucosal margin, biopsy:   - Benign squamous mucosa,  negative for dysplasia     K. Left vallecula mucosal margin, biopsy:   - Benign squamous mucosa, negative for dysplasia     L. Right pharyngeal wall mucosa, biopsy:   - Benign squamous mucosa, negative for dysplasia     M. Post cricoid mucosal margin, biopsy:   - Benign squamous mucosa, negative for dysplasia     N. Left pharyngeal wall mucosal margin, biopsy:   - Benign squamous mucosa, negative for dysplasia     O. Posterior tracheal wall mucosal margin, biopsy:   - Benign respiratory mucosa, negative for dysplasia     P. Left anterior tracheal wall mucosal margin, biopsy:        - Benign respiratory mucosa and cartilage with focal sialometaplasia,    negative for dysplasia     Q. Right anterior tracheal wall mucosal margin, biopsy:   - Benign respiratory mucosa and cartilage, negative for dysplasia     R. Larynx and thyroid, total laryngectomy with left hemithyroidectomy:   - Invasive moderately differentiated keratinizing squamous cell carcinoma   - Tumor size: 2.5 cm, involving right and left vocal cords        - The tumor invades to through the thyroid cartilage and focally   extends to the soft tissue   - Benign thyroid tissue without tumor involvement   - Angiolymphatic invasion: Identified   - Perineural invasion: Not identified   - Resection margins free of tumor   - Pathologic staging:   - see synoptic report     S. Teeth x13, removal:   - Teeth as described grossly     COMMENT:   Frozen section interpretations correlated with final diagnoses     Report Name: Larynx (Supraglottis, Glottis, Subglottis)        Status: Submitted Checklist Inst: 1      Last Updated By: Lisa Santiago M.D., PhD, [facility], 08/28/2020   18:38:26   Part(s) Involved:   R: Total laryngectomy with left mey thyroid     Synoptic Report:     SPECIMEN     Procedure:         - Total laryngectomy         Left hemithyroidectomy     TUMOR     Tumor Site:         - Larynx, glottis       Transglottic Extension:           - Present      Tumor Laterality:         - Right         - Left         - Midline     Histologic Type:           - Squamous cell carcinoma, conventional (keratinizing)     Histologic Grade:         - G2: Moderately differentiated     Tumor Focality:         - Unifocal     Tumor Size: 2.5 Centimeters (cm)     Lymphovascular Invasion:         - Present     Perineural Invasion:         - Not identified     MARGINS     Margins:         - Uninvolved by invasive tumor       Distance from Closest Margin (Millimeters):           7 mm       Location of Closest Margin, per Orientation:           anterior soft tissue       Status of Non-Invasive Tumor at Margins:           - Uninvolved by high grade dysplasia / in situ disease         Distance from Closest Margin (Millimeters):             - Cannot be determined - separate margins submitted for frozen             sections     LYMPH NODES     Number of Lymph Nodes Involved:         - 0     Number of Lymph Nodes Examined: 86     PATHOLOGIC STAGE CLASSIFICATION (PTNM, AJCC 8TH EDITION)     Primary Tumor (pT):         - pT4a     Regional Lymph Nodes (pN):         - pN0     IMPRESSION AND PLAN:   Evin Sterling is a 70-year-old man with a T4aN0 SCC of the larynx. He underwent a total laryngectomy with bilateral neck dissections on 8/25/2020.    Pathology was reviewed today.  We discussed that he does require postoperative radiation but no chemotherapy.    He had a video swallow study performed.  Unfortunately this does show a small contained leak.  It does not communicate with the skin so there is no fistula.  It is hard to tell if this is potentially just an area near the neovallecula.  For now I would like to manage it conservatively with a pressure dressing, Robinul and continued n.p.o.  We will plan on repeating the video swallow study in 2 weeks.  I would like to keep the PEG tube in place through radiation.     Dr Gonzales discussed radiation with the patient.  We will try to coordinate the  video swallow study with his CT simulation.    Thank you very much for the opportunity to participate in the care of your patient.      Caron Wang MD, M.D.  Otolaryngology- Head & Neck Surgery      This note was dictated with voice recognition software and then edited. Please excuse any unintentional errors.         CC:  Kyaw Blankenship MD  [address] [address]      Vineet Gonzales MD  Department of Radiation Oncology  [facility]           Department of Radiation Oncology  [facility]  [address], [place] [address] [phone]       Radiation Oncology Follow-up Visit  2020      Evin Sterling  MRN: [id number]   : 1950     DISEASE TREATED:   pT4a N0 M0 squamous cell carcinoma of the larynx    SUBJECTIVE:   Evin Sterling is a 70 year old male with a PMH significant for a locally advanced squamous cell carcinoma of the larynx. For complete details on his history and clinical presentation, please refer to my initial consultation note from 2020.    Mr. Sterling underwent a total laryngectomy and bilateral neck dissections by Dr. Wang on 2020. Surgical pathology (specimen: [id number]) revealed a 2.5 cm moderately differentiated keratinizing squamous cell carcinoma involving the bilateral vocal cords with invasion through the thyroid cartilage and into the soft tissue. There was angiolymphatic invasion without perineural invasion. The surgical margins were negative. Dr. Wang dissected a total of 86 lymph nodes from the bilateral II-IV with no evidence of metastatic disease within the sampled cervical lymphatics. He presents to multiD clinic today to review his pathology and provide recommendations regarding the selection of adjuvant therapy.    On exam today, Mr. Sterling is recovering nicely from his recent surgery. He had a swallow study performed earlier today which demonstrates a small leak but no  evidence of a fistula. He has minimal pain and is currently living at home independently. He is tolerating his tube feeds reasonably well although does state that he occasionally gets a bloating sensation when taking them. His remaining ROS are otherwise negative    PHYSICAL EXAM:  Weight: 79.4 kg  BP: 123/77  Pulse: 69  Temp: 36.8 C     General: 70 year old gentleman seated comfortably in a chair in [facility]  HEENT: NC/AT. EOMI. MMM. Edentulous.  Neck: Surgical incisions are c/d/i. No crusting around the stoma.   Pulmonary: No wheezing, stridor or respiratory distress   Skin: Normal color and turgor    LABS AND IMAGING:  Reviewed    IMPRESSION:   Mr. Sterling is a 70 year old male with a pT4a N0 M0 squamous cell carcinoma of the larynx who is approximately a week and a half out from a total laryngectomy and bilateral neck dissections.     PLAN:   Dr. Wang and I independently reviewed the pathology findings and, given the advanced T-stage disease, recommended proceeding with adjuvant radiotherapy alone for improved local disease control. I specifically recommended a treatment course consisting of 60 Gy delivered to the tumor bed with coverage of the bilateral dissected necks. I reviewed the potential acute and late-term treatment-related toxicities associated with therapy and answered Mr. Sterling and his daughter's questions to their stated satisfaction.     He currently has a small leak on his swallow study which we would prefer to let heal for a bit more prior to proceeding with adjuvant therapy. Given that he is only 1.5 weeks out from surgery at this time, we do have some time available to us within the 6 week post-operative window to commence radiotherapy. I will tentatively have him return to radiation oncology clinic the week of 9/14/2020 for a CT-simulation session with therapy to commence the last week of September. I will attempt to coordinate this appointment with a follow-up SLP evaluation to consolidate his care.  Mr. Sterling and his daughter were provided with my contact information should they have any additional questions prior to these upcoming appointments.    Vineet Gonzales MD/PhD    Department of Radiation Oncology  [facility]

## 2020-09-04 NOTE — TELEPHONE ENCOUNTER
----- Message from Magali Solis RN sent at 8/27/2020  4:27 PM CDT -----  For now, let's just do a V V in the of October.thx  ----- Message -----  From: Tonya Arboleda  Sent: 8/27/2020   4:01 PM CDT  To: LIANNE Land Dr. doesn't have anything until the middle of October. What would you like me to do for scheduling? Thanks  ----- Message -----  From: Magali Solis RN  Sent: 8/26/2020   4:08 PM CDT  To: [facility]    Can you please help him get set up for a Virtual visit with Dr. Aguirre?    Thank you!  Magali  ----- Message -----  From: Magali Solis RN  Sent: 8/18/2020  To: Magali Solis RN      ----- Message -----  From: Laina Chun MD  Sent: 8/10/2020   8:17 AM CDT  To: LIANNE Land,    Hope you had a nice weekend.     Can you please help arrange a visit with Dr. Aguirre for Mr. Sterling in the next 2-4 weeks? He has a very enlarged prostate and is interested in holep vs prostate artery embolization    Thank you!    Barbara  [phone]

## 2020-09-04 NOTE — PROGRESS NOTES
Dr. Wang will review pathology and post op radiation plan with patient at post op appointment today. Patient is also scheduled to see Dr. Gonzales for consult today as well.     Felisha Cronin, RN, BSN

## 2020-09-04 NOTE — PATIENT INSTRUCTIONS
1. Please follow-up in clinic in 2 weeks with swallow study same day.   2. Please call the ENT clinic with any questions,concerns, new or worsening symptoms.    -Clinic number is [phone]   - Felisha's direct line (Dr. Wang's nurse) [phone]

## 2020-09-04 NOTE — PROGRESS NOTES
Dear Dr. Blankenship:    I had the pleasure of seeing Evin Sterling in follow-up today at the [facility].     History of Present Illness:   Evin Sterling is a 70 year old man with a T4aN0 SCC of the larynx. The patient has about a 4 year history of progressive hoarseness. He was potentially seen by an ENT locally in [facility] and diagnosed with reflux. He had insurance issues and was not able to afford the medication. He had progressive voice changes. He saw Dr Blankenship on 7/28/2020 for consultation at which time he had biphasic stridor and on scope exam was noted to have a large exophytic mass of the glottis without mobility of either cord and a glottic opening of about 5 mm.     He was taken to the OR on 8/3/2020 for an awake trach with DL. Biopsies were consistent with SCC. He had a PET scan which showed the tumor in the larynx with thyroid cartilage erosion but no obvious mariel disease. The patient demonstrated poor swallow function with aspiration on swallow study. Given these findings he was recommended surgery followed by adjuvant radiation.    He was taken to the OR on 8/25/2020 for a DL, total laryngectomy, left hemithyroidectomy, bilateral neck dissections, cricopharyngeal myotomy, dental extractions.. He had PEG tube placement performed by thoracic surgery at the time of surgery. His postoperative course was uncomplicated. His final pathology demonstrated a 2.5 cm invasive moderately differentiated SCC, 2.5 cm involving the right and left cords, invading the thyroid cartilage and focally extending to the soft tissues, benign thyroid, angiolymphatic invasion present, no PNI, negative margins, 0/86 lymph nodes.  His case was reviewed at tumor conference with recommendation for postoperative radiation.    He had a video swallow study today.  Unfortunately this does show a small leak but no fistula.  He is otherwise doing well.  His pain is controlled.  He is actually been at home  independently without issues.  He is tolerating his tube feeds but does notice that he has increased gas.      MEDICATIONS:     Current Outpatient Medications   Medication Sig Dispense Refill     acetaminophen (TYLENOL) 32 mg/mL liquid 20.3 mLs (650 mg) by Per Feeding Tube route every 6 hours 473 mL 1     acetaminophen (TYLENOL) 32 mg/mL liquid 20.3 mLs (650 mg) by Per Feeding Tube route every 4 hours as needed for pain 473 mL 1     calcitRIOL (ROCALTROL) 1 MCG/ML solution 0.25 mLs (0.25 mcg) by Per G Tube route 2 times daily for 6 days 3 mL 0     [START ON 9/8/2020] calcitRIOL (ROCALTROL) 1 MCG/ML solution 0.25 mLs (0.25 mcg) by Per G Tube route daily for 7 days 1.75 mL 0     calcium carbonate (TUMS) 500 MG chewable tablet 5 tablets (2,500 mg) by Per Feeding Tube route 2 times daily for 14 days , then take 5 tablets by feeding tube once daily for 7 days. 105 tablet 0     carboxymethylcellulose PF (REFRESH PLUS) 0.5 % ophthalmic solution Place 1 drop into both eyes 4 times daily as needed for dry eyes 1 Bottle 3     chlorhexidine (PERIDEX) 0.12 % solution Swish and spit 15 mLs in mouth 3 times daily for 15 days 675 mL 0     doxazosin (CARDURA) 1 MG tablet 1 tablet (1 mg) by Per G Tube route daily for 14 days 14 tablet 0     glycopyrrolate (ROBINUL) 1 MG tablet 1 tablet (1 mg) by Per Feeding Tube route 2 times daily 30 tablet 1     lisinopril (ZESTRIL) 10 MG tablet 1 tablet (10 mg) by Per G Tube route daily for 14 days 14 tablet 0     melatonin 1 MG/ML LIQD liquid 3 mLs (3 mg) by Per NG tube route At Bedtime for 15 days 45 mL 0     metoprolol tartrate (LOPRESSOR) 25 MG tablet 0.5 tablets (12.5 mg) by Per G Tube route 2 times daily 30 tablet 0     mineral oil-hydrophilic petrolatum (AQUAPHOR) external ointment Apply topically every 8 hours 50 g 3     multivitamins w/minerals (CERTAVITE) liquid 15 mLs by Per Feeding Tube route daily 236 mL 1     multivitamins w/minerals (CERTAVITE) liquid 15 mLs by Per Feeding Tube  route daily 236 mL 1     omeprazole (FIRST-OMEPRAZOLE) 2 MG/ML SUSP 10 mLs (20 mg) by Per G Tube route every morning (before breakfast) for 14 days 140 mL 0     ondansetron (ZOFRAN) 4 MG tablet 1 tablet (4 mg) by Per G Tube route every 6 hours as needed for nausea 30 tablet 0     order for DME Equipment being ordered: Tracheostomy supplies    0.9% sodium chloride 1000 mL bottles  Box split 4x4 gauze sponges  Trach kits with brushes  Velcro trach ties  3 cc 0.9% sodium chloride lavages for trach suctioning  Large gloves  Cool mist humidity via trach dome  6-0 Shiley disposable inner cannulas    Diagnosis: laryngeal cancer 3 Month 1     order for DME Equipment being ordered:   Portable suction machine   14 French suction catheters  [place]keur suction tips    Diagnosis: laryngeal cancer 3 Month 1     order for DME Equipment being ordered: Nasogastric bolus tube feeding supplies  Formula: Isosource 1.5, 6 cans per day  Gravity feeding bags  60 mL syringes    Treatment Diagnosis: laryngeal cancer 3 Month 1     sennosides (SENOKOT) 8.8 MG/5ML syrup 5 mLs by Per Feeding Tube route nightly as needed for constipation 236 mL 0     ondansetron (ZOFRAN) 4 MG tablet Take 4 mg by mouth every 8 hours as needed for nausea       oxyCODONE (ROXICODONE) 5 MG/5ML solution 5-10 mLs (5-10 mg) by Per Feeding Tube route every 4 hours as needed for moderate to severe pain (Patient not taking: Reported on 9/4/2020) 420 mL 0     polyethylene glycol (MIRALAX) 17 g packet Take 17 g by mouth 2 times daily as needed for constipation (Patient not taking: Reported on 9/4/2020) 14 packet 0     senna-docusate (SENOKOT-S/PERICOLACE) 8.6-50 MG tablet 1 tablet by Per G Tube route 2 times daily (Patient not taking: Reported on 9/4/2020) 28 tablet 0       ALLERGIES:  No Known Allergies    HABITS/SOCIAL HISTORY:   Previous alcoholic. Former smoker, quit 12 years ago, previously 1/2 ppd.  Some chewing tobacco use, quit about 30 years ago.    Lives with his  son-in-law's father.    Previously worked in construction.    Social History     Socioeconomic History     Marital status:      Spouse name: Not on file     Number of children: Not on file     Years of education: Not on file     Highest education level: Not on file   Occupational History     Not on file   Social Needs     Financial resource strain: Not on file     Food insecurity     Worry: Not on file     Inability: Not on file     Transportation needs     Medical: Not on file     Non-medical: Not on file   Tobacco Use     Smoking status: Former Smoker     Packs/day: 1.00     Years: 20.00     Pack years: 20.00     Last attempt to quit:      Years since quittin.6     Smokeless tobacco: Former User   Substance and Sexual Activity     Alcohol use: Not Currently     Drug use: Not Currently     Sexual activity: Not on file   Lifestyle     Physical activity     Days per week: Not on file     Minutes per session: Not on file     Stress: Not on file   Relationships     Social connections     Talks on phone: Not on file     Gets together: Not on file     Attends Religion service: Not on file     Active member of club or organization: Not on file     Attends meetings of clubs or organizations: Not on file     Relationship status: Not on file     Intimate partner violence     Fear of current or ex partner: Not on file     Emotionally abused: Not on file     Physically abused: Not on file     Forced sexual activity: Not on file   Other Topics Concern     Not on file   Social History Narrative     Not on file       PAST MEDICAL HISTORY:   Past Medical History:   Diagnosis Date     CHF (congestive heart failure) (H)      GERD (gastroesophageal reflux disease)      Hypertension         PAST SURGICAL HISTORY:   Past Surgical History:   Procedure Laterality Date     DISSECTION RADICAL NECK BILATERAL Bilateral 2020    Procedure: Bilateral neck dissection;  Surgeon: Caron Wang MD;  Location:  OR      "ENDOSCOPIC INSERTION TUBE GASTROSTOMY N/A 8/25/2020    Procedure: INSERTION, PEG TUBE;  Surgeon: Reuben Hastings MD;  Location: [facility]     HERNIA REPAIR, INGUINAL RT/LT       HERNIA REPAIR, UMBILICAL       LARYNGECTOMY N/A 8/25/2020    Procedure: TOTAL LARYNGECTOMY WITH LEFT HEMITHYROIDECTOMY;  Surgeon: Caron Wang MD;  Location: [facility] OR     LARYNGOSCOPY N/A 8/25/2020    Procedure: DIRECT LARYNGOSCOPY;  Surgeon: Caron Wang MD;  Location: [facility] OR     LARYNGOSCOPY WITH BIOPSY(IES) N/A 8/3/2020    Procedure: LARYNGOSCOPY WITH BIOPSY, TRACHEOSTOMY, NG TUBE PLACEMENT;  Surgeon: Caron Wang MD;  Location: [facility] OR     ODONTECTOMY N/A 8/25/2020    Procedure: DENTAL EXTRACTION OF TEETH x 13;  Surgeon: Caron Wang MD;  Location: [facility] OR     TRACHEOSTOMY N/A 8/3/2020    Procedure: TRACHEOSTOMY;  Surgeon: Caron Wang MD;  Location: [facility] OR       FAMILY HISTORY:    Family History   Problem Relation Age of Onset     Anesthesia Reaction No family hx of      Deep Vein Thrombosis (DVT) No family hx of        REVIEW OF SYSTEMS:  12 point ROS was negative other than the symptoms noted above in the HPI.  Patient Supplied Answers to Review of Systems  [facility] ROS 9/4/2020   Constitutional -   Neurology Dizzy spells   Ears, Nose, Throat Ringing/noise in ears   Cardiopulmonary -   Gastrointestinal/Genitourinary -         PHYSICAL EXAMINATION:   /77   Pulse 69   Temp 98.3  F (36.8  C)   Ht 1.753 m (5' 9\")   Wt 79.4 kg (175 lb)   SpO2 100%   BMI 25.84 kg/m     Patient in no apparent distress  Breathing comfortably on room air  Apron neck incision healing appropriately without any point tenderness, no erythema, no swelling, Prolene sutures in place  Stoma patent with Dilcia tube and HME in place, PDS sutures still present    PROCEDURES:       RESULTS REVIEWED:     SPECIMEN(S):   A: Right level 2A neck dissection   B: Right level 3 neck dissection   C: Right level 4 neck dissection   D: Right level 2B " neck dissection   E: Left 2A lymph node neck dissection   F: Left 3 lymph node neck dissection   G: Left 4 lymph node neck dissection   H: Left 2B lymph node neck dissection   I: Sternal hyoid biopsy   J: Right vallecula mucosal margin   K: Left vallecula mucosal margin   L: Right pharyngeal wall mucosa   M: Post cricoid mucosal margin   N: Left pharyngeal wall mucosal margin   O: Posterior tracheal wall mucosal margin   P: Left anterior tracheal wall mucosal margin   Q: Right anterior tracheal wall mucosal margin   R: Total laryngectomy with left mey thyroid   S: Teeth X13     FINAL DIAGNOSIS:   A. Lymph nodes, right level 2A, neck dissection:   - Three lymph nodes, negative for malignancy (0/3)     B. Lymph nodes, right level 3, neck dissection:   - Twelve lymph nodes, negative for malignancy (0/12)     C. Lymph nodes, right level 4, neck dissection:   - Twenty-six lymph nodes, negative for malignancy (0/26)     D. Lymph nodes, right level 2B, neck dissection:   - Four lymph nodes, negative for malignancy (0/4)     E. Lymph nodes, left 2A, neck dissection:   - Eleven lymph nodes, negative for malignancy (0/11)     F. Lymph nodes, left 3, neck dissection:   - Fifteen lymph nodes, negative for malignancy (0/15)     G. Lymph node, left 4, neck dissection:   - Nine lymph nodes, negative for malignancy (0/9)     H. Lymph node, left 2B, neck dissection:   - Six lymph nodes, negative for malignancy (0/6)     I. Sternal hyoid, biopsy:        - Granulation tissue and necroinflammatory debris with focal atypical    cell, consistent with reactive   change   - No malignancy identified     J. Right vallecula mucosal margin, biopsy:   - Benign squamous mucosa, negative for dysplasia     K. Left vallecula mucosal margin, biopsy:   - Benign squamous mucosa, negative for dysplasia     L. Right pharyngeal wall mucosa, biopsy:   - Benign squamous mucosa, negative for dysplasia     M. Post cricoid mucosal margin, biopsy:   - Benign  squamous mucosa, negative for dysplasia     N. Left pharyngeal wall mucosal margin, biopsy:   - Benign squamous mucosa, negative for dysplasia     O. Posterior tracheal wall mucosal margin, biopsy:   - Benign respiratory mucosa, negative for dysplasia     P. Left anterior tracheal wall mucosal margin, biopsy:        - Benign respiratory mucosa and cartilage with focal sialometaplasia,    negative for dysplasia     Q. Right anterior tracheal wall mucosal margin, biopsy:   - Benign respiratory mucosa and cartilage, negative for dysplasia     R. Larynx and thyroid, total laryngectomy with left hemithyroidectomy:   - Invasive moderately differentiated keratinizing squamous cell carcinoma   - Tumor size: 2.5 cm, involving right and left vocal cords        - The tumor invades to through the thyroid cartilage and focally   extends to the soft tissue   - Benign thyroid tissue without tumor involvement   - Angiolymphatic invasion: Identified   - Perineural invasion: Not identified   - Resection margins free of tumor   - Pathologic staging:   - see synoptic report     S. Teeth x13, removal:   - Teeth as described grossly     COMMENT:   Frozen section interpretations correlated with final diagnoses     Report Name: Larynx (Supraglottis, Glottis, Subglottis)        Status: Submitted Checklist Inst: 1      Last Updated By: Lisa Santiago M.D., PhD, [facility], 08/28/2020   18:38:26   Part(s) Involved:   R: Total laryngectomy with left mey thyroid     Synoptic Report:     SPECIMEN     Procedure:         - Total laryngectomy         Left hemithyroidectomy     TUMOR     Tumor Site:         - Larynx, glottis       Transglottic Extension:           - Present     Tumor Laterality:         - Right         - Left         - Midline     Histologic Type:           - Squamous cell carcinoma, conventional (keratinizing)     Histologic Grade:         - G2: Moderately differentiated     Tumor Focality:         - Unifocal     Tumor Size: 2.5  Centimeters (cm)     Lymphovascular Invasion:         - Present     Perineural Invasion:         - Not identified     MARGINS     Margins:         - Uninvolved by invasive tumor       Distance from Closest Margin (Millimeters):           7 mm       Location of Closest Margin, per Orientation:           anterior soft tissue       Status of Non-Invasive Tumor at Margins:           - Uninvolved by high grade dysplasia / in situ disease         Distance from Closest Margin (Millimeters):             - Cannot be determined - separate margins submitted for frozen             sections     LYMPH NODES     Number of Lymph Nodes Involved:         - 0     Number of Lymph Nodes Examined: 86     PATHOLOGIC STAGE CLASSIFICATION (PTNM, AJCC 8TH EDITION)     Primary Tumor (pT):         - pT4a     Regional Lymph Nodes (pN):         - pN0     IMPRESSION AND PLAN:   Evin Sterling is a 70-year-old man with a T4aN0 SCC of the larynx. He underwent a total laryngectomy with bilateral neck dissections on 8/25/2020.    Pathology was reviewed today.  We discussed that he does require postoperative radiation but no chemotherapy.    He had a video swallow study performed.  Unfortunately this does show a small contained leak.  It does not communicate with the skin so there is no fistula.  It is hard to tell if this is potentially just an area near the neovallecula.  For now I would like to manage it conservatively with a pressure dressing, Robinul and continued n.p.o.  We will plan on repeating the video swallow study in 2 weeks.  I would like to keep the PEG tube in place through radiation.     Dr Gonzales discussed radiation with the patient.  We will try to coordinate the video swallow study with his CT simulation.    Thank you very much for the opportunity to participate in the care of your patient.      Caron Wang MD, M.D.  Otolaryngology- Head & Neck Surgery      This note was dictated with voice recognition software and then edited.  Please excuse any unintentional errors.         CC:  Kyaw Blankenship MD  [address] [address]      Vineet Gonzales MD  Department of Radiation Oncology  [facility]

## 2020-09-04 NOTE — NURSING NOTE
"Chief Complaint   Patient presents with     RECHECK     post op      Blood pressure 123/77, pulse 69, temperature 98.3  F (36.8  C), height 1.753 m (5' 9\"), weight 79.4 kg (175 lb), SpO2 100 %.    Yoseph Yee LPN    "

## 2020-09-06 NOTE — PROGRESS NOTES
Department of Radiation Oncology  [facility]  [address] [place] [address]  [phone]       Radiation Oncology Follow-up Visit  2020      Evin Sterling  MRN: [id number]   : 1950     DISEASE TREATED:   pT4a N0 M0 squamous cell carcinoma of the larynx    SUBJECTIVE:   Evin Sterling is a 70 year old male with a PMH significant for a locally advanced squamous cell carcinoma of the larynx. For complete details on his history and clinical presentation, please refer to my initial consultation note from 2020.    Mr. Sterling underwent a total laryngectomy and bilateral neck dissections by Dr. Wang on 2020. Surgical pathology (specimen: [id number]) revealed a 2.5 cm moderately differentiated keratinizing squamous cell carcinoma involving the bilateral vocal cords with invasion through the thyroid cartilage and into the soft tissue. There was angiolymphatic invasion without perineural invasion. The surgical margins were negative. Dr. Wang dissected a total of 86 lymph nodes from the bilateral II-IV with no evidence of metastatic disease within the sampled cervical lymphatics. He presents to multiD clinic today to review his pathology and provide recommendations regarding the selection of adjuvant therapy.    On exam today, Mr. Sterling is recovering nicely from his recent surgery. He had a swallow study performed earlier today which demonstrates a small leak but no evidence of a fistula. He has minimal pain and is currently living at home independently. He is tolerating his tube feeds reasonably well although does state that he occasionally gets a bloating sensation when taking them. His remaining ROS are otherwise negative    PHYSICAL EXAM:  Weight: 79.4 kg  BP: 123/77  Pulse: 69  Temp: 36.8 C     General: 70 year old gentleman seated comfortably in a chair in [facility]  HEENT: NC/AT. EOMI. MMM. Edentulous.  Neck: Surgical incisions are c/d/i. No crusting around  the stoma.   Pulmonary: No wheezing, stridor or respiratory distress   Skin: Normal color and turgor    LABS AND IMAGING:  Reviewed    IMPRESSION:   Mr. Sterling is a 70 year old male with a pT4a N0 M0 squamous cell carcinoma of the larynx who is approximately a week and a half out from a total laryngectomy and bilateral neck dissections.     PLAN:   Dr. Wang and I independently reviewed the pathology findings and, given the advanced T-stage disease, recommended proceeding with adjuvant radiotherapy alone for improved local disease control. I specifically recommended a treatment course consisting of 60 Gy delivered to the tumor bed with coverage of the bilateral dissected necks. I reviewed the potential acute and late-term treatment-related toxicities associated with therapy and answered Mr. Sterling and his daughter's questions to their stated satisfaction.     He currently has a small leak on his swallow study which we would prefer to let heal for a bit more prior to proceeding with adjuvant therapy. Given that he is only 1.5 weeks out from surgery at this time, we do have some time available to us within the 6 week post-operative window to commence radiotherapy. I will tentatively have him return to radiation oncology clinic the week of 9/14/2020 for a CT-simulation session with therapy to commence the last week of September. I will attempt to coordinate this appointment with a follow-up SLP evaluation to consolidate his care. Mr. Sterling and his daughter were provided with my contact information should they have any additional questions prior to these upcoming appointments.    Vineet Gonzales MD/PhD    Department of Radiation Oncology  [facility]

## 2020-09-08 NOTE — TELEPHONE ENCOUNTER
----- Message from Magali Solis RN sent at 8/27/2020  4:27 PM CDT -----  For now, let's just do a V V in the of October.thx  ----- Message -----  From: Tonya Arboleda  Sent: 8/27/2020   4:01 PM CDT  To: LIANNE Land Dr. doesn't have anything until the middle of October. What would you like me to do for scheduling? Thanks  ----- Message -----  From: Magali Solis RN  Sent: 8/26/2020   4:08 PM CDT  To: [facility]    Can you please help him get set up for a Virtual visit with Dr. Aguirre?    Thank you!  Magali  ----- Message -----  From: Magali Solis RN  Sent: 8/18/2020  To: Magali Solis RN      ----- Message -----  From: Laina Chun MD  Sent: 8/10/2020   8:17 AM CDT  To: LIANNE Land,    Hope you had a nice weekend.     Can you please help arrange a visit with Dr. Aguirre for Mr. Sterling in the next 2-4 weeks? He has a very enlarged prostate and is interested in holep vs prostate artery embolization    Thank you!    Barbara  [phone]

## 2020-09-09 ENCOUNTER — PATIENT OUTREACH (OUTPATIENT)
Dept: OTOLARYNGOLOGY | Facility: CLINIC | Age: 70
End: 2020-09-09

## 2020-09-09 DIAGNOSIS — C32.9 LARYNGEAL CANCER (H): Primary | ICD-10-CM

## 2020-09-09 DIAGNOSIS — R13.10 DYSPHAGIA: ICD-10-CM

## 2020-09-09 NOTE — TELEPHONE ENCOUNTER
Called and spoke with patient's daughter Jesica regarding appointments for 9/16. Reviewed list of appointments, time and location.     We are awaiting to hear from dietician on virtual visit to discuss weight loss concerns and will contact them with this information.     Daughter verbalized understanding and was encouraged to call with further questions or concerns.     Felisha Cronin, RN, BSN

## 2020-09-10 ENCOUNTER — HOSPITAL ENCOUNTER (OUTPATIENT)
Dept: NUTRITION | Facility: CLINIC | Age: 70
End: 2020-09-10
Attending: RADIOLOGY
Payer: MEDICARE

## 2020-09-10 NOTE — PROGRESS NOTES
"Evin Sterling is a 70 year old male who is being evaluated via a billable telephone visit.      The patient has been notified of following:     \"This telephone visit will be conducted via a call between you and your physician/provider. We have found that certain health care needs can be provided without the need for a physical exam.  This service lets us provide the care you need with a short phone conversation.  If a prescription is necessary we can send it directly to your pharmacy.  If lab work is needed we can place an order for that and you can then stop by our lab to have the test done at a later time.    Telephone visits are billed at different rates depending on your insurance coverage. During this emergency period, for some insurers they may be billed the same as an in-person visit.  Please reach out to your insurance provider with any questions.    If during the course of the call the physician/provider feels a telephone visit is not appropriate, you will not be charged for this service.\"    Patient has given verbal consent for Telephone visit?  Yes    What phone number would you like to be contacted at? [phone] - daughter Jesica Webb    CLINICAL NUTRITION SERVICES - ASSESSMENT NOTE    Evin Sterling 70 year old referred for MNT related to laryngeal cancer    Time Spent: 30 minutes (no charge)  Visit Type: telephone  Referring Physician: Janet 9/9  Pt accompanied by: his daughter, Jesica      NUTRITION HISTORY  Factors affecting nutrition intake include:swallowing difficulties  Current diet: NPO  PEG Tube: Yes - dependent  Chemotherapy: impending   Radiation: starts next week 9/14     Current EN regimen:  Formula: Isosource 1.5 enoch  MOA: gravity bag bolus; 120mL water with formula; 30mL water before and after each fdg  Volume: 6 cartons/day (1500mL, 1140ml free water)  Provisions:  2250kcal (28kcal/kg), 102g protein (1.2g/kg), 264g CHO, 22g fiber    His daughter tells me that he has been losing weight " "since he left the hospital just over one week ago.  Per EMR he is down 12 lbs x past one week; some of which may be fluids.      With this, she has advised her father to start taking 7-8 cartons of formula/day.   His routine consists of 2 carton TID for meals and 1 carton BID as snacks.   She tells me that he has been tolerating this very well, however, tends to get gasey and bloated.  He has also been feeling full.  His weight has been stable and he no longer feels hungry.  He still complains of fatigue.     She inquires about altnerative formulas with higher calories so he does't have to take so much volume.     ANTHROPOMETRICS  Height: 70\"  Weight: 175 lbs/80kg  BMI: 25  Weight Status:  Overweight BMI 25-29.9  IBW: 166 lbs (105%)  Weight History: down 8 lb x pat 1 week   Wt Readings from Last 6 Encounters:   09/04/20 79.4 kg (175 lb)   08/30/20 84.8 kg (187 lb)   08/11/20 86.6 kg (191 lb)   07/31/20 86.2 kg (190 lb)   07/31/20 85.3 kg (188 lb)     Dosing Weight: 80kg    Medications/vitamins/minerals/herbals:   Reviewed    Labs:   Labs reviewed    NUTRITION FOCUSED PHYSICAL ASSESSMENT FOR DIAGNOSING MALNUTRITION:  Not observed due to Covid pandemic    ASSESSED NUTRITION NEEDS:  Estimated Energy Needs: 2800 kcals (35 Kcal/Kg)  Justification: repletion  Estimated Protein Needs: 120 grams protein (1.2-1.5 g pro/Kg)  Justification: Repletion  Estimated Fluid Needs: 3000  mL   Justification: maintenance    MALNUTRITION:  % Weight Loss:  > 2% in 1 week (severe malnutrition)  % Intake:  <75% for >/= 1 month (non-severe malnutrition)  Subcutaneous Fat Loss:  None observed  Muscle Loss:  None observed  Fluid Retention:  None noted    Malnutrition Diagnosis: non severe malnutrition  In Context of:  Chronic illness or disease    NUTRITION DIAGNOSIS:  Inadequate enteral nutrition infusion related to knowedge deficit as evidenced by 7% wt loss x past 1 week    INTERVENTIONS  Provided written & verbal education:   --Reviewed EN " formulas, MOA, supplies etc.    --Reviewed alternative formulas (Nutren 2.0, TwoCal HN).  Samples of Nutren 2.0 will be sent for patient to try.    Suggested the following formula transition:  Day 1: 6 cartons of Isosource 1.5, 1 carton Nutren 2.0   Day 2: 5 cartons of Iso, 2 cartons of Nutren   Day 3: 4 cartons of Iso, 3 cartons of Nutren   Day 4: 3 cartons of Iso, 4 cartons of Nutren   Day 5: 2 cartons of Iso, 5 cartons of Nutren   Day 6: 6 cartons of Nutren 2.0     Goal 6 cartons of Nutren 2.0/day  Provisions: 1500mL, 3000kcal (35kcal/kg), 126 g protein (1.5g/kg)  Flushes: measure out 6 cups water every am: use for flushes (ie 30mL before/after ea fdg + 120mL with each feeding).  Advised to consume all 6 cups by the end of the day to meet 100% of hydration needs.     Pt verbalize understanding of materials provided during consult.   Patient Understanding: Excellent  Expected Compliance: Excellent     Implementation  Collaboration and Referral of Nutrition care - spoke with Korin from [employer]; they will be sending patient 12 cartons of Nutren 2.0 to sample.  If he tolerates this, they will send his full supply.    Patient is paying OOP.    RD sent Jesica the comparable formula options and encouraged to compare pricing to get the best price available.     Contacts:  --Daughter Jesica: [email or web address] - preferred way to receive EN information regarding tube feeding supplies, formula changes etc.   --Korin: [employer] supply #: [phone] ([place])    Goals  1.  EN to provide 100% of estimated nutrition needs  2.  Weight maintenance/weight repletion      Follow-Up Plans: Pt has RD contact information for questions.    Pt to follow up with RD in 1 week     MONITORING AND EVALUATION:  -EN intake/tolerance  -Weight trends    Renita Frazier, MARYJANEN, LD

## 2020-09-12 NOTE — PROGRESS NOTES
Speech-Language Pathology Department   EVALUATION  [facility]  Video Swallow Study      09/04/20 5746   General Information   Type Of Visit Initial   Start Of Care Date 09/04/20   Referring Physician Dr. Caron Wang   Orders Evaluate And Treat   Orders Comment Video Swallow Study   Medical Diagnosis Laryngeal cancer s/p total laryngectomy    Onset Of Illness/injury Or Date Of Surgery 08/28/20   Precautions/limitations Other (see Comments);Swallowing Precautions  (NPO, laryngectomy stoma)   Hearing Adequate   Pertinent History of Current Problem/OT: Additional Occupational Profile Info Evin Sterling is a 70-year-old man who underwent total laryngectomy, bilateral neck dissections, left hemithyroidectomy, and dental extractions on 8/25/2020 for T4N0 SCC of the larynx. He presents today for video swallow study following surgery to identify any possible dysphagia or extravasion of contrast into the reconstructed tissue.    Respiratory Status Room air  (laryngectomy stoma, larytube in place with HME)   Clinical Swallow Evaluation   Dentition upper dentures;lower dentures  (None present during evaluation)   Mucosal Quality adequate   Mandibular Strength and Mobility impaired   Oral Labial Strength and Mobility WFL   Lingual Strength and Mobility WFL   Velar Elevation intact   Buccal Strength and Mobility intact   Oral Musculature Comments Anatomical changes consistent with post laryngectomy anatomy.    VFSS Eval: Radiology   Radiologist Radiologist   Views Taken left lateral;A/P   Physical Location of Procedure [facility]   VFSS Eval: Thin Liquid Texture Trial   Mode of Presentation, Thin Liquid cup;self-fed   Order of Presentation All series are omnipaque contrast   Preparatory Phase WFL   Oral Phase, Thin Liquid WFL   Pharyngeal Phase, Thin Liquid other (see comments)  (extravasion into the tissue demonstrated)   Rosenbek's Penetration Aspiration Scale:  Thin Liquid Trial Results 1 - no aspiration, contrast does not enter airway   Diagnostic Statement Pt demonstrates extravasion into the reconstructed tissue.   Swallow Compensations   Swallow Compensations No compensations were used   Educational Assessment   Barriers to Learning No barriers   Swallow Eval: Clinical Impressions   Skilled Criteria for Therapy Intervention Other (see comments)  (repeat Video Swallow Study will be required prior to therapy)   Dysphagia Outcome Severity Scale (SWAPNA) Level 1 - SWAPNA   Treatment Diagnosis Severe dysphagia due to extravasion of contrast into the reconstructed area.    Diet texture recommendations NPO  (Continue enteral support)   Predicted Duration of Therapy Intervention (days/wks) Evaluation only   Clinical Impression Comments Evin Sterling demonstrates severe pharyngoesophgeal dysphagia as characterized by extravasion of contrast into the reconstructed tissue. Only omnipaque was administered due to the extravasion. Recommend NPO with continued enteral support to allow further time for healing of the tissue in the area of laryngectomy.    Total Session Time   SLP Eval: VideoFluoroscopic Swallow function Minutes (92042) 25   Total Evaluation Time 25     Thank you for the referral of Evin Sterling. If you have any questions about this report, please contact me using the information below.      Aide Whitfield MS, CCC-SLP  Speech-Language Pathology  [facility] [place]  Departement of Otolaryngology/D&T - 4th floor  Pager: [phone]  Phone: [phone]  Email: [email or web address]

## 2020-09-14 ENCOUNTER — PATIENT OUTREACH (OUTPATIENT)
Dept: OTOLARYNGOLOGY | Facility: CLINIC | Age: 70
End: 2020-09-14

## 2020-09-14 ENCOUNTER — TELEPHONE (OUTPATIENT)
Dept: RADIATION ONCOLOGY | Facility: CLINIC | Age: 70
End: 2020-09-14

## 2020-09-14 ENCOUNTER — HOSPITAL ENCOUNTER (OUTPATIENT)
Dept: NUTRITION | Facility: CLINIC | Age: 70
End: 2020-09-14
Attending: RADIOLOGY
Payer: MEDICARE

## 2020-09-14 NOTE — PROGRESS NOTES
Nutrition Services:    Called patient's Jesica valle today for follow-up on EN tolerance.    Evin has been transitioning from Isosource 1.5 enoch to Nutren 2.0 enoch.    She tells me that he has been feeling less hungry and this formula and has more energy.   He is currently on day 3 of his transition (taking 3 cartons of Isosource and 3 cartons of Nutren.    Jesica denies any questions or concerns for RD at this time.    She will reach out to RD with further questions prn per her request.     Renita Frazier, RENETTA, LDN  [facility] [place]  [phone]

## 2020-09-14 NOTE — TELEPHONE ENCOUNTER
Patient's daughter called to cancel the FUP and Sim on Wednesday. Her father does not want to start radiation at this time and did not want to reschedule.  Notified team of this decision.

## 2020-09-14 NOTE — TELEPHONE ENCOUNTER
Called and spoke with patient's daughter Jesica as writer received a message from radiation nurse that patient has decided to not proceed with radiation at this time.     Daughter indicates that patient is having a hard time with insurance coverage as he only has Part A of Medicare currently. They are working with financial to try and secure community care but patient is not ready to proceed with the amount of bills he would have for radiation.     He also reports that he does not want to proceed with any additional treatment with radiation at this time and would reconsider if/when he can start taking things by mouth again.     Reviewed with daughter the timeline of starting radiation within 4-6 weeks of surgery and the importance of this but daughter indicates that patient is very set on his decision at this time. Offered to have Dr. Wang call to further discuss if they wished but daughter declined at this time.     Patient will follow-up with Dr. Wang as scheduled on Wednesday. Daughter appreciative of call and was encouraged to call with further questions or concerns.     Update sent to Dr. Wang.     Felisha Cronin, RN, BSN

## 2020-09-16 ENCOUNTER — OFFICE VISIT (OUTPATIENT)
Dept: OTOLARYNGOLOGY | Facility: CLINIC | Age: 70
End: 2020-09-16

## 2020-09-16 ENCOUNTER — ANCILLARY PROCEDURE (OUTPATIENT)
Dept: GENERAL RADIOLOGY | Facility: CLINIC | Age: 70
End: 2020-09-16
Attending: RADIOLOGY

## 2020-09-16 ENCOUNTER — THERAPY VISIT (OUTPATIENT)
Dept: SPEECH THERAPY | Facility: CLINIC | Age: 70
End: 2020-09-16
Attending: RADIOLOGY

## 2020-09-16 VITALS
BODY MASS INDEX: 26.07 KG/M2 | HEIGHT: 69 IN | TEMPERATURE: 99 F | DIASTOLIC BLOOD PRESSURE: 72 MMHG | WEIGHT: 176 LBS | OXYGEN SATURATION: 98 % | HEART RATE: 78 BPM | SYSTOLIC BLOOD PRESSURE: 102 MMHG

## 2020-09-16 DIAGNOSIS — R13.12 OROPHARYNGEAL DYSPHAGIA: Primary | ICD-10-CM

## 2020-09-16 DIAGNOSIS — C32.9 LARYNGEAL CANCER (H): ICD-10-CM

## 2020-09-16 DIAGNOSIS — C32.9 LARYNGEAL CANCER (H): Primary | ICD-10-CM

## 2020-09-16 ASSESSMENT — MIFFLIN-ST. JEOR: SCORE: 1558.71

## 2020-09-16 ASSESSMENT — PAIN SCALES - GENERAL: PAINLEVEL: NO PAIN (0)

## 2020-09-16 NOTE — PATIENT INSTRUCTIONS
1. Please follow-up in clinic in 2-3 weeks with swallow study the same day.   2. Please call the ENT clinic with any questions,concerns, new or worsening symptoms.    -Clinic number is [phone]   - Felisha's direct line (Dr. Wang's nurse) [phone]

## 2020-09-16 NOTE — PROGRESS NOTES
Speech-Language Pathology Department   EVALUATION  [facility]  Video Swallow Study Evaluation    09/16/20 1300   General Information   Type Of Visit Initial   Start Of Care Date 09/16/20   Referring Physician Dr. Caron Wang   Orders Evaluate And Treat   Orders Comment Video Swallow Study   Medical Diagnosis Laryngeal cancer s/p total laryngectomy    Onset Of Illness/injury Or Date Of Surgery 08/28/20   Precautions/limitations Other (see Comments);Swallowing Precautions  (NPO, laryngectomy stoma)   Hearing Adequate   Respiratory Status Room air  (laryngectomy stoma, larytube in place with HME)   Prior Level Of Function Swallowing   Prior Level Of Function Comment NPO since sugery   General Observations Pt is pleasant and cooperative throughout evaluation.   Patient/family Goals To evaluate for continued fistula/extravasion into tisssue before beginning PO   Clinical Swallow Evaluation   Dentition upper dentures;lower dentures   Mucosal Quality adequate   Mandibular Strength and Mobility impaired   Oral Labial Strength and Mobility WFL   Lingual Strength and Mobility WFL   Velar Elevation intact   Buccal Strength and Mobility intact   Oral Musculature Comments Anatomical changes consistent with post laryngectomy anatomy.    VFSS Eval: Radiology   Radiologist Radiologist   Views Taken left lateral;A/P   Physical Location of Procedure [facility]   VFSS Eval: Thin Liquid Texture Trial   Mode of Presentation, Thin Liquid cup;self-fed   Order of Presentation All series are omnipaque contrast   Preparatory Phase WFL   Oral Phase, Thin Liquid WFL   Pharyngeal Phase, Thin Liquid other (see comments)  (extravasion into the tissue demonstrated)   Rosenbek's Penetration Aspiration Scale: Thin Liquid Trial Results 1 - no aspiration, contrast does not enter airway   Diagnostic Statement Pt demonstrates continued extravasion into the reconstructed tissue. This area  appears similar, but slightly smaller compared to 9/4/20 VFSS.    Swallow Compensations   Swallow Compensations No compensations were used   Educational Assessment   Barriers to Learning No barriers   Swallow Eval: Clinical Impressions   Skilled Criteria for Therapy Intervention Other (see comments)  (repeat Video Swallow Study will be required prior to therapy)   Dysphagia Outcome Severity Scale (SWAPNA) Level 1 - SWAPNA   Treatment Diagnosis Severe dysphagia due to extravasion of contrast into the reconstructed area.    Diet texture recommendations NPO   Predicted Duration of Therapy Intervention (days/wks) Evaluation only   Clinical Impression Comments Evin Sterling demonstrates severe pharyngoesophgeal dysphagia as characterized by continued extravasion of contrast into the reconstructed tissue. Only omnipaque was administered due to the extravasion. This area appears similar, but slightly smaller to area of extravasion observed on 9/4/20 VFSS.  Recommend NPO with continued enteral support to allow further time for healing of the tissue in the area of laryngectomy. Pt would benefit from repeat VFSS in 2-3 weeks to reassess.   Total Session Time   SLP Eval: VideoFluoroscopic Swallow function Minutes (04893) 25   Total Evaluation Time 25     Thank you for the referral of Evin Sterling. If you have any questions about this report, please contact me using the information below.     LUBNA Gallegos MA (music), CCC-SLP   Speech Language Pathologist  [place] Trained Vocologist   [facility] [place]  Dept. of Otolaryngology  Department of Rehabilitation Services  [address] [place] [id number]  Email: [email or web address]   Pronouns: she/her/hers

## 2020-09-16 NOTE — NURSING NOTE
"Chief Complaint   Patient presents with     RECHECK     Follow up after VSS       Blood pressure 102/72, pulse 78, temperature 99  F (37.2  C), temperature source Temporal, height 1.753 m (5' 9\"), weight 79.8 kg (176 lb), SpO2 98 %.    Linda Arellano, EMT  "

## 2020-09-16 NOTE — LETTER
9/16/2020       RE: Evin Sterling  [address] [facility] [address]     Dear Colleague,    Thank you for referring your patient, Evin Sterling, to the [facility] EAR NOSE [facility] THROAT at [facility]. Please see a copy of my visit note below.    Dear Dr. Blankenship:    I had the pleasure of seeing Evin Sterling in follow-up today at the [facility].     History of Present Illness:   Evin Sterling is a 70 year old man with a T4aN0 SCC of the larynx. The patient has about a 4 year history of progressive hoarseness. He was potentially seen by an ENT locally in [facility] [place] and diagnosed with reflux. He had insurance issues and was not able to afford the medication. He had progressive voice changes. He saw Dr Blankenship on 7/28/2020 for consultation at which time he had biphasic stridor and on scope exam was noted to have a large exophytic mass of the glottis without mobility of either cord and a glottic opening of about 5 mm.     He was taken to the OR on 8/3/2020 for an awake trach with DL. Biopsies were consistent with SCC. He had a PET scan which showed the tumor in the larynx with thyroid cartilage erosion but no obvious mariel disease. The patient demonstrated poor swallow function with aspiration on swallow study. Given these findings he was recommended surgery followed by adjuvant radiation.    He was taken to the OR on 8/25/2020 for a DL, total laryngectomy, left hemithyroidectomy, bilateral neck dissections, cricopharyngeal myotomy, dental extractions.. He had PEG tube placement performed by thoracic surgery at the time of surgery. His postoperative course was uncomplicated. His final pathology demonstrated a 2.5 cm invasive moderately differentiated SCC, 2.5 cm involving the right and left cords, invading the thyroid cartilage and focally extending to the soft tissues, benign thyroid, angiolymphatic invasion present, no PNI, negative margins, 0/86 lymph nodes.   His case was reviewed at tumor conference with recommendation for postoperative radiation.    Interval history:  He comes in today for follow-up. He was last seen in clinic 2 weeks ago in multidisciplinary clinic in conjunction with Dr Gonzales. He had a video swallow study which showed a small contained leak. He remained NPO. He was supposed to have CT simulation for radiation but then cancelled his appointment due to financial concerns. He says today that he just wants to be able to live his life for as long as he can and does not want to face financial ruin from treatment. He has increased his tube feeds up to 8 cans per day due to ongoing issues with hunger. He had a repeat video swallow study today which showed a persistent leak that was decreased in size.      MEDICATIONS:     Current Outpatient Medications   Medication Sig Dispense Refill     acetaminophen (TYLENOL) 32 mg/mL liquid 20.3 mLs (650 mg) by Per Feeding Tube route every 6 hours 473 mL 1     acetaminophen (TYLENOL) 32 mg/mL liquid 20.3 mLs (650 mg) by Per Feeding Tube route every 4 hours as needed for pain 473 mL 1     carboxymethylcellulose PF (REFRESH PLUS) 0.5 % ophthalmic solution Place 1 drop into both eyes 4 times daily as needed for dry eyes 1 Bottle 3     glycopyrrolate (ROBINUL) 1 MG tablet 1 tablet (1 mg) by Per Feeding Tube route 2 times daily 30 tablet 1     metoprolol tartrate (LOPRESSOR) 25 MG tablet 0.5 tablets (12.5 mg) by Per G Tube route 2 times daily 30 tablet 0     mineral oil-hydrophilic petrolatum (AQUAPHOR) external ointment Apply topically every 8 hours 50 g 3     multivitamins w/minerals (CERTAVITE) liquid 15 mLs by Per Feeding Tube route daily 236 mL 1     multivitamins w/minerals (CERTAVITE) liquid 15 mLs by Per Feeding Tube route daily 236 mL 1     ondansetron (ZOFRAN) 4 MG tablet 1 tablet (4 mg) by Per G Tube route every 6 hours as needed for nausea 30 tablet 0     ondansetron (ZOFRAN) 4 MG tablet Take 4 mg by mouth every 8  hours as needed for nausea       order for DME Equipment being ordered: Tracheostomy supplies    0.9% sodium chloride 1000 mL bottles  Box split 4x4 gauze sponges  Trach kits with brushes  Velcro trach ties  3 cc 0.9% sodium chloride lavages for trach suctioning  Large gloves  Cool mist humidity via trach dome  6-0 Shiley disposable inner cannulas    Diagnosis: laryngeal cancer 3 Month 1     order for DME Equipment being ordered:   Portable suction machine   14 French suction catheters  Yankeur suction tips    Diagnosis: laryngeal cancer 3 Month 1     order for DME Equipment being ordered: Nasogastric bolus tube feeding supplies  Formula: Isosource 1.5, 6 cans per day  Gravity feeding bags  60 mL syringes    Treatment Diagnosis: laryngeal cancer 3 Month 1     sennosides (SENOKOT) 8.8 MG/5ML syrup 5 mLs by Per Feeding Tube route nightly as needed for constipation 236 mL 0     calcitRIOL (ROCALTROL) 1 MCG/ML solution 0.25 mLs (0.25 mcg) by Per G Tube route 2 times daily for 6 days 3 mL 0     calcitRIOL (ROCALTROL) 1 MCG/ML solution 0.25 mLs (0.25 mcg) by Per G Tube route daily for 7 days 1.75 mL 0     doxazosin (CARDURA) 1 MG tablet 1 tablet (1 mg) by Per G Tube route daily for 14 days 14 tablet 0     lisinopril (ZESTRIL) 10 MG tablet 1 tablet (10 mg) by Per G Tube route daily for 14 days 14 tablet 0     oxyCODONE (ROXICODONE) 5 MG/5ML solution 5-10 mLs (5-10 mg) by Per Feeding Tube route every 4 hours as needed for moderate to severe pain (Patient not taking: Reported on 9/4/2020) 420 mL 0     polyethylene glycol (MIRALAX) 17 g packet Take 17 g by mouth 2 times daily as needed for constipation (Patient not taking: Reported on 9/4/2020) 14 packet 0     senna-docusate (SENOKOT-S/PERICOLACE) 8.6-50 MG tablet 1 tablet by Per G Tube route 2 times daily (Patient not taking: Reported on 9/4/2020) 28 tablet 0       ALLERGIES:  No Known Allergies    HABITS/SOCIAL HISTORY:   Previous alcoholic. Former smoker, quit 12 years  ago, previously 1/2 ppd.  Some chewing tobacco use, quit about 30 years ago.    Lives with his son-in-law's father.    Previously worked in construction.    Social History     Socioeconomic History     Marital status:      Spouse name: Not on file     Number of children: Not on file     Years of education: Not on file     Highest education level: Not on file   Occupational History     Not on file   Social Needs     Financial resource strain: Not on file     Food insecurity     Worry: Not on file     Inability: Not on file     Transportation needs     Medical: Not on file     Non-medical: Not on file   Tobacco Use     Smoking status: Former Smoker     Packs/day: 1.00     Years: 20.00     Pack years: 20.00     Last attempt to quit:      Years since quittin.7     Smokeless tobacco: Former User   Substance and Sexual Activity     Alcohol use: Not Currently     Drug use: Not Currently     Sexual activity: Not on file   Lifestyle     Physical activity     Days per week: Not on file     Minutes per session: Not on file     Stress: Not on file   Relationships     Social connections     Talks on phone: Not on file     Gets together: Not on file     Attends Oriental orthodox service: Not on file     Active member of club or organization: Not on file     Attends meetings of clubs or organizations: Not on file     Relationship status: Not on file     Intimate partner violence     Fear of current or ex partner: Not on file     Emotionally abused: Not on file     Physically abused: Not on file     Forced sexual activity: Not on file   Other Topics Concern     Not on file   Social History Narrative     Not on file       PAST MEDICAL HISTORY:   Past Medical History:   Diagnosis Date     CHF (congestive heart failure) (H)      GERD (gastroesophageal reflux disease)      Hypertension         PAST SURGICAL HISTORY:   Past Surgical History:   Procedure Laterality Date     DISSECTION RADICAL NECK BILATERAL Bilateral 2020     "Procedure: Bilateral neck dissection;  Surgeon: Caron Wang MD;  Location: [facility]     ENDOSCOPIC INSERTION TUBE GASTROSTOMY N/A 8/25/2020    Procedure: INSERTION, PEG TUBE;  Surgeon: Reuben Hastings MD;  [facility]     HERNIA REPAIR, INGUINAL RT/LT       HERNIA REPAIR, UMBILICAL       LARYNGECTOMY N/A 8/25/2020    Procedure: TOTAL LARYNGECTOMY WITH LEFT HEMITHYROIDECTOMY;  Surgeon: Caron Wang MD;  [facility]     LARYNGOSCOPY N/A 8/25/2020    Procedure: DIRECT LARYNGOSCOPY;  Surgeon: Caron Wang MD;  [facility]     LARYNGOSCOPY WITH BIOPSY(IES) N/A 8/3/2020    Procedure: LARYNGOSCOPY WITH BIOPSY, TRACHEOSTOMY, NG TUBE PLACEMENT;  Surgeon: Caron Wang MD;  [facility]     ODONTECTOMY N/A 8/25/2020    Procedure: DENTAL EXTRACTION OF TEETH x 13;  Surgeon: Caron Wang MD;  [facility]     TRACHEOSTOMY N/A 8/3/2020    Procedure: TRACHEOSTOMY;  Surgeon: Caron Wang MD;  [facility]       FAMILY HISTORY:    Family History   Problem Relation Age of Onset     Anesthesia Reaction No family hx of      Deep Vein Thrombosis (DVT) No family hx of        REVIEW OF SYSTEMS:  12 point ROS was negative other than the symptoms noted above in the HPI.  Patient Supplied Answers to Review of Systems  [facility] ROS 9/4/2020   Constitutional -   Neurology Dizzy spells   Ears, Nose, Throat Ringing/noise in ears   Cardiopulmonary -   Gastrointestinal/Genitourinary -         PHYSICAL EXAMINATION:   /72 (BP Location: Right arm, Patient Position: Chair, Cuff Size: Adult Regular)   Pulse 78   Temp 99  F (37.2  C) (Temporal)   Ht 1.753 m (5' 9\")   Wt 79.8 kg (176 lb)   SpO2 98%   BMI 25.99 kg/m     Patient in no apparent distress  Breathing comfortably on room air  Apron neck incision healed without any point tenderness, no erythema, no swelling  Stoma patent with Dilcia tube and HME in place, PDS sutures still present, small amount of crusting present    PROCEDURES: "       RESULTS REVIEWED:   I reviewed the video swallow images which show a small contained leak which has decreased in size from previous video swallow      IMPRESSION AND PLAN:   Evin Sterling is a 70-year-old man with a T4aN0 SCC of the larynx. He underwent a total laryngectomy with bilateral neck dissections on 8/25/2020.    He was recommended for postoperative radiation.  He canceled his CT simulation.  I discussed with the patient and his daughter that radiation needs to be started within 6 weeks from surgery.  He had indicated that he wanted to wait to start radiation until he healed.  I discussed with him that radiation may actually do some scarring and help him heal but more importantly that going outside of the 6-week window decreases the effectiveness.  If he is going to delay radiation I would actually probably recommend that we not give him the radiation to avoid losing that option in the future if it was needed.  He does state today that 1 of his biggest concerns is financial constraints and not wanting to be financially ruined from treatment.  We gave him the information for the financial counselor our clinic and have reached out to the radiation clinic to have them provide the financial counselor information further clinic to give the patient an estimate.    Both myself and speech pathology counseled the patient on expected course of recovery for this leak.  We discussed that it is smaller in size and should likely heal on its own.  He is getting adequate nutrition with his feeding tube and I think this is helping.  We advised him that it is just going to take some time and right now I do not plan on any surgical intervention for this and it should heal on its own.  He did feel overall more optimistic after our discussion.    I would like to repeat the video swallow study in about 2 weeks and I will see him at the same time.    Thank you very much for the opportunity to participate in the care of your  patient.      Caron Wang MD, M.D.  Otolaryngology- Head & Neck Surgery      This note was dictated with voice recognition software and then edited. Please excuse any unintentional errors.         CC:  Kyaw Blankenship MD  [address] [address]      Vineet Gonzales MD  Department of Radiation Oncology  [facility]

## 2020-09-17 NOTE — PROGRESS NOTES
Dear Dr. Blankenship:    I had the pleasure of seeing Evin Sterling in follow-up today at the [facility] Otol[facility].     History of Present Illness:   Evin Sterling is a 70 year old man with a T4aN0 SCC of the larynx. The patient has about a 4 year history of progressive hoarseness. He was potentially [place] by an [place] [facility] [place] with [place]. He had insurance issues and was not able to afford the medication. He had progressive voice changes. He saw Dr Blankenship on 7/28/2020 for consultation at which time he had biphasic stridor and on scope exam was noted to have a large exophytic mass of the glottis without mobility of either cord and a glottic opening of about 5 mm.     He was taken to the OR on 8/3/2020 for an awake trach with DL. Biopsies were consistent with SCC. He had a PET scan which showed the tumor in the larynx with thyroid cartilage erosion but no obvious mariel disease. The patient demonstrated poor swallow function with aspiration on swallow study. Given these findings he was recommended surgery followed by adjuvant radiation.    He was taken to the OR on 8/25/2020 for a DL, total laryngectomy, left hemithyroidectomy, bilateral neck dissections, cricopharyngeal myotomy, dental extractions.. He had PEG tube placement performed by thoracic surgery at the time of surgery. His postoperative course was uncomplicated. His final pathology demonstrated a 2.5 cm invasive moderately differentiated SCC, 2.5 cm involving the right and left cords, invading the thyroid cartilage and focally extending to the soft tissues, benign thyroid, angiolymphatic invasion present, no PNI, negative margins, 0/86 lymph nodes.  His case was reviewed at tumor conference with recommendation for postoperative radiation.    Interval history:  He comes in today for follow-up. He was last seen in clinic 2 weeks ago in multidisciplinary clinic in conjunction with Dr Gonzales. He had a video swallow study which  showed a small contained leak. He remained NPO. He was supposed to have CT simulation for radiation but then cancelled his appointment due to financial concerns. He says today that he just wants to be able to live his life for as long as he can and does not want to face financial ruin from treatment. He has increased his tube feeds up to 8 cans per day due to ongoing issues with hunger. He had a repeat video swallow study today which showed a persistent leak that was decreased in size.      MEDICATIONS:     Current Outpatient Medications   Medication Sig Dispense Refill     acetaminophen (TYLENOL) 32 mg/mL liquid 20.3 mLs (650 mg) by Per Feeding Tube route every 6 hours 473 mL 1     acetaminophen (TYLENOL) 32 mg/mL liquid 20.3 mLs (650 mg) by Per Feeding Tube route every 4 hours as needed for pain 473 mL 1     carboxymethylcellulose PF (REFRESH PLUS) 0.5 % ophthalmic solution Place 1 drop into both eyes 4 times daily as needed for dry eyes 1 Bottle 3     glycopyrrolate (ROBINUL) 1 MG tablet 1 tablet (1 mg) by Per Feeding Tube route 2 times daily 30 tablet 1     metoprolol tartrate (LOPRESSOR) 25 MG tablet 0.5 tablets (12.5 mg) by Per G Tube route 2 times daily 30 tablet 0     mineral oil-hydrophilic petrolatum (AQUAPHOR) external ointment Apply topically every 8 hours 50 g 3     multivitamins w/minerals (CERTAVITE) liquid 15 mLs by Per Feeding Tube route daily 236 mL 1     multivitamins w/minerals (CERTAVITE) liquid 15 mLs by Per Feeding Tube route daily 236 mL 1     ondansetron (ZOFRAN) 4 MG tablet 1 tablet (4 mg) by Per G Tube route every 6 hours as needed for nausea 30 tablet 0     ondansetron (ZOFRAN) 4 MG tablet Take 4 mg by mouth every 8 hours as needed for nausea       order for DME Equipment being ordered: Tracheostomy supplies    0.9% sodium chloride 1000 mL bottles  Box split 4x4 gauze sponges  Trach kits with brushes  Velcro trach ties  3 cc 0.9% sodium chloride lavages for trach suctioning  Large  gloves  Cool mist humidity via trach dome  6-0 Shiley disposable inner cannulas    Diagnosis: laryngeal cancer 3 Month 1     order for DME Equipment being ordered:   Portable suction machine   14 French suction catheters  Yankeur suction tips    Diagnosis: laryngeal cancer 3 Month 1     order for DME Equipment being ordered: Nasogastric bolus tube feeding supplies  Formula: Isosource 1.5, 6 cans per day  Gravity feeding bags  60 mL syringes    Treatment Diagnosis: laryngeal cancer 3 Month 1     sennosides (SENOKOT) 8.8 MG/5ML syrup 5 mLs by Per Feeding Tube route nightly as needed for constipation 236 mL 0     calcitRIOL (ROCALTROL) 1 MCG/ML solution 0.25 mLs (0.25 mcg) by Per G Tube route 2 times daily for 6 days 3 mL 0     calcitRIOL (ROCALTROL) 1 MCG/ML solution 0.25 mLs (0.25 mcg) by Per G Tube route daily for 7 days 1.75 mL 0     doxazosin (CARDURA) 1 MG tablet 1 tablet (1 mg) by Per G Tube route daily for 14 days 14 tablet 0     lisinopril (ZESTRIL) 10 MG tablet 1 tablet (10 mg) by Per G Tube route daily for 14 days 14 tablet 0     oxyCODONE (ROXICODONE) 5 MG/5ML solution 5-10 mLs (5-10 mg) by Per Feeding Tube route every 4 hours as needed for moderate to severe pain (Patient not taking: Reported on 9/4/2020) 420 mL 0     polyethylene glycol (MIRALAX) 17 g packet Take 17 g by mouth 2 times daily as needed for constipation (Patient not taking: Reported on 9/4/2020) 14 packet 0     senna-docusate (SENOKOT-S/PERICOLACE) 8.6-50 MG tablet 1 tablet by Per G Tube route 2 times daily (Patient not taking: Reported on 9/4/2020) 28 tablet 0       ALLERGIES:  No Known Allergies    HABITS/SOCIAL HISTORY:   Previous alcoholic. Former smoker, quit 12 years ago, previously 1/2 ppd.  Some chewing tobacco use, quit about 30 years ago.    Lives with his son-in-law's father.    Previously worked in construction.    Social History     Socioeconomic History     Marital status:      Spouse name: Not on file     Number of  children: Not on file     Years of education: Not on file     Highest education level: Not on file   Occupational History     Not on file   Social Needs     Financial resource strain: Not on file     Food insecurity     Worry: Not on file     Inability: Not on file     Transportation needs     Medical: Not on file     Non-medical: Not on file   Tobacco Use     Smoking status: Former Smoker     Packs/day: 1.00     Years: 20.00     Pack years: 20.00     Last attempt to quit:      Years since quittin.7     Smokeless tobacco: Former User   Substance and Sexual Activity     Alcohol use: Not Currently     Drug use: Not Currently     Sexual activity: Not on file   Lifestyle     Physical activity     Days per week: Not on file     Minutes per session: Not on file     Stress: Not on file   Relationships     Social connections     Talks on phone: Not on file     Gets together: Not on file     Attends Taoist service: Not on file     Active member of club or organization: Not on file     Attends meetings of clubs or organizations: Not on file     Relationship status: Not on file     Intimate partner violence     Fear of current or ex partner: Not on file     Emotionally abused: Not on file     Physically abused: Not on file     Forced sexual activity: Not on file   Other Topics Concern     Not on file   Social History Narrative     Not on file       PAST MEDICAL HISTORY:   Past Medical History:   Diagnosis Date     CHF (congestive heart failure) (H)      GERD (gastroesophageal reflux disease)      Hypertension         PAST SURGICAL HISTORY:   Past Surgical History:   Procedure Laterality Date     DISSECTION RADICAL NECK BILATERAL Bilateral 2020    Procedure: Bilateral neck dissection;  Surgeon: Caron Wang MD;  [facility]     ENDOSCOPIC INSERTION TUBE GASTROSTOMY N/A 2020    Procedure: INSERTION, PEG TUBE;  Surgeon: Reuben Hastings MD;  [facility]     HERNIA REPAIR, INGUINAL RT/LT        "HERNIA REPAIR, UMBILICAL       LARYNGECTOMY N/A 8/25/2020    Procedure: TOTAL LARYNGECTOMY WITH LEFT HEMITHYROIDECTOMY;  Surgeon: Caron Wang MD;  [facility]     LARYNGOSCOPY N/A 8/25/2020    Procedure: DIRECT LARYNGOSCOPY;  Surgeon: Caron Wang MD;  [facility]     LARYNGOSCOPY WITH BIOPSY(IES) N/A 8/3/2020    Procedure: LARYNGOSCOPY WITH BIOPSY, TRACHEOSTOMY, NG TUBE PLACEMENT;  Surgeon: Caron Wang MD;  [facility]     ODONTECTOMY N/A 8/25/2020    Procedure: DENTAL EXTRACTION OF TEETH x 13;  Surgeon: Caron Wang MD;  [facility]     TRACHEOSTOMY N/A 8/3/2020    Procedure: TRACHEOSTOMY;  Surgeon: Caron Wang MD;  [facility]       FAMILY HISTORY:    Family History   Problem Relation Age of Onset     Anesthesia Reaction No family hx of      Deep Vein Thrombosis (DVT) No family hx of        REVIEW OF SYSTEMS:  12 point ROS was negative other than the symptoms noted above in the HPI.  Patient Supplied Answers to Review of Systems  [facility] ROS 9/4/2020   Constitutional -   Neurology Dizzy spells   Ears, Nose, Throat Ringing/noise in ears   Cardiopulmonary -   Gastrointestinal/Genitourinary -         PHYSICAL EXAMINATION:   /72 (BP Location: Right arm, Patient Position: Chair, Cuff Size: Adult Regular)   Pulse 78   Temp 99  F (37.2  C) (Temporal)   Ht 1.753 m (5' 9\")   Wt 79.8 kg (176 lb)   SpO2 98%   BMI 25.99 kg/m     Patient in no apparent distress  Breathing comfortably on room air  Apron neck incision healed without any point tenderness, no erythema, no swelling  Stoma patent with Dilcia tube and HME in place, PDS sutures still present, small amount of crusting present    PROCEDURES:       RESULTS REVIEWED:   I reviewed the video swallow images which show a small contained leak which has decreased in size from previous video swallow      IMPRESSION AND PLAN:   Evin Sterling is a 70-year-old man with a T4aN0 SCC of the larynx. He underwent a total " laryngectomy with bilateral neck dissections on 8/25/2020.    He was recommended for postoperative radiation.  He canceled his CT simulation.  I discussed with the patient and his daughter that radiation needs to be started within 6 weeks from surgery.  He had indicated that he wanted to wait to start radiation until he healed.  I discussed with him that radiation may actually do some scarring and help him heal but more importantly that going outside of the 6-week window decreases the effectiveness.  If he is going to delay radiation I would actually probably recommend that we not give him the radiation to avoid losing that option in the future if it was needed.  He does state today that 1 of his biggest concerns is financial constraints and not wanting to be financially ruined from treatment.  We gave him the information for the financial counselor our clinic and have reached out to the radiation clinic to have them provide the financial counselor information further clinic to give the patient an estimate.    Both myself and speech pathology counseled the patient on expected course of recovery for this leak.  We discussed that it is smaller in size and should likely heal on its own.  He is getting adequate nutrition with his feeding tube and I think this is helping.  We advised him that it is just going to take some time and right now I do not plan on any surgical intervention for this and it should heal on its own.  He did feel overall more optimistic after our discussion.    I would like to repeat the video swallow study in about 2 weeks and I will see him at the same time.    Thank you very much for the opportunity to participate in the care of your patient.      Caron Wang MD, M.D.  Otolaryngology- Head & Neck Surgery      This note was dictated with voice recognition software and then edited. Please excuse any unintentional errors.         CC:  Kyaw Blankenship MD  [employer]  [address]  [place] [address]      Vineet Gonzales MD  Department of Radiation Oncology  [facility]

## 2020-09-23 ENCOUNTER — TELEPHONE (OUTPATIENT)
Dept: UROLOGY | Facility: CLINIC | Age: 70
End: 2020-09-23

## 2020-09-23 NOTE — TELEPHONE ENCOUNTER
----- Message from Magali Solis RN sent at 9/14/2020 11:19 AM CDT -----  MB    This patient is not interested in scheduling right now. He has our number and will call us back when he is ready to schedule.     GJ  ----- Message -----  From: Magali Solis RN  Sent: 9/14/2020  To: Magali Solis RN     Scheduled yet?  ----- Message -----  From: Magali Solis RN  Sent: 9/2/2020  To: Magali Solis RN    Check on this.   ----- Message -----  From: Magali Solis RN  Sent: 8/18/2020  To: Magali Solis RN      ----- Message -----  From: Laina Chun MD  Sent: 8/10/2020   8:17 AM CDT  To: LIANNE Land    Hope you had a nice weekend.     Can you please help arrange a visit with Dr. Aguirre for Mr. Sterling in the next 2-4 weeks? He has a very enlarged prostate and is interested in holep vs prostate artery embolization    Thank you!    Barbara  [phone]

## 2020-10-05 ENCOUNTER — DOCUMENTATION ONLY (OUTPATIENT)
Dept: ONCOLOGY | Facility: CLINIC | Age: 70
End: 2020-10-05

## 2020-10-05 NOTE — PROGRESS NOTES
Nutrition Services:     Received email from Jesica, Evin Sterling daughter.     'Good morning Renita~    It s been a few weeks since my father, Evin Sterling, switched to the higher calorie formula, Nutren 2.0. He is still facing challenges with the tube feeding. He continues to loose weight even though he s in taking 0485-6628 calories per day. His stomach is constantly upset (gas and slushy feeling). He s is also still feeling hungry a lot of the time but, he can t take more formula then what he s doing. He continues to be active each day with walking and we know that spends a lot of the calories he in takes.     We have an appointment this week (Wednesday) with his surgeon, Dr. Lin and speech therapy. He will have another swallow study done and hopefully he ll be cleared to begin with clear liquids. Regardless of the results, we understand he ll continue to be on tube feedings for awhile and we need to resolve these issues, mainly the continued weight loss. Could you recommend some solutions to these problems he is facing.     I appreciate your prompt response and thought to these issue. If you d wish to discuss during a phone call , please call me at [phone].     Thank you!     Jesica eWbb  Sent from my [email or web address]'      RD called Jesica regarding the above message.     Jesica tells me that her dad has been taking ~6 cartons of formula/day.    It was recommended by writer that he take 6 cartons of Nutren 2.0 (3000kcal; 37kcal/kg) however, he has been taking some Isosource 1.5 to use of his stock.    His daughter has a couple concerns 1. That he is losing weight (no recorded weights just visual) and 2. He has ongoing gas with the formula.     She tells em that he is infusing the formula in ~30-40 minutes, 2 cartons TID.     Recommendations:  1. Take only Nutren 2.0 formula (discard Isosource 1.5)  2. Slow feedings down to >60-90 minutes/feeding  3. Take 2 cartons of Nutren 2.0 TID spread 4 hours apart; take  additional 7th carton prn    RD will follow-up with Jesica Leach) in 1 week:  --Formula intake/tolerance  --Weight trends    Renita Frazier, MARYJANEN, LDN  [facility]  [phone]        Korin: Y[employer] supply #: [phone] ([place])

## 2020-10-06 NOTE — PROGRESS NOTES
Dear Dr. Blankenship:    I had the pleasure of seeing Evin Sterling in follow-up today at the [facility].     History of Present Illness:   Evin Sterling is a 70 year old man with a T4aN0 SCC of the larynx. The patient has about a 4 year history of progressive hoarseness. He was potentially seen by an ENT locally in [facility] and diagnosed with reflux. He had insurance issues and was not able to afford the medication. He had progressive voice changes. He saw Dr Blankenship on 7/28/2020 for consultation at which time he had biphasic stridor and on scope exam was noted to have a large exophytic mass of the glottis without mobility of either cord and a glottic opening of about 5 mm.     He was taken to the OR on 8/3/2020 for an awake trach with DL. Biopsies were consistent with SCC. He had a PET scan which showed the tumor in the larynx with thyroid cartilage erosion but no obvious mariel disease. The patient demonstrated poor swallow function with aspiration on swallow study. Given these findings he was recommended surgery followed by adjuvant radiation.    He was taken to the OR on 8/25/2020 for a DL, total laryngectomy, left hemithyroidectomy, bilateral neck dissections, cricopharyngeal myotomy, dental extractions.. He had PEG tube placement performed by thoracic surgery at the time of surgery. His postoperative course was uncomplicated. His final pathology demonstrated a 2.5 cm invasive moderately differentiated SCC, 2.5 cm involving the right and left cords, invading the thyroid cartilage and focally extending to the soft tissues, benign thyroid, angiolymphatic invasion present, no PNI, negative margins, 0/86 lymph nodes.  His case was reviewed at tumor conference with recommendation for postoperative radiation.    Interval history:  He comes in today for follow-up. He was last seen in clinic on 9/16/2020 with a persistent leak but improved from the previous swallow study. He also had decided  against radiation at that time. He comes in today with a repeat video swallow study. This showed improvement in the fistula but persistence. He says today that he is struggling with thick secretions and stopped the robinol last week. He is having some issues with toleration of his tube feeds with gas, tried some simethicone last week and is meeting with the dietician again next week. He is seeing the dentist to discuss dentures.       MEDICATIONS:     Current Outpatient Medications   Medication Sig Dispense Refill     acetaminophen (TYLENOL) 32 mg/mL liquid 20.3 mLs (650 mg) by Per Feeding Tube route every 6 hours 473 mL 1     acetaminophen (TYLENOL) 32 mg/mL liquid 20.3 mLs (650 mg) by Per Feeding Tube route every 4 hours as needed for pain 473 mL 1     carboxymethylcellulose PF (REFRESH PLUS) 0.5 % ophthalmic solution Place 1 drop into both eyes 4 times daily as needed for dry eyes 1 Bottle 3     glycopyrrolate (ROBINUL) 1 MG tablet 1 tablet (1 mg) by Per Feeding Tube route 2 times daily 30 tablet 1     metoprolol tartrate (LOPRESSOR) 25 MG tablet 0.5 tablets (12.5 mg) by Per G Tube route 2 times daily 30 tablet 0     mineral oil-hydrophilic petrolatum (AQUAPHOR) external ointment Apply topically every 8 hours 50 g 3     multivitamins w/minerals (CERTAVITE) liquid 15 mLs by Per Feeding Tube route daily 236 mL 1     ondansetron (ZOFRAN) 4 MG tablet 1 tablet (4 mg) by Per G Tube route every 6 hours as needed for nausea 30 tablet 0     ondansetron (ZOFRAN) 4 MG tablet Take 4 mg by mouth every 8 hours as needed for nausea       order for DME Equipment being ordered: Tracheostomy supplies    0.9% sodium chloride 1000 mL bottles  Box split 4x4 gauze sponges  Trach kits with brushes  Velcro trach ties  3 cc 0.9% sodium chloride lavages for trach suctioning  Large gloves  Cool mist humidity via trach dome  6-0 Shiley disposable inner cannulas    Diagnosis: laryngeal cancer 3 Month 1     order for DME Equipment being  ordered:   Portable suction machine   14 French suction catheters  Ji suction tips    Diagnosis: laryngeal cancer 3 Month 1     order for DME Equipment being ordered: Nasogastric bolus tube feeding supplies  Formula: Isosource 1.5, 6 cans per day  Gravity feeding bags  60 mL syringes    Treatment Diagnosis: laryngeal cancer 3 Month 1     oxyCODONE (ROXICODONE) 5 MG/5ML solution 5-10 mLs (5-10 mg) by Per Feeding Tube route every 4 hours as needed for moderate to severe pain 420 mL 0     polyethylene glycol (MIRALAX) 17 g packet Take 17 g by mouth 2 times daily as needed for constipation 14 packet 0     senna-docusate (SENOKOT-S/PERICOLACE) 8.6-50 MG tablet 1 tablet by Per G Tube route 2 times daily 28 tablet 0     sennosides (SENOKOT) 8.8 MG/5ML syrup 5 mLs by Per Feeding Tube route nightly as needed for constipation 236 mL 0     calcitRIOL (ROCALTROL) 1 MCG/ML solution 0.25 mLs (0.25 mcg) by Per G Tube route 2 times daily for 6 days 3 mL 0     calcitRIOL (ROCALTROL) 1 MCG/ML solution 0.25 mLs (0.25 mcg) by Per G Tube route daily for 7 days 1.75 mL 0     doxazosin (CARDURA) 1 MG tablet 1 tablet (1 mg) by Per G Tube route daily for 14 days 14 tablet 0     lisinopril (ZESTRIL) 10 MG tablet 1 tablet (10 mg) by Per G Tube route daily for 14 days 14 tablet 0       ALLERGIES:  No Known Allergies    HABITS/SOCIAL HISTORY:   Previous alcoholic. Former smoker, quit 12 years ago, previously 1/2 ppd.  Some chewing tobacco use, quit about 30 years ago.    Lives with his son-in-law's father.    Previously worked in construction.    Social History     Socioeconomic History     Marital status:      Spouse name: Not on file     Number of children: Not on file     Years of education: Not on file     Highest education level: Not on file   Occupational History     Not on file   Social Needs     Financial resource strain: Not on file     Food insecurity     Worry: Not on file     Inability: Not on file     Transportation  needs     Medical: Not on file     Non-medical: Not on file   Tobacco Use     Smoking status: Former Smoker     Packs/day: 1.00     Years: 20.00     Pack years: 20.00     Quit date:      Years since quittin.7     Smokeless tobacco: Former User   Substance and Sexual Activity     Alcohol use: Not Currently     Drug use: Not Currently     Sexual activity: Not on file   Lifestyle     Physical activity     Days per week: Not on file     Minutes per session: Not on file     Stress: Not on file   Relationships     Social connections     Talks on phone: Not on file     Gets together: Not on file     Attends [employer] service: Not on file     Active member of club or organization: Not on file     Attends meetings of clubs or organizations: Not on file     Relationship status: Not on file     Intimate partner violence     Fear of current or ex partner: Not on file     Emotionally abused: Not on file     Physically abused: Not on file     Forced sexual activity: Not on file   Other Topics Concern     Not on file   Social History Narrative     Not on file       PAST MEDICAL HISTORY:   Past Medical History:   Diagnosis Date     CHF (congestive heart failure) (H)      GERD (gastroesophageal reflux disease)      Hypertension         PAST SURGICAL HISTORY:   Past Surgical History:   Procedure Laterality Date     DISSECTION RADICAL NECK BILATERAL Bilateral 2020    Procedure: Bilateral neck dissection;  Surgeon: Caron Wang MD;  Location: [facility] OR     ENDOSCOPIC INSERTION TUBE GASTROSTOMY N/A 2020    Procedure: INSERTION, PEG TUBE;  Surgeon: Reuben Hastings MD;  Location: [facility] OR     HERNIA REPAIR, INGUINAL RT/LT       HERNIA REPAIR, UMBILICAL       LARYNGECTOMY N/A 2020    Procedure: TOTAL LARYNGECTOMY WITH LEFT HEMITHYROIDECTOMY;  Surgeon: Caron Wang MD;  Location: [facility] OR     LARYNGOSCOPY N/A 2020    Procedure: DIRECT LARYNGOSCOPY;  Surgeon: Caron Wang MD;  Location: [facility] OR      "LARYNGOSCOPY WITH BIOPSY(IES) N/A 8/3/2020    Procedure: LARYNGOSCOPY WITH BIOPSY, TRACHEOSTOMY, NG TUBE PLACEMENT;  Surgeon: Caron Wang MD;  Location: [facility] OR     ODONTECTOMY N/A 8/25/2020    Procedure: DENTAL EXTRACTION OF TEETH x 13;  Surgeon: Caron Wang MD;  Location: [facility] OR     TRACHEOSTOMY N/A 8/3/2020    Procedure: TRACHEOSTOMY;  Surgeon: Caron Wang MD;  Location: [facility] OR       FAMILY HISTORY:    Family History   Problem Relation Age of Onset     Anesthesia Reaction No family hx of      Deep Vein Thrombosis (DVT) No family hx of        REVIEW OF SYSTEMS:  12 point ROS was negative other than the symptoms noted above in the HPI.  Patient Supplied Answers to Review of Systems  [facility] ROS 9/4/2020   Constitutional -   Neurology Dizzy spells   Ears, Nose, Throat Ringing/noise in ears   Cardiopulmonary -   Gastrointestinal/Genitourinary -         PHYSICAL EXAMINATION:   Pulse 71   Temp 98.4  F (36.9  C)   Ht 1.778 m (5' 10\")   Wt 76.7 kg (169 lb)   SpO2 99%   BMI 24.25 kg/m     Patient in no apparent distress  Breathing comfortably on room air  Apron neck incision healed without any point tenderness, no erythema, no swelling  Stoma patent with Dilcia tube and HME in place    PROCEDURES:       RESULTS REVIEWED:   I reviewed the video swallow images which show improvement but persistent small tract present with filling of barium on swallowing      IMPRESSION AND PLAN:   Evin Sterling is a 70-year-old man with a T4aN0 SCC of the larynx. He underwent a total laryngectomy with bilateral neck dissections on 8/25/2020. He was recommended for postoperative radiation but decided against treatment.    He has a persistent leak that is contained and is improving. He was understandably quite discouraged with the news. We discussed the option of doing a limited amount of ice chips with the risk of potential progression of the fistula. I explained that right now I am not comfortable with starting a diet. " After a lengthy discussion, he was amenable to the plan of repeat swallow study in 2 weeks.    His 3 month post treatment PET scan is due around 11/17/2020.    I will see him back with his repeat swallow study.    Thank you very much for the opportunity to participate in the care of your patient.      Caron Wang MD, M.D.  Otolaryngology- Head & Neck Surgery      This note was dictated with voice recognition software and then edited. Please excuse any unintentional errors.         CC:  Kyaw Blankenship MD  [address] [address]      Vineet Gonzales MD  Department of Radiation Oncology  [facility]

## 2020-10-07 ENCOUNTER — OFFICE VISIT (OUTPATIENT)
Dept: OTOLARYNGOLOGY | Facility: CLINIC | Age: 70
End: 2020-10-07

## 2020-10-07 ENCOUNTER — ANCILLARY PROCEDURE (OUTPATIENT)
Dept: GENERAL RADIOLOGY | Facility: CLINIC | Age: 70
End: 2020-10-07
Attending: OTOLARYNGOLOGY
Payer: MEDICARE

## 2020-10-07 ENCOUNTER — THERAPY VISIT (OUTPATIENT)
Dept: SPEECH THERAPY | Facility: CLINIC | Age: 70
End: 2020-10-07
Attending: OTOLARYNGOLOGY
Payer: MEDICARE

## 2020-10-07 VITALS
HEIGHT: 70 IN | WEIGHT: 169 LBS | TEMPERATURE: 98.4 F | OXYGEN SATURATION: 99 % | BODY MASS INDEX: 24.2 KG/M2 | HEART RATE: 71 BPM

## 2020-10-07 DIAGNOSIS — C32.9 LARYNGEAL CANCER (H): Primary | ICD-10-CM

## 2020-10-07 DIAGNOSIS — C32.9 LARYNGEAL CANCER (H): ICD-10-CM

## 2020-10-07 PROCEDURE — 92611 MOTION FLUOROSCOPY/SWALLOW: CPT | Mod: GN | Performed by: SPEECH-LANGUAGE PATHOLOGIST

## 2020-10-07 PROCEDURE — 74230 X-RAY XM SWLNG FUNCJ C+: CPT | Mod: GC | Performed by: RADIOLOGY

## 2020-10-07 PROCEDURE — 99024 POSTOP FOLLOW-UP VISIT: CPT | Performed by: OTOLARYNGOLOGY

## 2020-10-07 ASSESSMENT — PAIN SCALES - GENERAL: PAINLEVEL: NO PAIN (0)

## 2020-10-07 ASSESSMENT — MIFFLIN-ST. JEOR: SCORE: 1542.83

## 2020-10-07 NOTE — LETTER
10/7/2020       RE: Evin Sterling  [address] [facility] [address]     Dear Colleague,    Thank you for referring your patient, Evin Sterling, to the [facility] [place] at [facility]. Please see a copy of my visit note below.    Dear Dr. Blankenship:    I had the pleasure of seeing Evin Sterling in follow-up today at the [facility].     History of Present Illness:   Evin Sterling is a 70 year old man with a T4aN0 SCC of the larynx. The patient has about a 4 year history of progressive hoarseness. He was potentially seen by an ENT locally in [facility] and diagnosed with reflux. He had insurance issues and was not able to afford the medication. He had progressive voice changes. He saw Dr Blankenship on 7/28/2020 for consultation at which time he had biphasic stridor and on scope exam was noted to have a large exophytic mass of the glottis without mobility of either cord and a glottic opening of about 5 mm.     He was taken to the OR on 8/3/2020 for an awake trach with DL. Biopsies were consistent with SCC. He had a PET scan which showed the tumor in the larynx with thyroid cartilage erosion but no obvious mariel disease. The patient demonstrated poor swallow function with aspiration on swallow study. Given these findings he was recommended surgery followed by adjuvant radiation.    He was taken to the OR on 8/25/2020 for a DL, total laryngectomy, left hemithyroidectomy, bilateral neck dissections, cricopharyngeal myotomy, dental extractions.. He had PEG tube placement performed by thoracic surgery at the time of surgery. His postoperative course was uncomplicated. His final pathology demonstrated a 2.5 cm invasive moderately differentiated SCC, 2.5 cm involving the right and left cords, invading the thyroid cartilage and focally extending to the soft tissues, benign thyroid, angiolymphatic invasion present, no PNI, negative  margins, 0/86 lymph nodes.  His case was reviewed at tumor conference with recommendation for postoperative radiation.    Interval history:  He comes in today for follow-up. He was last seen in clinic on 9/16/2020 with a persistent leak but improved from the previous swallow study. He also had decided against radiation at that time. He comes in today with a repeat video swallow study. This showed improvement in the fistula but persistence. He says today that he is struggling with thick secretions and stopped the robinol last week. He is having some issues with toleration of his tube feeds with gas, tried some simethicone last week and is meeting with the dietician again next week. He is seeing the dentist to discuss dentures.       MEDICATIONS:     Current Outpatient Medications   Medication Sig Dispense Refill     acetaminophen (TYLENOL) 32 mg/mL liquid 20.3 mLs (650 mg) by Per Feeding Tube route every 6 hours 473 mL 1     acetaminophen (TYLENOL) 32 mg/mL liquid 20.3 mLs (650 mg) by Per Feeding Tube route every 4 hours as needed for pain 473 mL 1     carboxymethylcellulose PF (REFRESH PLUS) 0.5 % ophthalmic solution Place 1 drop into both eyes 4 times daily as needed for dry eyes 1 Bottle 3     glycopyrrolate (ROBINUL) 1 MG tablet 1 tablet (1 mg) by Per Feeding Tube route 2 times daily 30 tablet 1     metoprolol tartrate (LOPRESSOR) 25 MG tablet 0.5 tablets (12.5 mg) by Per G Tube route 2 times daily 30 tablet 0     mineral oil-hydrophilic petrolatum (AQUAPHOR) external ointment Apply topically every 8 hours 50 g 3     multivitamins w/minerals (CERTAVITE) liquid 15 mLs by Per Feeding Tube route daily 236 mL 1     ondansetron (ZOFRAN) 4 MG tablet 1 tablet (4 mg) by Per G Tube route every 6 hours as needed for nausea 30 tablet 0     ondansetron (ZOFRAN) 4 MG tablet Take 4 mg by mouth every 8 hours as needed for nausea       order for DME Equipment being ordered: Tracheostomy supplies    0.9% sodium chloride 1000 mL  bottles  Box split 4x4 gauze sponges  Trach kits with brushes  Velcro trach ties  3 cc 0.9% sodium chloride lavages for trach suctioning  Large gloves  Cool mist humidity via trach dome  6-0 Shiley disposable inner cannulas    Diagnosis: laryngeal cancer 3 Month 1     order for DME Equipment being ordered:   Portable suction machine   14 French suction catheters  [place]ankeur suction tips    Diagnosis: laryngeal cancer 3 Month 1     order for DME Equipment being ordered: Nasogastric bolus tube feeding supplies  Formula: Isosource 1.5, 6 cans per day  Gravity feeding bags  60 mL syringes    Treatment Diagnosis: laryngeal cancer 3 Month 1     oxyCODONE (ROXICODONE) 5 MG/5ML solution 5-10 mLs (5-10 mg) by Per Feeding Tube route every 4 hours as needed for moderate to severe pain 420 mL 0     polyethylene glycol (MIRALAX) 17 g packet Take 17 g by mouth 2 times daily as needed for constipation 14 packet 0     senna-docusate (SENOKOT-S/PERICOLACE) 8.6-50 MG tablet 1 tablet by Per G Tube route 2 times daily 28 tablet 0     sennosides (SENOKOT) 8.8 MG/5ML syrup 5 mLs by Per Feeding Tube route nightly as needed for constipation 236 mL 0     calcitRIOL (ROCALTROL) 1 MCG/ML solution 0.25 mLs (0.25 mcg) by Per G Tube route 2 times daily for 6 days 3 mL 0     calcitRIOL (ROCALTROL) 1 MCG/ML solution 0.25 mLs (0.25 mcg) by Per G Tube route daily for 7 days 1.75 mL 0     doxazosin (CARDURA) 1 MG tablet 1 tablet (1 mg) by Per G Tube route daily for 14 days 14 tablet 0     lisinopril (ZESTRIL) 10 MG tablet 1 tablet (10 mg) by Per G Tube route daily for 14 days 14 tablet 0       ALLERGIES:  No Known Allergies    HABITS/SOCIAL HISTORY:   Previous alcoholic. Former smoker, quit 12 years ago, previously 1/2 ppd.  Some chewing tobacco use, quit about 30 years ago.    Lives with his son-in-law's father.    Previously worked in construction.    Social History     Socioeconomic History     Marital status:      Spouse name: Not on file      Number of children: Not on file     Years of education: Not on file     Highest education level: Not on file   Occupational History     Not on file   Social Needs     Financial resource strain: Not on file     Food insecurity     Worry: Not on file     Inability: Not on file     Transportation needs     Medical: Not on file     Non-medical: Not on file   Tobacco Use     Smoking status: Former Smoker     Packs/day: 1.00     Years: 20.00     Pack years: 20.00     Quit date:      Years since quittin.7     Smokeless tobacco: Former User   Substance and Sexual Activity     Alcohol use: Not Currently     Drug use: Not Currently     Sexual activity: Not on file   Lifestyle     Physical activity     Days per week: Not on file     Minutes per session: Not on file     Stress: Not on file   Relationships     Social connections     Talks on phone: Not on file     Gets together: Not on file     Attends Jew service: Not on file     Active member of club or organization: Not on file     Attends meetings of clubs or organizations: Not on file     Relationship status: Not on file     Intimate partner violence     Fear of current or ex partner: Not on file     Emotionally abused: Not on file     Physically abused: Not on file     Forced sexual activity: Not on file   Other Topics Concern     Not on file   Social History Narrative     Not on file       PAST MEDICAL HISTORY:   Past Medical History:   Diagnosis Date     CHF (congestive heart failure) (H)      GERD (gastroesophageal reflux disease)      Hypertension         PAST SURGICAL HISTORY:   Past Surgical History:   Procedure Laterality Date     DISSECTION RADICAL NECK BILATERAL Bilateral 2020    Procedure: Bilateral neck dissection;  Surgeon: Caron Wang MD;  Location: [facility] OR     ENDOSCOPIC INSERTION TUBE GASTROSTOMY N/A 2020    Procedure: INSERTION, PEG TUBE;  Surgeon: Reuben Hastings MD;  Location: [facility] OR     HERNIA REPAIR, INGUINAL RT/LT        "HERNIA REPAIR, UMBILICAL       LARYNGECTOMY N/A 8/25/2020    Procedure: TOTAL LARYNGECTOMY WITH LEFT HEMITHYROIDECTOMY;  Surgeon: Caron Wang MD;  Location: [facility] OR     LARYNGOSCOPY N/A 8/25/2020    Procedure: DIRECT LARYNGOSCOPY;  Surgeon: Caron Wang MD;  Location: [facility]     LARYNGOSCOPY WITH BIOPSY(IES) N/A 8/3/2020    Procedure: LARYNGOSCOPY WITH BIOPSY, TRACHEOSTOMY, NG TUBE PLACEMENT;  Surgeon: Caron Wang MD;  Location: [facility] OR     ODONTECTOMY N/A 8/25/2020    Procedure: DENTAL EXTRACTION OF TEETH x 13;  Surgeon: Caron Wang MD;  Location: [facility]     TRACHEOSTOMY N/A 8/3/2020    Procedure: TRACHEOSTOMY;  Surgeon: Caron Wang MD;  Location: [facility]       FAMILY HISTORY:    Family History   Problem Relation Age of Onset     Anesthesia Reaction No family hx of      Deep Vein Thrombosis (DVT) No family hx of        REVIEW OF SYSTEMS:  12 point ROS was negative other than the symptoms noted above in the HPI.  Patient Supplied Answers to Review of Systems  [facility] ENT ROS 9/4/2020   Constitutional -   Neurology Dizzy spells   Ears, Nose, Throat Ringing/noise in ears   Cardiopulmonary -   Gastrointestinal/Genitourinary -         PHYSICAL EXAMINATION:   Pulse 71   Temp 98.4  F (36.9  C)   Ht 1.778 m (5' 10\")   Wt 76.7 kg (169 lb)   SpO2 99%   BMI 24.25 kg/m     Patient in no apparent distress  Breathing comfortably on room air  Apron neck incision healed without any point tenderness, no erythema, no swelling  Stoma patent with Dilcia tube and HME in place    PROCEDURES:       RESULTS REVIEWED:   I reviewed the video swallow images which show improvement but persistent small tract present with filling of barium on swallowing      IMPRESSION AND PLAN:   Evin Sterling is a 70-year-old man with a T4aN0 SCC of the larynx. He underwent a total laryngectomy with bilateral neck dissections on 8/25/2020. He was recommended for postoperative radiation but decided against treatment.    He has a " persistent leak that is contained and is improving. He was understandably quite discouraged with the news. We discussed the option of doing a limited amount of ice chips with the risk of potential progression of the fistula. I explained that right now I am not comfortable with starting a diet. After a lengthy discussion, he was amenable to the plan of repeat swallow study in 2 weeks.    His 3 month post treatment PET scan is due around 11/17/2020.    I will see him back with his repeat swallow study.    Thank you very much for the opportunity to participate in the care of your patient.      Caron Wang MD, M.D.  Otolaryngology- Head & Neck Surgery      This note was dictated with voice recognition software and then edited. Please excuse any unintentional errors.         CC:  Kyaw Blankenship MD  [address] [address]      Vineet Gonzales MD  Department of Radiation Oncology  [facility]

## 2020-10-07 NOTE — PROGRESS NOTES
Speech-Language Pathology Department   EVALUATION  [facility]  Video Swallow Study    10/07/20 1300   General Information   Type Of Visit Initial   Start Of Care Date 10/07/20   Referring Physician Dr. Caron Wang   Orders Evaluate And Treat   Orders Comment Video Swallow Study   Medical Diagnosis Laryngeal cancer   Onset Of Illness/injury Or Date Of Surgery 08/28/20   Precautions/limitations Other (see Comments);Swallowing Precautions  (NPO, laryngectomy stoma)   Hearing Adequate   Respiratory Status Room air  (laryngectomy stoma, larytube in place with HME)   Prior Level Of Function Swallowing   Prior Level Of Function Comment NPO since sugery   General Observations Pt is pleasant and cooperative throughout evaluation.   Patient/family Goals To evaluate for continued fistula/extravasion into tisssue before beginning PO   Clinical Swallow Evaluation   Dentition upper dentures;lower dentures   Mucosal Quality adequate   Mandibular Strength and Mobility impaired   Oral Labial Strength and Mobility WFL   Lingual Strength and Mobility WFL   Velar Elevation intact   Buccal Strength and Mobility intact   Oral Musculature Comments Anatomical changes consistent with post laryngectomy anatomy.    VFSS Eval: Radiology   Radiologist Radiologist   Views Taken left lateral;A/P   Physical Location of Procedure [facility]   VFSS Eval: Thin Liquid Texture Trial   Mode of Presentation, Thin Liquid cup;self-fed   Order of Presentation All series are omnipaque contrast  (1-still)   Preparatory Phase WFL   Oral Phase, Thin Liquid WFL   Pharyngeal Phase, Thin Liquid other (see comments)  (extravasion into the tissue demonstrated)   Rosenbek's Penetration Aspiration Scale: Thin Liquid Trial Results 1 - no aspiration, contrast does not enter airway   Diagnostic Statement Pt demonstrates continued extravasion into the reconstructed tissue. This area appears much smaller to  last video swallow completed on 9/16/20.    Swallow Compensations   Swallow Compensations No compensations were used   Educational Assessment   Barriers to Learning No barriers   Swallow Eval: Clinical Impressions   Skilled Criteria for Therapy Intervention Other (see comments)  (repeat Video Swallow Study will be required prior to therapy)   Dysphagia Outcome Severity Scale (SWAPNA) Level 1 - SWAPNA   Treatment Diagnosis Severe dysphagia due to extravasion of contrast into the reconstructed area.    Diet texture recommendations NPO   Predicted Duration of Therapy Intervention (days/wks) Evaluation only   Clinical Impression Comments Evin Sterling demonstrates severe pharyngoesophgeal dysphagia as characterized by continued extravasion of contrast into the reconstructed tissue. Only omnipaque was administered due to the extravasion. This area appears much smaller compared to 9/16/20 VFSS. Recommend NPO with continued enteral support to allow further time for healing of the tissue in the area of laryngectomy. Pt would benefit from repeat VFSS in 2 weeks to reassess.   Total Session Time   SLP Eval: VideoFluoroscopic Swallow function Minutes (22892) 25   Total Evaluation Time 25     Thank you for the referral of Evin Sterling. If you have any questions about this report, please contact me using the information below.     LUBNA Gallegos MA (music), CCC-SLP   Speech Language Pathologist  [place] Trained Vocologist   [facility] [place]  Dept. of Otolaryngology  Department of Rehabilitation Services  [address] [place] [id number]  Email: [email or web address]   Pronouns: she/her/hers

## 2020-10-07 NOTE — NURSING NOTE
"Chief Complaint   Patient presents with     RECHECK     follow up       Pulse 71, temperature 98.4  F (36.9  C), height 1.778 m (5' 10\"), weight 76.7 kg (169 lb), SpO2 99 %.    Annalee Chambers, EMT    "

## 2020-10-15 ENCOUNTER — VIRTUAL VISIT (OUTPATIENT)
Dept: ONCOLOGY | Facility: CLINIC | Age: 70
End: 2020-10-15
Attending: DIETITIAN, REGISTERED

## 2020-10-15 DIAGNOSIS — C32.9 LARYNGEAL CANCER (H): Primary | ICD-10-CM

## 2020-10-15 PROCEDURE — 99207 PR NO CHARGE LOS: CPT | Mod: TEL | Performed by: DIETITIAN, REGISTERED

## 2020-10-15 PROCEDURE — 97803 MED NUTRITION INDIV SUBSEQ: CPT | Mod: TEL | Performed by: DIETITIAN, REGISTERED

## 2020-10-15 NOTE — PROGRESS NOTES
"Evin Sterling is a 70 year old male who is being evaluated via a billable telephone visit.      The patient has been notified of following:     \"This telephone visit will be conducted via a call between you and your physician/provider. We have found that certain health care needs can be provided without the need for a physical exam.  This service lets us provide the care you need with a short phone conversation.  If a prescription is necessary we can send it directly to your pharmacy.  If lab work is needed we can place an order for that and you can then stop by our lab to have the test done at a later time.    Telephone visits are billed at different rates depending on your insurance coverage. During this emergency period, for some insurers they may be billed the same as an in-person visit.  Please reach out to your insurance provider with any questions.    If during the course of the call the physician/provider feels a telephone visit is not appropriate, you will not be charged for this service.\"    Patient has given verbal consent for Telephone visit?  Yes    CLINICAL NUTRITION SERVICES - REASSESSMENT NOTE   EVALUATION OF PREVIOUS PLAN OF CARE:   Referring Physician: Janet  Time spent with patient: 30 minutes.    Current diet: NPO  PEG Tube: dependent      Monitoring from previous assessment:   -EN intake/tolerance - Dorian continues to have gas/bloating with both diarrhea and constipation despite trying GasX.    He has been taking 6-7 cartons of Nutren 2.0 enoch/day (2 cartons TID, with a 4th feeding of 1 carton a few times/week).   He has slowed his feedings down from 30 minutes to 60 minutes.  He does not feel like this has improved his tolerance.    His daughter, Jesica inquires about alternative supplements to help reduce gas/bloating and GI distress.    -Weight trends - down 7 lb x past 3 weeks  Wt Readings from Last 6 Encounters:   10/07/20 76.7 kg (169 lb)   09/16/20 79.8 kg (176 lb)   09/04/20 79.4 kg (175 lb) "   08/30/20 84.8 kg (187 lb)   08/11/20 86.6 kg (191 lb)   07/31/20 86.2 kg (190 lb)     Previous Goals:   1.  EN to provide 100% of estimated nutrition needs - goal not met  2.  Weight maintenance/weight repletion - goal not met  Evaluation: Not met   Previous Nutrition Diagnosis:   Inadequate enteral nutrition infusion related to knowedge deficit as evidenced by 7% wt loss x past 1 week  Evaluation: No change   NEW FINDINGS:   --ongoing weight loss despite EN intake    CURRENT NUTRITION DIAGNOSIS   Inadequate enteral nutrition infusion related to GI distress as evidenced by 7 lb wt loss x past 3 weeks.    INTERVENTIONS   Recommendations via PEG tube:  --Citrucel - start with one seving/day (1 heaping Tbsp mixed with 1 cup warm/water)  --Healios - contains amino acids for healing (take 2 times/day as per pkg)   --Plant based enzymes - take if desired to aid in digestion   --Pledialyte, Gatorade Zero/G2 - electrolytes   --Aurelia tea or aurelia and peppermint essential oils - nausea  Implementation  Medical Food Supplement - reviewed the above supplement options to help alleviate GI distress.   Goals   1. Patient to tolerate 6-7 cartons of formula daily  2. Weight stability    Follow up/Monitoring:   -Enteral Nutrition intake  -Weight trends    Renita Frazier RD

## 2020-10-15 NOTE — LETTER
"    10/15/2020         RE: Evin Sterling  [address] [place] [id number]        Dear Colleague,    Thank you for referring your patient, Evin Sterling, to the [facility]. Please see a copy of my visit note below.    Evin Sterling is a 70 year old male who is being evaluated via a billable telephone visit.      The patient has been notified of following:     \"This telephone visit will be conducted via a call between you and your physician/provider. We have found that certain health care needs can be provided without the need for a physical exam.  This service lets us provide the care you need with a short phone conversation.  If a prescription is necessary we can send it directly to your pharmacy.  If lab work is needed we can place an order for that and you can then stop by our lab to have the test done at a later time.    Telephone visits are billed at different rates depending on your insurance coverage. During this emergency period, for some insurers they may be billed the same as an in-person visit.  Please reach out to your insurance provider with any questions.    If during the course of the call the physician/provider feels a telephone visit is not appropriate, you will not be charged for this service.\"    Patient has given verbal consent for Telephone visit?  Yes    CLINICAL NUTRITION SERVICES - REASSESSMENT NOTE   EVALUATION OF PREVIOUS PLAN OF CARE:   Referring Physician: Janet  Time spent with patient: 30 minutes.    Current diet: NPO  PEG Tube: dependent      Monitoring from previous assessment:   -EN intake/tolerance - Dorian continues to have gas/bloating with both diarrhea and constipation despite trying GasX.    He has been taking 6-7 cartons of Nutren 2.0 enoch/day (2 cartons TID, with a 4th feeding of 1 carton a few times/week).   He has slowed his feedings down from 30 minutes to 60 minutes.  He does not feel like this has improved his tolerance.    His daughter, Jesica" inquires about alternative supplements to help reduce gas/bloating and GI distress.    -Weight trends - down 7 lb x past 3 weeks  Wt Readings from Last 6 Encounters:   10/07/20 76.7 kg (169 lb)   09/16/20 79.8 kg (176 lb)   09/04/20 79.4 kg (175 lb)   08/30/20 84.8 kg (187 lb)   08/11/20 86.6 kg (191 lb)   07/31/20 86.2 kg (190 lb)     Previous Goals:   1.  EN to provide 100% of estimated nutrition needs - goal not met  2.  Weight maintenance/weight repletion - goal not met  Evaluation: Not met   Previous Nutrition Diagnosis:   Inadequate enteral nutrition infusion related to knowedge deficit as evidenced by 7% wt loss x past 1 week  Evaluation: No change   NEW FINDINGS:   --ongoing weight loss despite EN intake    CURRENT NUTRITION DIAGNOSIS   Inadequate enteral nutrition infusion related to GI distress as evidenced by 7 lb wt loss x past 3 weeks.    INTERVENTIONS   Recommendations via PEG tube:  --Citrucel - start with one seving/day (1 heaping Tbsp mixed with 1 cup warm/water)  --Healios - contains amino acids for healing (take 2 times/day as per pkg)   --Plant based enzymes - take if desired to aid in digestion   --Pledialyte, Gatorade Zero/G2 - electrolytes   --Aurelia tea or aurelia and peppermint essential oils - nausea  Implementation  Medical Food Supplement - reviewed the above supplement options to help alleviate GI distress.   Goals   1. Patient to tolerate 6-7 cartons of formula daily  2. Weight stability    Follow up/Monitoring:   -Enteral Nutrition intake  -Weight trends    Renita Frazier RD            Again, thank you for allowing me to participate in the care of your patient.        Sincerely,        Renita Frazier RD

## 2020-10-23 ENCOUNTER — THERAPY VISIT (OUTPATIENT)
Dept: SPEECH THERAPY | Facility: CLINIC | Age: 70
End: 2020-10-23
Attending: OTOLARYNGOLOGY
Payer: MEDICARE

## 2020-10-23 ENCOUNTER — OFFICE VISIT (OUTPATIENT)
Dept: OTOLARYNGOLOGY | Facility: CLINIC | Age: 70
End: 2020-10-23

## 2020-10-23 ENCOUNTER — ANCILLARY PROCEDURE (OUTPATIENT)
Dept: GENERAL RADIOLOGY | Facility: CLINIC | Age: 70
End: 2020-10-23
Attending: OTOLARYNGOLOGY
Payer: MEDICARE

## 2020-10-23 VITALS — HEIGHT: 69 IN | HEART RATE: 74 BPM | BODY MASS INDEX: 24.96 KG/M2 | OXYGEN SATURATION: 98 % | TEMPERATURE: 98.3 F

## 2020-10-23 DIAGNOSIS — C32.9 LARYNGEAL CANCER (H): ICD-10-CM

## 2020-10-23 DIAGNOSIS — C32.9 LARYNGEAL CANCER (H): Primary | ICD-10-CM

## 2020-10-23 DIAGNOSIS — R13.12 OROPHARYNGEAL DYSPHAGIA: Primary | ICD-10-CM

## 2020-10-23 PROCEDURE — 99024 POSTOP FOLLOW-UP VISIT: CPT | Performed by: OTOLARYNGOLOGY

## 2020-10-23 PROCEDURE — 92611 MOTION FLUOROSCOPY/SWALLOW: CPT | Mod: GN | Performed by: SPEECH-LANGUAGE PATHOLOGIST

## 2020-10-23 PROCEDURE — 74230 X-RAY XM SWLNG FUNCJ C+: CPT | Mod: GC | Performed by: RADIOLOGY

## 2020-10-23 ASSESSMENT — PAIN SCALES - GENERAL: PAINLEVEL: NO PAIN (0)

## 2020-10-23 NOTE — DISCHARGE INSTRUCTIONS
Videofluoroscopic Swallow Study Results    Problem Identified: Improved swallow function    Diet Recommended: Advance per below instructions    Begin today!   Clear liquids:  Any liquid you can see through   Clear broth, tea or coffee with no cream or milk added, cranberry juice, Jell-O, popsicles (without bits of fruit, fruit pulp or yogurn in them), apple juice, ginger ale, lemon-lime soda, grape juice, sports drinks that don't have color    Begin Tuesday 10/27/2020   Full liquid:     strained creamy soups, tea, juice, jell-o, milkshakes, pudding, popsicles, yogurt, custard, frozen yogurt, plain ice cream, boost, ensure, resource, other liquid supplements, tea or coffee with milk, cream,  sugar, or honey. Cream of wheat, grits, oatmeal, cream of rice  You can also have any of the items on the clear liquid list.     Begin Saturday 10/31/2020  Dysphagia diet level 2 (soft solids) see other handout.       Swallowing Suggestions:  Sit upright at 90 degrees  Eat slowly  Chew carefully  Small bites/sips  Tip chin downward while swallowing  Alternate liquids and solids    Symptoms to watch for and contact right away if they arise: redness or swelling in the neck, warm to the touch in the neck, fever, leakage of fluid in the area of the neck or stoma.     Additional information provided:    List of foods appropriate for Level 2 Dysphagia Diet    Follow up: in 2 weeks in conjunction with ENT clinic visit, in 4 weeks in conjunction with ENT clinic visit. We will only repeat video swallow study if necessary.

## 2020-10-23 NOTE — LETTER
10/23/2020       RE: Evin Sterling  [address] [facility] [address]     Dear Colleague,    Thank you for referring your patient, Evin Sterling, to the [facility] [place] at [facility]. Please see a copy of my visit note below.    Dear Dr. Blankenship:    I had the pleasure of seeing Evin Sterling in follow-up today at the [facility].     History of Present Illness:   Evin Sterling is a 70 year old man with a T4aN0 SCC of the larynx. The patient has about a 4 year history of progressive hoarseness. He was potentially seen by an ENT locally in [facility] and diagnosed with reflux. He had insurance issues and was not able to afford the medication. He had progressive voice changes. He saw Dr Blankenship on 7/28/2020 for consultation at which time he had biphasic stridor and on scope exam was noted to have a large exophytic mass of the glottis without mobility of either cord and a glottic opening of about 5 mm.     He was taken to the OR on 8/3/2020 for an awake trach with DL. Biopsies were consistent with SCC. He had a PET scan which showed the tumor in the larynx with thyroid cartilage erosion but no obvious mariel disease. The patient demonstrated poor swallow function with aspiration on swallow study. Given these findings he was recommended surgery followed by adjuvant radiation.    He was taken to the OR on 8/25/2020 for a DL, total laryngectomy, left hemithyroidectomy, bilateral neck dissections, cricopharyngeal myotomy, dental extractions.. He had PEG tube placement performed by thoracic surgery at the time of surgery. His postoperative course was uncomplicated. His final pathology demonstrated a 2.5 cm invasive moderately differentiated SCC, 2.5 cm involving the right and left cords, invading the thyroid cartilage and focally extending to the soft tissues, benign thyroid, angiolymphatic invasion present, no PNI, negative  margins, 0/86 lymph nodes.  His case was reviewed at tumor conference with recommendation for postoperative radiation. The patient decided against radiation.      Interval history:  He comes in today for follow-up. He was last seen in clinic on 10/7/2020 with a persistent leak but improved from the previous swallow study. He had a virtual visit with the dietician last week.  He had a repeat swallow study today.  The swallow study showed a persistent area that does appear to empty into the esophagus.  He is doing better since starting Pedialyte at the recommendation of the dietitian.  He saw his dentist who is recommending implants prior to denture placement.  He is seeing oral surgery next week.      MEDICATIONS:     Current Outpatient Medications   Medication Sig Dispense Refill     acetaminophen (TYLENOL) 32 mg/mL liquid 20.3 mLs (650 mg) by Per Feeding Tube route every 6 hours 473 mL 1     acetaminophen (TYLENOL) 32 mg/mL liquid 20.3 mLs (650 mg) by Per Feeding Tube route every 4 hours as needed for pain 473 mL 1     carboxymethylcellulose PF (REFRESH PLUS) 0.5 % ophthalmic solution Place 1 drop into both eyes 4 times daily as needed for dry eyes 1 Bottle 3     glycopyrrolate (ROBINUL) 1 MG tablet 1 tablet (1 mg) by Per Feeding Tube route 2 times daily 30 tablet 1     metoprolol tartrate (LOPRESSOR) 25 MG tablet 0.5 tablets (12.5 mg) by Per G Tube route 2 times daily 30 tablet 0     mineral oil-hydrophilic petrolatum (AQUAPHOR) external ointment Apply topically every 8 hours 50 g 3     multivitamins w/minerals (CERTAVITE) liquid 15 mLs by Per Feeding Tube route daily 236 mL 1     ondansetron (ZOFRAN) 4 MG tablet 1 tablet (4 mg) by Per G Tube route every 6 hours as needed for nausea 30 tablet 0     ondansetron (ZOFRAN) 4 MG tablet Take 4 mg by mouth every 8 hours as needed for nausea       order for DME Equipment being ordered: Tracheostomy supplies    0.9% sodium chloride 1000 mL bottles  Box split 4x4 gauze  sponges  Trach kits with brushes  Velcro trach ties  3 cc 0.9% sodium chloride lavages for trach suctioning  Large gloves  Cool mist humidity via trach dome  6-0 Shiley disposable inner cannulas    Diagnosis: laryngeal cancer 3 Month 1     order for DME Equipment being ordered:   Portable suction machine   14 French suction catheters  Yankeur suction tips    Diagnosis: laryngeal cancer 3 Month 1     order for DME Equipment being ordered: Nasogastric bolus tube feeding supplies  Formula: Isosource 1.5, 6 cans per day  Gravity feeding bags  60 mL syringes    Treatment Diagnosis: laryngeal cancer 3 Month 1     oxyCODONE (ROXICODONE) 5 MG/5ML solution 5-10 mLs (5-10 mg) by Per Feeding Tube route every 4 hours as needed for moderate to severe pain 420 mL 0     polyethylene glycol (MIRALAX) 17 g packet Take 17 g by mouth 2 times daily as needed for constipation 14 packet 0     senna-docusate (SENOKOT-S/PERICOLACE) 8.6-50 MG tablet 1 tablet by Per G Tube route 2 times daily 28 tablet 0     sennosides (SENOKOT) 8.8 MG/5ML syrup 5 mLs by Per Feeding Tube route nightly as needed for constipation 236 mL 0     calcitRIOL (ROCALTROL) 1 MCG/ML solution 0.25 mLs (0.25 mcg) by Per G Tube route 2 times daily for 6 days 3 mL 0     calcitRIOL (ROCALTROL) 1 MCG/ML solution 0.25 mLs (0.25 mcg) by Per G Tube route daily for 7 days 1.75 mL 0     doxazosin (CARDURA) 1 MG tablet 1 tablet (1 mg) by Per G Tube route daily for 14 days 14 tablet 0     lisinopril (ZESTRIL) 10 MG tablet 1 tablet (10 mg) by Per G Tube route daily for 14 days 14 tablet 0       ALLERGIES:  No Known Allergies    HABITS/SOCIAL HISTORY:   Previous alcoholic. Former smoker, quit 12 years ago, previously 1/2 ppd.  Some chewing tobacco use, quit about 30 years ago.    Lives with his son-in-law's father.    Previously worked in construction.    Social History     Socioeconomic History     Marital status:      Spouse name: Not on file     Number of children: Not on  file     Years of education: Not on file     Highest education level: Not on file   Occupational History     Not on file   Social Needs     Financial resource strain: Not on file     Food insecurity     Worry: Not on file     Inability: Not on file     Transportation needs     Medical: Not on file     Non-medical: Not on file   Tobacco Use     Smoking status: Former Smoker     Packs/day: 1.00     Years: 20.00     Pack years: 20.00     Quit date:      Years since quittin.8     Smokeless tobacco: Former User   Substance and Sexual Activity     Alcohol use: Not Currently     Drug use: Not Currently     Sexual activity: Not on file   Lifestyle     Physical activity     Days per week: Not on file     Minutes per session: Not on file     Stress: Not on file   Relationships     Social connections     Talks on phone: Not on file     Gets together: Not on file     Attends Episcopal service: Not on file     Active member of club or organization: Not on file     Attends meetings of clubs or organizations: Not on file     Relationship status: Not on file     Intimate partner violence     Fear of current or ex partner: Not on file     Emotionally abused: Not on file     Physically abused: Not on file     Forced sexual activity: Not on file   Other Topics Concern     Not on file   Social History Narrative     Not on file       PAST MEDICAL HISTORY:   Past Medical History:   Diagnosis Date     CHF (congestive heart failure) (H)      GERD (gastroesophageal reflux disease)      Hypertension         PAST SURGICAL HISTORY:   Past Surgical History:   Procedure Laterality Date     DISSECTION RADICAL NECK BILATERAL Bilateral 2020    Procedure: Bilateral neck dissection;  Surgeon: Caron Wang MD;  Location: [facility]     ENDOSCOPIC INSERTION TUBE GASTROSTOMY N/A 2020    Procedure: INSERTION, PEG TUBE;  Surgeon: Reuben Hastings MD;  Location: [facility]     HERNIA REPAIR, INGUINAL RT/LT       HERNIA REPAIR, UMBILICAL    "    LARYNGECTOMY N/A 8/25/2020    Procedure: TOTAL LARYNGECTOMY WITH LEFT HEMITHYROIDECTOMY;  Surgeon: Caron Wang MD;  Location: [facility]     LARYNGOSCOPY N/A 8/25/2020    Procedure: DIRECT LARYNGOSCOPY;  Surgeon: Caron Wang MD;  [facility]     LARYNGOSCOPY WITH BIOPSY(IES) N/A 8/3/2020    Procedure: LARYNGOSCOPY WITH BIOPSY, TRACHEOSTOMY, NG TUBE PLACEMENT;  Surgeon: Caron Wang MD;  [facility]     ODONTECTOMY N/A 8/25/2020    Procedure: DENTAL EXTRACTION OF TEETH x 13;  Surgeon: Caron Wang MD;  [facility]     TRACHEOSTOMY N/A 8/3/2020    Procedure: TRACHEOSTOMY;  Surgeon: Caron Wang MD;  [facility]       FAMILY HISTORY:    Family History   Problem Relation Age of Onset     Anesthesia Reaction No family hx of      Deep Vein Thrombosis (DVT) No family hx of        REVIEW OF SYSTEMS:  12 point ROS was negative other than the symptoms noted above in the HPI.  Patient Supplied Answers to Review of Systems   ENT ROS 9/4/2020   Constitutional -   Neurology Dizzy spells   Ears, Nose, Throat Ringing/noise in ears   Cardiopulmonary -   Gastrointestinal/Genitourinary -         PHYSICAL EXAMINATION:   Pulse 74   Temp 98.3  F (36.8  C)   Ht 1.753 m (5' 9\")   SpO2 98%   BMI 24.96 kg/m     Patient in no apparent distress  Breathing comfortably on room air  Apron neck incision healed without any point tenderness, no erythema, no swelling  Stoma patent with Dilcia tube and HME in place    PROCEDURES:   Flexible fiberoptic laryngoscopy: Scope exam is indicated due to laryngeal cancer.  The nose was topically anesthetized with decongestant and local anesthetic.  The flexible fiberoptic laryngoscope was passed through the nasal cavity back to the nasopharynx.  This was advanced into the oropharynx.  There is a shelf at the site of the previous epiglottis.  Along the needle vallecula there is a small concavity that appears to be contiguous with the neopharynx.  The base " of tongue is normal appearing.  The scope was passed into the esophagus without any obvious concerning areas.  There is no obvious fistula.    RESULTS REVIEWED:   I reviewed the video swallow images which show improvement but persistent small tract present with filling of barium on swallowing.  This does empty into the esophagus unlike previous exams.  This was reviewed with the speech pathologist.      IMPRESSION AND PLAN:   Evin Sterling is a 70-year-old man with a T4aN0 SCC of the larynx. He underwent a total laryngectomy with bilateral neck dissections on 8/25/2020. He was recommended for postoperative radiation but decided against treatment.    He we scoped him today and I do not see any concerning findings.  I would like to start him on a diet.  He was given directions by speech pathologist on advancing of his diet.  He was instructed on signs and symptoms to watch for in case of fistula.    I will see him back in about 2 weeks.    His 3 month post treatment PET scan is due around 11/17/2020.      Thank you very much for the opportunity to participate in the care of your patient.      Caron Wang MD, M.D.  Otolaryngology- Head & Neck Surgery      This note was dictated with voice recognition software and then edited. Please excuse any unintentional errors.     CC:  Kyaw Blankenship MD  [address] [address]      Vineet Gonzales MD  Department of Radiation Oncology  [facility]

## 2020-10-23 NOTE — PROGRESS NOTES
Dear Dr. Blankenship:    I had the pleasure of seeing Evin Sterling in follow-up today at the [facility].     History of Present Illness:   Evin Sterling is a 70 year old man with a T4aN0 SCC of the larynx. The patient has about a 4 year history of progressive hoarseness. He was potentially seen by an ENT locally in [facility] and diagnosed with reflux. He had insurance issues and was not able to afford the medication. He had progressive voice changes. He saw Dr Blankenship on 7/28/2020 for consultation at which time he had biphasic stridor and on scope exam was noted to have a large exophytic mass of the glottis without mobility of either cord and a glottic opening of about 5 mm.     He was taken to the OR on 8/3/2020 for an awake trach with DL. Biopsies were consistent with SCC. He had a PET scan which showed the tumor in the larynx with thyroid cartilage erosion but no obvious mariel disease. The patient demonstrated poor swallow function with aspiration on swallow study. Given these findings he was recommended surgery followed by adjuvant radiation.    He was taken to the OR on 8/25/2020 for a DL, total laryngectomy, left hemithyroidectomy, bilateral neck dissections, cricopharyngeal myotomy, dental extractions.. He had PEG tube placement performed by thoracic surgery at the time of surgery. His postoperative course was uncomplicated. His final pathology demonstrated a 2.5 cm invasive moderately differentiated SCC, 2.5 cm involving the right and left cords, invading the thyroid cartilage and focally extending to the soft tissues, benign thyroid, angiolymphatic invasion present, no PNI, negative margins, 0/86 lymph nodes.  His case was reviewed at tumor conference with recommendation for postoperative radiation. The patient decided against radiation.      Interval history:  He comes in today for follow-up. He was last seen in clinic on 10/7/2020 with a persistent leak but improved from the  previous swallow study. He had a virtual visit with the dietician last week.  He had a repeat swallow study today.  The swallow study showed a persistent area that does appear to empty into the esophagus.  He is doing better since starting Pedialyte at the recommendation of the dietitian.  He saw his dentist who is recommending implants prior to denture placement.  He is seeing oral surgery next week.      MEDICATIONS:     Current Outpatient Medications   Medication Sig Dispense Refill     acetaminophen (TYLENOL) 32 mg/mL liquid 20.3 mLs (650 mg) by Per Feeding Tube route every 6 hours 473 mL 1     acetaminophen (TYLENOL) 32 mg/mL liquid 20.3 mLs (650 mg) by Per Feeding Tube route every 4 hours as needed for pain 473 mL 1     carboxymethylcellulose PF (REFRESH PLUS) 0.5 % ophthalmic solution Place 1 drop into both eyes 4 times daily as needed for dry eyes 1 Bottle 3     glycopyrrolate (ROBINUL) 1 MG tablet 1 tablet (1 mg) by Per Feeding Tube route 2 times daily 30 tablet 1     metoprolol tartrate (LOPRESSOR) 25 MG tablet 0.5 tablets (12.5 mg) by Per G Tube route 2 times daily 30 tablet 0     mineral oil-hydrophilic petrolatum (AQUAPHOR) external ointment Apply topically every 8 hours 50 g 3     multivitamins w/minerals (CERTAVITE) liquid 15 mLs by Per Feeding Tube route daily 236 mL 1     ondansetron (ZOFRAN) 4 MG tablet 1 tablet (4 mg) by Per G Tube route every 6 hours as needed for nausea 30 tablet 0     ondansetron (ZOFRAN) 4 MG tablet Take 4 mg by mouth every 8 hours as needed for nausea       order for DME Equipment being ordered: Tracheostomy supplies    0.9% sodium chloride 1000 mL bottles  Box split 4x4 gauze sponges  Trach kits with brushes  Velcro trach ties  3 cc 0.9% sodium chloride lavages for trach suctioning  Large gloves  Cool mist humidity via trach dome  6-0 Shiley disposable inner cannulas    Diagnosis: laryngeal cancer 3 Month 1     order for DME Equipment being ordered:   Portable suction  machine   14 French suction catheters  Sykeur suction tips    Diagnosis: laryngeal cancer 3 Month 1     order for DME Equipment being ordered: Nasogastric bolus tube feeding supplies  Formula: Isosource 1.5, 6 cans per day  Gravity feeding bags  60 mL syringes    Treatment Diagnosis: laryngeal cancer 3 Month 1     oxyCODONE (ROXICODONE) 5 MG/5ML solution 5-10 mLs (5-10 mg) by Per Feeding Tube route every 4 hours as needed for moderate to severe pain 420 mL 0     polyethylene glycol (MIRALAX) 17 g packet Take 17 g by mouth 2 times daily as needed for constipation 14 packet 0     senna-docusate (SENOKOT-S/PERICOLACE) 8.6-50 MG tablet 1 tablet by Per G Tube route 2 times daily 28 tablet 0     sennosides (SENOKOT) 8.8 MG/5ML syrup 5 mLs by Per Feeding Tube route nightly as needed for constipation 236 mL 0     calcitRIOL (ROCALTROL) 1 MCG/ML solution 0.25 mLs (0.25 mcg) by Per G Tube route 2 times daily for 6 days 3 mL 0     calcitRIOL (ROCALTROL) 1 MCG/ML solution 0.25 mLs (0.25 mcg) by Per G Tube route daily for 7 days 1.75 mL 0     doxazosin (CARDURA) 1 MG tablet 1 tablet (1 mg) by Per G Tube route daily for 14 days 14 tablet 0     lisinopril (ZESTRIL) 10 MG tablet 1 tablet (10 mg) by Per G Tube route daily for 14 days 14 tablet 0       ALLERGIES:  No Known Allergies    HABITS/SOCIAL HISTORY:   Previous alcoholic. Former smoker, quit 12 years ago, previously 1/2 ppd.  Some chewing tobacco use, quit about 30 years ago.    Lives with his son-in-law's father.    Previously worked in construction.    Social History     Socioeconomic History     Marital status:      Spouse name: Not on file     Number of children: Not on file     Years of education: Not on file     Highest education level: Not on file   Occupational History     Not on file   Social Needs     Financial resource strain: Not on file     Food insecurity     Worry: Not on file     Inability: Not on file     Transportation needs     Medical: Not on file      Non-medical: Not on file   Tobacco Use     Smoking status: Former Smoker     Packs/day: 1.00     Years: 20.00     Pack years: 20.00     Quit date:      Years since quittin.8     Smokeless tobacco: Former User   Substance and Sexual Activity     Alcohol use: Not Currently     Drug use: Not Currently     Sexual activity: Not on file   Lifestyle     Physical activity     Days per week: Not on file     Minutes per session: Not on file     Stress: Not on file   Relationships     Social connections     Talks on phone: Not on file     Gets together: Not on file     Attends Mandaen service: Not on file     Active member of club or organization: Not on file     Attends meetings of clubs or organizations: Not on file     Relationship status: Not on file     Intimate partner violence     Fear of current or ex partner: Not on file     Emotionally abused: Not on file     Physically abused: Not on file     Forced sexual activity: Not on file   Other Topics Concern     Not on file   Social History Narrative     Not on file       PAST MEDICAL HISTORY:   Past Medical History:   Diagnosis Date     CHF (congestive heart failure) (H)      GERD (gastroesophageal reflux disease)      Hypertension         PAST SURGICAL HISTORY:   Past Surgical History:   Procedure Laterality Date     DISSECTION RADICAL NECK BILATERAL Bilateral 2020    Procedure: Bilateral neck dissection;  Surgeon: Caron Wang MD;  [facility]     ENDOSCOPIC INSERTION TUBE GASTROSTOMY N/A 2020    Procedure: INSERTION, PEG TUBE;  Surgeon: Reuben Hastings MD;  [facility]     HERNIA REPAIR, INGUINAL RT/LT       HERNIA REPAIR, UMBILICAL       LARYNGECTOMY N/A 2020    Procedure: TOTAL LARYNGECTOMY WITH LEFT HEMITHYROIDECTOMY;  Surgeon: Caron Wang MD;  [facility]     LARYNGOSCOPY N/A 2020    Procedure: DIRECT LARYNGOSCOPY;  Surgeon: Caron Wang MD;  [facility]     LARYNGOSCOPY WITH BIOPSY(IES) N/A  "8/3/2020    Procedure: LARYNGOSCOPY WITH BIOPSY, TRACHEOSTOMY, NG TUBE PLACEMENT;  Surgeon: Caron Wang MD;  [facility]     ODONTECTOMY N/A 8/25/2020    Procedure: DENTAL EXTRACTION OF TEETH x 13;  Surgeon: Caron Wang MD;  [facility]     TRACHEOSTOMY N/A 8/3/2020    Procedure: TRACHEOSTOMY;  Surgeon: Caron Wang MD;  [facility]       FAMILY HISTORY:    Family History   Problem Relation Age of Onset     Anesthesia Reaction No family hx of      Deep Vein Thrombosis (DVT) No family hx of        REVIEW OF SYSTEMS:  12 point ROS was negative other than the symptoms noted above in the HPI.  Patient Supplied Answers to Review of Systems  [facility] ROS 9/4/2020   Constitutional -   Neurology Dizzy spells   Ears, Nose, Throat Ringing/noise in ears   Cardiopulmonary -   Gastrointestinal/Genitourinary -         PHYSICAL EXAMINATION:   Pulse 74   Temp 98.3  F (36.8  C)   Ht 1.753 m (5' 9\")   SpO2 98%   BMI 24.96 kg/m     Patient in no apparent distress  Breathing comfortably on room air  Apron neck incision healed without any point tenderness, no erythema, no swelling  Stoma patent with Dilcia tube and HME in place    PROCEDURES:   Flexible fiberoptic laryngoscopy: Scope exam is indicated due to laryngeal cancer.  The nose was topically anesthetized with decongestant and local anesthetic.  The flexible fiberoptic laryngoscope was passed through the nasal cavity back to the nasopharynx.  This was advanced into the oropharynx.  There is a shelf at the site of the previous epiglottis.  Along the needle vallecula there is a small concavity that appears to be contiguous with the neopharynx.  The base of tongue is normal appearing.  The scope was passed into the esophagus without any obvious concerning areas.  There is no obvious fistula.    RESULTS REVIEWED:   I reviewed the video swallow images which show improvement but persistent small tract present with filling of barium on swallowing.  " This does empty into the esophagus unlike previous exams.  This was reviewed with the speech pathologist.      IMPRESSION AND PLAN:   Evin Sterling is a 70-year-old man with a T4aN0 SCC of the larynx. He underwent a total laryngectomy with bilateral neck dissections on 8/25/2020. He was recommended for postoperative radiation but decided against treatment.    He we scoped him today and I do not see any concerning findings.  I would like to start him on a diet.  He was given directions by speech pathologist on advancing of his diet.  He was instructed on signs and symptoms to watch for in case of fistula.    I will see him back in about 2 weeks.    His 3 month post treatment PET scan is due around 11/17/2020.      Thank you very much for the opportunity to participate in the care of your patient.      Caron Wang MD, M.D.  Otolaryngology- Head & Neck Surgery      This note was dictated with voice recognition software and then edited. Please excuse any unintentional errors.         CC:  Kyaw Blankenship MD  [address] [address]      Vineet Gonzales MD  Department of Radiation Oncology  [facility]

## 2020-10-23 NOTE — PATIENT INSTRUCTIONS
Schedule 2 appts:  1. F/u in clinic in about 10-14 days  2. F/u in clinic with PET scan around 11/17/2020

## 2020-10-24 NOTE — PROGRESS NOTES
[facility]                                                                                   OUTPATIENT SPEECH LANGUAGE PATHOLOGY    PLAN OF TREATMENT FOR OUTPATIENT REHABILITATION   Patient's Last Name, First Name, Evin Ramos YOB: 1950   Provider's Name   [facility]   Medical Record No.  [id number]     Onset Date: 08/28/20 Start of Care Date:  10/23/20     Medical Diagnosis:   laryngeal cancer s/p total laryngectomy       SLP Treatment Diagnosis: Mild to moderate pharyngeal dysphagia  Plan of Treatment  Frequency/Duration:  1-2x/week  /   6 weeks    Certification date from  10/23/20   To 1/21/21           See note for plan of treatment details and functional goals     Aide Whitfield, SLP                         I CERTIFY THE NEED FOR THESE SERVICES FURNISHED UNDER        THIS PLAN OF TREATMENT AND WHILE UNDER MY CARE     (Physician attestation of this document indicates review and certification of the therapy plan).              Referring Provider:  Caron Wang    Initial Assessment  See Epic Evaluation-               Speech-Language Pathology Department   EVALUATION  [facility]  Video Swallow Study      10/23/20 1400   General Information   Type Of Visit Initial   Start Of Care Date 10/23/20   Referring Physician Dr. Caron Wang   Orders Evaluate And Treat   Orders Comment Video Swallow Study   Medical Diagnosis laryngeal cancer s/p total laryngectomy   Onset Of Illness/injury Or Date Of Surgery 08/28/20   Precautions/limitations Other (see Comments);Swallowing Precautions  (NPO, PEG larygectomy stoma)   Hearing Adequate   Pertinent History of Current Problem/OT: Additional Occupational Profile Info Evin Sterling is a 70-year-old man who underwent total laryngectomy, bilateral neck dissections, left hemithyroidectomy, and dental extractions on 8/25/2020 for T4N0 SCC of  the larynx. He presents today for video swallow study due to repeated extravasion of contrast into the surrounding tissue. He has been NPO since his surgery and is really hopeful to advance.his po intake. After video swallow study was complete, endoscopic visualization of oropharyngx into distal esophagus provided by Dr. Wang   Respiratory Status Room air  (laryngectomy stoma, larytube and HME in place)   Prior Level Of Function Swallowing   Prior Level Of Function Comment NPO since surgery with PEG for enteral support   General Observations Pt is pleasant and cooperative throughout evaluation.   Patient/family Goals Pt would like to return to PO intake   Clinical Swallow Evaluation   Oral Musculature generally intact   Dentition edentulous, does not have dentures   Mucosal Quality adequate   Mandibular Strength and Mobility impaired   Oral Labial Strength and Mobility WFL   Lingual Strength and Mobility WFL   Velar Elevation intact   Buccal Strength and Mobility intact   Oral Musculature Comments Functional for post total laryngectomy anatomy.    VFSS Eval: Radiology   Radiologist Resident   Views Taken left lateral;A/P   Physical Location of Procedure [facility]   VFSS Eval: Thin Liquid Texture Trial   Mode of Presentation, Thin Liquid cup;self-fed   Order of Presentation All series were completed using omnipaque contrast   Preparatory Phase WFL   Oral Phase, Thin Liquid WFL   Pharyngeal Phase, Thin Liquid other (see comments)   Rosenbek's Penetration Aspiration Scale: Thin Liquid Trial Results 1 - no aspiration, contrast does not enter airway   Diagnostic Statement Pt appeared to have continued extravasion of contrast however it does not track toward the skin at all.    Educational Assessment   Barriers to Learning No barriers   Esophageal Phase of Swallow   Patient reports or presents with symptoms of esophageal dysphagia No   Swallow Eval: Clinical Impressions   Skilled Criteria for Therapy  Intervention Skilled criteria met.  Treatment indicated.   Dysphagia Outcome Severity Scale (SWAPNA) Level 4 - SWAPNA   Treatment Diagnosis Mild to moderate pharyngeal dysphagia   Diet texture recommendations Clear Liquid Diet   Recommended Feeding/Eating Techniques alternate between small bites and sips of food/liquid;maintain upright posture during/after eating for 30 mins;small sips/bites   Rehab Potential good, to achieve stated therapy goals   Therapy Frequency other (see comments)   Predicted Duration of Therapy Intervention (days/wks) 1-2 times per week x 6 weeks   Anticipated Discharge Disposition home w/ outpatient services   Risks and Benefits of Treatment have been explained. Yes   Patient, family and/or staff in agreement with Plan of Care Yes   Clinical Impression Comments Evin Sterling demonstrate mild to moderate oropharyngeal dysphagia. Initially on Xray Exam it appeared he had extravasion of the contrast through the neopharynx. Upon review of images from both this exam and previous exams, he has a similar tract that appears the same as the previous exam. Dr. Wang placed an endoscope and upon visualization of the neopharynx, pt has varied guillaume of tissue. On the left side the tissue is noted to be higher than the right side which is a depression and likely explains the multiple levels noted on Xray. After discussion with Dr. Wang, we have decided to clear him to initiate po intake. He will start with clear liquids and advance slowly to full liquids and ultimately soft solids. He was provided written information on diet advancement and symptoms of extravasion into his neck. He verbalized understanding and will adhere to guidelines. He will benefit from follow up in 2 weeks to ensure po tolerance and use of safe swallow strategies.    Swallow Goal 1   Goal Identifier PO   Goal Description 1. Pt will advance to soft solids while adhering to safe swallow precautions 100% of the time indpendently.    Target  Date 01/21/21   Swallow Goal 2   Goal Identifier PO   Goal Description 2. Pt will advance to regular solids and thin liquids while adhering to safe swallow precautions indpendently.    Target Date 01/21/21   Total Session Time   SLP Annabella: VideoFluoroscopic Swallow function Minutes (47504) 45   Total Evaluation Time 45             Thank you for the referral of Evin Sterling. If you have any questions about this report, please contact me using the information below.      Aide Whitfield MS, CCC-SLP  Speech-Language Pathology  [facility]  Department of Otolaryngology/D&T - 4th floor  Pager: [phone]  Phone: [phone]  Email: [email or web address]

## 2020-10-31 NOTE — PROGRESS NOTES
Dear Dr. Blankenship:    I had the pleasure of seeing Evin Sterling in follow-up today at the [facility].     History of Present Illness:   Evin Sterling is a 70 year old man with a T4aN0 SCC of the larynx. The patient has about a 4 year history of progressive hoarseness. He was potentially seen by an ENT locally in [facility] and diagnosed with reflux. He had insurance issues and was not able to afford the medication. He had progressive voice changes. He saw Dr Blankenship on 7/28/2020 for consultation at which time he had biphasic stridor and on scope exam was noted to have a large exophytic mass of the glottis without mobility of either cord and a glottic opening of about 5 mm.     He was taken to the OR on 8/3/2020 for an awake trach with DL. Biopsies were consistent with SCC. He had a PET scan which showed the tumor in the larynx with thyroid cartilage erosion but no obvious mariel disease. The patient demonstrated poor swallow function with aspiration on swallow study. Given these findings he was recommended surgery followed by adjuvant radiation.    He was taken to the OR on 8/25/2020 for a DL, total laryngectomy, left hemithyroidectomy, bilateral neck dissections, cricopharyngeal myotomy, dental extractions.. He had PEG tube placement performed by thoracic surgery at the time of surgery. His postoperative course was uncomplicated. His final pathology demonstrated a 2.5 cm invasive moderately differentiated SCC, 2.5 cm involving the right and left cords, invading the thyroid cartilage and focally extending to the soft tissues, benign thyroid, angiolymphatic invasion present, no PNI, negative margins, 0/86 lymph nodes.  His case was reviewed at tumor conference with recommendation for postoperative radiation. The patient decided against radiation.      Interval history:  He comes in today for follow-up. He was last seen in clinic in October 2020. He had improvement in his video swallow study  and there was no obvious fistula on scope exam. He was started on diet last visit. He is up to taking puree/soft foods. He says that he doing small meals. He has no choking, no nasal regurgitation. He says the problem with the eating is his lack of teeth. He is taking 3-4 cans of tube feeds per day. He says his weight is stable. He feels his energy is good and he was able to walk 4 miles this weekend. He is planning on moving home. He was supposed to see an oral surgeon to discuss implants but given how recent his surgery was he was recommended to delay another month by the oral surgeon.        MEDICATIONS:     Current Outpatient Medications   Medication Sig Dispense Refill     acetaminophen (TYLENOL) 32 mg/mL liquid 20.3 mLs (650 mg) by Per Feeding Tube route every 6 hours 473 mL 1     acetaminophen (TYLENOL) 32 mg/mL liquid 20.3 mLs (650 mg) by Per Feeding Tube route every 4 hours as needed for pain 473 mL 1     carboxymethylcellulose PF (REFRESH PLUS) 0.5 % ophthalmic solution Place 1 drop into both eyes 4 times daily as needed for dry eyes 1 Bottle 3     glycopyrrolate (ROBINUL) 1 MG tablet 1 tablet (1 mg) by Per Feeding Tube route 2 times daily 30 tablet 1     metoprolol tartrate (LOPRESSOR) 25 MG tablet 0.5 tablets (12.5 mg) by Per G Tube route 2 times daily 30 tablet 0     mineral oil-hydrophilic petrolatum (AQUAPHOR) external ointment Apply topically every 8 hours 50 g 3     multivitamins w/minerals (CERTAVITE) liquid 15 mLs by Per Feeding Tube route daily 236 mL 1     ondansetron (ZOFRAN) 4 MG tablet 1 tablet (4 mg) by Per G Tube route every 6 hours as needed for nausea 30 tablet 0     ondansetron (ZOFRAN) 4 MG tablet Take 4 mg by mouth every 8 hours as needed for nausea       order for DME Equipment being ordered: Tracheostomy supplies    0.9% sodium chloride 1000 mL bottles  Box split 4x4 gauze sponges  Trach kits with brushes  Velcro trach ties  3 cc 0.9% sodium chloride lavages for trach  suctioning  Large gloves  Cool mist humidity via trach dome  6-0 Shiley disposable inner cannulas    Diagnosis: laryngeal cancer 3 Month 1     order for DME Equipment being ordered:   Portable suction machine   14 French suction catheters  [place] suction tips    Diagnosis: laryngeal cancer 3 Month 1     order for DME Equipment being ordered: Nasogastric bolus tube feeding supplies  Formula: Isosource 1.5, 6 cans per day  Gravity feeding bags  60 mL syringes    Treatment Diagnosis: laryngeal cancer 3 Month 1     oxyCODONE (ROXICODONE) 5 MG/5ML solution 5-10 mLs (5-10 mg) by Per Feeding Tube route every 4 hours as needed for moderate to severe pain 420 mL 0     polyethylene glycol (MIRALAX) 17 g packet Take 17 g by mouth 2 times daily as needed for constipation 14 packet 0     senna-docusate (SENOKOT-S/PERICOLACE) 8.6-50 MG tablet 1 tablet by Per G Tube route 2 times daily 28 tablet 0     sennosides (SENOKOT) 8.8 MG/5ML syrup 5 mLs by Per Feeding Tube route nightly as needed for constipation 236 mL 0     calcitRIOL (ROCALTROL) 1 MCG/ML solution 0.25 mLs (0.25 mcg) by Per G Tube route 2 times daily for 6 days 3 mL 0     calcitRIOL (ROCALTROL) 1 MCG/ML solution 0.25 mLs (0.25 mcg) by Per G Tube route daily for 7 days 1.75 mL 0     doxazosin (CARDURA) 1 MG tablet 1 tablet (1 mg) by Per G Tube route daily for 14 days 14 tablet 0     lisinopril (ZESTRIL) 10 MG tablet 1 tablet (10 mg) by Per G Tube route daily for 14 days 14 tablet 0       ALLERGIES:  No Known Allergies    HABITS/SOCIAL HISTORY:   Previous alcoholic. Former smoker, quit 12 years ago, previously 1/2 ppd.  Some chewing tobacco use, quit about 30 years ago.    Lives with his son-in-law's father.    Previously worked in construction.    Social History     Socioeconomic History     Marital status:      Spouse name: Not on file     Number of children: Not on file     Years of education: Not on file     Highest education level: Not on file   Occupational  History     Not on file   Social Needs     Financial resource strain: Not on file     Food insecurity     Worry: Not on file     Inability: Not on file     Transportation needs     Medical: Not on file     Non-medical: Not on file   Tobacco Use     Smoking status: Former Smoker     Packs/day: 1.00     Years: 20.00     Pack years: 20.00     Quit date:      Years since quittin.8     Smokeless tobacco: Former User   Substance and Sexual Activity     Alcohol use: Not Currently     Drug use: Not Currently     Sexual activity: Not on file   Lifestyle     Physical activity     Days per week: Not on file     Minutes per session: Not on file     Stress: Not on file   Relationships     Social connections     Talks on phone: Not on file     Gets together: Not on file     Attends Yazidi service: Not on file     Active member of club or organization: Not on file     Attends meetings of clubs or organizations: Not on file     Relationship status: Not on file     Intimate partner violence     Fear of current or ex partner: Not on file     Emotionally abused: Not on file     Physically abused: Not on file     Forced sexual activity: Not on file   Other Topics Concern     Not on file   Social History Narrative     Not on file       PAST MEDICAL HISTORY:   Past Medical History:   Diagnosis Date     CHF (congestive heart failure) (H)      GERD (gastroesophageal reflux disease)      Hypertension         PAST SURGICAL HISTORY:   Past Surgical History:   Procedure Laterality Date     DISSECTION RADICAL NECK BILATERAL Bilateral 2020    Procedure: Bilateral neck dissection;  Surgeon: Caron Wang MD;  Location: [facility] OR     ENDOSCOPIC INSERTION TUBE GASTROSTOMY N/A 2020    Procedure: INSERTION, PEG TUBE;  Surgeon: Reuben Hastings MD;  Location: [facility] OR     HERNIA REPAIR, INGUINAL RT/LT       HERNIA REPAIR, UMBILICAL       LARYNGECTOMY N/A 2020    Procedure: TOTAL LARYNGECTOMY WITH LEFT HEMITHYROIDECTOMY;   "Surgeon: Caron Wang MD;  Location: [facility] OR     LARYNGOSCOPY N/A 8/25/2020    Procedure: DIRECT LARYNGOSCOPY;  Surgeon: Caron Wang MD;  Location: [facility] OR     LARYNGOSCOPY WITH BIOPSY(IES) N/A 8/3/2020    Procedure: LARYNGOSCOPY WITH BIOPSY, TRACHEOSTOMY, NG TUBE PLACEMENT;  Surgeon: Caron Wang MD;  Location: [facility] OR     ODONTECTOMY N/A 8/25/2020    Procedure: DENTAL EXTRACTION OF TEETH x 13;  Surgeon: Caron Wang MD;  Location: [facility] OR     TRACHEOSTOMY N/A 8/3/2020    Procedure: TRACHEOSTOMY;  Surgeon: Caron Wang MD;  Location: [facility] OR       FAMILY HISTORY:    Family History   Problem Relation Age of Onset     Anesthesia Reaction No family hx of      Deep Vein Thrombosis (DVT) No family hx of        REVIEW OF SYSTEMS:  12 point ROS was negative other than the symptoms noted above in the HPI.  Patient Supplied Answers to Review of Systems  [facility]NT ROS 9/4/2020   Constitutional -   Neurology Dizzy spells   Ears, Nose, Throat Ringing/noise in ears   Cardiopulmonary -   Gastrointestinal/Genitourinary -         PHYSICAL EXAMINATION:   Pulse 61   Temp 96.8  F (36  C)   Ht 1.753 m (5' 9\")   Wt 77.6 kg (171 lb)   SpO2 99%   BMI 25.25 kg/m     Patient in no apparent distress  Breathing comfortably on room air  Apron neck incision healed without any point tenderness, no erythema, no swelling  Stoma patent with Dilcia tube and HME in place      PROCEDURES:         RESULTS REVIEWED:         IMPRESSION AND PLAN:   Evin Sterling is a 70-year-old man with a T4aN0 SCC of the larynx. He underwent a total laryngectomy with bilateral neck dissections on 8/25/2020. He was recommended for postoperative radiation but decided against treatment.    He was started on a diet last visit. He is doing well with this. I would like him to work on increasing his PO intake and using his PEG less frequently. He can start trying to take his medications by mouth.  We did discuss that even with lack of teeth he should " still be able to expand his diet and work toward not using his PEG.    He is already scheduled for follow-up with his PET in 2 weeks.     He did have a hemithyroidectomy performed with his laryngectomy. We will get a TSH as a baseline and will need to have rechecks periodically.       Thank you very much for the opportunity to participate in the care of your patient.      Caron Wang MD, M.D.  Otolaryngology- Head & Neck Surgery      This note was dictated with voice recognition software and then edited. Please excuse any unintentional errors.         CC:  Kyaw Blankenship MD  [address] [address]      Vineet Gonzales MD  Department of Radiation Oncology  [facility]

## 2020-11-02 ENCOUNTER — OFFICE VISIT (OUTPATIENT)
Dept: OTOLARYNGOLOGY | Facility: CLINIC | Age: 70
End: 2020-11-02
Payer: MEDICARE

## 2020-11-02 ENCOUNTER — THERAPY VISIT (OUTPATIENT)
Dept: SPEECH THERAPY | Facility: CLINIC | Age: 70
End: 2020-11-02
Payer: MEDICARE

## 2020-11-02 VITALS
TEMPERATURE: 96.8 F | HEIGHT: 69 IN | OXYGEN SATURATION: 99 % | WEIGHT: 171 LBS | HEART RATE: 61 BPM | BODY MASS INDEX: 25.33 KG/M2

## 2020-11-02 DIAGNOSIS — E89.0 POSTOPERATIVE HYPOTHYROIDISM: ICD-10-CM

## 2020-11-02 DIAGNOSIS — C32.9 LARYNGEAL CANCER (H): Primary | ICD-10-CM

## 2020-11-02 DIAGNOSIS — R13.12 OROPHARYNGEAL DYSPHAGIA: ICD-10-CM

## 2020-11-02 PROCEDURE — 92526 ORAL FUNCTION THERAPY: CPT | Mod: GN | Performed by: SPEECH-LANGUAGE PATHOLOGIST

## 2020-11-02 PROCEDURE — 99024 POSTOP FOLLOW-UP VISIT: CPT | Performed by: OTOLARYNGOLOGY

## 2020-11-02 ASSESSMENT — MIFFLIN-ST. JEOR: SCORE: 1536.03

## 2020-11-02 ASSESSMENT — PAIN SCALES - GENERAL: PAINLEVEL: NO PAIN (0)

## 2020-11-02 NOTE — NURSING NOTE
"No chief complaint on file.      Pulse 61, temperature 96.8  F (36  C), height 1.753 m (5' 9\"), weight 77.6 kg (171 lb), SpO2 99 %.    Annalee Chambers, EMT    "

## 2020-11-02 NOTE — LETTER
11/2/2020       RE: Evin Sterling  [address] [facility] [address]     Dear Colleague,    Thank you for referring your patient, Evin Sterling, to the [facility] [place] at [facility]. Please see a copy of my visit note below.    Dear Dr. Blankenship:    I had the pleasure of seeing Evin Sterling in follow-up today at the [facility].     History of Present Illness:   Evin Sterling is a 70 year old man with a T4aN0 SCC of the larynx. The patient has about a 4 year history of progressive hoarseness. He [place] [facility] [place] with [place]. He had insurance issues and was not able to afford the medication. He had progressive voice changes. He saw Dr Blankenship on 7/28/2020 for consultation at which time he had biphasic stridor and on scope exam was noted to have a large exophytic mass of the glottis without mobility of either cord and a glottic opening of about 5 mm.     He was taken to the OR on 8/3/2020 for an awake trach with DL. Biopsies were consistent with SCC. He had a PET scan which showed the tumor in the larynx with thyroid cartilage erosion but no obvious mariel disease. The patient demonstrated poor swallow function with aspiration on swallow study. Given these findings he was recommended surgery followed by adjuvant radiation.    He was taken to the OR on 8/25/2020 for a DL, total laryngectomy, left hemithyroidectomy, bilateral neck dissections, cricopharyngeal myotomy, dental extractions.. He had PEG tube placement performed by thoracic surgery at the time of surgery. His postoperative course was uncomplicated. His final pathology demonstrated a 2.5 cm invasive moderately differentiated SCC, 2.5 cm involving the right and left cords, invading the thyroid cartilage and focally extending to the soft tissues, benign thyroid, angiolymphatic invasion present, no PNI,  negative margins, 0/86 lymph nodes.  His case was reviewed at tumor conference with recommendation for postoperative radiation. The patient decided against radiation.      Interval history:  He comes in today for follow-up. He was last seen in clinic in October 2020. He had improvement in his video swallow study and there was no obvious fistula on scope exam. He was started on diet last visit. He is up to taking puree/soft foods. He says that he doing small meals. He has no choking, no nasal regurgitation. He says the problem with the eating is his lack of teeth. He is taking 3-4 cans of tube feeds per day. He says his weight is stable. He feels his energy is good and he was able to walk 4 miles this weekend. He is planning on moving home. He was supposed to see an oral surgeon to discuss implants but given how recent his surgery was he was recommended to delay another month by the oral surgeon.        MEDICATIONS:     Current Outpatient Medications   Medication Sig Dispense Refill     acetaminophen (TYLENOL) 32 mg/mL liquid 20.3 mLs (650 mg) by Per Feeding Tube route every 6 hours 473 mL 1     acetaminophen (TYLENOL) 32 mg/mL liquid 20.3 mLs (650 mg) by Per Feeding Tube route every 4 hours as needed for pain 473 mL 1     carboxymethylcellulose PF (REFRESH PLUS) 0.5 % ophthalmic solution Place 1 drop into both eyes 4 times daily as needed for dry eyes 1 Bottle 3     glycopyrrolate (ROBINUL) 1 MG tablet 1 tablet (1 mg) by Per Feeding Tube route 2 times daily 30 tablet 1     metoprolol tartrate (LOPRESSOR) 25 MG tablet 0.5 tablets (12.5 mg) by Per G Tube route 2 times daily 30 tablet 0     mineral oil-hydrophilic petrolatum (AQUAPHOR) external ointment Apply topically every 8 hours 50 g 3     multivitamins w/minerals (CERTAVITE) liquid 15 mLs by Per Feeding Tube route daily 236 mL 1     ondansetron (ZOFRAN) 4 MG tablet 1 tablet (4 mg) by Per G Tube route every 6 hours as needed for nausea 30 tablet 0     ondansetron  (ZOFRAN) 4 MG tablet Take 4 mg by mouth every 8 hours as needed for nausea       order for DME Equipment being ordered: Tracheostomy supplies    0.9% sodium chloride 1000 mL bottles  Box split 4x4 gauze sponges  Trach kits with brushes  Velcro trach ties  3 cc 0.9% sodium chloride lavages for trach suctioning  Large gloves  Cool mist humidity via trach dome  6-0 Shiley disposable inner cannulas    Diagnosis: laryngeal cancer 3 Month 1     order for DME Equipment being ordered:   Portable suction machine   14 French suction catheters  Yankeur suction tips    Diagnosis: laryngeal cancer 3 Month 1     order for DME Equipment being ordered: Nasogastric bolus tube feeding supplies  Formula: Isosource 1.5, 6 cans per day  Gravity feeding bags  60 mL syringes    Treatment Diagnosis: laryngeal cancer 3 Month 1     oxyCODONE (ROXICODONE) 5 MG/5ML solution 5-10 mLs (5-10 mg) by Per Feeding Tube route every 4 hours as needed for moderate to severe pain 420 mL 0     polyethylene glycol (MIRALAX) 17 g packet Take 17 g by mouth 2 times daily as needed for constipation 14 packet 0     senna-docusate (SENOKOT-S/PERICOLACE) 8.6-50 MG tablet 1 tablet by Per G Tube route 2 times daily 28 tablet 0     sennosides (SENOKOT) 8.8 MG/5ML syrup 5 mLs by Per Feeding Tube route nightly as needed for constipation 236 mL 0     calcitRIOL (ROCALTROL) 1 MCG/ML solution 0.25 mLs (0.25 mcg) by Per G Tube route 2 times daily for 6 days 3 mL 0     calcitRIOL (ROCALTROL) 1 MCG/ML solution 0.25 mLs (0.25 mcg) by Per G Tube route daily for 7 days 1.75 mL 0     doxazosin (CARDURA) 1 MG tablet 1 tablet (1 mg) by Per G Tube route daily for 14 days 14 tablet 0     lisinopril (ZESTRIL) 10 MG tablet 1 tablet (10 mg) by Per G Tube route daily for 14 days 14 tablet 0       ALLERGIES:  No Known Allergies    HABITS/SOCIAL HISTORY:   Previous alcoholic. Former smoker, quit 12 years ago, previously 1/2 ppd.  Some chewing tobacco use, quit about 30 years ago.     Lives with his son-in-law's father.    Previously worked in construction.    Social History     Socioeconomic History     Marital status:      Spouse name: Not on file     Number of children: Not on file     Years of education: Not on file     Highest education level: Not on file   Occupational History     Not on file   Social Needs     Financial resource strain: Not on file     Food insecurity     Worry: Not on file     Inability: Not on file     Transportation needs     Medical: Not on file     Non-medical: Not on file   Tobacco Use     Smoking status: Former Smoker     Packs/day: 1.00     Years: 20.00     Pack years: 20.00     Quit date:      Years since quittin.8     Smokeless tobacco: Former User   Substance and Sexual Activity     Alcohol use: Not Currently     Drug use: Not Currently     Sexual activity: Not on file   Lifestyle     Physical activity     Days per week: Not on file     Minutes per session: Not on file     Stress: Not on file   Relationships     Social connections     Talks on phone: Not on file     Gets together: Not on file     Attends Protestant service: Not on file     Active member of club or organization: Not on file     Attends meetings of clubs or organizations: Not on file     Relationship status: Not on file     Intimate partner violence     Fear of current or ex partner: Not on file     Emotionally abused: Not on file     Physically abused: Not on file     Forced sexual activity: Not on file   Other Topics Concern     Not on file   Social History Narrative     Not on file       PAST MEDICAL HISTORY:   Past Medical History:   Diagnosis Date     CHF (congestive heart failure) (H)      GERD (gastroesophageal reflux disease)      Hypertension         PAST SURGICAL HISTORY:   Past Surgical History:   Procedure Laterality Date     DISSECTION RADICAL NECK BILATERAL Bilateral 2020    Procedure: Bilateral neck dissection;  Surgeon: Caron Wang MD;  Location:  OR  "    ENDOSCOPIC INSERTION TUBE GASTROSTOMY N/A 8/25/2020    Procedure: INSERTION, PEG TUBE;  Surgeon: Reuben Hastings MD;  Location: [facility] OR     HERNIA REPAIR, INGUINAL RT/LT       HERNIA REPAIR, UMBILICAL       LARYNGECTOMY N/A 8/25/2020    Procedure: TOTAL LARYNGECTOMY WITH LEFT HEMITHYROIDECTOMY;  Surgeon: Caron Wang MD;  Location: [facility] OR     LARYNGOSCOPY N/A 8/25/2020    Procedure: DIRECT LARYNGOSCOPY;  Surgeon: Caron Wang MD;  Location: [facility]     LARYNGOSCOPY WITH BIOPSY(IES) N/A 8/3/2020    Procedure: LARYNGOSCOPY WITH BIOPSY, TRACHEOSTOMY, NG TUBE PLACEMENT;  Surgeon: Caron Wang MD;  Location: [facility]     ODONTECTOMY N/A 8/25/2020    Procedure: DENTAL EXTRACTION OF TEETH x 13;  Surgeon: Caron Wang MD;  [facility]: [facility]     TRACHEOSTOMY N/A 8/3/2020    Procedure: TRACHEOSTOMY;  Surgeon: Caron Wang MD;  Location: [facility] OR       FAMILY HISTORY:    Family History   Problem Relation Age of Onset     Anesthesia Reaction No family hx of      Deep Vein Thrombosis (DVT) No family hx of        REVIEW OF SYSTEMS:  12 point ROS was negative other than the symptoms noted above in the HPI.  Patient Supplied Answers to Review of Systems  [facility] ROS 9/4/2020   Constitutional -   Neurology Dizzy spells   Ears, Nose, Throat Ringing/noise in ears   Cardiopulmonary -   Gastrointestinal/Genitourinary -         PHYSICAL EXAMINATION:   Pulse 61   Temp 96.8  F (36  C)   Ht 1.753 m (5' 9\")   Wt 77.6 kg (171 lb)   SpO2 99%   BMI 25.25 kg/m     Patient in no apparent distress  Breathing comfortably on room air  Apron neck incision healed without any point tenderness, no erythema, no swelling  Stoma patent with Dilcia tube and HME in place      PROCEDURES:         RESULTS REVIEWED:         IMPRESSION AND PLAN:   Evin Sterling is a 70-year-old man with a T4aN0 SCC of the larynx. He underwent a total laryngectomy with bilateral neck dissections on 8/25/2020. He was recommended for postoperative " radiation but decided against treatment.    He was started on a diet last visit. He is doing well with this. I would like him to work on increasing his PO intake and using his PEG less frequently. He can start trying to take his medications by mouth.  We did discuss that even with lack of teeth he should still be able to expand his diet and work toward not using his PEG.    He is already scheduled for follow-up with his PET in 2 weeks.     He did have a hemithyroidectomy performed with his laryngectomy. We will get a TSH as a baseline and will need to have rechecks periodically.       Thank you very much for the opportunity to participate in the care of your patient.      Caron Wang MD, M.D.  Otolaryngology- Head & Neck Surgery      This note was dictated with voice recognition software and then edited. Please excuse any unintentional errors.       CC:  Kyaw Blankenship MD  [address] [address]      Vineet Gonzales MD  Department of Radiation Oncology  [facility]

## 2020-11-10 ENCOUNTER — DOCUMENTATION ONLY (OUTPATIENT)
Dept: CARE COORDINATION | Facility: CLINIC | Age: 70
End: 2020-11-10

## 2020-11-17 NOTE — PROGRESS NOTES
Dear Dr. Blankenship:    I had the pleasure of seeing Evin Sterling in follow-up today at the [facility].     History of Present Illness:   Evin Sterling is a 70 year old man with a T4aN0 SCC of the larynx. The patient has about a 4 year history of progressive hoarseness. He was potentially [place] by an [place] [facility] [place] with [place]. He had insurance issues and was not able to afford the medication. He had progressive voice changes. He saw Dr Blankenship on 7/28/2020 for consultation at which time he had biphasic stridor and on scope exam was noted to have a large exophytic mass of the glottis without mobility of either cord and a glottic opening of about 5 mm.     He was taken to the OR on 8/3/2020 for an awake trach with DL. Biopsies were consistent with SCC. He had a PET scan which showed the tumor in the larynx with thyroid cartilage erosion but no obvious mariel disease. The patient demonstrated poor swallow function with aspiration on swallow study. Given these findings he was recommended surgery followed by adjuvant radiation.    He was taken to the OR on 8/25/2020 for a DL, total laryngectomy, left hemithyroidectomy, bilateral neck dissections, cricopharyngeal myotomy, dental extractions.. He had PEG tube placement performed by thoracic surgery at the time of surgery. His postoperative course was uncomplicated. His final pathology demonstrated a 2.5 cm invasive moderately differentiated SCC, 2.5 cm involving the right and left cords, invading the thyroid cartilage and focally extending to the soft tissues, benign thyroid, angiolymphatic invasion present, no PNI, negative margins, 0/86 lymph nodes.  His case was reviewed at tumor conference with recommendation for postoperative radiation. The patient decided against radiation. He was started on an oral diet in October 2020.       Interval history:  He comes in today for follow-up. He was last seen in clinic in November  2020. At that time he was doing well with his oral diet. He had his 3 month posttreatment PET scan today. Final read is pending but on initial review this does appear negative for recurrent disease. He has his TSH scheduled for later today. He feels like things are going well. He is trying more foods, was tentative at first but is expanding his diet. He is using nutritional supplements.He says thinks like bread feel like they get stuck but he can get them down with water.  He has not used his PEG for the last 2 weeks. He says his weight is stable. He has no sore throat, odynophagia, hemoptysis, ear pain, neck masses. Otherwise is health is unchanged. He has moved back home.      MEDICATIONS:     Current Outpatient Medications   Medication Sig Dispense Refill     acetaminophen (TYLENOL) 32 mg/mL liquid 20.3 mLs (650 mg) by Per Feeding Tube route every 6 hours 473 mL 1     acetaminophen (TYLENOL) 32 mg/mL liquid 20.3 mLs (650 mg) by Per Feeding Tube route every 4 hours as needed for pain 473 mL 1     carboxymethylcellulose PF (REFRESH PLUS) 0.5 % ophthalmic solution Place 1 drop into both eyes 4 times daily as needed for dry eyes 1 Bottle 3     glycopyrrolate (ROBINUL) 1 MG tablet 1 tablet (1 mg) by Per Feeding Tube route 2 times daily 30 tablet 1     metoprolol tartrate (LOPRESSOR) 25 MG tablet 0.5 tablets (12.5 mg) by Per G Tube route 2 times daily 30 tablet 0     mineral oil-hydrophilic petrolatum (AQUAPHOR) external ointment Apply topically every 8 hours 50 g 3     multivitamins w/minerals (CERTAVITE) liquid 15 mLs by Per Feeding Tube route daily 236 mL 1     ondansetron (ZOFRAN) 4 MG tablet 1 tablet (4 mg) by Per G Tube route every 6 hours as needed for nausea 30 tablet 0     ondansetron (ZOFRAN) 4 MG tablet Take 4 mg by mouth every 8 hours as needed for nausea       order for DME Equipment being ordered: Tracheostomy supplies    0.9% sodium chloride 1000 mL bottles  Box split 4x4 gauze sponges  Trach kits with  brushes  Velcro trach ties  3 cc 0.9% sodium chloride lavages for trach suctioning  Large gloves  Cool mist humidity via trach dome  6-0 Shiley disposable inner cannulas    Diagnosis: laryngeal cancer 3 Month 1     order for DME Equipment being ordered:   Portable suction machine   14 French suction catheters  Yankeur suction tips    Diagnosis: laryngeal cancer 3 Month 1     order for DME Equipment being ordered: Nasogastric bolus tube feeding supplies  Formula: Isosource 1.5, 6 cans per day  Gravity feeding bags  60 mL syringes    Treatment Diagnosis: laryngeal cancer 3 Month 1     oxyCODONE (ROXICODONE) 5 MG/5ML solution 5-10 mLs (5-10 mg) by Per Feeding Tube route every 4 hours as needed for moderate to severe pain 420 mL 0     polyethylene glycol (MIRALAX) 17 g packet Take 17 g by mouth 2 times daily as needed for constipation 14 packet 0     senna-docusate (SENOKOT-S/PERICOLACE) 8.6-50 MG tablet 1 tablet by Per G Tube route 2 times daily 28 tablet 0     sennosides (SENOKOT) 8.8 MG/5ML syrup 5 mLs by Per Feeding Tube route nightly as needed for constipation 236 mL 0     calcitRIOL (ROCALTROL) 1 MCG/ML solution 0.25 mLs (0.25 mcg) by Per G Tube route 2 times daily for 6 days 3 mL 0     calcitRIOL (ROCALTROL) 1 MCG/ML solution 0.25 mLs (0.25 mcg) by Per G Tube route daily for 7 days 1.75 mL 0     doxazosin (CARDURA) 1 MG tablet 1 tablet (1 mg) by Per G Tube route daily for 14 days 14 tablet 0     lisinopril (ZESTRIL) 10 MG tablet 1 tablet (10 mg) by Per G Tube route daily for 14 days 14 tablet 0       ALLERGIES:  No Known Allergies    HABITS/SOCIAL HISTORY:   Previous alcoholic. Former smoker, quit 12 years ago, previously 1/2 ppd.  Some chewing tobacco use, quit about 30 years ago.    Lives with his son-in-law's father.    Previously worked in construction.    Social History     Socioeconomic History     Marital status:      Spouse name: Not on file     Number of children: Not on file     Years of  education: Not on file     Highest education level: Not on file   Occupational History     Not on file   Social Needs     Financial resource strain: Not on file     Food insecurity     Worry: Not on file     Inability: Not on file     Transportation needs     Medical: Not on file     Non-medical: Not on file   Tobacco Use     Smoking status: Former Smoker     Packs/day: 1.00     Years: 20.00     Pack years: 20.00     Quit date:      Years since quittin.8     Smokeless tobacco: Former User   Substance and Sexual Activity     Alcohol use: Not Currently     Drug use: Not Currently     Sexual activity: Not on file   Lifestyle     Physical activity     Days per week: Not on file     Minutes per session: Not on file     Stress: Not on file   Relationships     Social connections     Talks on phone: Not on file     Gets together: Not on file     Attends Catholic service: Not on file     Active member of club or organization: Not on file     Attends meetings of clubs or organizations: Not on file     Relationship status: Not on file     Intimate partner violence     Fear of current or ex partner: Not on file     Emotionally abused: Not on file     Physically abused: Not on file     Forced sexual activity: Not on file   Other Topics Concern     Not on file   Social History Narrative     Not on file       PAST MEDICAL HISTORY:   Past Medical History:   Diagnosis Date     CHF (congestive heart failure) (H)      GERD (gastroesophageal reflux disease)      Hypertension         PAST SURGICAL HISTORY:   Past Surgical History:   Procedure Laterality Date     DISSECTION RADICAL NECK BILATERAL Bilateral 2020    Procedure: Bilateral neck dissection;  Surgeon: Caron Wang MD;  [facility]: [facility]     ENDOSCOPIC INSERTION TUBE GASTROSTOMY N/A 2020    Procedure: INSERTION, PEG TUBE;  Surgeon: Reuben Hastings MD;  [facility]     HERNIA REPAIR, INGUINAL RT/LT       HERNIA REPAIR, UMBILICAL       LARYNGECTOMY N/A  8/25/2020    Procedure: TOTAL LARYNGECTOMY WITH LEFT HEMITHYROIDECTOMY;  Surgeon: Caron Wang MD;  [facility]     LARYNGOSCOPY N/A 8/25/2020    Procedure: DIRECT LARYNGOSCOPY;  Surgeon: Caron Wang MD;  [facility]     LARYNGOSCOPY WITH BIOPSY(IES) N/A 8/3/2020    Procedure: LARYNGOSCOPY WITH BIOPSY, TRACHEOSTOMY, NG TUBE PLACEMENT;  Surgeon: Caron Wang MD;  [facility]     ODONTECTOMY N/A 8/25/2020    Procedure: DENTAL EXTRACTION OF TEETH x 13;  Surgeon: Caron Wang MD;  [facility]     TRACHEOSTOMY N/A 8/3/2020    Procedure: TRACHEOSTOMY;  Surgeon: Caron Wang MD;  [facility]       FAMILY HISTORY:    Family History   Problem Relation Age of Onset     Anesthesia Reaction No family hx of      Deep Vein Thrombosis (DVT) No family hx of        REVIEW OF SYSTEMS:  12 point ROS was negative other than the symptoms noted above in the HPI.  Patient Supplied Answers to Review of Systems  [facility] ROS 9/4/2020   Constitutional -   Neurology Dizzy spells   Ears, Nose, Throat Ringing/noise in ears   Cardiopulmonary -   Gastrointestinal/Genitourinary -         PHYSICAL EXAMINATION:   Pulse 64   Temp 96.6  F (35.9  C) (Temporal)   Wt 78.5 kg (173 lb)   SpO2 99%   BMI 25.55 kg/m     Appearance:   normal; NAD, age-appropriate appearance, well-developed, normal habitus   Communication:   aphonic, communicates with writing and mouthing words   Head/Face:   inspection -  Normal; no scars or visible lesions   Salivary glands -  Normal size, no tenderness, swelling, or palpable masses   Facial strength -  Normal and symmetric    Skin:  normal, no rash   Ears:  auricle (AD) -  normal  EAC (AD) -  normal  TM (AD) -  Normal, no effusion  auricle (AS) -  normal  EAC (AS) - cerumen  TM (AS) -  Unable to visualize  Normal clinical speech reception   Nose:  Ext. inspection -  Normal  Internal Inspection -  Normal mucosa, septum, and turbinates   Nasopharynx:  normal mucosa, no  masses   Oral Cavity:  lips -  Normal mucosa, oral competence, and stoma size   Edentulous, healthy gingival mucosa   Hard palate, buccal, floor of mouth mucosa normal   Tongue - normal movement, no lesions   Oropharynx:  mucosa -  Normal, no lesions  soft palate -  Normal, no lesions, no asymmetry, normal elevation   Neoharynx:  Normal mucosa, no masses  neovallecula clear  Shelf at site of previous epiglottis   Neck: Well healed apron neck incision  No palpable masses  Stoma patent  Normal range of motion   Lymphatic:  no abnormal nodes   Cardiovascular:  warm, pink, well-perfused extremities without swelling, tenderness, or edema   Respiratory:  Normal respiratory effort, no stridor   Neuro/Psych.:  mood/affect -  normal  mental status -  normal       PROCEDURES:   Flexible fiberoptic laryngoscopy: Scope exam was indicated due to laryngeal cancer. Verbal consent was obtained. The nasal cavity was prepped with an aerosolized solution of topical anesthetic and vasoconstrictive agent. The scope was passed through the anterior nasal cavity and advanced. Inspection of the nasopharynx revealed no gross abnormality. The base of tongue and neovallecula are normal. There is a shelf at the site of the previous epiglottis. The neopharynx is clear with healthy appearing mucosa and no masses.  Procedure tolerated well with no immediate complications noted.    Flexible tracheoscopy: The flexible scope was passed through the stoma and down to the primary bronchi. The right mainstem bronchus has clear secretions. The airway was patent. Patient tolerated the procedure well with no immediate complications.       RESULTS REVIEWED:   I independently reviewed the PET scan images which show postoperative changes from total laryngectomy and neck dissection, no evidence of recurrent disease in the neopharynx, stoma, neck, or in the chest      IMPRESSION AND PLAN:   Evin Sterling is a 70-year-old man with a T4aN0 SCC of the larynx. He  underwent a total laryngectomy with bilateral neck dissections on 8/25/2020. He was recommended for postoperative radiation but decided against treatment.    He is overall doing well. He has expanded his oral intake and has not used his PEG for 2 weeks. We will request PEG removal with the thoracic team for another 2 weeks.     He was seen by SLP today. He did not have his electrolarynx today but will see SLP in follow-up to work on this in the coming weeks.     He had his 3 month posttreatment PET scan. On initial review there is no evidence of recurrent disease. We will contact his daughter with the final results.     He did have a hemithyroidectomy performed with his laryngectomy. We will get a TSH as a baseline and will need to have rechecks periodically. He has a lab visit today after the appointment.     I will see him back in 2 months.      Thank you very much for the opportunity to participate in the care of your patient.      Caron Wang MD, M.D.  Otolaryngology- Head & Neck Surgery      This note was dictated with voice recognition software and then edited. Please excuse any unintentional errors.         CC:  Kyaw Blankenship MD  [address] [id number]      Vineet Gonzales MD  Department of Radiation Oncology  [facility]

## 2020-11-18 ENCOUNTER — HOSPITAL ENCOUNTER (OUTPATIENT)
Dept: PET IMAGING | Facility: CLINIC | Age: 70
End: 2020-11-18
Attending: OTOLARYNGOLOGY

## 2020-11-18 ENCOUNTER — OFFICE VISIT (OUTPATIENT)
Dept: OTOLARYNGOLOGY | Facility: CLINIC | Age: 70
End: 2020-11-18

## 2020-11-18 ENCOUNTER — THERAPY VISIT (OUTPATIENT)
Dept: SPEECH THERAPY | Facility: CLINIC | Age: 70
End: 2020-11-18
Payer: MEDICARE

## 2020-11-18 VITALS — TEMPERATURE: 96.6 F | BODY MASS INDEX: 25.55 KG/M2 | WEIGHT: 173 LBS | OXYGEN SATURATION: 99 % | HEART RATE: 64 BPM

## 2020-11-18 DIAGNOSIS — C32.9 LARYNGEAL CANCER (H): ICD-10-CM

## 2020-11-18 DIAGNOSIS — C32.9 LARYNGEAL CANCER (H): Primary | ICD-10-CM

## 2020-11-18 DIAGNOSIS — E89.0 POSTOPERATIVE HYPOTHYROIDISM: ICD-10-CM

## 2020-11-18 DIAGNOSIS — R13.12 OROPHARYNGEAL DYSPHAGIA: ICD-10-CM

## 2020-11-18 LAB
T4 FREE SERPL-MCNC: 0.9 NG/DL (ref 0.76–1.46)
TSH SERPL DL<=0.005 MIU/L-ACNC: 4.87 MU/L (ref 0.4–4)

## 2020-11-18 PROCEDURE — 70491 CT SOFT TISSUE NECK W/DYE: CPT

## 2020-11-18 PROCEDURE — 250N000011 HC RX IP 250 OP 636: Performed by: OTOLARYNGOLOGY

## 2020-11-18 PROCEDURE — 70491 CT SOFT TISSUE NECK W/DYE: CPT | Mod: 26 | Performed by: RADIOLOGY

## 2020-11-18 PROCEDURE — A9552 F18 FDG: HCPCS | Performed by: OTOLARYNGOLOGY

## 2020-11-18 PROCEDURE — 84439 ASSAY OF FREE THYROXINE: CPT | Performed by: PATHOLOGY

## 2020-11-18 PROCEDURE — 84443 ASSAY THYROID STIM HORMONE: CPT | Performed by: PATHOLOGY

## 2020-11-18 PROCEDURE — 71260 CT THORAX DX C+: CPT | Performed by: RADIOLOGY

## 2020-11-18 PROCEDURE — 74177 CT ABD & PELVIS W/CONTRAST: CPT | Performed by: RADIOLOGY

## 2020-11-18 PROCEDURE — 343N000001 HC RX 343: Performed by: OTOLARYNGOLOGY

## 2020-11-18 PROCEDURE — 92526 ORAL FUNCTION THERAPY: CPT | Mod: GN | Performed by: SPEECH-LANGUAGE PATHOLOGIST

## 2020-11-18 PROCEDURE — 99024 POSTOP FOLLOW-UP VISIT: CPT | Performed by: OTOLARYNGOLOGY

## 2020-11-18 PROCEDURE — 78816 PET IMAGE W/CT FULL BODY: CPT | Mod: 26 | Performed by: RADIOLOGY

## 2020-11-18 PROCEDURE — 74177 CT ABD & PELVIS W/CONTRAST: CPT

## 2020-11-18 PROCEDURE — 36415 COLL VENOUS BLD VENIPUNCTURE: CPT | Performed by: PATHOLOGY

## 2020-11-18 RX ORDER — IOPAMIDOL 755 MG/ML
20-135 INJECTION, SOLUTION INTRAVASCULAR ONCE
Status: COMPLETED | OUTPATIENT
Start: 2020-11-18 | End: 2020-11-18

## 2020-11-18 RX ADMIN — FLUDEOXYGLUCOSE F-18 9.87 MCI.: 500 INJECTION, SOLUTION INTRAVENOUS at 09:52

## 2020-11-18 RX ADMIN — IOPAMIDOL 104 ML: 755 INJECTION, SOLUTION INTRAVENOUS at 09:52

## 2020-11-18 ASSESSMENT — PAIN SCALES - GENERAL: PAINLEVEL: NO PAIN (0)

## 2020-11-18 NOTE — LETTER
11/18/2020       RE: Evin Sterling  [address] [facility] [address]     Dear Colleague,    Thank you for referring your patient, Evin Sterling, to the [facility] [place] at [facility]. Please see a copy of my visit note below.    Dear Dr. Blankenship:    I had the pleasure of seeing Evin Sterling in follow-up today at the [facility].     History of Present Illness:   Evin Sterling is a 70 year old man with a T4aN0 SCC of the larynx. The patient has about a 4 year history of progressive hoarseness. He was potentially seen by an ENT locally in [facility] and diagnosed with reflux. He had insurance issues and was not able to afford the medication. He had progressive voice changes. He saw Dr Blankenship on 7/28/2020 for consultation at which time he had biphasic stridor and on scope exam was noted to have a large exophytic mass of the glottis without mobility of either cord and a glottic opening of about 5 mm.     He was taken to the OR on 8/3/2020 for an awake trach with DL. Biopsies were consistent with SCC. He had a PET scan which showed the tumor in the larynx with thyroid cartilage erosion but no obvious mariel disease. The patient demonstrated poor swallow function with aspiration on swallow study. Given these findings he was recommended surgery followed by adjuvant radiation.    He was taken to the OR on 8/25/2020 for a DL, total laryngectomy, left hemithyroidectomy, bilateral neck dissections, cricopharyngeal myotomy, dental extractions.. He had PEG tube placement performed by thoracic surgery at the time of surgery. His postoperative course was uncomplicated. His final pathology demonstrated a 2.5 cm invasive moderately differentiated SCC, 2.5 cm involving the right and left cords, invading the thyroid cartilage and focally extending to the soft tissues, benign thyroid, angiolymphatic invasion present, no PNI,  negative margins, 0/86 lymph nodes.  His case was reviewed at tumor conference with recommendation for postoperative radiation. The patient decided against radiation. He was started on an oral diet in October 2020.       Interval history:  He comes in today for follow-up. He was last seen in clinic in November 2020. At that time he was doing well with his oral diet. He had his 3 month posttreatment PET scan today. Final read is pending but on initial review this does appear negative for recurrent disease. He has his TSH scheduled for later today. He feels like things are going well. He is trying more foods, was tentative at first but is expanding his diet. He is using nutritional supplements.He says thinks like bread feel like they get stuck but he can get them down with water.  He has not used his PEG for the last 2 weeks. He says his weight is stable. He has no sore throat, odynophagia, hemoptysis, ear pain, neck masses. Otherwise is health is unchanged. He has moved back home.      MEDICATIONS:     Current Outpatient Medications   Medication Sig Dispense Refill     acetaminophen (TYLENOL) 32 mg/mL liquid 20.3 mLs (650 mg) by Per Feeding Tube route every 6 hours 473 mL 1     acetaminophen (TYLENOL) 32 mg/mL liquid 20.3 mLs (650 mg) by Per Feeding Tube route every 4 hours as needed for pain 473 mL 1     carboxymethylcellulose PF (REFRESH PLUS) 0.5 % ophthalmic solution Place 1 drop into both eyes 4 times daily as needed for dry eyes 1 Bottle 3     glycopyrrolate (ROBINUL) 1 MG tablet 1 tablet (1 mg) by Per Feeding Tube route 2 times daily 30 tablet 1     metoprolol tartrate (LOPRESSOR) 25 MG tablet 0.5 tablets (12.5 mg) by Per G Tube route 2 times daily 30 tablet 0     mineral oil-hydrophilic petrolatum (AQUAPHOR) external ointment Apply topically every 8 hours 50 g 3     multivitamins w/minerals (CERTAVITE) liquid 15 mLs by Per Feeding Tube route daily 236 mL 1     ondansetron (ZOFRAN) 4 MG tablet 1 tablet (4 mg)  by Per G Tube route every 6 hours as needed for nausea 30 tablet 0     ondansetron (ZOFRAN) 4 MG tablet Take 4 mg by mouth every 8 hours as needed for nausea       order for DME Equipment being ordered: Tracheostomy supplies    0.9% sodium chloride 1000 mL bottles  Box split 4x4 gauze sponges  Trach kits with brushes  Velcro trach ties  3 cc 0.9% sodium chloride lavages for trach suctioning  Large gloves  Cool mist humidity via trach dome  6-0 Shiley disposable inner cannulas    Diagnosis: laryngeal cancer 3 Month 1     order for DME Equipment being ordered:   Portable suction machine   14 French suction catheters  [place] suction tips    Diagnosis: laryngeal cancer 3 Month 1     order for DME Equipment being ordered: Nasogastric bolus tube feeding supplies  Formula: Isosource 1.5, 6 cans per day  Gravity feeding bags  60 mL syringes    Treatment Diagnosis: laryngeal cancer 3 Month 1     oxyCODONE (ROXICODONE) 5 MG/5ML solution 5-10 mLs (5-10 mg) by Per Feeding Tube route every 4 hours as needed for moderate to severe pain 420 mL 0     polyethylene glycol (MIRALAX) 17 g packet Take 17 g by mouth 2 times daily as needed for constipation 14 packet 0     senna-docusate (SENOKOT-S/PERICOLACE) 8.6-50 MG tablet 1 tablet by Per G Tube route 2 times daily 28 tablet 0     sennosides (SENOKOT) 8.8 MG/5ML syrup 5 mLs by Per Feeding Tube route nightly as needed for constipation 236 mL 0     calcitRIOL (ROCALTROL) 1 MCG/ML solution 0.25 mLs (0.25 mcg) by Per G Tube route 2 times daily for 6 days 3 mL 0     calcitRIOL (ROCALTROL) 1 MCG/ML solution 0.25 mLs (0.25 mcg) by Per G Tube route daily for 7 days 1.75 mL 0     doxazosin (CARDURA) 1 MG tablet 1 tablet (1 mg) by Per G Tube route daily for 14 days 14 tablet 0     lisinopril (ZESTRIL) 10 MG tablet 1 tablet (10 mg) by Per G Tube route daily for 14 days 14 tablet 0       ALLERGIES:  No Known Allergies    HABITS/SOCIAL HISTORY:   Previous alcoholic. Former smoker, quit 12 years  ago, previously 1/2 ppd.  Some chewing tobacco use, quit about 30 years ago.    Lives with his son-in-law's father.    Previously worked in construction.    Social History     Socioeconomic History     Marital status:      Spouse name: Not on file     Number of children: Not on file     Years of education: Not on file     Highest education level: Not on file   Occupational History     Not on file   Social Needs     Financial resource strain: Not on file     Food insecurity     Worry: Not on file     Inability: Not on file     Transportation needs     Medical: Not on file     Non-medical: Not on file   Tobacco Use     Smoking status: Former Smoker     Packs/day: 1.00     Years: 20.00     Pack years: 20.00     Quit date:      Years since quittin.8     Smokeless tobacco: Former User   Substance and Sexual Activity     Alcohol use: Not Currently     Drug use: Not Currently     Sexual activity: Not on file   Lifestyle     Physical activity     Days per week: Not on file     Minutes per session: Not on file     Stress: Not on file   Relationships     Social connections     Talks on phone: Not on file     Gets together: Not on file     Attends Oriental orthodox service: Not on file     Active member of club or organization: Not on file     Attends meetings of clubs or organizations: Not on file     Relationship status: Not on file     Intimate partner violence     Fear of current or ex partner: Not on file     Emotionally abused: Not on file     Physically abused: Not on file     Forced sexual activity: Not on file   Other Topics Concern     Not on file   Social History Narrative     Not on file       PAST MEDICAL HISTORY:   Past Medical History:   Diagnosis Date     CHF (congestive heart failure) (H)      GERD (gastroesophageal reflux disease)      Hypertension         PAST SURGICAL HISTORY:   Past Surgical History:   Procedure Laterality Date     DISSECTION RADICAL NECK BILATERAL Bilateral 2020    Procedure:  Bilateral neck dissection;  Surgeon: Caron Wang MD;  Location: [facility] OR     ENDOSCOPIC INSERTION TUBE GASTROSTOMY N/A 8/25/2020    Procedure: INSERTION, PEG TUBE;  Surgeon: Reuben Hastings MD;  Location: [facility] OR     HERNIA REPAIR, INGUINAL RT/LT       HERNIA REPAIR, UMBILICAL       LARYNGECTOMY N/A 8/25/2020    Procedure: TOTAL LARYNGECTOMY WITH LEFT HEMITHYROIDECTOMY;  Surgeon: Caron Wang MD;  Location: [facility] OR     LARYNGOSCOPY N/A 8/25/2020    Procedure: DIRECT LARYNGOSCOPY;  Surgeon: Caron Wang MD;  Location: [facility] OR     LARYNGOSCOPY WITH BIOPSY(IES) N/A 8/3/2020    Procedure: LARYNGOSCOPY WITH BIOPSY, TRACHEOSTOMY, NG TUBE PLACEMENT;  Surgeon: Caron Wang MD;  Location: [facility] OR     ODONTECTOMY N/A 8/25/2020    Procedure: DENTAL EXTRACTION OF TEETH x 13;  Surgeon: Caron Wang MD;  Location: [facility]     TRACHEOSTOMY N/A 8/3/2020    Procedure: TRACHEOSTOMY;  Surgeon: Caron Wang MD;  Location: [facility] OR       FAMILY HISTORY:    Family History   Problem Relation Age of Onset     Anesthesia Reaction No family hx of      Deep Vein Thrombosis (DVT) No family hx of        REVIEW OF SYSTEMS:  12 point ROS was negative other than the symptoms noted above in the HPI.  Patient Supplied Answers to Review of Systems   ENT ROS 9/4/2020   Constitutional -   Neurology Dizzy spells   Ears, Nose, Throat Ringing/noise in ears   Cardiopulmonary -   Gastrointestinal/Genitourinary -         PHYSICAL EXAMINATION:   Pulse 64   Temp 96.6  F (35.9  C) (Temporal)   Wt 78.5 kg (173 lb)   SpO2 99%   BMI 25.55 kg/m     Appearance:   normal; NAD, age-appropriate appearance, well-developed, normal habitus   Communication:   aphonic, communicates with writing and mouthing words   Head/Face:   inspection -  Normal; no scars or visible lesions   Salivary glands -  Normal size, no tenderness, swelling, or palpable masses   Facial strength -  Normal and symmetric    Skin:  normal, no rash   Ears:  auricle (AD)  -  normal  EAC (AD) -  normal  TM (AD) -  Normal, no effusion  auricle (AS) -  normal  EAC (AS) - cerumen  TM (AS) -  Unable to visualize  Normal clinical speech reception   Nose:  Ext. inspection -  Normal  Internal Inspection -  Normal mucosa, septum, and turbinates   Nasopharynx:  normal mucosa, no masses   Oral Cavity:  lips -  Normal mucosa, oral competence, and stoma size   Edentulous, healthy gingival mucosa   Hard palate, buccal, floor of mouth mucosa normal   Tongue - normal movement, no lesions   Oropharynx:  mucosa -  Normal, no lesions  soft palate -  Normal, no lesions, no asymmetry, normal elevation   Neoharynx:  Normal mucosa, no masses  neovallecula clear  Shelf at site of previous epiglottis   Neck: Well healed apron neck incision  No palpable masses  Stoma patent  Normal range of motion   Lymphatic:  no abnormal nodes   Cardiovascular:  warm, pink, well-perfused extremities without swelling, tenderness, or edema   Respiratory:  Normal respiratory effort, no stridor   Neuro/Psych.:  mood/affect -  normal  mental status -  normal       PROCEDURES:   Flexible fiberoptic laryngoscopy: Scope exam was indicated due to laryngeal cancer. Verbal consent was obtained. The nasal cavity was prepped with an aerosolized solution of topical anesthetic and vasoconstrictive agent. The scope was passed through the anterior nasal cavity and advanced. Inspection of the nasopharynx revealed no gross abnormality. The base of tongue and neovallecula are normal. There is a shelf at the site of the previous epiglottis. The neopharynx is clear with healthy appearing mucosa and no masses.  Procedure tolerated well with no immediate complications noted.    Flexible tracheoscopy: The flexible scope was passed through the stoma and down to the primary bronchi. The right mainstem bronchus has clear secretions. The airway was patent. Patient tolerated the procedure well with no immediate complications.       RESULTS REVIEWED:   I  independently reviewed the PET scan images which show postoperative changes from total laryngectomy and neck dissection, no evidence of recurrent disease in the neopharynx, stoma, neck, or in the chest      IMPRESSION AND PLAN:   Evin Sterling is a 70-year-old man with a T4aN0 SCC of the larynx. He underwent a total laryngectomy with bilateral neck dissections on 8/25/2020. He was recommended for postoperative radiation but decided against treatment.    He is overall doing well. He has expanded his oral intake and has not used his PEG for 2 weeks. We will request PEG removal with the thoracic team for another 2 weeks.     He was seen by SLP today. He did not have his electrolarynx today but will see SLP in follow-up to work on this in the coming weeks.     He had his 3 month posttreatment PET scan. On initial review there is no evidence of recurrent disease. We will contact his daughter with the final results.     He did have a hemithyroidectomy performed with his laryngectomy. We will get a TSH as a baseline and will need to have rechecks periodically. He has a lab visit today after the appointment.     I will see him back in 2 months.      Thank you very much for the opportunity to participate in the care of your patient.      Caron Wang MD, M.D.  Otolaryngology- Head & Neck Surgery      This note was dictated with voice recognition software and then edited. Please excuse any unintentional errors.         CC:  Kyaw Blankenship MD  [address] [address]      Vineet Gonzales MD  Department of Radiation Oncology  [facility]

## 2020-11-18 NOTE — NURSING NOTE
Chief Complaint   Patient presents with     RECHECK     Follow up with PET scan prior       Pulse 64, temperature 96.6  F (35.9  C), temperature source Temporal, weight 78.5 kg (173 lb), SpO2 99 %.    Annalee Chambers, EMT

## 2020-11-19 ENCOUNTER — PATIENT OUTREACH (OUTPATIENT)
Dept: OTOLARYNGOLOGY | Facility: CLINIC | Age: 70
End: 2020-11-19

## 2020-11-19 NOTE — PROGRESS NOTES
Called patient's daughter Jesica with the following PET scan results:    IMPRESSION: In this patient with a history of squamous cell carcinoma  of the larynx:  1. No evidence of metastatic disease in the chest, abdomen or pelvis.  2. Subsegmental atelectasis in the right lower lobe.  3. Cardiomegaly.  4. Please see dedicated neck PET/CT report.    IMPRESSION:  Status post total laryngectomy. No evidence of new FDG avid lesion to  suggest residual or recurrent tumor.   Please refer to the whole body PET CT performed as a separate report,  for the findings of the remainder of the body.    Also reviewed TSH results with Jesica:     Ref Range & Units 1d ago 2mo ago     TSH 0.40 - 4.00 mU/L 4.87High   0.91      Ref Range & Units 1d ago      T4 Free 0.76 - 1.46 ng/dL 0.90          Reviewed with Jesica that the PET scan looks good. There is no concerns for residual or recurrent cancer at this time. He will continue with plan to follow-up with Dr. Wang in January.     We will repeat TSH in 6 months.     Patient's daughter will receive a call to arrange Peg removal and once this is scheduled she will return call to writer to arrange speech appointment for same day. Daughter was in agreement with plan and was encouraged to call with further questions or concerns.     Felisha Cronin, RN, BSN

## 2020-12-01 ENCOUNTER — THERAPY VISIT (OUTPATIENT)
Dept: SPEECH THERAPY | Facility: CLINIC | Age: 70
End: 2020-12-01
Payer: MEDICARE

## 2020-12-01 DIAGNOSIS — R49.1 APHONIA: Primary | ICD-10-CM

## 2020-12-01 DIAGNOSIS — C32.9 LARYNGEAL CANCER (H): ICD-10-CM

## 2020-12-01 PROCEDURE — 92507 TX SP LANG VOICE COMM INDIV: CPT | Mod: GN | Performed by: SPEECH-LANGUAGE PATHOLOGIST

## 2020-12-01 PROCEDURE — 92522 EVALUATE SPEECH PRODUCTION: CPT | Mod: GN | Performed by: SPEECH-LANGUAGE PATHOLOGIST

## 2020-12-02 NOTE — PROGRESS NOTES
"                                                                                 Rehabilitation Services          OUTPATIENT SPEECH LANGUAGE PATHOLOGY ALARYNGEALCOMMUNICATION EVALUATION  PLAN OF TREATMENT FOR OUTPATIENT REHABILITATION  (COMPLETE FOR INITIAL CLAIMS ONLY)  Patient's Last Name, First Name, M.I.  YOB: 1950  SterlingEvin monae  DARRON                        Provider s Name: SUE Gallegos Medical Record No.  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 Goal Identifier: Pulmonary Rehab       Goal Description: 1. Pt will demonstrate understanding and use for all pulmonary rehab options (i.e.baseplates, HME's, larytube) for maintaining good pulmonary function post laryngectomy 100% of the time independently.       Target Date: 03/01/21   2. Goal Identifier: EL Placement       Goal Description: 2. Pt jorge demonstrate adequate neck placement in the \"sweet spot\" independently in 4/5 opportunities.       Target Date: 03/01/21   3. Goal Identifier: EL Intelligiblity       Goal Description: 3. Pt will use electrolarynx in conversation with ~90% intelligibility as judged by a trained listener 80% of the time.        Target Date: 03/01/21      Frequency / Duration Of Treatment: 2x/month for 12 months    SUE Gallegos         I CERTIFY THE NEED FOR THESE SERVICES FURNISHED UNDER        THIS PLAN OF TREATMENT AND WHILE UNDER MY CARE     (Physician attestation of this document indicates review and certification of the therapy plan).                  Certification Date From:  12/01/20   Certification Date To:  03/01/21           "   Referring Physician: Dr. Caron Wang    Initial Assessment        See Epic Evaluation Start of Care Date: 12/01/20

## 2020-12-02 NOTE — PROGRESS NOTES
Speech-Language Pathology Department   EV[facility]ATION  [facility]  Alaryngeal Communication Evaluation    12/01/20 1500   General Patient Information   Start of Care Date 12/01/20   Referring Physician Dr. Caron Wang   Orders Eval and Treat   Orders Comment Alaryngel Communication Evaluation   Orders Date 12/01/20   Medical Diagnosis Aphonia s/p total laryngectomy   Onset of illness/injury or Date of Surgery 08/25/20  (date of TL)   Precautions/Limitations no known precautions/limitations   Surgical/Medical history reviewed Yes   Pertinent history of current problem Evin Sterling is a 70 year old male with a PMH significant for a T4aN0 SCC larynx s/p total laryngectomy, left hemithyroidectomy, bilateral neck dissections and dental extractions on 8/25/20. He was recommended to undergo postoperative radiation, but decided against this. He has a PEG. He was started on an oral diet in October 2020. He is seen today for alaryngeal communication evaluation to begin electrolarynx instruction. He is accompanied by his daughter, Jesica.    Hearing WNL for conversational level of speech   Vision WNL   Dentition Edentulous   Present Hydration/Nutrition Method Oral diet   Diet Regular moist solids and thin liquids   Occupation Retired   Living Status Independent   Primary Communication Method Electrolarynx;Written expression   Electrolarynx   Model [place]   Placement Neck  (right )   Speech Intelligibility 80-90% to a trained listener   Factors Impacting Speech Intelligibility Poor Use Of Button Onset/offset;Improper Phrasing;Rapid Rate Of Speech;Distracters (stoma Noise, Facial Grimace, Neck Movements, Exertion)   Electrolarynx Comments Pt trained on electrolarynx and importance of articulation, phrasing and on/off timing. Pt's sweet spot is on the right neck just under the mandible slightly right of center. Pt demonstrates occasional stoma blast and inconsistent use of on/off  "button inappropriately. With cues for phrasing, pt demonstrates excellent pausing/phrasing.    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    Frequency / Duration Of Treatment 2x/month for 12 months   Prognosis Good With Intervention   Clinical Impressions   Clinical Impressions Evin Sterling presents today with his daughter, Jesica for electrolarynx training. He demonstrates excellent progress during today's session. Trained pt on use of electrolarynx including anatomy/physiology of use, cleaning and care. Pt trained on importance of articulation, phrasing and on/off timing. Pt's sweet spot is on the right neck just under the mandible slightly right of center. Pt demonstrates occasional stoma blast and inconsistent use of on/off button inappropriately. With cues for phrasing, pt demonstrates excellent pausing/phrasing. Within conversation pt is judged to be ~80-85% intelligible to a trained listener when topic is known. Intelligibility drops to ~70-75% with unknown topic. Trained pt to continue practicing daily with electrolarynx. Trained pt and his daughter on other communication strategies when conversing with friends/family such as limiting environmental noise and communication face to face. Pt will benefit from ongoing SLP services to continue training electrolarynx intelligibility and facilitating independence with placement and strategies, as well as to continue pulmonary rehab. Pt will benefit from daily use of larytube, HMEs, laryclips, etc for pulmonary rehab.    Alaryngeal Goals   Alaryngeal Goals 1;2;3   Alaryngeal Goal 1   Goal " "Identifier Pulmonary Rehab   Goal Description 1. Pt will demonstrate understanding and use for all pulmonary rehab options (i.e.baseplates, HME's, larytube) for maintaining good pulmonary function post laryngectomy 100% of the time independently.   Target Date 03/01/21   Alaryngeal Goal 2   Goal Identifier EL Placement   Goal Description 2. Pt jorge demonstrate adequate neck placement in the \"sweet spot\" independently in 4/5 opportunities.   Target Date 03/01/21   Alaryngeal Goal 3   Goal Identifier EL Intelligiblity   Goal Description 3. Pt will use electrolarynx in conversation with ~90% intelligibility as judged by a trained listener 80% of the time.    Target Date 03/01/21   General Therapy Interventions   Planned Therapy Interventions Communication   Communication Electrolarynx instruction;Improve speech intelligibility   Equipment   Equipment [place] electrolarynx   Plan   Home program Continue EL practice; pulmonary rehab   Education   Learner Patient;Family   Readiness Acceptance   Method Booklet/handout;Explanation;Demonstration   Response Verbalizes understanding;Demonstrates understanding   Prescriptions Heat/moisture Exchanger (hme);Other  (laryclips, larytube)   Therapy Certification   Certification date from 12/01/20   Certification date to 03/01/21   Medical Diagnosis Aphonia s/p total laryngectomy   Certification I certify the need for these services furnished under this plan of treatment and while under my care.  (Physician co-signature of this document indicates review and certification of the therapy plan).   Total Evaluation Time   Sound production (artic, phonology, apraxia, dysarthria) Minutes (77787) 30   Total Evaluation Time 30     Thank you for the referral of Evin Sterling. If you have any questions about this report, please contact me using the information below.     ANNETTE Gallegos. (music), MA, CCC-SLP   Speech Language Pathologist  [facility] Trained Vocologist   [facility]  " [facility]  Dept. of Otolaryngology  Department of Rehabilitation Services  [address] [place] [id number]  Email: [email or web address]  [email or web address]   Pronouns: she/her/hers

## 2020-12-17 ENCOUNTER — OFFICE VISIT (OUTPATIENT)
Dept: SURGERY | Facility: CLINIC | Age: 70
End: 2020-12-17
Attending: STUDENT IN AN ORGANIZED HEALTH CARE EDUCATION/TRAINING PROGRAM
Payer: MEDICARE

## 2020-12-17 VITALS
BODY MASS INDEX: 24.9 KG/M2 | RESPIRATION RATE: 18 BRPM | DIASTOLIC BLOOD PRESSURE: 97 MMHG | HEART RATE: 77 BPM | HEIGHT: 70 IN | SYSTOLIC BLOOD PRESSURE: 153 MMHG | OXYGEN SATURATION: 98 % | WEIGHT: 173.9 LBS | TEMPERATURE: 98.7 F

## 2020-12-17 DIAGNOSIS — Z98.890 POST-OPERATIVE STATE: Primary | ICD-10-CM

## 2020-12-17 PROCEDURE — G0463 HOSPITAL OUTPT CLINIC VISIT: HCPCS

## 2020-12-17 PROCEDURE — 99214 OFFICE O/P EST MOD 30 MIN: CPT | Performed by: STUDENT IN AN ORGANIZED HEALTH CARE EDUCATION/TRAINING PROGRAM

## 2020-12-17 RX ORDER — SPIRONOLACTONE 25 MG/1
12.5 TABLET ORAL DAILY
COMMUNITY
Start: 2020-11-29

## 2020-12-17 ASSESSMENT — MIFFLIN-ST. JEOR: SCORE: 1565.06

## 2020-12-17 ASSESSMENT — PAIN SCALES - GENERAL: PAINLEVEL: NO PAIN (0)

## 2020-12-17 NOTE — LETTER
12/17/2020         RE: Evin Sterling  [address] [facility] [address]        Dear Colleague,    Thank you for referring your patient, Evin Sterling, to the [facility]. Please see a copy of my visit note below.      See above    THORACIC SURGERY FOLLOW UP VISIT    Dear Dr. Bui,  I saw Mr. Sterling in follow-up today. The clinical summary follows:     PREOP DIAGNOSIS   T4aN0 SCC larynx     PROCEDURE   total laryngectomy, left hemithyroidectomy, bilateral neck dissections, cricopharyngeal myotomy, dental extractions by ENT   PEG placement by thoracic surgery,     DATE OF PROCEDURE  8/25/2020    COMPLICATIONS  None    INTERVAL STUDIES  None  ETOH none  TOB former smoker 1ppd 20y, quit 18y ago  BMI 25.25  General: no acute distress, communicates through writing  Resp: nonlabored respirations on room air  Abdomen: soft, nontender, PEG without drainage or erythema around the skin  Extremities: warm and well perfused     SUBJECTIVE     He  has not required tube feeds in 4 weeks but continues to flush the tube twice a day. He eats three meals a day with protein supplements. He has gained 5 pounds in the last 2 months. He is very active throughout the day. Denies any purulent drainage or pain at the PEG site. He would like the PEG removed in clinic today.     IMPRESSION   70 year-old man with history of T4aN0 SCC of the larynx with PEG placemennt, here for PEG tube removal    PLAN  I spent a total of 30 minutes with Mr. Evin Sterling, more than 50% of which were spent in counseling, coordination of care, and face-to-face time. I reviewed the plan as follows:  - PEG tube removed in clinic. Gauze placed over wound. Instructed patient to change as needed.     All questions were answered and the patient and present family were in agreement with the plan.  I appreciate the opportunity to participate in the care of your patient and will keep you updated.  Sincerely,    Twila Brown,  MD

## 2020-12-17 NOTE — PROGRESS NOTES
THORACIC SURGERY FOLLOW UP VISIT    Dear Dr. Bui,  I saw Mr. Sterling in follow-up today. The clinical summary follows:     PREOP DIAGNOSIS   T4aN0 SCC larynx     PROCEDURE   total laryngectomy, left hemithyroidectomy, bilateral neck dissections, cricopharyngeal myotomy, dental extractions by ENT   PEG placement by thoracic surgery,     DATE OF PROCEDURE  8/25/2020    COMPLICATIONS  None    INTERVAL STUDIES  None  ETOH none  TOB former smoker 1ppd 20y, quit 18y ago  BMI 25.25  General: no acute distress, communicates through writing  Resp: nonlabored respirations on room air  Abdomen: soft, nontender, PEG without drainage or erythema around the skin  Extremities: warm and well perfused     SUBJECTIVE     He  has not required tube feeds in 4 weeks but continues to flush the tube twice a day. He eats three meals a day with protein supplements. He has gained 5 pounds in the last 2 months. He is very active throughout the day. Denies any purulent drainage or pain at the PEG site. He would like the PEG removed in clinic today.     IMPRESSION   70 year-old man with history of T4aN0 SCC of the larynx with PEG placemennt, here for PEG tube removal    PLAN  I spent a total of 30 minutes with Mr. Evin Sterling, more than 50% of which were spent in counseling, coordination of care, and face-to-face time. I reviewed the plan as follows:  - PEG tube removed in clinic. Gauze placed over wound. Instructed patient to change as needed.     All questions were answered and the patient and present family were in agreement with the plan.  I appreciate the opportunity to participate in the care of your patient and will keep you updated.  Sincerely,

## 2020-12-17 NOTE — NURSING NOTE
"Oncology Rooming Note    December 17, 2020 8:44 AM   Evin Sterling is a 70 year old male who presents for:    Chief Complaint   Patient presents with     Oncology Clinic Visit     PEG removal     Initial Vitals: BP (!) 153/97   Pulse 77   Temp 98.7  F (37.1  C) (Oral)   Resp 18   Ht 1.778 m (5' 10\")   SpO2 98%   BMI 24.82 kg/m   Estimated body mass index is 24.82 kg/m  as calculated from the following:    Height as of this encounter: 1.778 m (5' 10\").    Weight as of 11/18/20: 78.5 kg (173 lb). Body surface area is 1.97 meters squared.  No Pain (0) Comment: Data Unavailable   No LMP for male patient.  Allergies reviewed: Yes  Medications reviewed: Yes    Medications: Medication refills not needed today.  Pharmacy name entered into EPIC:    [id number] - [place], [place] - [address] [id number] - [place], [place] [facility] [address] [facility] [address] [id number] - [place], [place] - [address] AT [facility] OF [place] [address] & [facility]    Clinical concerns: No new concerns today.       Brigida Khan MA            "

## 2021-02-03 ENCOUNTER — THERAPY VISIT (OUTPATIENT)
Dept: SPEECH THERAPY | Facility: CLINIC | Age: 71
End: 2021-02-03
Payer: MEDICARE

## 2021-02-03 ENCOUNTER — OFFICE VISIT (OUTPATIENT)
Dept: OTOLARYNGOLOGY | Facility: CLINIC | Age: 71
End: 2021-02-03
Payer: MEDICARE

## 2021-02-03 VITALS
HEART RATE: 73 BPM | BODY MASS INDEX: 27.43 KG/M2 | HEIGHT: 69 IN | OXYGEN SATURATION: 99 % | TEMPERATURE: 96.8 F | WEIGHT: 185.19 LBS

## 2021-02-03 DIAGNOSIS — C32.9 LARYNGEAL CANCER (H): ICD-10-CM

## 2021-02-03 DIAGNOSIS — R13.12 OROPHARYNGEAL DYSPHAGIA: ICD-10-CM

## 2021-02-03 DIAGNOSIS — R49.1 APHONIA: Primary | ICD-10-CM

## 2021-02-03 DIAGNOSIS — C32.9 LARYNGEAL CANCER (H): Primary | ICD-10-CM

## 2021-02-03 PROCEDURE — 99213 OFFICE O/P EST LOW 20 MIN: CPT | Mod: 25 | Performed by: OTOLARYNGOLOGY

## 2021-02-03 PROCEDURE — 31575 DIAGNOSTIC LARYNGOSCOPY: CPT | Mod: 51 | Performed by: OTOLARYNGOLOGY

## 2021-02-03 PROCEDURE — 92507 TX SP LANG VOICE COMM INDIV: CPT | Mod: GN | Performed by: SPEECH-LANGUAGE PATHOLOGIST

## 2021-02-03 PROCEDURE — 31615 TRCHEOBRNCHSC EST TRACHS INC: CPT | Performed by: OTOLARYNGOLOGY

## 2021-02-03 ASSESSMENT — MIFFLIN-ST. JEOR: SCORE: 1600.38

## 2021-02-03 ASSESSMENT — PAIN SCALES - GENERAL: PAINLEVEL: NO PAIN (0)

## 2021-02-03 NOTE — LETTER
2/3/2021       RE: Evin Sterling  [address] [facility] [address]     Dear Colleague,    Thank you for referring your patient, Evin Sterling, to the [facility] [place] at [facility]. Please see a copy of my visit note below.    Dear Dr. Blankenship:    I had the pleasure of seeing Evin Sterling in follow-up today at the [facility].     History of Present Illness:   Evin Sterling is a 70 year old man with a T4aN0 SCC of the larynx. The patient has about a 4 year history of progressive hoarseness. He was potentially seen by an ENT locally in [facility] and diagnosed with reflux. He had insurance issues and was not able to afford the medication. He had progressive voice changes. He saw Dr Blankenship on 7/28/2020 for consultation at which time he had biphasic stridor and on scope exam was noted to have a large exophytic mass of the glottis without mobility of either cord and a glottic opening of about 5 mm.     He was taken to the OR on 8/3/2020 for an awake trach with DL. Biopsies were consistent with SCC. He had a PET scan which showed the tumor in the larynx with thyroid cartilage erosion but no obvious mariel disease. The patient demonstrated poor swallow function with aspiration on swallow study. Given these findings he was recommended surgery followed by adjuvant radiation.    He was taken to the OR on 8/25/2020 for a DL, total laryngectomy, left hemithyroidectomy, bilateral neck dissections, cricopharyngeal myotomy, dental extractions.. He had PEG tube placement performed by thoracic surgery at the time of surgery. His postoperative course was uncomplicated. His final pathology demonstrated a 2.5 cm invasive moderately differentiated SCC, 2.5 cm involving the right and left cords, invading the thyroid cartilage and focally extending to the soft tissues, benign thyroid, angiolymphatic invasion present, no PNI, negative  margins, 0/86 lymph nodes.  His case was reviewed at tumor conference with recommendation for postoperative radiation. The patient decided against radiation. He was started on an oral diet in October 2020.  He had a negative posttreatment PET scan in November 2020.      Interval history:  He comes in today for follow-up. He was last seen in clinic in November 2020.  At that time his PET was negative.  He had his TSH checked at that time which was slightly elevated, with a normal T4.  He had his PEG removed by thoracic surgery in December 2020. He says that he is doing overall well. He is not using his electrolarynx, says that he struggles without having teeth, drools when he talks. He says that he is leaving independently and has no one that he really talks to regularly. He says his swallowing ok, does use water to get things down. He denies any nasal regurgitation. He says his weight is going up, up about 5 lbs. He says that he is breathing without issues, takes careful care of his stoma. He says his alisa tube moves around quite a bit. He has no bleeding. He has no pain or masses. He feels like he is getting stronger. He feels his energy is ok. His daughter was diagnosed with ovarian cancer, getting chemotherapy at the [facility].    He is accompanied by his daughter who helps provide history.        MEDICATIONS:     Current Outpatient Medications   Medication Sig Dispense Refill     metoprolol tartrate (LOPRESSOR) 25 MG tablet 0.5 tablets (12.5 mg) by Per G Tube route 2 times daily 30 tablet 0     multivitamins w/minerals (CERTAVITE) liquid 15 mLs by Per Feeding Tube route daily 236 mL 1     ondansetron (ZOFRAN) 4 MG tablet Take 4 mg by mouth every 8 hours as needed for nausea       order for DME Equipment being ordered: Tracheostomy supplies    0.9% sodium chloride 1000 mL bottles  Box split 4x4 gauze sponges  Trach kits with brushes  Velcro trach ties  3 cc 0.9% sodium chloride lavages for trach  suctioning  Large gloves  Cool mist humidity via trach dome  6-0 Shiley disposable inner cannulas    Diagnosis: laryngeal cancer 3 Month 1     order for DME Equipment being ordered:   Portable suction machine   14 French suction catheters  [place]ur suction tips    Diagnosis: laryngeal cancer 3 Month 1     order for DME Equipment being ordered: Nasogastric bolus tube feeding supplies  Formula: Isosource 1.5, 6 cans per day  Gravity feeding bags  60 mL syringes    Treatment Diagnosis: laryngeal cancer 3 Month 1     spironolactone (ALDACTONE) 25 MG tablet Take 12.5 mg by mouth daily       acetaminophen (TYLENOL) 32 mg/mL liquid 20.3 mLs (650 mg) by Per Feeding Tube route every 6 hours (Patient not taking: Reported on 12/17/2020) 473 mL 1     acetaminophen (TYLENOL) 32 mg/mL liquid 20.3 mLs (650 mg) by Per Feeding Tube route every 4 hours as needed for pain (Patient not taking: Reported on 12/17/2020) 473 mL 1     calcitRIOL (ROCALTROL) 1 MCG/ML solution 0.25 mLs (0.25 mcg) by Per G Tube route 2 times daily for 6 days 3 mL 0     calcitRIOL (ROCALTROL) 1 MCG/ML solution 0.25 mLs (0.25 mcg) by Per G Tube route daily for 7 days 1.75 mL 0     carboxymethylcellulose PF (REFRESH PLUS) 0.5 % ophthalmic solution Place 1 drop into both eyes 4 times daily as needed for dry eyes (Patient not taking: Reported on 12/17/2020) 1 Bottle 3     doxazosin (CARDURA) 1 MG tablet 1 tablet (1 mg) by Per G Tube route daily for 14 days 14 tablet 0     glycopyrrolate (ROBINUL) 1 MG tablet 1 tablet (1 mg) by Per Feeding Tube route 2 times daily (Patient not taking: Reported on 12/17/2020) 30 tablet 1     lisinopril (ZESTRIL) 10 MG tablet 1 tablet (10 mg) by Per G Tube route daily for 14 days 14 tablet 0     mineral oil-hydrophilic petrolatum (AQUAPHOR) external ointment Apply topically every 8 hours (Patient not taking: Reported on 12/17/2020) 50 g 3     ondansetron (ZOFRAN) 4 MG tablet 1 tablet (4 mg) by Per G Tube route every 6 hours as needed  for nausea (Patient not taking: Reported on 2020) 30 tablet 0     oxyCODONE (ROXICODONE) 5 MG/5ML solution 5-10 mLs (5-10 mg) by Per Feeding Tube route every 4 hours as needed for moderate to severe pain (Patient not taking: Reported on 2020) 420 mL 0     polyethylene glycol (MIRALAX) 17 g packet Take 17 g by mouth 2 times daily as needed for constipation (Patient not taking: Reported on 2020) 14 packet 0     senna-docusate (SENOKOT-S/PERICOLACE) 8.6-50 MG tablet 1 tablet by Per G Tube route 2 times daily (Patient not taking: Reported on 2020) 28 tablet 0     sennosides (SENOKOT) 8.8 MG/5ML syrup 5 mLs by Per Feeding Tube route nightly as needed for constipation (Patient not taking: Reported on 2020) 236 mL 0       ALLERGIES:  No Known Allergies    HABITS/SOCIAL HISTORY:   Previous alcoholic. Former smoker, quit 12 years ago, previously 1/2 ppd.  Some chewing tobacco use, quit about 30 years ago.    Lives with his son-in-law's father.    Previously worked in construction.    Social History     Socioeconomic History     Marital status:      Spouse name: Not on file     Number of children: Not on file     Years of education: Not on file     Highest education level: Not on file   Occupational History     Not on file   Social Needs     Financial resource strain: Not on file     Food insecurity     Worry: Not on file     Inability: Not on file     Transportation needs     Medical: Not on file     Non-medical: Not on file   Tobacco Use     Smoking status: Former Smoker     Packs/day: 1.00     Years: 20.00     Pack years: 20.00     Quit date:      Years since quittin.1     Smokeless tobacco: Former User   Substance and Sexual Activity     Alcohol use: Not Currently     Drug use: Not Currently     Sexual activity: Not on file   Lifestyle     Physical activity     Days per week: Not on file     Minutes per session: Not on file     Stress: Not on file   Relationships     Social  connections     Talks on phone: Not on file     Gets together: Not on file     Attends [employer] service: Not on file     Active member of club or organization: Not on file     Attends meetings of clubs or organizations: Not on file     Relationship status: Not on file     Intimate partner violence     Fear of current or ex partner: Not on file     Emotionally abused: Not on file     Physically abused: Not on file     Forced sexual activity: Not on file   Other Topics Concern     Not on file   Social History Narrative     Not on file       PAST MEDICAL HISTORY:   Past Medical History:   Diagnosis Date     CHF (congestive heart failure) (H)      GERD (gastroesophageal reflux disease)      Hypertension         PAST SURGICAL HISTORY:   Past Surgical History:   Procedure Laterality Date     DISSECTION RADICAL NECK BILATERAL Bilateral 8/25/2020    Procedure: Bilateral neck dissection;  Surgeon: Caron Wang MD;  Location: [facility] OR     ENDOSCOPIC INSERTION TUBE GASTROSTOMY N/A 8/25/2020    Procedure: INSERTION, PEG TUBE;  Surgeon: Reuben Hastings MD;  Location: [facility] OR     HERNIA REPAIR, INGUINAL RT/LT       HERNIA REPAIR, UMBILICAL       LARYNGECTOMY N/A 8/25/2020    Procedure: TOTAL LARYNGECTOMY WITH LEFT HEMITHYROIDECTOMY;  Surgeon: Caron Wang MD;  Location: [facility] OR     LARYNGOSCOPY N/A 8/25/2020    Procedure: DIRECT LARYNGOSCOPY;  Surgeon: Caron Wang MD;  Location: [facility] OR     LARYNGOSCOPY WITH BIOPSY(IES) N/A 8/3/2020    Procedure: LARYNGOSCOPY WITH BIOPSY, TRACHEOSTOMY, NG TUBE PLACEMENT;  Surgeon: Caron Wang MD;  Location: [facility] OR     ODONTECTOMY N/A 8/25/2020    Procedure: DENTAL EXTRACTION OF TEETH x 13;  Surgeon: Caron Wang MD;  Location: [facility]     TRACHEOSTOMY N/A 8/3/2020    Procedure: TRACHEOSTOMY;  Surgeon: Caron Wang MD;  Location: [facility] OR       FAMILY HISTORY:    Family History   Problem Relation Age of Onset     Anesthesia Reaction No family hx of      Deep Vein  "Thrombosis (DVT) No family hx of        REVIEW OF SYSTEMS:  12 point ROS was negative other than the symptoms noted above in the HPI.  Patient Supplied Answers to Review of Systems  [facility] ENT ROS 9/4/2020   Constitutional -   Neurology Dizzy spells   Ears, Nose, Throat Ringing/noise in ears   Cardiopulmonary -   Gastrointestinal/Genitourinary -         PHYSICAL EXAMINATION:   Pulse 73   Temp 96.8  F (36  C) (Temporal)   Ht 1.753 m (5' 9\")   Wt 84 kg (185 lb 3 oz)   SpO2 99%   BMI 27.35 kg/m     Appearance:   normal; NAD, age-appropriate appearance, well-developed, normal habitus   Communication:   aphonic, communicates with writing and mouthing words   Head/Face:   inspection -  Normal; no scars or visible lesions   Salivary glands -  Normal size, no tenderness, swelling, or palpable masses   Facial strength -  Normal and symmetric    Skin:  normal, no rash   Ears:  auricle (AD) -  normal  EAC (AD) -  normal  TM (AD) -  Normal, no effusion  auricle (AS) -  normal  EAC (AS) - cerumen  TM (AS) -  Unable to visualize  Normal clinical speech reception   Nose:  Ext. inspection -  Normal  Internal Inspection -  Normal mucosa, septum, and turbinates   Nasopharynx:  normal mucosa, no masses   Oral Cavity:  lips -  Normal mucosa, oral competence, and stoma size   Edentulous, healthy gingival mucosa   Hard palate, buccal, floor of mouth mucosa normal   Tongue - normal movement, no lesions   Oropharynx:  mucosa -  Normal, no lesions  soft palate -  Normal, no lesions, no asymmetry, normal elevation   Neoharynx:  Normal mucosa, no masses  neovallecula clear  Shelf at site of previous epiglottis   Neck: Well healed apron neck incision  No palpable masses  Stoma patent, no crusting, no bleeding  Normal range of motion   Lymphatic:  no abnormal nodes   Cardiovascular:  warm, pink, well-perfused extremities without swelling, tenderness, or edema   Respiratory:  Normal respiratory effort, no stridor   Neuro/Psych.:  mood/affect -  " normal  mental status -  normal       PROCEDURES:   Flexible fiberoptic laryngoscopy: Scope exam was indicated due to laryngeal cancer. Verbal consent was obtained. The nasal cavity was prepped with an aerosolized solution of topical anesthetic and vasoconstrictive agent. The scope was passed through the anterior nasal cavity and advanced. Inspection of the nasopharynx revealed no gross abnormality. The base of tongue and neovallecula are normal. There is a shelf at the site of the previous epiglottis. The neopharynx is clear with healthy appearing mucosa and no masses.  Procedure tolerated well with no immediate complications noted.    Flexible tracheoscopy: The flexible scope was passed through the stoma and down to the primary bronchi. There is no crusting, no dry mucosa, no bleeding. The airway was patent. Patient tolerated the procedure well with no immediate complications.       RESULTS REVIEWED:   I reviewed the note from Dr. Brown      IMPRESSION AND PLAN:   Evin Sterling is a 70-year-old man with a T4aN0 SCC of the larynx. He underwent a total laryngectomy with bilateral neck dissections on 8/25/2020. He was recommended for postoperative radiation but decided against treatment.    He is doing well. I discussed with him that he should try to continue practicing with his electrolarynx, and that the lack of teeth should not be a deterrent.     He had his 3 month post treatment PET scan in November 2020.  He will need repeat CT neck and chest in May 2021.    He did have a hemithyroidectomy performed with his laryngectomy.  He had his TSH checked in November 2020 which was mildly elevated with a normal T4.  We will plan on repeating this when he gets his next set of imaging in May 2021.    I will see him back in 2 months.      Thank you very much for the opportunity to participate in the care of your patient.      Caron Wang MD, M.D.  Otolaryngology- Head & Neck Surgery      This note was dictated with voice  recognition software and then edited. Please excuse any unintentional errors.         CC:  Kyaw Blankenship MD  [address] [address]      Vineet Gonzales MD  Department of Radiation Oncology  [facility]

## 2021-02-03 NOTE — NURSING NOTE
"Chief Complaint   Patient presents with     RECHECK     2 month follow up       Pulse 73, temperature 96.8  F (36  C), temperature source Temporal, height 1.753 m (5' 9\"), weight 84 kg (185 lb 3 oz), SpO2 99 %.    Annalee Chambers, EMT    "

## 2021-04-17 NOTE — PROGRESS NOTES
Dear Dr. Blankenship:    I had the pleasure of seeing Evin Sterling in follow-up today at the [facility].     History of Present Illness:   Evin Sterling is a 70 year old man with a T4aN0 SCC of the larynx. The patient has about a 4 year history of progressive hoarseness. He was potentially seen by an ENT locally in [facility] and diagnosed with reflux. He had insurance issues and was not able to afford the medication. He had progressive voice changes. He saw Dr Blankenship on 7/28/2020 for consultation at which time he had biphasic stridor and on scope exam was noted to have a large exophytic mass of the glottis without mobility of either cord and a glottic opening of about 5 mm.     He was taken to the OR on 8/3/2020 for an awake trach with DL. Biopsies were consistent with SCC. He had a PET scan which showed the tumor in the larynx with thyroid cartilage erosion but no obvious mariel disease. The patient demonstrated poor swallow function with aspiration on swallow study. Given these findings he was recommended surgery followed by adjuvant radiation.    He was taken to the OR on 8/25/2020 for a DL, total laryngectomy, left hemithyroidectomy, bilateral neck dissections, cricopharyngeal myotomy, dental extractions.. He had PEG tube placement performed by thoracic surgery at the time of surgery. His postoperative course was uncomplicated. His final pathology demonstrated a 2.5 cm invasive moderately differentiated SCC, 2.5 cm involving the right and left cords, invading the thyroid cartilage and focally extending to the soft tissues, benign thyroid, angiolymphatic invasion present, no PNI, negative margins, 0/86 lymph nodes.  His case was reviewed at tumor conference with recommendation for postoperative radiation. The patient decided against radiation. He was started on an oral diet in October 2020.  He had a negative posttreatment PET scan in November 2020. He had his PEG removed by thoracic  surgery in December 2020.      Interval history:  He comes in today for follow-up. He was last seen in clinic in February 2021. He says today that things are going well. He has no problems or concerns. He says the eating and drinking are going well. It does take more time to eat than previous. He says sometimes to wait for food and drink to go down. He has no sticking of food. He has no pain with swallowing. He has no sore throat, otalgia. He says his breathing is fine, no shortness of breath. Some days he has more secretions and requires more frequent cleaning of his alisa tube. He has no hemoptysis. His weight is increasing. He says he is very active and his energy is good. He is unsure about the COVID vaccine.     He is accompanied by his daughter who helps provide history.        MEDICATIONS:     Current Outpatient Medications   Medication Sig Dispense Refill     metoprolol tartrate (LOPRESSOR) 25 MG tablet 0.5 tablets (12.5 mg) by Per G Tube route 2 times daily 30 tablet 0     multivitamins w/minerals (CERTAVITE) liquid 15 mLs by Per Feeding Tube route daily 236 mL 1     ondansetron (ZOFRAN) 4 MG tablet Take 4 mg by mouth every 8 hours as needed for nausea       order for DME Equipment being ordered: Tracheostomy supplies    0.9% sodium chloride 1000 mL bottles  Box split 4x4 gauze sponges  Trach kits with brushes  Velcro trach ties  3 cc 0.9% sodium chloride lavages for trach suctioning  Large gloves  Cool mist humidity via trach dome  6-0 Shiley disposable inner cannulas    Diagnosis: laryngeal cancer 3 Month 1     order for DME Equipment being ordered:   Portable suction machine   14 French suction catheters  [place]ur suction tips    Diagnosis: laryngeal cancer 3 Month 1     order for DME Equipment being ordered: Nasogastric bolus tube feeding supplies  Formula: Isosource 1.5, 6 cans per day  Gravity feeding bags  60 mL syringes    Treatment Diagnosis: laryngeal cancer 3 Month 1     spironolactone (ALDACTONE)  25 MG tablet Take 12.5 mg by mouth daily       acetaminophen (TYLENOL) 32 mg/mL liquid 20.3 mLs (650 mg) by Per Feeding Tube route every 6 hours (Patient not taking: Reported on 12/17/2020) 473 mL 1     acetaminophen (TYLENOL) 32 mg/mL liquid 20.3 mLs (650 mg) by Per Feeding Tube route every 4 hours as needed for pain (Patient not taking: Reported on 12/17/2020) 473 mL 1     calcitRIOL (ROCALTROL) 1 MCG/ML solution 0.25 mLs (0.25 mcg) by Per G Tube route 2 times daily for 6 days 3 mL 0     calcitRIOL (ROCALTROL) 1 MCG/ML solution 0.25 mLs (0.25 mcg) by Per G Tube route daily for 7 days 1.75 mL 0     carboxymethylcellulose PF (REFRESH PLUS) 0.5 % ophthalmic solution Place 1 drop into both eyes 4 times daily as needed for dry eyes (Patient not taking: Reported on 12/17/2020) 1 Bottle 3     doxazosin (CARDURA) 1 MG tablet 1 tablet (1 mg) by Per G Tube route daily for 14 days 14 tablet 0     glycopyrrolate (ROBINUL) 1 MG tablet 1 tablet (1 mg) by Per Feeding Tube route 2 times daily (Patient not taking: Reported on 12/17/2020) 30 tablet 1     lisinopril (ZESTRIL) 10 MG tablet 1 tablet (10 mg) by Per G Tube route daily for 14 days 14 tablet 0     mineral oil-hydrophilic petrolatum (AQUAPHOR) external ointment Apply topically every 8 hours (Patient not taking: Reported on 12/17/2020) 50 g 3     ondansetron (ZOFRAN) 4 MG tablet 1 tablet (4 mg) by Per G Tube route every 6 hours as needed for nausea (Patient not taking: Reported on 12/17/2020) 30 tablet 0     oxyCODONE (ROXICODONE) 5 MG/5ML solution 5-10 mLs (5-10 mg) by Per Feeding Tube route every 4 hours as needed for moderate to severe pain (Patient not taking: Reported on 12/17/2020) 420 mL 0     polyethylene glycol (MIRALAX) 17 g packet Take 17 g by mouth 2 times daily as needed for constipation (Patient not taking: Reported on 12/17/2020) 14 packet 0     senna-docusate (SENOKOT-S/PERICOLACE) 8.6-50 MG tablet 1 tablet by Per G Tube route 2 times daily (Patient not  taking: Reported on 2020) 28 tablet 0     sennosides (SENOKOT) 8.8 MG/5ML syrup 5 mLs by Per Feeding Tube route nightly as needed for constipation (Patient not taking: Reported on 2020) 236 mL 0       ALLERGIES:  No Known Allergies    HABITS/SOCIAL HISTORY:   Previous alcoholic. Former smoker, quit 12 years ago, previously 1/2 ppd.  Some chewing tobacco use, quit about 30 years ago.    Lives with his son-in-law's father.    Previously worked in construction.    Social History     Socioeconomic History     Marital status:      Spouse name: Not on file     Number of children: Not on file     Years of education: Not on file     Highest education level: Not on file   Occupational History     Not on file   Social Needs     Financial resource strain: Not on file     Food insecurity     Worry: Not on file     Inability: Not on file     Transportation needs     Medical: Not on file     Non-medical: Not on file   Tobacco Use     Smoking status: Former Smoker     Packs/day: 1.00     Years: 20.00     Pack years: 20.00     Quit date:      Years since quittin.3     Smokeless tobacco: Former User   Substance and Sexual Activity     Alcohol use: Not Currently     Drug use: Not Currently     Sexual activity: Not on file   Lifestyle     Physical activity     Days per week: Not on file     Minutes per session: Not on file     Stress: Not on file   Relationships     Social connections     Talks on phone: Not on file     Gets together: Not on file     Attends Tenriism service: Not on file     Active member of club or organization: Not on file     Attends meetings of clubs or organizations: Not on file     Relationship status: Not on file     Intimate partner violence     Fear of current or ex partner: Not on file     Emotionally abused: Not on file     Physically abused: Not on file     Forced sexual activity: Not on file   Other Topics Concern     Not on file   Social History Narrative     Not on file  "      PAST MEDICAL HISTORY:   Past Medical History:   Diagnosis Date     CHF (congestive heart failure) (H)      GERD (gastroesophageal reflux disease)      Hypertension         PAST SURGICAL HISTORY:   Past Surgical History:   Procedure Laterality Date     DISSECTION RADICAL NECK BILATERAL Bilateral 8/25/2020    Procedure: Bilateral neck dissection;  Surgeon: Caron Wang MD;  Location: [facility] OR     ENDOSCOPIC INSERTION TUBE GASTROSTOMY N/A 8/25/2020    Procedure: INSERTION, PEG TUBE;  Surgeon: Reuben Hastings MD;  Location: [facility] OR     HERNIA REPAIR, INGUINAL RT/LT       HERNIA REPAIR, UMBILICAL       LARYNGECTOMY N/A 8/25/2020    Procedure: TOTAL LARYNGECTOMY WITH LEFT HEMITHYROIDECTOMY;  Surgeon: Caron Wang MD;  Location: [facility] OR     LARYNGOSCOPY N/A 8/25/2020    Procedure: DIRECT LARYNGOSCOPY;  Surgeon: Caron Wang MD;  Location: [facility] OR     LARYNGOSCOPY WITH BIOPSY(IES) N/A 8/3/2020    Procedure: LARYNGOSCOPY WITH BIOPSY, TRACHEOSTOMY, NG TUBE PLACEMENT;  Surgeon: Caron Wang MD;  Location: [facility] OR     ODONTECTOMY N/A 8/25/2020    Procedure: DENTAL EXTRACTION OF TEETH x 13;  Surgeon: Caron Wang MD;  Location: [facility] OR     TRACHEOSTOMY N/A 8/3/2020    Procedure: TRACHEOSTOMY;  Surgeon: Caron Wang MD;  Location: [facility] OR       FAMILY HISTORY:    Family History   Problem Relation Age of Onset     Anesthesia Reaction No family hx of      Deep Vein Thrombosis (DVT) No family hx of        REVIEW OF SYSTEMS:  12 point ROS was negative other than the symptoms noted above in the HPI.  Patient Supplied Answers to Review of Systems  [facility]NT ROS 9/4/2020   Constitutional -   Neurology Dizzy spells   Ears, Nose, Throat Ringing/noise in ears   Cardiopulmonary -   Gastrointestinal/Genitourinary -         PHYSICAL EXAMINATION:   Pulse 84   Temp 97.7  F (36.5  C) (Temporal)   Ht 1.753 m (5' 9\")   Wt 84.6 kg (186 lb 8.2 oz)   SpO2 98%   BMI 27.54 kg/m     Appearance:   normal; NAD, " age-appropriate appearance, well-developed, normal habitus   Communication:   aphonic, communicates with writing and mouthing words   Head/Face:   inspection -  Normal; no scars or visible lesions   Salivary glands -  Normal size, no tenderness, swelling, or palpable masses   Facial strength -  Normal and symmetric    Skin:  normal, no rash   Ears:  auricle (AD) -  normal  EAC (AD) -  normal  TM (AD) -  Normal, no effusion  auricle (AS) -  normal  EAC (AS) - cerumen  TM (AS) -  Unable to visualize  Normal clinical speech reception   Nose:  Ext. inspection -  Normal  Internal Inspection -  Normal mucosa, septum, and turbinates   Nasopharynx:  normal mucosa, no masses   Oral Cavity:  lips -  Normal mucosa, oral competence, and stoma size   Edentulous, healthy gingival mucosa, one exposed tooth root on left maxillary alveolus   Hard palate, buccal, floor of mouth mucosa normal   Tongue - normal movement, no lesions   Oropharynx:  mucosa -  Normal, no lesions  soft palate -  Normal, no lesions, no asymmetry, normal elevation   Neoharynx:  Normal mucosa, no masses  neovallecula clear  Shelf at site of previous epiglottis   Neck: Well healed apron neck incision  No palpable masses  Stoma patent, no crusting, no bleeding  Normal range of motion   Lymphatic:  no abnormal nodes   Cardiovascular:  warm, pink, well-perfused extremities without swelling, tenderness, or edema   Respiratory:  Normal respiratory effort, no stridor   Neuro/Psych.:  mood/affect -  normal  mental status -  normal       PROCEDURES:   Flexible fiberoptic laryngoscopy: Scope exam was indicated due to laryngeal cancer. Verbal consent was obtained. The nasal cavity was prepped with an aerosolized solution of topical anesthetic and vasoconstrictive agent. The scope was passed through the anterior nasal cavity and advanced. Inspection of the nasopharynx revealed no gross abnormality. The base of tongue and neovallecula are normal. There is a shelf at the  site of the previous epiglottis. The neopharynx is clear with healthy appearing mucosa and no masses.  Procedure tolerated well with no immediate complications noted.    Flexible tracheoscopy: The flexible scope was passed through the stoma and down to the primary bronchi. There is no crusting, no dry mucosa, no bleeding. There were a few secretions present. The airway was patent. Patient tolerated the procedure well with no immediate complications.       RESULTS REVIEWED:         IMPRESSION AND PLAN:   Evin Sterling is a 70-year-old man with a T4aN0 SCC of the larynx. He underwent a total laryngectomy with bilateral neck dissections on 8/25/2020. He was recommended for postoperative radiation but decided against treatment.    He is doing well with no signs of recurrent disease.    He saw SLP today.    We discussed the COVID vaccine. I advised that I do recommend the vaccine and we discussed the safety of it.    He had his 3 month post treatment PET scan in November 2020.  He will need repeat CT neck and chest in May 2021 (ordered).    He did have a hemithyroidectomy performed with his laryngectomy.  He had his TSH checked in November 2020 which was mildly elevated with a normal T4.  We will plan on repeating this when he gets his next set of imaging in May 2021 (ordered).    I will see him back with labs and imaging.      Thank you very much for the opportunity to participate in the care of your patient.      Caron Wang MD, M.D.  Otolaryngology- Head & Neck Surgery      This note was dictated with voice recognition software and then edited. Please excuse any unintentional errors.         A total of 23 minutes was spent on clinic visit and charting activities on 4/19/2021.        CC:  Kyaw Blankenship MD  [address] [address]      Vineet Gonzales MD  Department of Radiation Oncology  [facility]

## 2021-04-18 PROBLEM — Z43.0: Status: RESOLVED | Noted: 2020-08-03 | Resolved: 2021-04-18

## 2021-04-19 ENCOUNTER — THERAPY VISIT (OUTPATIENT)
Dept: SPEECH THERAPY | Facility: CLINIC | Age: 71
End: 2021-04-19
Payer: MEDICARE

## 2021-04-19 ENCOUNTER — OFFICE VISIT (OUTPATIENT)
Dept: OTOLARYNGOLOGY | Facility: CLINIC | Age: 71
End: 2021-04-19
Payer: MEDICARE

## 2021-04-19 VITALS
HEIGHT: 69 IN | HEART RATE: 84 BPM | OXYGEN SATURATION: 98 % | BODY MASS INDEX: 27.62 KG/M2 | WEIGHT: 186.51 LBS | TEMPERATURE: 97.7 F

## 2021-04-19 DIAGNOSIS — R13.12 OROPHARYNGEAL DYSPHAGIA: ICD-10-CM

## 2021-04-19 DIAGNOSIS — C32.9 LARYNGEAL CANCER (H): Primary | ICD-10-CM

## 2021-04-19 DIAGNOSIS — R49.1 APHONIA: ICD-10-CM

## 2021-04-19 PROCEDURE — 99213 OFFICE O/P EST LOW 20 MIN: CPT | Mod: 25 | Performed by: OTOLARYNGOLOGY

## 2021-04-19 PROCEDURE — 92507 TX SP LANG VOICE COMM INDIV: CPT | Mod: GN | Performed by: SPEECH-LANGUAGE PATHOLOGIST

## 2021-04-19 PROCEDURE — 31615 TRCHEOBRNCHSC EST TRACHS INC: CPT | Performed by: OTOLARYNGOLOGY

## 2021-04-19 PROCEDURE — 92526 ORAL FUNCTION THERAPY: CPT | Mod: GN | Performed by: SPEECH-LANGUAGE PATHOLOGIST

## 2021-04-19 ASSESSMENT — PAIN SCALES - GENERAL: PAINLEVEL: NO PAIN (0)

## 2021-04-19 ASSESSMENT — MIFFLIN-ST. JEOR: SCORE: 1606.38

## 2021-04-19 NOTE — NURSING NOTE
"Chief Complaint   Patient presents with     RECHECK     2 month follow up       Pulse 84, temperature 97.7  F (36.5  C), temperature source Temporal, height 1.753 m (5' 9\"), weight 84.6 kg (186 lb 8.2 oz), SpO2 98 %.    Annalee Chambers, EMT    "

## 2021-04-19 NOTE — LETTER
4/19/2021       RE: Evin Sterling  [address] [facility] [id number]     Dear Colleague,    Thank you for referring your patient, Evin Sterling, to the [facility] E[facility] AND [facility]IC [place] at [facility]. Please see a copy of my visit note below.    Dear Dr. Blankenship:    I had the pleasure of seeing Evin Sterling in follow-up today at the [facility].     History of Present Illness:   Evin Sterling is a 70 year old man with a T4aN0 SCC of the larynx. The patient has about a 4 year history of progressive hoarseness. He was potentially seen by an E[place] in [facility] and diagnosed with reflux. He had insurance issues and was not able to afford the medication. He had progressive voice changes. He saw Dr Blankenship on 7/28/2020 for consultation at which time he had biphasic stridor and on scope exam was noted to have a large exophytic mass of the glottis without mobility of either cord and a glottic opening of about 5 mm.     He was taken to the OR on 8/3/2020 for an awake trach with DL. Biopsies were consistent with SCC. He had a PET scan which showed the tumor in the larynx with thyroid cartilage erosion but no obvious mariel disease. The patient demonstrated poor swallow function with aspiration on swallow study. Given these findings he was recommended surgery followed by adjuvant radiation.    He was taken to the OR on 8/25/2020 for a DL, total laryngectomy, left hemithyroidectomy, bilateral neck dissections, cricopharyngeal myotomy, dental extractions.. He had PEG tube placement performed by thoracic surgery at the time of surgery. His postoperative course was uncomplicated. His final pathology demonstrated a 2.5 cm invasive moderately differentiated SCC, 2.5 cm involving the right and left cords, invading the thyroid cartilage and focally extending to the soft tissues, benign thyroid, angiolymphatic invasion present, no  PNI, negative margins, 0/86 lymph nodes.  His case was reviewed at tumor conference with recommendation for postoperative radiation. The patient decided against radiation. He was started on an oral diet in October 2020.  He had a negative posttreatment PET scan in November 2020. He had his PEG removed by thoracic surgery in December 2020.      Interval history:  He comes in today for follow-up. He was last seen in clinic in February 2021. He says today that things are going well. He has no problems or concerns. He says the eating and drinking are going well. It does take more time to eat than previous. He says sometimes to wait for food and drink to go down. He has no sticking of food. He has no pain with swallowing. He has no sore throat, otalgia. He says his breathing is fine, no shortness of breath. Some days he has more secretions and requires more frequent cleaning of his alisa tube. He has no hemoptysis. His weight is increasing. He says he is very active and his energy is good. He is unsure about the COVID vaccine.     He is accompanied by his daughter who helps provide history.        MEDICATIONS:     Current Outpatient Medications   Medication Sig Dispense Refill     metoprolol tartrate (LOPRESSOR) 25 MG tablet 0.5 tablets (12.5 mg) by Per G Tube route 2 times daily 30 tablet 0     multivitamins w/minerals (CERTAVITE) liquid 15 mLs by Per Feeding Tube route daily 236 mL 1     ondansetron (ZOFRAN) 4 MG tablet Take 4 mg by mouth every 8 hours as needed for nausea       order for DME Equipment being ordered: Tracheostomy supplies    0.9% sodium chloride 1000 mL bottles  Box split 4x4 gauze sponges  Trach kits with brushes  Velcro trach ties  3 cc 0.9% sodium chloride lavages for trach suctioning  Large gloves  Cool mist humidity via trach dome  6-0 Shiley disposable inner cannulas    Diagnosis: laryngeal cancer 3 Month 1     order for DME Equipment being ordered:   Portable suction machine   14 French suction  catheters  [place]keur suction tips    Diagnosis: laryngeal cancer 3 Month 1     order for DME Equipment being ordered: Nasogastric bolus tube feeding supplies  Formula: Isosource 1.5, 6 cans per day  Gravity feeding bags  60 mL syringes    Treatment Diagnosis: laryngeal cancer 3 Month 1     spironolactone (ALDACTONE) 25 MG tablet Take 12.5 mg by mouth daily       acetaminophen (TYLENOL) 32 mg/mL liquid 20.3 mLs (650 mg) by Per Feeding Tube route every 6 hours (Patient not taking: Reported on 12/17/2020) 473 mL 1     acetaminophen (TYLENOL) 32 mg/mL liquid 20.3 mLs (650 mg) by Per Feeding Tube route every 4 hours as needed for pain (Patient not taking: Reported on 12/17/2020) 473 mL 1     calcitRIOL (ROCALTROL) 1 MCG/ML solution 0.25 mLs (0.25 mcg) by Per G Tube route 2 times daily for 6 days 3 mL 0     calcitRIOL (ROCALTROL) 1 MCG/ML solution 0.25 mLs (0.25 mcg) by Per G Tube route daily for 7 days 1.75 mL 0     carboxymethylcellulose PF (REFRESH PLUS) 0.5 % ophthalmic solution Place 1 drop into both eyes 4 times daily as needed for dry eyes (Patient not taking: Reported on 12/17/2020) 1 Bottle 3     doxazosin (CARDURA) 1 MG tablet 1 tablet (1 mg) by Per G Tube route daily for 14 days 14 tablet 0     glycopyrrolate (ROBINUL) 1 MG tablet 1 tablet (1 mg) by Per Feeding Tube route 2 times daily (Patient not taking: Reported on 12/17/2020) 30 tablet 1     lisinopril (ZESTRIL) 10 MG tablet 1 tablet (10 mg) by Per G Tube route daily for 14 days 14 tablet 0     mineral oil-hydrophilic petrolatum (AQUAPHOR) external ointment Apply topically every 8 hours (Patient not taking: Reported on 12/17/2020) 50 g 3     ondansetron (ZOFRAN) 4 MG tablet 1 tablet (4 mg) by Per G Tube route every 6 hours as needed for nausea (Patient not taking: Reported on 12/17/2020) 30 tablet 0     oxyCODONE (ROXICODONE) 5 MG/5ML solution 5-10 mLs (5-10 mg) by Per Feeding Tube route every 4 hours as needed for moderate to severe pain (Patient not taking:  Reported on 2020) 420 mL 0     polyethylene glycol (MIRALAX) 17 g packet Take 17 g by mouth 2 times daily as needed for constipation (Patient not taking: Reported on 2020) 14 packet 0     senna-docusate (SENOKOT-S/PERICOLACE) 8.6-50 MG tablet 1 tablet by Per G Tube route 2 times daily (Patient not taking: Reported on 2020) 28 tablet 0     sennosides (SENOKOT) 8.8 MG/5ML syrup 5 mLs by Per Feeding Tube route nightly as needed for constipation (Patient not taking: Reported on 2020) 236 mL 0       ALLERGIES:  No Known Allergies    HABITS/SOCIAL HISTORY:   Previous alcoholic. Former smoker, quit 12 years ago, previously 1/2 ppd.  Some chewing tobacco use, quit about 30 years ago.    Lives with his son-in-law's father.    Previously worked in construction.    Social History     Socioeconomic History     Marital status:      Spouse name: Not on file     Number of children: Not on file     Years of education: Not on file     Highest education level: Not on file   Occupational History     Not on file   Social Needs     Financial resource strain: Not on file     Food insecurity     Worry: Not on file     Inability: Not on file     Transportation needs     Medical: Not on file     Non-medical: Not on file   Tobacco Use     Smoking status: Former Smoker     Packs/day: 1.00     Years: 20.00     Pack years: 20.00     Quit date:      Years since quittin.3     Smokeless tobacco: Former User   Substance and Sexual Activity     Alcohol use: Not Currently     Drug use: Not Currently     Sexual activity: Not on file   Lifestyle     Physical activity     Days per week: Not on file     Minutes per session: Not on file     Stress: Not on file   Relationships     Social connections     Talks on phone: Not on file     Gets together: Not on file     Attends Judaism service: Not on file     Active member of club or organization: Not on file     Attends meetings of clubs or organizations: Not on file      Relationship status: Not on file     Intimate partner violence     Fear of current or ex partner: Not on file     Emotionally abused: Not on file     Physically abused: Not on file     Forced sexual activity: Not on file   Other Topics Concern     Not on file   Social History Narrative     Not on file       PAST MEDICAL HISTORY:   Past Medical History:   Diagnosis Date     CHF (congestive heart failure) (H)      GERD (gastroesophageal reflux disease)      Hypertension         PAST SURGICAL HISTORY:   Past Surgical History:   Procedure Laterality Date     DISSECTION RADICAL NECK BILATERAL Bilateral 8/25/2020    Procedure: Bilateral neck dissection;  Surgeon: Caron Wang MD;  Location: [facility] OR     ENDOSCOPIC INSERTION TUBE GASTROSTOMY N/A 8/25/2020    Procedure: INSERTION, PEG TUBE;  Surgeon: Reuben Hastings MD;  Location: [facility] OR     HERNIA REPAIR, INGUINAL RT/LT       HERNIA REPAIR, UMBILICAL       LARYNGECTOMY N/A 8/25/2020    Procedure: TOTAL LARYNGECTOMY WITH LEFT HEMITHYROIDECTOMY;  Surgeon: Caron Wang MD;  Location: [facility] OR     LARYNGOSCOPY N/A 8/25/2020    Procedure: DIRECT LARYNGOSCOPY;  Surgeon: Caron Wang MD;  Location: [facility] OR     LARYNGOSCOPY WITH BIOPSY(IES) N/A 8/3/2020    Procedure: LARYNGOSCOPY WITH BIOPSY, TRACHEOSTOMY, NG TUBE PLACEMENT;  Surgeon: Caron Wang MD;  Location: [facility] OR     ODONTECTOMY N/A 8/25/2020    Procedure: DENTAL EXTRACTION OF TEETH x 13;  Surgeon: Caron Wang MD;  Location: [facility] OR     TRACHEOSTOMY N/A 8/3/2020    Procedure: TRACHEOSTOMY;  Surgeon: Caron Wang MD;  Location: [facility] OR       FAMILY HISTORY:    Family History   Problem Relation Age of Onset     Anesthesia Reaction No family hx of      Deep Vein Thrombosis (DVT) No family hx of        REVIEW OF SYSTEMS:  12 point ROS was negative other than the symptoms noted above in the HPI.  Patient Supplied Answers to Review of Systems  [facility]NT ROS 9/4/2020   Constitutional -   Neurology  "Dizzy spells   Ears, Nose, Throat Ringing/noise in ears   Cardiopulmonary -   Gastrointestinal/Genitourinary -         PHYSICAL EXAMINATION:   Pulse 84   Temp 97.7  F (36.5  C) (Temporal)   Ht 1.753 m (5' 9\")   Wt 84.6 kg (186 lb 8.2 oz)   SpO2 98%   BMI 27.54 kg/m     Appearance:   normal; NAD, age-appropriate appearance, well-developed, normal habitus   Communication:   aphonic, communicates with writing and mouthing words   Head/Face:   inspection -  Normal; no scars or visible lesions   Salivary glands -  Normal size, no tenderness, swelling, or palpable masses   Facial strength -  Normal and symmetric    Skin:  normal, no rash   Ears:  auricle (AD) -  normal  EAC (AD) -  normal  TM (AD) -  Normal, no effusion  auricle (AS) -  normal  EAC (AS) - cerumen  TM (AS) -  Unable to visualize  Normal clinical speech reception   Nose:  Ext. inspection -  Normal  Internal Inspection -  Normal mucosa, septum, and turbinates   Nasopharynx:  normal mucosa, no masses   Oral Cavity:  lips -  Normal mucosa, oral competence, and stoma size   Edentulous, healthy gingival mucosa, one exposed tooth root on left maxillary alveolus   Hard palate, buccal, floor of mouth mucosa normal   Tongue - normal movement, no lesions   Oropharynx:  mucosa -  Normal, no lesions  soft palate -  Normal, no lesions, no asymmetry, normal elevation   Neoharynx:  Normal mucosa, no masses  neovallecula clear  Shelf at site of previous epiglottis   Neck: Well healed apron neck incision  No palpable masses  Stoma patent, no crusting, no bleeding  Normal range of motion   Lymphatic:  no abnormal nodes   Cardiovascular:  warm, pink, well-perfused extremities without swelling, tenderness, or edema   Respiratory:  Normal respiratory effort, no stridor   Neuro/Psych.:  mood/affect -  normal  mental status -  normal       PROCEDURES:   Flexible fiberoptic laryngoscopy: Scope exam was indicated due to laryngeal cancer. Verbal consent was obtained. The nasal " cavity was prepped with an aerosolized solution of topical anesthetic and vasoconstrictive agent. The scope was passed through the anterior nasal cavity and advanced. Inspection of the nasopharynx revealed no gross abnormality. The base of tongue and neovallecula are normal. There is a shelf at the site of the previous epiglottis. The neopharynx is clear with healthy appearing mucosa and no masses.  Procedure tolerated well with no immediate complications noted.    Flexible tracheoscopy: The flexible scope was passed through the stoma and down to the primary bronchi. There is no crusting, no dry mucosa, no bleeding. There were a few secretions present. The airway was patent. Patient tolerated the procedure well with no immediate complications.       RESULTS REVIEWED:         IMPRESSION AND PLAN:   Evin Sterling is a 70-year-old man with a T4aN0 SCC of the larynx. He underwent a total laryngectomy with bilateral neck dissections on 8/25/2020. He was recommended for postoperative radiation but decided against treatment.    He is doing well with no signs of recurrent disease.    He saw SLP today.    We discussed the COVID vaccine. I advised that I do recommend the vaccine and we discussed the safety of it.    He had his 3 month post treatment PET scan in November 2020.  He will need repeat CT neck and chest in May 2021 (ordered).    He did have a hemithyroidectomy performed with his laryngectomy.  He had his TSH checked in November 2020 which was mildly elevated with a normal T4.  We will plan on repeating this when he gets his next set of imaging in May 2021 (ordered).    I will see him back with labs and imaging.      Thank you very much for the opportunity to participate in the care of your patient.      Caron Wang MD, M.D.  Otolaryngology- Head & Neck Surgery      This note was dictated with voice recognition software and then edited. Please excuse any unintentional errors.         A total of 23 minutes was  spent on clinic visit and charting activities on 4/19/2021.        CC:  Kyaw Blankenship MD  [address] [address]      Vineet Gonzales MD  Department of Radiation Oncology  [facility]          Again, thank you for allowing me to participate in the care of your patient.      Sincerely,    Caron Wang MD

## 2021-04-21 NOTE — PROGRESS NOTES
"Outpatient Speech Language Pathology Discharge Note     Patient: Evin Sterling  : 1950    Beginning/End Dates of Reporting Period:  2020 to 2021    Referring Provider: Dr. Caron Wang    Therapy Diagnosis: Dysphagia, aphonia    Client Self Report: Evin Sterling is a 70-year-old man who underwent total laryngectomy, bilateral neck dissections, left hemithyroidectomy, and dental extractions on 2020 for T4N0 SCC of the larynx. He was seen for dysphagia therapy in conjunction with ENT clinic visit. Pt was accompanied by daughter. He appeared alert, energetic, and responsive.     Goals:  Goal Identifier PO   Goal Description 1. Pt will advance to soft solids while adhering to safe swallow precautions 100% of the time indpendently.    Target Date 21   Date Met   21   Progress:     Goal Identifier PO   Goal Description 2. Pt will advance to regular solids and thin liquids while adhering to safe swallow precautions indpendently.    Target Date 21   Date Met   2021   Progress:     Goal Identifier  Pulmonary rehab   Goal Description 1. Pt will demonstrate understanding and use for all pulmonary rehab options (i.e.baseplates, HME's, larytube) for maintaining good pulmonary function post laryngectomy 100% of the time independently.   Target Date  2021   Date Met   2021   Progress:     Goal Identifier  EL Placement   Goal Description  2. Pt jorge demonstrate adequate neck placement in the \"sweet spot\" independently in 4/5 opportunities.   Target Date  2021   Date Met   2021   Progress:     Goal Identifier  EL Intelligiblity   Goal Description  3. Pt will use electrolarynx in conversation with ~90% intelligibility as judged by a trained listener 80% of the time.    Target Date  2021   Date Met   2021   Progress:       Progress Toward Goals:   Progress this reporting period: Pt demonstrates adequate progress toward goals. He is eating and drinking well. " He is able to exercise and is feeling really good. He has decided to no longer use the electrolarynx and only write notes since this seems more effective than trying to speak with the electrolarynx. He becomes very frustrated with his communication and folks not understanding him and that broad frustration is something he prefers to avoid. He is able to manage his stoma and demonstrates good use of pulmonary rehab items.     Plan:  Discharge from therapy.    Discharge:    Reason for Discharge: Patient has met all goals.    Discharge Plan: Patient to continue home program. He will notify Dr. Wang if there are further issues with speech or swallowing and new evaluation can be completed at that time.

## 2021-05-11 NOTE — PROGRESS NOTES
Care Coordinator Progress Note    Admission Date/Time:  8/25/2020  Attending MD:  Dr Caron Wang    Data  Chart reviewed, discussed with interdisciplinary team. In 6A Discharge Rounds CHRISTA Blair reported pt needs to practice laryngectomy lavage & suctioning. Plan to remove a drain today. Anticipate discharge tomorrow.   Observed pt up ad dez in [place] today.     Patient was admitted for:  Squamous cell carcinoma of larynx  Procedure:    Direct laryngoscopy   Dental extractions x 13  Total laryngectomy  Left hemithyroidectomy  Bilateral neck dissections (levels II-IV)  Cricopharyngeal myotomy  ________    --Essential hypertension  --HFrEF (heart failure with reduced ejection fraction) (H).    Past history includes:  A-fib; CHF; HTN; GERD.     Coordination of Care and Referrals  E-mailed ATOS script to Aide Whitfield, SLP at [facility].       Plan  Anticipated Discharge Date:  9-; when independent with laryngectomy cares.   Anticipated Discharge Plan:  Discharge to daughter's home.   --Pt to continue practicing laryngectomy cares with nursing at the bedside.    --Pt has suction, humidity and tube feeding supplies at home already.       Ale Combs, RN Care Coordinator  [address], [facility]  Phone: [phone]     Detail Level: Zone

## 2021-07-21 ENCOUNTER — OFFICE VISIT (OUTPATIENT)
Dept: OTOLARYNGOLOGY | Facility: CLINIC | Age: 71
End: 2021-07-21
Payer: MEDICARE

## 2021-07-21 ENCOUNTER — PATIENT OUTREACH (OUTPATIENT)
Dept: OTOLARYNGOLOGY | Facility: CLINIC | Age: 71
End: 2021-07-21

## 2021-07-21 ENCOUNTER — ANCILLARY PROCEDURE (OUTPATIENT)
Dept: CT IMAGING | Facility: CLINIC | Age: 71
End: 2021-07-21
Attending: OTOLARYNGOLOGY
Payer: MEDICARE

## 2021-07-21 ENCOUNTER — ALLIED HEALTH/NURSE VISIT (OUTPATIENT)
Dept: SPEECH THERAPY | Facility: CLINIC | Age: 71
End: 2021-07-21

## 2021-07-21 ENCOUNTER — LAB (OUTPATIENT)
Dept: LAB | Facility: CLINIC | Age: 71
End: 2021-07-21
Payer: MEDICARE

## 2021-07-21 VITALS
BODY MASS INDEX: 27.27 KG/M2 | WEIGHT: 184.08 LBS | SYSTOLIC BLOOD PRESSURE: 131 MMHG | OXYGEN SATURATION: 96 % | HEART RATE: 80 BPM | DIASTOLIC BLOOD PRESSURE: 69 MMHG | HEIGHT: 69 IN | TEMPERATURE: 97.4 F

## 2021-07-21 DIAGNOSIS — E03.4 HYPOTHYROIDISM DUE TO ACQUIRED ATROPHY OF THYROID: Primary | ICD-10-CM

## 2021-07-21 DIAGNOSIS — C32.9 LARYNGEAL CANCER (H): Primary | ICD-10-CM

## 2021-07-21 DIAGNOSIS — C32.9 LARYNGEAL CANCER (H): ICD-10-CM

## 2021-07-21 DIAGNOSIS — E03.4 HYPOTHYROIDISM DUE TO ACQUIRED ATROPHY OF THYROID: ICD-10-CM

## 2021-07-21 LAB
CREAT BLD-MCNC: 0.9 MG/DL (ref 0.7–1.3)
GFR SERPL CREATININE-BSD FRML MDRD: >60 ML/MIN/1.73M2
TSH SERPL DL<=0.005 MIU/L-ACNC: 2.42 MU/L (ref 0.4–4)

## 2021-07-21 PROCEDURE — 70491 CT SOFT TISSUE NECK W/DYE: CPT | Mod: MG | Performed by: RADIOLOGY

## 2021-07-21 PROCEDURE — G1004 CDSM NDSC: HCPCS | Mod: GC | Performed by: RADIOLOGY

## 2021-07-21 PROCEDURE — 99213 OFFICE O/P EST LOW 20 MIN: CPT | Mod: 25 | Performed by: OTOLARYNGOLOGY

## 2021-07-21 PROCEDURE — 31575 DIAGNOSTIC LARYNGOSCOPY: CPT | Mod: 51 | Performed by: OTOLARYNGOLOGY

## 2021-07-21 PROCEDURE — 36415 COLL VENOUS BLD VENIPUNCTURE: CPT | Performed by: PATHOLOGY

## 2021-07-21 PROCEDURE — 84443 ASSAY THYROID STIM HORMONE: CPT | Performed by: PATHOLOGY

## 2021-07-21 PROCEDURE — 31615 TRCHEOBRNCHSC EST TRACHS INC: CPT | Performed by: OTOLARYNGOLOGY

## 2021-07-21 PROCEDURE — 71260 CT THORAX DX C+: CPT | Mod: MG | Performed by: RADIOLOGY

## 2021-07-21 RX ORDER — IOPAMIDOL 755 MG/ML
91 INJECTION, SOLUTION INTRAVASCULAR ONCE
Status: COMPLETED | OUTPATIENT
Start: 2021-07-21 | End: 2021-07-21

## 2021-07-21 RX ADMIN — IOPAMIDOL 91 ML: 755 INJECTION, SOLUTION INTRAVASCULAR at 11:17

## 2021-07-21 ASSESSMENT — PAIN SCALES - GENERAL: PAINLEVEL: NO PAIN (0)

## 2021-07-21 ASSESSMENT — MIFFLIN-ST. JEOR: SCORE: 1590.38
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
## 2021-07-21 NOTE — PROGRESS NOTES
Pt seen for brief allied health visit today per MD request. Pt interested in trying [employer] line of products, specifically for the Energy HME. Educated pt on option of laryclips as well, which may work well to keep his larytube deeper set in his stoma. Pt has very deep set stoma and may not be an ideal candidate for baseplates. This clinician will update Rx to include [employer] line of products. Encouraged pt and his daughter to reach out should they have further questions/concerns or wish to set up a visit with SLP to work on pulmonary rehab. Pt and daughter appreciative.    LUBNA Gallegos (music), MA, CCC-SLP   Speech Language Pathologist  [facility] Trained Vocologist   [facility]  Dept. of Otolaryngology  Department of Rehabilitation Services  [address] [place] [id number]  Email: [email or web address]   Phone: [phone]  Pronouns: she/her/hers
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
## 2021-07-22 NOTE — PROGRESS NOTES
Called patient's daughter with the following CT neck results:    IMPRESSION: In this patient with a history of laryngeal squamous cell  carcinoma status post total laryngectomy and radiation therapy:  1. No evidence for metastatic disease in the neck.  2. Left middle cranial fossa meningioma is unchanged.    Patient will continue to follow-up with Dr. Wang as scheduled on 10/18. Daughter was encouraged to call sooner with any further questions or concerns.     Felisha Cronin RN, BSN

## 2021-10-17 NOTE — PROGRESS NOTES
Dear Dr. Blankenship:    I had the pleasure of seeing Evin Sterling in follow-up today at the [facility].     History of Present Illness:   Evin Sterling is a 71 year old man with a T4aN0 SCC of the larynx. The patient has about a 4 year history of progressive hoarseness. He was potentially seen by an ENT locally in [facility] and diagnosed with reflux. He had insurance issues and was not able to afford the medication. He had progressive voice changes. He saw Dr Blankenship on 7/28/2020 for consultation at which time he had biphasic stridor and on scope exam was noted to have a large exophytic mass of the glottis without mobility of either cord and a glottic opening of about 5 mm.     He was taken to the OR on 8/3/2020 for an awake trach with DL. Biopsies were consistent with SCC. He had a PET scan which showed the tumor in the larynx with thyroid cartilage erosion but no obvious mariel disease. The patient demonstrated poor swallow function with aspiration on swallow study. Given these findings he was recommended surgery followed by adjuvant radiation.    He was taken to the OR on 8/25/2020 for a DL, total laryngectomy, left hemithyroidectomy, bilateral neck dissections, cricopharyngeal myotomy, dental extractions.. He had PEG tube placement performed by thoracic surgery at the time of surgery. His postoperative course was uncomplicated. His final pathology demonstrated a 2.5 cm invasive moderately differentiated SCC, 2.5 cm involving the right and left cords, invading the thyroid cartilage and focally extending to the soft tissues, benign thyroid, angiolymphatic invasion present, no PNI, negative margins, 0/86 lymph nodes.  His case was reviewed at tumor conference with recommendation for postoperative radiation. The patient decided against radiation. He was started on an oral diet in October 2020.  He had a negative posttreatment PET scan in November 2020. He had his PEG removed by thoracic  surgery in December 2020.      Interval history:  He comes in today for follow-up. He was last seen in clinic in July 2021 with repeat imaging which was negative. He says today that things are going well. He feels like he is stronger. The biggest challenge is not being able to talk but he is able to communicate. He is noticing some increased phlegm. He feels like his swallowing is going well. He is noticing some issues with allergies. He is getting by in his daily life. He has no pain anywhere. He is overall happy with things. He has no sticking of foods. He is breathing well. He has no bleeding from the stoma. He has no neck masses. He feels like he has more energy than prior to surgery. He is exercising daily. His shoulder is doing well with good ROM.     He is accompanied by his daughter.        MEDICATIONS:     Current Outpatient Medications   Medication Sig Dispense Refill     acetaminophen (TYLENOL) 32 mg/mL liquid 20.3 mLs (650 mg) by Per Feeding Tube route every 6 hours (Patient not taking: Reported on 12/17/2020) 473 mL 1     acetaminophen (TYLENOL) 32 mg/mL liquid 20.3 mLs (650 mg) by Per Feeding Tube route every 4 hours as needed for pain (Patient not taking: Reported on 12/17/2020) 473 mL 1     calcitRIOL (ROCALTROL) 1 MCG/ML solution 0.25 mLs (0.25 mcg) by Per G Tube route 2 times daily for 6 days 3 mL 0     calcitRIOL (ROCALTROL) 1 MCG/ML solution 0.25 mLs (0.25 mcg) by Per G Tube route daily for 7 days 1.75 mL 0     carboxymethylcellulose PF (REFRESH PLUS) 0.5 % ophthalmic solution Place 1 drop into both eyes 4 times daily as needed for dry eyes (Patient not taking: Reported on 12/17/2020) 1 Bottle 3     doxazosin (CARDURA) 1 MG tablet 1 tablet (1 mg) by Per G Tube route daily for 14 days 14 tablet 0     glycopyrrolate (ROBINUL) 1 MG tablet 1 tablet (1 mg) by Per Feeding Tube route 2 times daily (Patient not taking: Reported on 12/17/2020) 30 tablet 1     lisinopril (ZESTRIL) 10 MG tablet 1 tablet (10  mg) by Per G Tube route daily for 14 days 14 tablet 0     metoprolol tartrate (LOPRESSOR) 25 MG tablet 0.5 tablets (12.5 mg) by Per G Tube route 2 times daily 30 tablet 0     mineral oil-hydrophilic petrolatum (AQUAPHOR) external ointment Apply topically every 8 hours (Patient not taking: Reported on 12/17/2020) 50 g 3     multivitamins w/minerals (CERTAVITE) liquid 15 mLs by Per Feeding Tube route daily 236 mL 1     ondansetron (ZOFRAN) 4 MG tablet 1 tablet (4 mg) by Per G Tube route every 6 hours as needed for nausea (Patient not taking: Reported on 12/17/2020) 30 tablet 0     ondansetron (ZOFRAN) 4 MG tablet Take 4 mg by mouth every 8 hours as needed for nausea       order for DME Equipment being ordered: Tracheostomy supplies    0.9% sodium chloride 1000 mL bottles  Box split 4x4 gauze sponges  Trach kits with brushes  Velcro trach ties  3 cc 0.9% sodium chloride lavages for trach suctioning  Large gloves  Cool mist humidity via trach dome  6-0 Shiley disposable inner cannulas    Diagnosis: laryngeal cancer 3 Month 1     order for DME Equipment being ordered:   Portable suction machine   14 French suction catheters  [place] suction tips    Diagnosis: laryngeal cancer 3 Month 1     order for DME Equipment being ordered: Nasogastric bolus tube feeding supplies  Formula: Isosource 1.5, 6 cans per day  Gravity feeding bags  60 mL syringes    Treatment Diagnosis: laryngeal cancer 3 Month 1     oxyCODONE (ROXICODONE) 5 MG/5ML solution 5-10 mLs (5-10 mg) by Per Feeding Tube route every 4 hours as needed for moderate to severe pain (Patient not taking: Reported on 12/17/2020) 420 mL 0     polyethylene glycol (MIRALAX) 17 g packet Take 17 g by mouth 2 times daily as needed for constipation (Patient not taking: Reported on 12/17/2020) 14 packet 0     senna-docusate (SENOKOT-S/PERICOLACE) 8.6-50 MG tablet 1 tablet by Per G Tube route 2 times daily (Patient not taking: Reported on 12/17/2020) 28 tablet 0     sennosides  (SENOKOT) 8.8 MG/5ML syrup 5 mLs by Per Feeding Tube route nightly as needed for constipation (Patient not taking: Reported on 2020) 236 mL 0     spironolactone (ALDACTONE) 25 MG tablet Take 12.5 mg by mouth daily         ALLERGIES:  No Known Allergies    HABITS/SOCIAL HISTORY:   Previous alcoholic. Former smoker, quit 12 years ago, previously 1/2 ppd.  Some chewing tobacco use, quit about 30 years ago.    Lives with his son-in-law's father.    Previously worked in construction.    Social History     Socioeconomic History     Marital status:      Spouse name: Not on file     Number of children: Not on file     Years of education: Not on file     Highest education level: Not on file   Occupational History     Not on file   Tobacco Use     Smoking status: Former Smoker     Packs/day: 1.00     Years: 20.00     Pack years: 20.00     Quit date:      Years since quittin.8     Smokeless tobacco: Former User   Substance and Sexual Activity     Alcohol use: Not Currently     Drug use: Not Currently     Sexual activity: Not on file   Other Topics Concern     Not on file   Social History Narrative     Not on file     Social Determinants of Health     Financial Resource Strain:      Difficulty of Paying Living Expenses:    Food Insecurity:      Worried About Running Out of Food in the Last Year:      Ran Out of Food in the Last Year:    Transportation Needs:      Lack of Transportation (Medical):      Lack of Transportation (Non-Medical):    Physical Activity:      Days of Exercise per Week:      Minutes of Exercise per Session:    Stress:      Feeling of Stress :    Social Connections:      Frequency of Communication with Friends and Family:      Frequency of Social Gatherings with Friends and Family:      Attends Oriental orthodox Services:      Active Member of Clubs or Organizations:      Attends Club or Organization Meetings:      Marital Status:    Intimate Partner Violence:      Fear of Current or  "Ex-Partner:      Emotionally Abused:      Physically Abused:      Sexually Abused:        PAST MEDICAL HISTORY:   Past Medical History:   Diagnosis Date     CHF (congestive heart failure) (H)      GERD (gastroesophageal reflux disease)      Hypertension         PAST SURGICAL HISTORY:   Past Surgical History:   Procedure Laterality Date     DISSECTION RADICAL NECK BILATERAL Bilateral 8/25/2020    Procedure: Bilateral neck dissection;  Surgeon: Caron Wang MD;  Location: [facility] OR     ENDOSCOPIC INSERTION TUBE GASTROSTOMY N/A 8/25/2020    Procedure: INSERTION, PEG TUBE;  Surgeon: Reuben Hastings MD;  Location: [facility] OR     HERNIA REPAIR, INGUINAL RT/LT       HERNIA REPAIR, UMBILICAL       LARYNGECTOMY N/A 8/25/2020    Procedure: TOTAL LARYNGECTOMY WITH LEFT HEMITHYROIDECTOMY;  Surgeon: Caron Wang MD;  Location: [facility] OR     LARYNGOSCOPY N/A 8/25/2020    Procedure: DIRECT LARYNGOSCOPY;  Surgeon: Caron Wang MD;  Location: [facility] OR     LARYNGOSCOPY WITH BIOPSY(IES) N/A 8/3/2020    Procedure: LARYNGOSCOPY WITH BIOPSY, TRACHEOSTOMY, NG TUBE PLACEMENT;  Surgeon: Caron Wang MD;  Location: [facility] OR     ODONTECTOMY N/A 8/25/2020    Procedure: DENTAL EXTRACTION OF TEETH x 13;  Surgeon: Caron Wang MD;  Location: [facility] OR     TRACHEOSTOMY N/A 8/3/2020    Procedure: TRACHEOSTOMY;  Surgeon: Caron Wang MD;  Location: [facility] OR       FAMILY HISTORY:    Family History   Problem Relation Age of Onset     Anesthesia Reaction No family hx of      Deep Vein Thrombosis (DVT) No family hx of        REVIEW OF SYSTEMS:  12 point ROS was negative other than the symptoms noted above in the HPI.  Patient Supplied Answers to Review of Systems  [facility] ENT ROS 9/4/2020   Constitutional -   Neurology Dizzy spells   Ears, Nose, Throat Ringing/noise in ears   Cardiopulmonary -   Gastrointestinal/Genitourinary -         PHYSICAL EXAMINATION:   Pulse 72   Temp 97.4  F (36.3  C) (Temporal)   Ht 1.753 m (5' 9\")   Wt 83.5 kg " (184 lb)   SpO2 100%   BMI 27.17 kg/m     Appearance:   normal; NAD, age-appropriate appearance, well-developed, normal habitus   Communication:   aphonic, communicates with writing and mouthing words   Head/Face:   inspection -  Normal; no scars or visible lesions   Salivary glands -  Normal size, no tenderness, swelling, or palpable masses   Facial strength -  Normal and symmetric    Skin:  normal, no rash   Ears:  auricle (AD) -  normal  EAC (AD) -  normal  TM (AD) -  Normal, no effusion  auricle (AS) -  normal  EAC (AS) - normal, small amount of cerumen  TM (AS) -  normal  Normal clinical speech reception   Nose:  Ext. inspection -  Normal  Internal Inspection -  Normal mucosa, septum, and turbinates   Nasopharynx:  normal mucosa, no masses   Oral Cavity:  lips -  Normal mucosa, oral competence, and stoma size   Edentulous, healthy gingival mucosa, one exposed tooth root on left maxillary alveolus   Hard palate, buccal, floor of mouth mucosa normal   Tongue - normal movement, no lesions   Oropharynx:  mucosa -  Normal, no lesions  soft palate -  Normal, no lesions, no asymmetry, normal elevation   Neoharynx:  Normal mucosa, no masses  neovallecula clear  Shelf at site of previous epiglottis   Neck: Well healed apron neck incision  No palpable masses  Stoma patent but deep, minimal crusting removed, no bleeding  Normal range of motion  Scar band bilaterally along SCM   Lymphatic:  no abnormal nodes   Cardiovascular:  warm, pink, well-perfused extremities without swelling, tenderness, or edema   Respiratory:  Normal respiratory effort, no stridor   Neuro/Psych.:  mood/affect -  normal  mental status -  normal       PROCEDURES:   Flexible fiberoptic laryngoscopy: Scope exam was indicated due to laryngeal cancer. Verbal consent was obtained. The nasal cavity was prepped with an aerosolized solution of topical anesthetic and vasoconstrictive agent. The scope was passed through the anterior nasal cavity and  advanced. Inspection of the nasopharynx revealed no gross abnormality. The base of tongue and neovallecula are normal. There is a shelf at the site of the previous epiglottis. The neopharynx is clear with healthy appearing mucosa and no masses.  Procedure tolerated well with no immediate complications noted.    Flexible tracheoscopy: The flexible scope was passed through the stoma and down to the primary bronchi. There is no crusting, no dry mucosa, no bleeding. There were a few secretions present in the primary bronchi. The airway was patent. Patient tolerated the procedure well with no immediate complications.       RESULTS REVIEWED:       IMPRESSION AND PLAN:   Evin Sterling is a 71-year-old man with a T4aN0 SCC of the larynx. He underwent a total laryngectomy with bilateral neck dissections on 8/25/2020. He was recommended for postoperative radiation but decided against treatment.    He is doing well with no signs of recurrent disease. He has adjusted to the laryngectomy well.     He had repeat imaging in July 2021. We will repeat imaging in January 2022.    He did have a hemithyroidectomy performed with his laryngectomy.  He had his TSH checked in July 2021. We will recheck this in January 2022.     I will see him back in 3 months with labs and imaging.      Thank you very much for the opportunity to participate in the care of your patient.      Caron Wang MD, M.D.  Otolaryngology- Head & Neck Surgery      This note was dictated with voice recognition software and then edited. Please excuse any unintentional errors.                 CC:  Kyaw Blankenship MD  [address] [address]      Vineet Gonzales MD  Department of Radiation Oncology  [facility]

## 2021-10-18 ENCOUNTER — ALLIED HEALTH/NURSE VISIT (OUTPATIENT)
Dept: SPEECH THERAPY | Facility: CLINIC | Age: 71
End: 2021-10-18

## 2021-10-18 ENCOUNTER — OFFICE VISIT (OUTPATIENT)
Dept: OTOLARYNGOLOGY | Facility: CLINIC | Age: 71
End: 2021-10-18
Payer: MEDICARE

## 2021-10-18 VITALS
TEMPERATURE: 97.4 F | BODY MASS INDEX: 27.25 KG/M2 | OXYGEN SATURATION: 100 % | HEIGHT: 69 IN | WEIGHT: 184 LBS | HEART RATE: 72 BPM

## 2021-10-18 DIAGNOSIS — C32.9 LARYNGEAL CANCER (H): Primary | ICD-10-CM

## 2021-10-18 DIAGNOSIS — E03.4 HYPOTHYROIDISM DUE TO ACQUIRED ATROPHY OF THYROID: ICD-10-CM

## 2021-10-18 PROCEDURE — 31615 TRCHEOBRNCHSC EST TRACHS INC: CPT | Performed by: OTOLARYNGOLOGY

## 2021-10-18 PROCEDURE — 31575 DIAGNOSTIC LARYNGOSCOPY: CPT | Mod: XU | Performed by: OTOLARYNGOLOGY

## 2021-10-18 PROCEDURE — 99213 OFFICE O/P EST LOW 20 MIN: CPT | Mod: 25 | Performed by: OTOLARYNGOLOGY

## 2021-10-18 ASSESSMENT — PAIN SCALES - GENERAL: PAINLEVEL: NO PAIN (0)

## 2021-10-18 ASSESSMENT — MIFFLIN-ST. JEOR: SCORE: 1590

## 2021-10-18 NOTE — PROGRESS NOTES
Pt seen for brief allied health visit today per MD request. Reports he is doing well and is not having any concerns. He is not having trouble getting supplies from [employer]. Denies further concerns. Encouraged pt and/or his daughter to reach out should they have further questions/concerns. Pt appreciative.     LUBNA Gallegos MA (music), CCC-SLP   Speech Language Pathologist  [place] Trained Vocologist   [facility]  Dept. of Otolaryngology  Department of Rehabilitation Services  [address] [place] [id number]  Email: [email or web address]   Phone: [phone]  Pronouns: she/her/hers

## 2021-10-18 NOTE — LETTER
10/18/2021       RE: Evin Sterling  [address] [facility] [address]     Dear Colleague,    Thank you for referring your patient, Evin Sterling, to the [facility]ROAT [facility]IC [place] at [facility]. Please see a copy of my visit note below.    Dear Dr. Blankenship:    I had the pleasure of seeing Evin Sterling in follow-up today at the [facility].     History of Present Illness:   Evin Sterling is a 71 year old man with a T4aN0 SCC of the larynx. The patient has about a 4 year history of progressive hoarseness. He was potentially seen by an ENT [place] in [facility] and diagnosed with reflux. He had insurance issues and was not able to afford the medication. He had progressive voice changes. He saw Dr Blankenship on 7/28/2020 for consultation at which time he had biphasic stridor and on scope exam was noted to have a large exophytic mass of the glottis without mobility of either cord and a glottic opening of about 5 mm.     He was taken to the OR on 8/3/2020 for an awake trach with DL. Biopsies were consistent with SCC. He had a PET scan which showed the tumor in the larynx with thyroid cartilage erosion but no obvious mariel disease. The patient demonstrated poor swallow function with aspiration on swallow study. Given these findings he was recommended surgery followed by adjuvant radiation.    He was taken to the OR on 8/25/2020 for a DL, total laryngectomy, left hemithyroidectomy, bilateral neck dissections, cricopharyngeal myotomy, dental extractions.. He had PEG tube placement performed by thoracic surgery at the time of surgery. His postoperative course was uncomplicated. His final pathology demonstrated a 2.5 cm invasive moderately differentiated SCC, 2.5 cm involving the right and left cords, invading the thyroid cartilage and focally extending to the soft tissues, benign thyroid, angiolymphatic invasion present, no  PNI, negative margins, 0/86 lymph nodes.  His case was reviewed at tumor conference with recommendation for postoperative radiation. The patient decided against radiation. He was started on an oral diet in October 2020.  He had a negative posttreatment PET scan in November 2020. He had his PEG removed by thoracic surgery in December 2020.      Interval history:  He comes in today for follow-up. He was last seen in clinic in July 2021 with repeat imaging which was negative. He says today that things are going well. He feels like he is stronger. The biggest challenge is not being able to talk but he is able to communicate. He is noticing some increased phlegm. He feels like his swallowing is going well. He is noticing some issues with allergies. He is getting by in his daily life. He has no pain anywhere. He is overall happy with things. He has no sticking of foods. He is breathing well. He has no bleeding from the stoma. He has no neck masses. He feels like he has more energy than prior to surgery. He is exercising daily. His shoulder is doing well with good ROM.     He is accompanied by his daughter.        MEDICATIONS:     Current Outpatient Medications   Medication Sig Dispense Refill     acetaminophen (TYLENOL) 32 mg/mL liquid 20.3 mLs (650 mg) by Per Feeding Tube route every 6 hours (Patient not taking: Reported on 12/17/2020) 473 mL 1     acetaminophen (TYLENOL) 32 mg/mL liquid 20.3 mLs (650 mg) by Per Feeding Tube route every 4 hours as needed for pain (Patient not taking: Reported on 12/17/2020) 473 mL 1     calcitRIOL (ROCALTROL) 1 MCG/ML solution 0.25 mLs (0.25 mcg) by Per G Tube route 2 times daily for 6 days 3 mL 0     calcitRIOL (ROCALTROL) 1 MCG/ML solution 0.25 mLs (0.25 mcg) by Per G Tube route daily for 7 days 1.75 mL 0     carboxymethylcellulose PF (REFRESH PLUS) 0.5 % ophthalmic solution Place 1 drop into both eyes 4 times daily as needed for dry eyes (Patient not taking: Reported on 12/17/2020) 1  Bottle 3     doxazosin (CARDURA) 1 MG tablet 1 tablet (1 mg) by Per G Tube route daily for 14 days 14 tablet 0     glycopyrrolate (ROBINUL) 1 MG tablet 1 tablet (1 mg) by Per Feeding Tube route 2 times daily (Patient not taking: Reported on 12/17/2020) 30 tablet 1     lisinopril (ZESTRIL) 10 MG tablet 1 tablet (10 mg) by Per G Tube route daily for 14 days 14 tablet 0     metoprolol tartrate (LOPRESSOR) 25 MG tablet 0.5 tablets (12.5 mg) by Per G Tube route 2 times daily 30 tablet 0     mineral oil-hydrophilic petrolatum (AQUAPHOR) external ointment Apply topically every 8 hours (Patient not taking: Reported on 12/17/2020) 50 g 3     multivitamins w/minerals (CERTAVITE) liquid 15 mLs by Per Feeding Tube route daily 236 mL 1     ondansetron (ZOFRAN) 4 MG tablet 1 tablet (4 mg) by Per G Tube route every 6 hours as needed for nausea (Patient not taking: Reported on 12/17/2020) 30 tablet 0     ondansetron (ZOFRAN) 4 MG tablet Take 4 mg by mouth every 8 hours as needed for nausea       order for DME Equipment being ordered: Tracheostomy supplies    0.9% sodium chloride 1000 mL bottles  Box split 4x4 gauze sponges  Trach kits with brushes  Velcro trach ties  3 cc 0.9% sodium chloride lavages for trach suctioning  Large gloves  Cool mist humidity via trach dome  6-0 Shiley disposable inner cannulas    Diagnosis: laryngeal cancer 3 Month 1     order for DME Equipment being ordered:   Portable suction machine   14 French suction catheters  Geniur suction tips    Diagnosis: laryngeal cancer 3 Month 1     order for DME Equipment being ordered: Nasogastric bolus tube feeding supplies  Formula: Isosource 1.5, 6 cans per day  Gravity feeding bags  60 mL syringes    Treatment Diagnosis: laryngeal cancer 3 Month 1     oxyCODONE (ROXICODONE) 5 MG/5ML solution 5-10 mLs (5-10 mg) by Per Feeding Tube route every 4 hours as needed for moderate to severe pain (Patient not taking: Reported on 12/17/2020) 420 mL 0     polyethylene glycol  (MIRALAX) 17 g packet Take 17 g by mouth 2 times daily as needed for constipation (Patient not taking: Reported on 2020) 14 packet 0     senna-docusate (SENOKOT-S/PERICOLACE) 8.6-50 MG tablet 1 tablet by Per G Tube route 2 times daily (Patient not taking: Reported on 2020) 28 tablet 0     sennosides (SENOKOT) 8.8 MG/5ML syrup 5 mLs by Per Feeding Tube route nightly as needed for constipation (Patient not taking: Reported on 2020) 236 mL 0     spironolactone (ALDACTONE) 25 MG tablet Take 12.5 mg by mouth daily         ALLERGIES:  No Known Allergies    HABITS/SOCIAL HISTORY:   Previous alcoholic. Former smoker, quit 12 years ago, previously 1/2 ppd.  Some chewing tobacco use, quit about 30 years ago.    Lives with his son-in-law's father.    Previously worked in construction.    Social History     Socioeconomic History     Marital status:      Spouse name: Not on file     Number of children: Not on file     Years of education: Not on file     Highest education level: Not on file   Occupational History     Not on file   Tobacco Use     Smoking status: Former Smoker     Packs/day: 1.00     Years: 20.00     Pack years: 20.00     Quit date:      Years since quittin.8     Smokeless tobacco: Former User   Substance and Sexual Activity     Alcohol use: Not Currently     Drug use: Not Currently     Sexual activity: Not on file   Other Topics Concern     Not on file   Social History Narrative     Not on file     Social Determinants of Health     Financial Resource Strain:      Difficulty of Paying Living Expenses:    Food Insecurity:      Worried About Running Out of Food in the Last Year:      Ran Out of Food in the Last Year:    Transportation Needs:      Lack of Transportation (Medical):      Lack of Transportation (Non-Medical):    Physical Activity:      Days of Exercise per Week:      Minutes of Exercise per Session:    Stress:      Feeling of Stress :    Social Connections:       Frequency of Communication with Friends and Family:      Frequency of Social Gatherings with Friends and Family:      Attends [employer] Services:      Active Member of Clubs or Organizations:      Attends Club or Organization Meetings:      Marital Status:    Intimate Partner Violence:      Fear of Current or Ex-Partner:      Emotionally Abused:      Physically Abused:      Sexually Abused:        PAST MEDICAL HISTORY:   Past Medical History:   Diagnosis Date     CHF (congestive heart failure) (H)      GERD (gastroesophageal reflux disease)      Hypertension         PAST SURGICAL HISTORY:   Past Surgical History:   Procedure Laterality Date     DISSECTION RADICAL NECK BILATERAL Bilateral 8/25/2020    Procedure: Bilateral neck dissection;  Surgeon: Caron Wang MD;  Location: [facility] OR     ENDOSCOPIC INSERTION TUBE GASTROSTOMY N/A 8/25/2020    Procedure: INSERTION, PEG TUBE;  Surgeon: Reuben Hastings MD;  Location: [facility] OR     HERNIA REPAIR, INGUINAL RT/LT       HERNIA REPAIR, UMBILICAL       LARYNGECTOMY N/A 8/25/2020    Procedure: TOTAL LARYNGECTOMY WITH LEFT HEMITHYROIDECTOMY;  Surgeon: Caron Wang MD;  Location: [facility] OR     LARYNGOSCOPY N/A 8/25/2020    Procedure: DIRECT LARYNGOSCOPY;  Surgeon: Caron Wang MD;  Location: [facility] OR     LARYNGOSCOPY WITH BIOPSY(IES) N/A 8/3/2020    Procedure: LARYNGOSCOPY WITH BIOPSY, TRACHEOSTOMY, NG TUBE PLACEMENT;  Surgeon: Caron Wang MD;  Location: [facility] OR     ODONTECTOMY N/A 8/25/2020    Procedure: DENTAL EXTRACTION OF TEETH x 13;  Surgeon: Caron Wang MD;  Location: [facility] OR     TRACHEOSTOMY N/A 8/3/2020    Procedure: TRACHEOSTOMY;  Surgeon: Caron Wang MD;  Location: [facility] OR       FAMILY HISTORY:    Family History   Problem Relation Age of Onset     Anesthesia Reaction No family hx of      Deep Vein Thrombosis (DVT) No family hx of        REVIEW OF SYSTEMS:  12 point ROS was negative other than the symptoms noted above in the HPI.  Patient  "Supplied Answers to Review of Systems   ENT ROS 9/4/2020   Constitutional -   Neurology Dizzy spells   Ears, Nose, Throat Ringing/noise in ears   Cardiopulmonary -   Gastrointestinal/Genitourinary -         PHYSICAL EXAMINATION:   Pulse 72   Temp 97.4  F (36.3  C) (Temporal)   Ht 1.753 m (5' 9\")   Wt 83.5 kg (184 lb)   SpO2 100%   BMI 27.17 kg/m     Appearance:   normal; NAD, age-appropriate appearance, well-developed, normal habitus   Communication:   aphonic, communicates with writing and mouthing words   Head/Face:   inspection -  Normal; no scars or visible lesions   Salivary glands -  Normal size, no tenderness, swelling, or palpable masses   Facial strength -  Normal and symmetric    Skin:  normal, no rash   Ears:  auricle (AD) -  normal  EAC (AD) -  normal  TM (AD) -  Normal, no effusion  auricle (AS) -  normal  EAC (AS) - normal, small amount of cerumen  TM (AS) -  normal  Normal clinical speech reception   Nose:  Ext. inspection -  Normal  Internal Inspection -  Normal mucosa, septum, and turbinates   Nasopharynx:  normal mucosa, no masses   Oral Cavity:  lips -  Normal mucosa, oral competence, and stoma size   Edentulous, healthy gingival mucosa, one exposed tooth root on left maxillary alveolus   Hard palate, buccal, floor of mouth mucosa normal   Tongue - normal movement, no lesions   Oropharynx:  mucosa -  Normal, no lesions  soft palate -  Normal, no lesions, no asymmetry, normal elevation   Neoharynx:  Normal mucosa, no masses  neovallecula clear  Shelf at site of previous epiglottis   Neck: Well healed apron neck incision  No palpable masses  Stoma patent but deep, minimal crusting removed, no bleeding  Normal range of motion  Scar band bilaterally along SCM   Lymphatic:  no abnormal nodes   Cardiovascular:  warm, pink, well-perfused extremities without swelling, tenderness, or edema   Respiratory:  Normal respiratory effort, no stridor   Neuro/Psych.:  mood/affect -  normal  mental status -  " normal       PROCEDURES:   Flexible fiberoptic laryngoscopy: Scope exam was indicated due to laryngeal cancer. Verbal consent was obtained. The nasal cavity was prepped with an aerosolized solution of topical anesthetic and vasoconstrictive agent. The scope was passed through the anterior nasal cavity and advanced. Inspection of the nasopharynx revealed no gross abnormality. The base of tongue and neovallecula are normal. There is a shelf at the site of the previous epiglottis. The neopharynx is clear with healthy appearing mucosa and no masses.  Procedure tolerated well with no immediate complications noted.    Flexible tracheoscopy: The flexible scope was passed through the stoma and down to the primary bronchi. There is no crusting, no dry mucosa, no bleeding. There were a few secretions present in the primary bronchi. The airway was patent. Patient tolerated the procedure well with no immediate complications.       RESULTS REVIEWED:       IMPRESSION AND PLAN:   Evin Sterling is a 71-year-old man with a T4aN0 SCC of the larynx. He underwent a total laryngectomy with bilateral neck dissections on 8/25/2020. He was recommended for postoperative radiation but decided against treatment.    He is doing well with no signs of recurrent disease. He has adjusted to the laryngectomy well.     He had repeat imaging in July 2021. We will repeat imaging in January 2022.    He did have a hemithyroidectomy performed with his laryngectomy.  He had his TSH checked in July 2021. We will recheck this in January 2022.     I will see him back in 3 months with labs and imaging.      Thank you very much for the opportunity to participate in the care of your patient.      Caron Wang MD, M.D.  Otolaryngology- Head & Neck Surgery      This note was dictated with voice recognition software and then edited. Please excuse any unintentional errors.       CC:  Kyaw Blankenship MD  [address]  [address]      Vineet Gonzales MD  Department of Radiation Oncology  [facility]

## 2021-12-20 ENCOUNTER — TELEPHONE (OUTPATIENT)
Dept: OTOLARYNGOLOGY | Facility: CLINIC | Age: 71
End: 2021-12-20
Payer: MEDICARE

## 2021-12-20 NOTE — TELEPHONE ENCOUNTER
[facility] Call Center    Phone Message    May a detailed message be left on voicemail: yes     Reason for Call: Other: Pt is having upcoming cataract surgery, requiring anesthesia. Pt had a laryngectomy about a year ago and pt's daughter Jesica would like to know if there are any precautions or anything that would need to take place. Please call pt's daughter Jesica to discuss. Thank you.     Action Taken: Message routed to:  [facility] ([facility] ENT    Travel Screening: Not Applicable

## 2021-12-21 NOTE — TELEPHONE ENCOUNTER
Returned call to patient's daughter with update that Dr. Wang has no concerns with patient's airway for the need for anesthesia for his cataract surgery. Reminded daughter to ensure they are aware that patient breaths through the laryngectomy stoma and they cannot place nasal cannula in nose as patient is not a mouth breather. Daughter will remind surgeon team of this and will contact writer with any further questions or concerns.       Felisha Cronin RN, BSN

## 2022-01-30 ENCOUNTER — HEALTH MAINTENANCE LETTER (OUTPATIENT)
Age: 72
End: 2022-01-30

## 2022-03-04 NOTE — PROGRESS NOTES
Dear Dr. Blankenship:    I had the pleasure of seeing Evin Sterling in follow-up today at the [facility].     History of Present Illness:   Evin Sterling is a 71 year old man with a T4aN0 SCC of the larynx. The patient has about a 4 year history of progressive hoarseness. He was potentially seen by an ENT locally in [facility] and diagnosed with reflux. He had insurance issues and was not able to afford the medication. He had progressive voice changes. He saw Dr Blankenship on 7/28/2020 for consultation at which time he had biphasic stridor and on scope exam was noted to have a large exophytic mass of the glottis without mobility of either cord and a glottic opening of about 5 mm.     He was taken to the OR on 8/3/2020 for an awake trach with DL. Biopsies were consistent with SCC. He had a PET scan which showed the tumor in the larynx with thyroid cartilage erosion but no obvious mariel disease. The patient demonstrated poor swallow function with aspiration on swallow study. Given these findings he was recommended surgery followed by adjuvant radiation.    He was taken to the OR on 8/25/2020 for a DL, total laryngectomy, left hemithyroidectomy, bilateral neck dissections, cricopharyngeal myotomy, dental extractions.. He had PEG tube placement performed by thoracic surgery at the time of surgery. His postoperative course was uncomplicated. His final pathology demonstrated a 2.5 cm invasive moderately differentiated SCC, 2.5 cm involving the right and left cords, invading the thyroid cartilage and focally extending to the soft tissues, benign thyroid, angiolymphatic invasion present, no PNI, negative margins, 0/86 lymph nodes.  His case was reviewed at tumor conference with recommendation for postoperative radiation. The patient decided against radiation. He was started on an oral diet in October 2020.  He had a negative posttreatment PET scan in November 2020. He had his PEG removed by thoracic  surgery in December 2020.      Interval history:  He comes in today for follow-up. He was last seen in clinic in October 2021. He comes in with labs and imaging. His TSH is mildly elevated, T4 normal. His CT chest was negative at the time of the visit, CT neck was pending. He says things are going well. He says the cold dry winter air has been a problem. He is not using the HME all the time. He is using a humidifier in his room. He is having some issues with thick phlegm. He is otherwise not having any crusting of the stoma. He did have some blood in the phlegm but they have resolved. He says eating and drinking is going pretty well. He has to eat slowly. He is going to get dentures. He has sticking of food only when eating too quickly. He has no pain anywhere. He says he has some strange sensations in his throat. He recently had cataract surgery. He says his vision is better. Energy is good. He is working and getting stronger. He is gaining some weight.       He is accompanied by his daughter.        MEDICATIONS:     Current Outpatient Medications   Medication Sig Dispense Refill     carboxymethylcellulose PF (REFRESH PLUS) 0.5 % ophthalmic solution Place 1 drop into both eyes 4 times daily as needed for dry eyes 1 Bottle 3     metoprolol tartrate (LOPRESSOR) 25 MG tablet 0.5 tablets (12.5 mg) by Per G Tube route 2 times daily 30 tablet 0     prednisoLONE acetate (PRED MILD) 0.12 % ophthalmic suspension 1-2 drops 4 times daily       spironolactone (ALDACTONE) 25 MG tablet Take 12.5 mg by mouth daily       lisinopril (ZESTRIL) 10 MG tablet 1 tablet (10 mg) by Per G Tube route daily for 14 days 14 tablet 0       ALLERGIES:    Allergies   Allergen Reactions     Sulfa Drugs      Other reaction(s): Fever       HABITS/SOCIAL HISTORY:   Previous alcoholic. Former smoker, quit 12 years ago, previously 1/2 ppd.  Some chewing tobacco use, quit about 30 years ago.    Lives with his son-in-law's father.    Previously worked in  construction.    Social History     Socioeconomic History     Marital status:      Spouse name: Not on file     Number of children: Not on file     Years of education: Not on file     Highest education level: Not on file   Occupational History     Not on file   Tobacco Use     Smoking status: Former Smoker     Packs/day: 1.00     Years: 20.00     Pack years: 20.00     Quit date:      Years since quittin.1     Smokeless tobacco: Former User   Substance and Sexual Activity     Alcohol use: Not Currently     Drug use: Not Currently     Sexual activity: Not on file   Other Topics Concern     Not on file   Social History Narrative     Not on file     Social Determinants of Health     Financial Resource Strain: Not on file   Food Insecurity: Not on file   Transportation Needs: Not on file   Physical Activity: Not on file   Stress: Not on file   Social Connections: Not on file   Intimate Partner Violence: Not on file   Housing Stability: Not on file       PAST MEDICAL HISTORY:   Past Medical History:   Diagnosis Date     CHF (congestive heart failure) (H)      GERD (gastroesophageal reflux disease)      Hypertension         PAST SURGICAL HISTORY:   Past Surgical History:   Procedure Laterality Date     DISSECTION RADICAL NECK BILATERAL Bilateral 2020    Procedure: Bilateral neck dissection;  Surgeon: Caron Wang MD;  Location: [facility]     ENDOSCOPIC INSERTION TUBE GASTROSTOMY N/A 2020    Procedure: INSERTION, PEG TUBE;  Surgeon: Reuben Hastings MD;  [facility]     HERNIA REPAIR, INGUINAL RT/LT       HERNIA REPAIR, UMBILICAL       LARYNGECTOMY N/A 2020    Procedure: TOTAL LARYNGECTOMY WITH LEFT HEMITHYROIDECTOMY;  Surgeon: Caron Wang MD;  [facility]     LARYNGOSCOPY N/A 2020    Procedure: DIRECT LARYNGOSCOPY;  Surgeon: Caron Wang MD;  [facility]     LARYNGOSCOPY WITH BIOPSY(IES) N/A 8/3/2020    Procedure: LARYNGOSCOPY WITH BIOPSY, TRACHEOSTOMY, NG  "TUBE PLACEMENT;  Surgeon: Caron Wang MD;  [facility]     ODONTECTOMY N/A 8/25/2020    Procedure: DENTAL EXTRACTION OF TEETH x 13;  Surgeon: Caron Wang MD;  [facility]     TRACHEOSTOMY N/A 8/3/2020    Procedure: TRACHEOSTOMY;  Surgeon: Caron Wang MD;  [facility]       FAMILY HISTORY:    Family History   Problem Relation Age of Onset     Anesthesia Reaction No family hx of      Deep Vein Thrombosis (DVT) No family hx of        REVIEW OF SYSTEMS:  12 point ROS was negative other than the symptoms noted above in the HPI.  Patient Supplied Answers to Review of Systems  [facility] ROS 3/7/2022   Constitutional -   Neurology -   Ears, Nose, Throat -   Cardiopulmonary -   Gastrointestinal/Genitourinary -   Musculoskeletal Sore or stiff joints   Endocrine Frequent urination         PHYSICAL EXAMINATION:   BP (!) 151/92   Pulse 66   Temp 98  F (36.7  C)   Ht 1.753 m (5' 9\")   Wt 83.9 kg (185 lb)   BMI 27.32 kg/m     Appearance:   normal; NAD, age-appropriate appearance, well-developed, normal habitus   Communication:   aphonic, communicates with writing and mouthing words   Head/Face:   inspection -  Normal; no scars or visible lesions   Salivary glands -  Normal size, no tenderness, swelling, or palpable masses   Facial strength -  Normal and symmetric    Skin:  normal, no rash   Ears:  auricle (AD) -  normal  EAC (AD) -  normal  TM (AD) -  Normal, no effusion  auricle (AS) -  normal  EAC (AS) - normal  TM (AS) -  normal  Normal clinical speech reception   Nose:  Ext. inspection -  Normal  Internal Inspection -  Normal mucosa, septum, and turbinates   Nasopharynx:  normal mucosa, no masses   Oral Cavity:  lips -  Normal mucosa, oral competence, and stoma size   Edentulous, healthy gingival mucosa, one exposed tooth root on left maxillary alveolus   Hard palate, buccal, floor of mouth mucosa normal   Tongue - normal movement, no lesions   Oropharynx:  mucosa -  Normal, no " lesions  soft palate -  Normal, no lesions, no asymmetry, normal elevation  Base of tongue - normal   Neoharynx:  Normal mucosa, no masses  neovallecula clear  Shelf at site of previous epiglottis   Neck: Well healed apron neck incision  No palpable masses  Stoma patent but deep, minimal crusting removed at the stoma edge, no bleeding  Normal range of motion  Scar band bilaterally along SCM   Lymphatic:  no abnormal nodes   Cardiovascular:  warm, pink, well-perfused extremities without swelling, tenderness, or edema   Respiratory:  Normal respiratory effort, no stridor   Neuro/Psych.:  mood/affect -  normal  mental status -  normal       PROCEDURES:   Flexible fiberoptic laryngoscopy: Scope exam was indicated due to laryngeal cancer. Verbal consent was obtained. The nasal cavity was prepped with an aerosolized solution of topical anesthetic and vasoconstrictive agent. The scope was passed through the anterior nasal cavity and advanced. Inspection of the nasopharynx revealed no gross abnormality. The base of tongue and neovallecula are normal. There is a shelf at the site of the previous epiglottis. The neopharynx is clear with healthy appearing mucosa and no masses.  Procedure tolerated well with no immediate complications noted.    Flexible tracheoscopy: The flexible scope was passed through the stoma and down to the primary bronchi. There is no crusting, no dry mucosa, no bleeding. There are no secretions. The airway was patent. Patient tolerated the procedure well with no immediate complications.       RESULTS REVIEWED:   TSH mildly elevated, T4 normal    CT neck and chest imaging independently reviewed     CT chest report reviewed      IMPRESSION AND PLAN:   Evin Sterling is a 71-year-old man with a T4aN0 SCC of the larynx. He underwent a total laryngectomy with bilateral neck dissections on 8/25/2020. He was recommended for postoperative radiation but decided against treatment.    He is doing well with no signs  of recurrent disease. He has adjusted to the laryngectomy well and is very happy with his outcome and his current quality of life.    He had repeat imaging today. CT chest was read - He has pulmonary artery enlargement. His daughter notes that he has a history of CHF with meds managed by PCP, never seen cardiology. Will refer to cardiology to evaluate for possible pulmonary hypertension. We will call him with the results of the CT neck. We will repeat scans in 6 months then go to yearly. After visit, CT neck results reviewed - stable meningioma, reviewed by Dr Gonzales and Dr Cardoso - will get MRI brain w/ and w/o contrast at next set of imaging to further evaluate.    He did have a hemithyroidectomy performed with his laryngectomy.  He had his TSH checked today which was mildly elevated, T4 normal. He has no hypothyroidism symptoms. We will recheck in September 2022, anticipate that at some point he will need a to start synthroid.     I will see him back in 3 months.      Thank you very much for the opportunity to participate in the care of your patient.      Caron Wang MD  Otolaryngology- Head & Neck Surgery      This note was dictated with voice recognition software and then edited. Please excuse any unintentional errors.       A total of 31 minutes was spent on patient visit and charting activities on the date of service, excluding procedures.          CC:  Kyaw Blankenship MD  [address] [address]      Vineet Gonzales MD  Department of Radiation Oncology  [facility]

## 2022-03-07 ENCOUNTER — ANCILLARY PROCEDURE (OUTPATIENT)
Dept: CT IMAGING | Facility: CLINIC | Age: 72
End: 2022-03-07
Attending: OTOLARYNGOLOGY
Payer: COMMERCIAL

## 2022-03-07 ENCOUNTER — LAB (OUTPATIENT)
Dept: LAB | Facility: CLINIC | Age: 72
End: 2022-03-07
Payer: COMMERCIAL

## 2022-03-07 ENCOUNTER — OFFICE VISIT (OUTPATIENT)
Dept: OTOLARYNGOLOGY | Facility: CLINIC | Age: 72
End: 2022-03-07
Payer: COMMERCIAL

## 2022-03-07 VITALS
SYSTOLIC BLOOD PRESSURE: 151 MMHG | WEIGHT: 185 LBS | HEART RATE: 66 BPM | TEMPERATURE: 98 F | BODY MASS INDEX: 27.4 KG/M2 | DIASTOLIC BLOOD PRESSURE: 92 MMHG | HEIGHT: 69 IN

## 2022-03-07 DIAGNOSIS — C32.9 LARYNGEAL CANCER (H): ICD-10-CM

## 2022-03-07 DIAGNOSIS — I27.20 PULMONARY HYPERTENSION (H): ICD-10-CM

## 2022-03-07 DIAGNOSIS — E03.4 HYPOTHYROIDISM DUE TO ACQUIRED ATROPHY OF THYROID: ICD-10-CM

## 2022-03-07 DIAGNOSIS — C32.9 LARYNGEAL CANCER (H): Primary | ICD-10-CM

## 2022-03-07 LAB
T4 FREE SERPL-MCNC: 1.09 NG/DL (ref 0.76–1.46)
TSH SERPL DL<=0.005 MIU/L-ACNC: 6.04 MU/L (ref 0.4–4)

## 2022-03-07 PROCEDURE — 84439 ASSAY OF FREE THYROXINE: CPT | Performed by: PATHOLOGY

## 2022-03-07 PROCEDURE — 84443 ASSAY THYROID STIM HORMONE: CPT | Performed by: PATHOLOGY

## 2022-03-07 PROCEDURE — 70491 CT SOFT TISSUE NECK W/DYE: CPT | Mod: GC | Performed by: RADIOLOGY

## 2022-03-07 PROCEDURE — 71260 CT THORAX DX C+: CPT | Mod: GC | Performed by: RADIOLOGY

## 2022-03-07 PROCEDURE — 36415 COLL VENOUS BLD VENIPUNCTURE: CPT | Performed by: PATHOLOGY

## 2022-03-07 PROCEDURE — 99214 OFFICE O/P EST MOD 30 MIN: CPT | Mod: 25 | Performed by: OTOLARYNGOLOGY

## 2022-03-07 PROCEDURE — 31575 DIAGNOSTIC LARYNGOSCOPY: CPT | Mod: 51 | Performed by: OTOLARYNGOLOGY

## 2022-03-07 PROCEDURE — 31615 TRCHEOBRNCHSC EST TRACHS INC: CPT | Performed by: OTOLARYNGOLOGY

## 2022-03-07 RX ORDER — IOPAMIDOL 755 MG/ML
90 INJECTION, SOLUTION INTRAVASCULAR ONCE
Status: COMPLETED | OUTPATIENT
Start: 2022-03-07 | End: 2022-03-07

## 2022-03-07 RX ADMIN — IOPAMIDOL 90 ML: 755 INJECTION, SOLUTION INTRAVASCULAR at 07:54

## 2022-03-07 ASSESSMENT — PAIN SCALES - GENERAL: PAINLEVEL: NO PAIN (0)

## 2022-03-07 NOTE — NURSING NOTE
"Chief Complaint   Patient presents with     RECHECK     3 month follow up      Blood pressure (!) 151/92, pulse 66, temperature 98  F (36.7  C), height 1.753 m (5' 9\"), weight 83.9 kg (185 lb).    Yoseph Yee LPN    "

## 2022-03-07 NOTE — LETTER
3/7/2022       RE: Evin Sterling  [address] [facility] [address]     Dear Colleague,    Thank you for referring your patient, Evin Sterling, to the [facility] [place] at [facility]. Please see a copy of my visit note below.    Dear Dr. Blankenship:    I had the pleasure of seeing Evin Sterling in follow-up today at the [facility].     History of Present Illness:   Evin Sterling is a 71 year old man with a T4aN0 SCC of the larynx. The patient has about a 4 year history of progressive hoarseness. He was potentially seen by an ENT locally in [facility] and diagnosed with reflux. He had insurance issues and was not able to afford the medication. He had progressive voice changes. He saw Dr Blankenship on 7/28/2020 for consultation at which time he had biphasic stridor and on scope exam was noted to have a large exophytic mass of the glottis without mobility of either cord and a glottic opening of about 5 mm.     He was taken to the OR on 8/3/2020 for an awake trach with DL. Biopsies were consistent with SCC. He had a PET scan which showed the tumor in the larynx with thyroid cartilage erosion but no obvious mariel disease. The patient demonstrated poor swallow function with aspiration on swallow study. Given these findings he was recommended surgery followed by adjuvant radiation.    He was taken to the OR on 8/25/2020 for a DL, total laryngectomy, left hemithyroidectomy, bilateral neck dissections, cricopharyngeal myotomy, dental extractions.. He had PEG tube placement performed by thoracic surgery at the time of surgery. His postoperative course was uncomplicated. His final pathology demonstrated a 2.5 cm invasive moderately differentiated SCC, 2.5 cm involving the right and left cords, invading the thyroid cartilage and focally extending to the soft tissues, benign thyroid, angiolymphatic invasion present, no PNI,  negative margins, 0/86 lymph nodes.  His case was reviewed at tumor conference with recommendation for postoperative radiation. The patient decided against radiation. He was started on an oral diet in October 2020.  He had a negative posttreatment PET scan in November 2020. He had his PEG removed by thoracic surgery in December 2020.      Interval history:  He comes in today for follow-up. He was last seen in clinic in October 2021. He comes in with labs and imaging. His TSH is mildly elevated, T4 normal. His CT chest was negative at the time of the visit, CT neck was pending. He says things are going well. He says the cold dry winter air has been a problem. He is not using the HME all the time. He is using a humidifier in his room. He is having some issues with thick phlegm. He is otherwise not having any crusting of the stoma. He did have some blood in the phlegm but they have resolved. He says eating and drinking is going pretty well. He has to eat slowly. He is going to get dentures. He has sticking of food only when eating too quickly. He has no pain anywhere. He says he has some strange sensations in his throat. He recently had cataract surgery. He says his vision is better. Energy is good. He is working and getting stronger. He is gaining some weight.       He is accompanied by his daughter.        MEDICATIONS:     Current Outpatient Medications   Medication Sig Dispense Refill     carboxymethylcellulose PF (REFRESH PLUS) 0.5 % ophthalmic solution Place 1 drop into both eyes 4 times daily as needed for dry eyes 1 Bottle 3     metoprolol tartrate (LOPRESSOR) 25 MG tablet 0.5 tablets (12.5 mg) by Per G Tube route 2 times daily 30 tablet 0     prednisoLONE acetate (PRED MILD) 0.12 % ophthalmic suspension 1-2 drops 4 times daily       spironolactone (ALDACTONE) 25 MG tablet Take 12.5 mg by mouth daily       lisinopril (ZESTRIL) 10 MG tablet 1 tablet (10 mg) by Per G Tube route daily for 14 days 14 tablet 0        ALLERGIES:    Allergies   Allergen Reactions     Sulfa Drugs      Other reaction(s): Fever       HABITS/SOCIAL HISTORY:   Previous alcoholic. Former smoker, quit 12 years ago, previously 1/2 ppd.  Some chewing tobacco use, quit about 30 years ago.    Lives with his son-in-law's father.    Previously worked in construction.    Social History     Socioeconomic History     Marital status:      Spouse name: Not on file     Number of children: Not on file     Years of education: Not on file     Highest education level: Not on file   Occupational History     Not on file   Tobacco Use     Smoking status: Former Smoker     Packs/day: 1.00     Years: 20.00     Pack years: 20.00     Quit date:      Years since quittin.1     Smokeless tobacco: Former User   Substance and Sexual Activity     Alcohol use: Not Currently     Drug use: Not Currently     Sexual activity: Not on file   Other Topics Concern     Not on file   Social History Narrative     Not on file     Social Determinants of Health     Financial Resource Strain: Not on file   Food Insecurity: Not on file   Transportation Needs: Not on file   Physical Activity: Not on file   Stress: Not on file   Social Connections: Not on file   Intimate Partner Violence: Not on file   Housing Stability: Not on file       PAST MEDICAL HISTORY:   Past Medical History:   Diagnosis Date     CHF (congestive heart failure) (H)      GERD (gastroesophageal reflux disease)      Hypertension         PAST SURGICAL HISTORY:   Past Surgical History:   Procedure Laterality Date     DISSECTION RADICAL NECK BILATERAL Bilateral 2020    Procedure: Bilateral neck dissection;  Surgeon: Caron Wang MD;  [facility]     ENDOSCOPIC INSERTION TUBE GASTROSTOMY N/A 2020    Procedure: INSERTION, PEG TUBE;  Surgeon: Reuben Hastings MD;  [facility]     HERNIA REPAIR, INGUINAL RT/LT       HERNIA REPAIR, UMBILICAL       LARYNGECTOMY N/A 2020    Procedure: TOTAL  "LARYNGECTOMY WITH LEFT HEMITHYROIDECTOMY;  Surgeon: Caron Wang MD;  [facility]     LARYNGOSCOPY N/A 8/25/2020    Procedure: DIRECT LARYNGOSCOPY;  Surgeon: Caron Wang MD;  [facility]     LARYNGOSCOPY WITH BIOPSY(IES) N/A 8/3/2020    Procedure: LARYNGOSCOPY WITH BIOPSY, TRACHEOSTOMY, NG TUBE PLACEMENT;  Surgeon: Caron Wang MD;  [facility]     ODONTECTOMY N/A 8/25/2020    Procedure: DENTAL EXTRACTION OF TEETH x 13;  Surgeon: Caron Wang MD;  [facility]     TRACHEOSTOMY N/A 8/3/2020    Procedure: TRACHEOSTOMY;  Surgeon: Caron Wang MD;  [facility]       FAMILY HISTORY:    Family History   Problem Relation Age of Onset     Anesthesia Reaction No family hx of      Deep Vein Thrombosis (DVT) No family hx of        REVIEW OF SYSTEMS:  12 point ROS was negative other than the symptoms noted above in the HPI.  Patient Supplied Answers to Review of Systems  [facility] ROS 3/7/2022   Constitutional -   Neurology -   Ears, Nose, Throat -   Cardiopulmonary -   Gastrointestinal/Genitourinary -   Musculoskeletal Sore or stiff joints   Endocrine Frequent urination         PHYSICAL EXAMINATION:   BP (!) 151/92   Pulse 66   Temp 98  F (36.7  C)   Ht 1.753 m (5' 9\")   Wt 83.9 kg (185 lb)   BMI 27.32 kg/m     Appearance:   normal; NAD, age-appropriate appearance, well-developed, normal habitus   Communication:   aphonic, communicates with writing and mouthing words   Head/Face:   inspection -  Normal; no scars or visible lesions   Salivary glands -  Normal size, no tenderness, swelling, or palpable masses   Facial strength -  Normal and symmetric    Skin:  normal, no rash   Ears:  auricle (AD) -  normal  EAC (AD) -  normal  TM (AD) -  Normal, no effusion  auricle (AS) -  normal  EAC (AS) - normal  TM (AS) -  normal  Normal clinical speech reception   Nose:  Ext. inspection -  Normal  Internal Inspection -  Normal mucosa, septum, and turbinates   Nasopharynx:  normal " mucosa, no masses   Oral Cavity:  lips -  Normal mucosa, oral competence, and stoma size   Edentulous, healthy gingival mucosa, one exposed tooth root on left maxillary alveolus   Hard palate, buccal, floor of mouth mucosa normal   Tongue - normal movement, no lesions   Oropharynx:  mucosa -  Normal, no lesions  soft palate -  Normal, no lesions, no asymmetry, normal elevation  Base of tongue - normal   Neoharynx:  Normal mucosa, no masses  neovallecula clear  Shelf at site of previous epiglottis   Neck: Well healed apron neck incision  No palpable masses  Stoma patent but deep, minimal crusting removed at the stoma edge, no bleeding  Normal range of motion  Scar band bilaterally along SCM   Lymphatic:  no abnormal nodes   Cardiovascular:  warm, pink, well-perfused extremities without swelling, tenderness, or edema   Respiratory:  Normal respiratory effort, no stridor   Neuro/Psych.:  mood/affect -  normal  mental status -  normal       PROCEDURES:   Flexible fiberoptic laryngoscopy: Scope exam was indicated due to laryngeal cancer. Verbal consent was obtained. The nasal cavity was prepped with an aerosolized solution of topical anesthetic and vasoconstrictive agent. The scope was passed through the anterior nasal cavity and advanced. Inspection of the nasopharynx revealed no gross abnormality. The base of tongue and neovallecula are normal. There is a shelf at the site of the previous epiglottis. The neopharynx is clear with healthy appearing mucosa and no masses.  Procedure tolerated well with no immediate complications noted.    Flexible tracheoscopy: The flexible scope was passed through the stoma and down to the primary bronchi. There is no crusting, no dry mucosa, no bleeding. There are no secretions. The airway was patent. Patient tolerated the procedure well with no immediate complications.       RESULTS REVIEWED:   TSH mildly elevated, T4 normal    CT neck and chest imaging independently reviewed     CT  chest report reviewed      IMPRESSION AND PLAN:   Evin Sterling is a 71-year-old man with a T4aN0 SCC of the larynx. He underwent a total laryngectomy with bilateral neck dissections on 8/25/2020. He was recommended for postoperative radiation but decided against treatment.    He is doing well with no signs of recurrent disease. He has adjusted to the laryngectomy well and is very happy with his outcome and his current quality of life.    He had repeat imaging today. CT chest was read - He has pulmonary artery enlargement. His daughter notes that he has a history of CHF with meds managed by PCP, never seen cardiology. Will refer to cardiology to evaluate for possible pulmonary hypertension. We will call him with the results of the CT neck. We will repeat scans in 6 months then go to yearly.     He did have a hemithyroidectomy performed with his laryngectomy.  He had his TSH checked today which was mildly elevated, T4 normal. He has no hypothyroidism symptoms. We will recheck in September 2022, anticipate that at some point he will need a to start synthroid.     I will see him back in 3 months.      Thank you very much for the opportunity to participate in the care of your patient.      Caron Wang MD  Otolaryngology- Head & Neck Surgery      This note was dictated with voice recognition software and then edited. Please excuse any unintentional errors.       A total of 31 minutes was spent on patient visit and charting activities on the date of service, excluding procedures.    CC:  Kyaw Blankenship MD  [address] [address]      Vineet Gonzales MD  Department of Radiation Oncology  [facility]

## 2022-03-11 ENCOUNTER — PATIENT OUTREACH (OUTPATIENT)
Dept: OTOLARYNGOLOGY | Facility: CLINIC | Age: 72
End: 2022-03-11
Payer: COMMERCIAL

## 2022-03-11 NOTE — TELEPHONE ENCOUNTER
Attempted to call patient but no answer and voicemail was full. Writer called and spoke with patient's daughter with the following scan results:    Impression:   1. No evidence for metastatic disease in the chest.  2. Unchanged prominent though nonenlarged mediastinal lymph nodes  since 11/8/2020.  3. Mild main pulmonary artery enlargement, 3.4 cm. This may indicate  pulmonary artery hypertension.      Impression:  Primary: NIRADS 2A: New 1.2 x 2.4 cm soft tissue density along the  posterior wall of the neopharynx. This could be due to secretions.  Correlation with direct visualization findings is recommended to rule  out a mucosal lesion.      Neck: NIRADS1}  No abnormal lymph node.      Unchanged left middle cranial fossa meningioma.      Reviewed with daughter that there are no concerns seen on scan. We will proceed with continued follow-up as scheduled in June with Dr. Wang. Advised daughter to call sooner with any further questions or concerns.     Felisha Cronin, RN, BSN

## 2022-04-01 PROBLEM — C32.9 LARYNGEAL CANCER (H): Status: ACTIVE | Noted: 2020-07-31

## 2022-07-26 NOTE — PROGRESS NOTES
Dear Dr. Blankenship:    I had the pleasure of seeing Evin Sterling in follow-up today at the [facility].     History of Present Illness:   Evin Sterling is a 72 year old man with a T4aN0 SCC of the larynx. The patient has about a 4 year history of progressive hoarseness. He was potentially seen by an ENT locally [facility] [place] [facility] and diagnosed with reflux. He had insurance issues and was not able to afford the medication. He had progressive voice changes. He saw Dr Blankenship on 7/28/2020 for consultation at which time he had biphasic stridor and on scope exam was noted to have a large exophytic mass of the glottis without mobility of either cord and a glottic opening of about 5 mm.     He was taken to the OR on 8/3/2020 for an awake trach with DL. Biopsies were consistent with SCC. He had a PET scan which showed the tumor in the larynx with thyroid cartilage erosion but no obvious mariel disease. The patient demonstrated poor swallow function with aspiration on swallow study. Given these findings he was recommended surgery followed by adjuvant radiation.    He was taken to the OR on 8/25/2020 for a DL, total laryngectomy, left hemithyroidectomy, bilateral neck dissections, cricopharyngeal myotomy, dental extractions.. He had PEG tube placement performed by thoracic surgery at the time of surgery. His postoperative course was uncomplicated. His final pathology demonstrated a 2.5 cm invasive moderately differentiated SCC, 2.5 cm involving the right and left cords, invading the thyroid cartilage and focally extending to the soft tissues, benign thyroid, angiolymphatic invasion present, no PNI, negative margins, 0/86 lymph nodes.  His case was reviewed at tumor conference with recommendation for postoperative radiation. The patient decided against radiation. He was started on an oral diet in October 2020.  He had a negative posttreatment PET scan in November 2020. He had his PEG removed by thoracic  surgery in December 2020.      Interval history:  He comes in today for follow-up. He was last seen in clinic in March 2022. He says things are going well. He has found his sweet spot for his electrolarynx and is doing well communicating and being understood. He is having issues with his alisa tube causing phlegm and coughing and was moving quite a bit in the stoma. This stopped after stopping wearing the alisa tube. He tried adhesives for his stoma but would not sit well. He is being active and feels good. He is swallowing, slower than preop and has to use water but no major issues. He is going to get a tooth extracted and then ideally will be getting dentures.    He is accompanied by his daughter.        MEDICATIONS:     Current Outpatient Medications   Medication Sig Dispense Refill     carboxymethylcellulose PF (REFRESH PLUS) 0.5 % ophthalmic solution Place 1 drop into both eyes 4 times daily as needed for dry eyes 1 Bottle 3     lidocaine, viscous, (XYLOCAINE) 2 % solution Apply topically to alisa tube prior to placement to help with cough 100 mL 11     metoprolol tartrate (LOPRESSOR) 25 MG tablet 0.5 tablets (12.5 mg) by Per G Tube route 2 times daily 30 tablet 0     prednisoLONE acetate (PRED MILD) 0.12 % ophthalmic suspension 1-2 drops 4 times daily       spironolactone (ALDACTONE) 25 MG tablet Take 12.5 mg by mouth daily       lisinopril (ZESTRIL) 10 MG tablet 1 tablet (10 mg) by Per G Tube route daily for 14 days 14 tablet 0       ALLERGIES:    Allergies   Allergen Reactions     Sulfa Drugs      Other reaction(s): Fever       HABITS/SOCIAL HISTORY:   Previous alcoholic. Former smoker, quit 12 years ago, previously 1/2 ppd.  Some chewing tobacco use, quit about 30 years ago.    Lives with his son-in-law's father.    Previously worked in construction.    Social History     Socioeconomic History     Marital status:      Spouse name: Not on file     Number of children: Not on file     Years of education:  Not on file     Highest education level: Not on file   Occupational History     Not on file   Tobacco Use     Smoking status: Former Smoker     Packs/day: 1.00     Years: 20.00     Pack years: 20.00     Quit date:      Years since quittin.5     Smokeless tobacco: Former User   Substance and Sexual Activity     Alcohol use: Not Currently     Drug use: Not Currently     Sexual activity: Not on file   Other Topics Concern     Not on file   Social History Narrative     Not on file     Social Determinants of Health     Financial Resource Strain: Not on file   Food Insecurity: Not on file   Transportation Needs: Not on file   Physical Activity: Not on file   Stress: Not on file   Social Connections: Not on file   Intimate Partner Violence: Not on file   Housing Stability: Not on file       PAST MEDICAL HISTORY:   Past Medical History:   Diagnosis Date     CHF (congestive heart failure) (H)      GERD (gastroesophageal reflux disease)      Hypertension         PAST SURGICAL HISTORY:   Past Surgical History:   Procedure Laterality Date     DISSECTION RADICAL NECK BILATERAL Bilateral 2020    Procedure: Bilateral neck dissection;  Surgeon: Caron Wang MD;  [facility]     ENDOSCOPIC INSERTION TUBE GASTROSTOMY N/A 2020    Procedure: INSERTION, PEG TUBE;  Surgeon: Reuben Hastings MD;  [facility]     HERNIA REPAIR, INGUINAL RT/LT       HERNIA REPAIR, UMBILICAL       LARYNGECTOMY N/A 2020    Procedure: TOTAL LARYNGECTOMY WITH LEFT HEMITHYROIDECTOMY;  Surgeon: Caron Wnag MD;  [facility]     LARYNGOSCOPY N/A 2020    Procedure: DIRECT LARYNGOSCOPY;  Surgeon: Caron Wang MD;  [facility]     LARYNGOSCOPY WITH BIOPSY(IES) N/A 8/3/2020    Procedure: LARYNGOSCOPY WITH BIOPSY, TRACHEOSTOMY, NG TUBE PLACEMENT;  Surgeon: Caron Wang MD;  [facility]     ODONTECTOMY N/A 2020    Procedure: DENTAL EXTRACTION OF TEETH x 13;  Surgeon: Caron Wang MD;  " [facility]     TRACHEOSTOMY N/A 8/3/2020    Procedure: TRACHEOSTOMY;  Surgeon: Caron Wang MD;  [facility]       FAMILY HISTORY:    Family History   Problem Relation Age of Onset     Anesthesia Reaction No family hx of      Deep Vein Thrombosis (DVT) No family hx of        REVIEW OF SYSTEMS:  12 point ROS was negative other than the symptoms noted above in the HPI.  Patient Supplied Answers to Review of Systems  [facility] ROS 3/7/2022   Constitutional: -   Neurology: -   Ears, Nose, Throat: -   Cardiopulmonary: -   Gastrointestinal/Genitourinary: -   Musculoskeletal: Sore or stiff joints   Endocrine: Frequent urination         PHYSICAL EXAMINATION:   BP (!) 152/82 (BP Location: Right arm, Patient Position: Sitting, Cuff Size: Adult Large)   Pulse 70   Ht 1.753 m (5' 9\")   Wt 85.8 kg (189 lb 3.2 oz)   BMI 27.94 kg/m     Appearance:   normal; NAD, age-appropriate appearance, well-developed, normal habitus   Communication:   aphonic, communicates with writing and mouthing words   Head/Face:   inspection -  Normal; no scars or visible lesions   Salivary glands -  Normal size, no tenderness, swelling, or palpable masses   Facial strength -  Normal and symmetric    Skin:  normal, no rash   Ears:  auricle (AD) -  normal  EAC (AD) -  cerumen  TM (AD) -  Unable to visualize  auricle (AS) -  normal  EAC (AS) - normal  TM (AS) -  normal  Normal clinical speech reception   Nose:  Ext. inspection -  Normal  Internal Inspection -  Normal mucosa, septum, and turbinates   Nasopharynx:  normal mucosa, no masses   Oral Cavity:  lips -  Normal mucosa, oral competence, and stoma size   Edentulous, healthy gingival mucosa, one exposed tooth root on left maxillary alveolus   Hard palate, buccal, floor of mouth mucosa normal   Tongue - normal movement, no lesions   Oropharynx:  mucosa -  Normal, no lesions  soft palate -  Normal, no lesions, no asymmetry, normal elevation  Base of tongue - normal   Neoharynx:  Normal " mucosa, no masses  neovallecula clear  Shelf at site of previous epiglottis   Neck: Well healed apron neck incision  No palpable masses  Stoma patent but deep, no crusting, no bleeding, reactive with placement of alisa tube   Normal range of motion  Scar band bilaterally along SCM   Lymphatic:  no abnormal nodes   Cardiovascular:  warm, pink, well-perfused extremities without swelling, tenderness, or edema   Respiratory:  Normal respiratory effort, no stridor   Neuro/Psych.:  mood/affect -  normal  mental status -  normal       PROCEDURES:   Flexible fiberoptic laryngoscopy: Scope exam was indicated due to laryngeal cancer. Verbal consent was obtained. The nasal cavity was prepped with an aerosolized solution of topical anesthetic and vasoconstrictive agent. The scope was passed through the anterior nasal cavity and advanced. Inspection of the nasopharynx revealed no gross abnormality. The base of tongue and neovallecula are normal. There is a shelf at the site of the previous epiglottis. The neopharynx is clear with healthy appearing mucosa and no masses.  Procedure tolerated well with no immediate complications noted.    Flexible tracheoscopy: The flexible scope was passed through the stoma and down to the primary bronchi. There is no crusting, no dry mucosa, no bleeding. There are no secretions. The airway was patent. Patient tolerated the procedure well with no immediate complications.             RESULTS REVIEWED:   Care discussed with SLP      IMPRESSION AND PLAN:   Evin Sterling is a 72-year-old man with a T4aN0 SCC of the larynx. He underwent a total laryngectomy with bilateral neck dissections on 8/25/2020. He was recommended for postoperative radiation but decided against treatment.    He is doing well with no signs of recurrent disease. He is having issues with tolerating his alisa tube with frequent coughing, phlegm and then trauma to the stoma wall. He found this improved with not wearing the alisa tube.  Because of his deep stoma the adhesives do not work. We trialed a shorter alisa tube with application of viscous lidocaine on the tube with placement which helped with the coughing issues. We discussed if the coughing issues are recurrent we can try a different size/length alisa tube and if that fails can try a alisa button. I would like him to use an HME to prevent crusting. Prescription was sent for lidocaine use at home.    He had repeat imaging in March 2022. We will repeat in September 2022 then go to yearly. We will get an MRI brain w/ and w/o contrast at next set of imaging to further evaluate mengingioma.    He did have a hemithyroidectomy performed with his laryngectomy. We will recheck TSH in September 2022.     I will see him back in 3 months with labs and imaging.    Thank you very much for the opportunity to participate in the care of your patient.      Caron Wang MD  Otolaryngology- Head & Neck Surgery      This note was dictated with voice recognition software and then edited. Please excuse any unintentional errors.       A total of 30 minutes was spent on patient visit and charting activities on the date of service, excluding procedures.          CC:  Kyaw Blankenship MD  [address] [address]      Vineet Gonzales MD  Department of Radiation Oncology  [facility]

## 2022-07-27 ENCOUNTER — OFFICE VISIT (OUTPATIENT)
Dept: OTOLARYNGOLOGY | Facility: CLINIC | Age: 72
End: 2022-07-27
Payer: COMMERCIAL

## 2022-07-27 ENCOUNTER — THERAPY VISIT (OUTPATIENT)
Dept: SPEECH THERAPY | Facility: CLINIC | Age: 72
End: 2022-07-27

## 2022-07-27 VITALS
DIASTOLIC BLOOD PRESSURE: 82 MMHG | BODY MASS INDEX: 28.02 KG/M2 | WEIGHT: 189.2 LBS | SYSTOLIC BLOOD PRESSURE: 152 MMHG | HEART RATE: 70 BPM | HEIGHT: 69 IN

## 2022-07-27 DIAGNOSIS — D32.9 MENINGIOMA (H): ICD-10-CM

## 2022-07-27 DIAGNOSIS — C32.9 LARYNGEAL CANCER (H): Primary | ICD-10-CM

## 2022-07-27 DIAGNOSIS — E03.4 HYPOTHYROIDISM DUE TO ACQUIRED ATROPHY OF THYROID: ICD-10-CM

## 2022-07-27 DIAGNOSIS — R49.1 APHONIA: ICD-10-CM

## 2022-07-27 PROCEDURE — 99207 PR NO CHARGE LOS: CPT | Performed by: SPEECH-LANGUAGE PATHOLOGIST

## 2022-07-27 PROCEDURE — 99214 OFFICE O/P EST MOD 30 MIN: CPT | Mod: 25 | Performed by: OTOLARYNGOLOGY

## 2022-07-27 PROCEDURE — 31615 TRCHEOBRNCHSC EST TRACHS INC: CPT | Performed by: OTOLARYNGOLOGY

## 2022-07-27 PROCEDURE — 31575 DIAGNOSTIC LARYNGOSCOPY: CPT | Mod: 51 | Performed by: OTOLARYNGOLOGY

## 2022-07-27 RX ORDER — LIDOCAINE HYDROCHLORIDE 20 MG/ML
SOLUTION OROPHARYNGEAL
Qty: 100 ML | Refills: 11 | Status: SHIPPED | OUTPATIENT
Start: 2022-07-27 | End: 2023-05-01

## 2022-07-27 ASSESSMENT — PAIN SCALES - GENERAL: PAINLEVEL: NO PAIN (0)

## 2022-07-27 NOTE — NURSING NOTE
"Chief Complaint   Patient presents with     RECHECK     3 month follow up       Blood pressure (!) 152/82, pulse 70, height 1.753 m (5' 9\"), weight 85.8 kg (189 lb 3.2 oz).    Annlaee Chambers, EMT    "

## 2022-07-27 NOTE — LETTER
7/27/2022       RE: Evin Sterling  [address] [facility] [address]     Dear Colleague,    Thank you for referring your patient, Evin Sterling, to the [facility] [place] at [facility]. Please see a copy of my visit note below.    Dear Dr. Blankenship:    I had the pleasure of seeing Evin Sterling in follow-up today at the [facility].     History of Present Illness:   Evin Sterling is a 72 year old man with a T4aN0 SCC of the larynx. The patient has about a 4 year history of progressive hoarseness. He was potentially seen by an ENT locally in [facility] and diagnosed with reflux. He had insurance issues and was not able to afford the medication. He had progressive voice changes. He saw Dr Blankenship on 7/28/2020 for consultation at which time he had biphasic stridor and on scope exam was noted to have a large exophytic mass of the glottis without mobility of either cord and a glottic opening of about 5 mm.     He was taken to the OR on 8/3/2020 for an awake trach with DL. Biopsies were consistent with SCC. He had a PET scan which showed the tumor in the larynx with thyroid cartilage erosion but no obvious mariel disease. The patient demonstrated poor swallow function with aspiration on swallow study. Given these findings he was recommended surgery followed by adjuvant radiation.    He was taken to the OR on 8/25/2020 for a DL, total laryngectomy, left hemithyroidectomy, bilateral neck dissections, cricopharyngeal myotomy, dental extractions.. He had PEG tube placement performed by thoracic surgery at the time of surgery. His postoperative course was uncomplicated. His final pathology demonstrated a 2.5 cm invasive moderately differentiated SCC, 2.5 cm involving the right and left cords, invading the thyroid cartilage and focally extending to the soft tissues, benign thyroid, angiolymphatic invasion present, no  PNI, negative margins, 0/86 lymph nodes.  His case was reviewed at tumor conference with recommendation for postoperative radiation. The patient decided against radiation. He was started on an oral diet in October 2020.  He had a negative posttreatment PET scan in November 2020. He had his PEG removed by thoracic surgery in December 2020.      Interval history:  He comes in today for follow-up. He was last seen in clinic in March 2022. He says things are going well. He has found his sweet spot for his electrolarynx and is doing well communicating and being understood. He is having issues with his alisa tube causing phlegm and coughing and was moving quite a bit in the stoma. This stopped after stopping wearing the alisa tube. He tried adhesives for his stoma but would not sit well. He is being active and feels good. He is swallowing, slower than preop and has to use water but no major issues. He is going to get a tooth extracted and then ideally will be getting dentures.    He is accompanied by his daughter.        MEDICATIONS:     Current Outpatient Medications   Medication Sig Dispense Refill     carboxymethylcellulose PF (REFRESH PLUS) 0.5 % ophthalmic solution Place 1 drop into both eyes 4 times daily as needed for dry eyes 1 Bottle 3     lidocaine, viscous, (XYLOCAINE) 2 % solution Apply topically to alisa tube prior to placement to help with cough 100 mL 11     metoprolol tartrate (LOPRESSOR) 25 MG tablet 0.5 tablets (12.5 mg) by Per G Tube route 2 times daily 30 tablet 0     prednisoLONE acetate (PRED MILD) 0.12 % ophthalmic suspension 1-2 drops 4 times daily       spironolactone (ALDACTONE) 25 MG tablet Take 12.5 mg by mouth daily       lisinopril (ZESTRIL) 10 MG tablet 1 tablet (10 mg) by Per G Tube route daily for 14 days 14 tablet 0       ALLERGIES:    Allergies   Allergen Reactions     Sulfa Drugs      Other reaction(s): Fever       HABITS/SOCIAL HISTORY:   Previous alcoholic. Former smoker, quit 12 years  ago, previously 1/2 ppd.  Some chewing tobacco use, quit about 30 years ago.    Lives with his son-in-law's father.    Previously worked in construction.    Social History     Socioeconomic History     Marital status:      Spouse name: Not on file     Number of children: Not on file     Years of education: Not on file     Highest education level: Not on file   Occupational History     Not on file   Tobacco Use     Smoking status: Former Smoker     Packs/day: 1.00     Years: 20.00     Pack years: 20.00     Quit date:      Years since quittin.5     Smokeless tobacco: Former User   Substance and Sexual Activity     Alcohol use: Not Currently     Drug use: Not Currently     Sexual activity: Not on file   Other Topics Concern     Not on file   Social History Narrative     Not on file     Social Determinants of Health     Financial Resource Strain: Not on file   Food Insecurity: Not on file   Transportation Needs: Not on file   Physical Activity: Not on file   Stress: Not on file   Social Connections: Not on file   Intimate Partner Violence: Not on file   Housing Stability: Not on file       PAST MEDICAL HISTORY:   Past Medical History:   Diagnosis Date     CHF (congestive heart failure) (H)      GERD (gastroesophageal reflux disease)      Hypertension         PAST SURGICAL HISTORY:   Past Surgical History:   Procedure Laterality Date     DISSECTION RADICAL NECK BILATERAL Bilateral 2020    Procedure: Bilateral neck dissection;  Surgeon: Caron Wang MD;  [facility]     ENDOSCOPIC INSERTION TUBE GASTROSTOMY N/A 2020    Procedure: INSERTION, PEG TUBE;  Surgeon: Reuben Hastings MD;  [facility]     HERNIA REPAIR, INGUINAL RT/LT       HERNIA REPAIR, UMBILICAL       LARYNGECTOMY N/A 2020    Procedure: TOTAL LARYNGECTOMY WITH LEFT HEMITHYROIDECTOMY;  Surgeon: Caron Wang MD;  [facility]     LARYNGOSCOPY N/A 2020    Procedure: DIRECT LARYNGOSCOPY;  Surgeon: Felipe  "Caron Singh MD;  [facility]     LARYNGOSCOPY WITH BIOPSY(IES) N/A 8/3/2020    Procedure: LARYNGOSCOPY WITH BIOPSY, TRACHEOSTOMY, NG TUBE PLACEMENT;  Surgeon: Caron Wang MD;  [facility]     ODONTECTOMY N/A 8/25/2020    Procedure: DENTAL EXTRACTION OF TEETH x 13;  Surgeon: Caron Wang MD;  [facility]     TRACHEOSTOMY N/A 8/3/2020    Procedure: TRACHEOSTOMY;  Surgeon: Caron Wang MD;  [facility]       FAMILY HISTORY:    Family History   Problem Relation Age of Onset     Anesthesia Reaction No family hx of      Deep Vein Thrombosis (DVT) No family hx of        REVIEW OF SYSTEMS:  12 point ROS was negative other than the symptoms noted above in the HPI.  Patient Supplied Answers to Review of Systems  [facility] ROS 3/7/2022   Constitutional: -   Neurology: -   Ears, Nose, Throat: -   Cardiopulmonary: -   Gastrointestinal/Genitourinary: -   Musculoskeletal: Sore or stiff joints   Endocrine: Frequent urination         PHYSICAL EXAMINATION:   BP (!) 152/82 (BP Location: Right arm, Patient Position: Sitting, Cuff Size: Adult Large)   Pulse 70   Ht 1.753 m (5' 9\")   Wt 85.8 kg (189 lb 3.2 oz)   BMI 27.94 kg/m     Appearance:   normal; NAD, age-appropriate appearance, well-developed, normal habitus   Communication:   aphonic, communicates with writing and mouthing words   Head/Face:   inspection -  Normal; no scars or visible lesions   Salivary glands -  Normal size, no tenderness, swelling, or palpable masses   Facial strength -  Normal and symmetric    Skin:  normal, no rash   Ears:  auricle (AD) -  normal  EAC (AD) -  cerumen  TM (AD) -  Unable to visualize  auricle (AS) -  normal  EAC (AS) - normal  TM (AS) -  normal  Normal clinical speech reception   Nose:  Ext. inspection -  Normal  Internal Inspection -  Normal mucosa, septum, and turbinates   Nasopharynx:  normal mucosa, no masses   Oral Cavity:  lips -  Normal mucosa, oral competence, and stoma size   Edentulous, healthy " gingival mucosa, one exposed tooth root on left maxillary alveolus   Hard palate, buccal, floor of mouth mucosa normal   Tongue - normal movement, no lesions   Oropharynx:  mucosa -  Normal, no lesions  soft palate -  Normal, no lesions, no asymmetry, normal elevation  Base of tongue - normal   Neoharynx:  Normal mucosa, no masses  neovallecula clear  Shelf at site of previous epiglottis   Neck: Well healed apron neck incision  No palpable masses  Stoma patent but deep, no crusting, no bleeding, reactive with placement of alisa tube   Normal range of motion  Scar band bilaterally along SCM   Lymphatic:  no abnormal nodes   Cardiovascular:  warm, pink, well-perfused extremities without swelling, tenderness, or edema   Respiratory:  Normal respiratory effort, no stridor   Neuro/Psych.:  mood/affect -  normal  mental status -  normal       PROCEDURES:   Flexible fiberoptic laryngoscopy: Scope exam was indicated due to laryngeal cancer. Verbal consent was obtained. The nasal cavity was prepped with an aerosolized solution of topical anesthetic and vasoconstrictive agent. The scope was passed through the anterior nasal cavity and advanced. Inspection of the nasopharynx revealed no gross abnormality. The base of tongue and neovallecula are normal. There is a shelf at the site of the previous epiglottis. The neopharynx is clear with healthy appearing mucosa and no masses.  Procedure tolerated well with no immediate complications noted.    Flexible tracheoscopy: The flexible scope was passed through the stoma and down to the primary bronchi. There is no crusting, no dry mucosa, no bleeding. There are no secretions. The airway was patent. Patient tolerated the procedure well with no immediate complications.             RESULTS REVIEWED:   Care discussed with SLP      IMPRESSION AND PLAN:   Evin Sterling is a 72-year-old man with a T4aN0 SCC of the larynx. He underwent a total laryngectomy with bilateral neck dissections on  8/25/2020. He was recommended for postoperative radiation but decided against treatment.    He is doing well with no signs of recurrent disease. He is having issues with tolerating his alisa tube with frequent coughing, phlegm and then trauma to the stoma wall. He found this improved with not wearing the alisa tube. Because of his deep stoma the adhesives do not work. We trialed a shorter alisa tube with application of viscous lidocaine on the tube with placement which helped with the coughing issues. We discussed if the coughing issues are recurrent we can try a different size/length alisa tube and if that fails can try a alisa button. I would like him to use an HME to prevent crusting. Prescription was sent for lidocaine use at home.    He had repeat imaging in March 2022. We will repeat in September 2022 then go to yearly. We will get an MRI brain w/ and w/o contrast at next set of imaging to further evaluate mengingioma.    He did have a hemithyroidectomy performed with his laryngectomy. We will recheck TSH in September 2022.     I will see him back in 3 months with labs and imaging.    Thank you very much for the opportunity to participate in the care of your patient.      Caron Wang MD  Otolaryngology- Head & Neck Surgery      This note was dictated with voice recognition software and then edited. Please excuse any unintentional errors.       A total of 30 minutes was spent on patient visit and charting activities on the date of service, excluding procedures.      CC:  Kyaw Blankenship MD  [address] [address]      Vineet Gonzales MD  Department of Radiation Oncology  [facility]

## 2022-07-27 NOTE — PROGRESS NOTES
Pt seen for brief allied health visit today per MD request. Reports he is coughing when he puts in his larytube. He thinks it is too long. Tried size 12/36 larytube today with viscous lidocaine as lubricant. This only made the patient cough once upon insertion of the larytube into the stoma. Pt's stoma is very deep set so the larytube just barely passes into the stoma. Could consider use of laryclips in the future. Pt will try 12/36 larytube first. MD will prescribe viscous lidocaine. Writer updated and faxed over new [employer] script. Provided SLP contact info and encouraged pt or daughter to call with questions/concerns.    LUBNA Gallegos (roxanna), MA, CCC-SLP   Speech Language Pathologist  [facility] Trained Vocologist   [facility]  Dept. of Otolaryngology  Department of Rehabilitation Services  [address] [place] [id number]  Email: [email or web address]   Phone: [phone]  Pronouns: she/her/hers

## 2022-09-24 ENCOUNTER — HEALTH MAINTENANCE LETTER (OUTPATIENT)
Age: 72
End: 2022-09-24

## 2022-10-29 NOTE — PROGRESS NOTES
Dear Dr. Blankenship:    I had the pleasure of seeing Evin Sterling in follow-up today at the [facility].     History of Present Illness:   Evin Sterling is a 72 year old man with a T4aN0 SCC of the larynx. The patient has about a 4 year history of progressive hoarseness. He [place] [facility] [place]. He had insurance issues and was not able to afford the medication. He had progressive voice changes. He saw Dr Blankenship on 7/28/2020 for consultation at which time he had biphasic stridor and on scope exam was noted to have a large exophytic mass of the glottis without mobility of either cord and a glottic opening of about 5 mm.     He was taken to the OR on 8/3/2020 for an awake trach with DL. Biopsies were consistent with SCC. He had a PET scan which showed the tumor in the larynx with thyroid cartilage erosion but no obvious mariel disease. The patient demonstrated poor swallow function with aspiration on swallow study. Given these findings he was recommended surgery followed by adjuvant radiation.    He was taken to the OR on 8/25/2020 for a DL, total laryngectomy, left hemithyroidectomy, bilateral neck dissections, cricopharyngeal myotomy, dental extractions.. He had PEG tube placement performed by thoracic surgery at the time of surgery. His postoperative course was uncomplicated. His final pathology demonstrated a 2.5 cm invasive moderately differentiated SCC, 2.5 cm involving the right and left cords, invading the thyroid cartilage and focally extending to the soft tissues, benign thyroid, angiolymphatic invasion present, no PNI, negative margins, 0/86 lymph nodes.  His case was reviewed at tumor conference with recommendation for postoperative radiation. The patient decided against radiation. He was started on an oral diet in October 2020.  He had a negative posttreatment PET scan in November 2020. He had his PEG removed by thoracic  surgery in December 2020.      Interval history:  He comes in today for follow-up. He was last seen in clinic in July 2022. He comes in with repeat imaging and labs. At that time he was having issues with tolerating his alisa tube, given shorter tube and viscous lidocaine to try. He says he is still coughing because the tube will rub. He is not wearing the tube at all. His eating and drinking are really good. He has to eat slowly. He just got dentures this week. He is having pain as he adjusts to it. He does just a few hours per day. He has not had the final adjustment yet. He is doing well with the electrolarynx, feels less self conscious. He is breathing well. He rarely has speck of blood from the stoma. He does saline down the stoma a few times per day. His weight is stable. He has a hard time eating enough the day. He has no pain with swallowing. He has popping of his ears sometimes. He has no other problems or concerns.         MEDICATIONS:     Current Outpatient Medications   Medication Sig Dispense Refill     carboxymethylcellulose PF (REFRESH PLUS) 0.5 % ophthalmic solution Place 1 drop into both eyes 4 times daily as needed for dry eyes 1 Bottle 3     lidocaine, viscous, (XYLOCAINE) 2 % solution Apply topically to alisa tube prior to placement to help with cough 100 mL 11     metoprolol tartrate (LOPRESSOR) 25 MG tablet 0.5 tablets (12.5 mg) by Per G Tube route 2 times daily 30 tablet 0     prednisoLONE acetate (PRED MILD) 0.12 % ophthalmic suspension 1-2 drops 4 times daily       spironolactone (ALDACTONE) 25 MG tablet Take 12.5 mg by mouth daily       lisinopril (ZESTRIL) 10 MG tablet 1 tablet (10 mg) by Per G Tube route daily for 14 days 14 tablet 0       ALLERGIES:    Allergies   Allergen Reactions     Sulfa Drugs      Other reaction(s): Fever       HABITS/SOCIAL HISTORY:   Previous alcoholic. Former smoker, quit 12 years ago, previously 1/2 ppd.  Some chewing tobacco use, quit about 30 years ago.     Lives with his son-in-law's father.    Previously worked in construction.    Social History     Socioeconomic History     Marital status:      Spouse name: Not on file     Number of children: Not on file     Years of education: Not on file     Highest education level: Not on file   Occupational History     Not on file   Tobacco Use     Smoking status: Former     Packs/day: 1.00     Years: 20.00     Pack years: 20.00     Types: Cigarettes     Quit date:      Years since quittin.8     Smokeless tobacco: Former   Substance and Sexual Activity     Alcohol use: Not Currently     Drug use: Not Currently     Sexual activity: Not on file   Other Topics Concern     Not on file   Social History Narrative     Not on file     Social Determinants of Health     Financial Resource Strain: Not on file   Food Insecurity: Not on file   Transportation Needs: Not on file   Physical Activity: Not on file   Stress: Not on file   Social Connections: Not on file   Intimate Partner Violence: Not on file   Housing Stability: Not on file       PAST MEDICAL HISTORY:   Past Medical History:   Diagnosis Date     CHF (congestive heart failure) (H)      GERD (gastroesophageal reflux disease)      Hypertension         PAST SURGICAL HISTORY:   Past Surgical History:   Procedure Laterality Date     DISSECTION RADICAL NECK BILATERAL Bilateral 2020    Procedure: Bilateral neck dissection;  Surgeon: Caron Wang MD;  [facility]: [facility]     ENDOSCOPIC INSERTION TUBE GASTROSTOMY N/A 2020    Procedure: INSERTION, PEG TUBE;  Surgeon: Reuben Hastings MD;  [facility]     HERNIA REPAIR, INGUINAL RT/LT       HERNIA REPAIR, UMBILICAL       LARYNGECTOMY N/A 2020    Procedure: TOTAL LARYNGECTOMY WITH LEFT HEMITHYROIDECTOMY;  Surgeon: Caron Wang MD;  [facility]     LARYNGOSCOPY N/A 2020    Procedure: DIRECT LARYNGOSCOPY;  Surgeon: Caron Wang MD;  [facility]     LARYNGOSCOPY WITH BIOPSY(IES)  "N/A 8/3/2020    Procedure: LARYNGOSCOPY WITH BIOPSY, TRACHEOSTOMY, NG TUBE PLACEMENT;  Surgeon: Caron Wang MD;  [facility]     ODONTECTOMY N/A 8/25/2020    Procedure: DENTAL EXTRACTION OF TEETH x 13;  Surgeon: Caron Wang MD;  [facility]     TRACHEOSTOMY N/A 8/3/2020    Procedure: TRACHEOSTOMY;  Surgeon: Caron Wang MD;  [facility]       FAMILY HISTORY:    Family History   Problem Relation Age of Onset     Anesthesia Reaction No family hx of      Deep Vein Thrombosis (DVT) No family hx of        REVIEW OF SYSTEMS:  12 point ROS was negative other than the symptoms noted above in the HPI.  Patient Supplied Answers to Review of Systems  [facility] ROS 3/7/2022   Constitutional: -   Neurology: -   Ears, Nose, Throat: -   Cardiopulmonary: -   Gastrointestinal/Genitourinary: -   Musculoskeletal: Sore or stiff joints   Endocrine: Frequent urination         PHYSICAL EXAMINATION:   BP (!) 141/96   Pulse 68   Temp 97.3  F (36.3  C)   Ht 1.753 m (5' 9\")   Wt 84.8 kg (187 lb)   SpO2 98%   BMI 27.62 kg/m     Appearance:   normal; NAD, age-appropriate appearance, well-developed, normal habitus   Communication:   aphonic, communicates with electrolarynx   Head/Face:   inspection -  Normal; no scars or visible lesions   Salivary glands -  Normal size, no tenderness, swelling, or palpable masses   Facial strength -  Normal and symmetric    Skin:  normal, no rash   Ears:  auricle (AD) -  normal  EAC (AD) -  normal  TM (AD) -  normal  auricle (AS) -  normal  EAC (AS) - normal  TM (AS) -  normal  Normal clinical speech reception   Nose:  Ext. inspection -  Normal  Internal Inspection -  Normal mucosa, septum, and turbinates   Nasopharynx:  normal mucosa, no masses   Oral Cavity:  lips -  Normal mucosa, oral competence, and stoma size   Edentulous   Hard palate, buccal, floor of mouth mucosa normal   Tongue - normal movement, no lesions   Oropharynx:  mucosa -  Normal, no lesions  soft palate - "  Normal, no lesions, no asymmetry, normal elevation  Base of tongue - normal   Neoharynx:  Normal mucosa, no masses  neovallecula clear  Shelf at site of previous epiglottis   Neck: Well healed apron neck incision  No palpable masses  Stoma patent but deep, no crusting, no bleeding   Normal range of motion  Scar band bilaterally along SCM   Lymphatic:  no abnormal nodes   Cardiovascular:  warm, pink, well-perfused extremities without swelling, tenderness, or edema   Respiratory:  Normal respiratory effort, no stridor   Neuro/Psych.:  mood/affect -  normal  mental status -  normal       PROCEDURES:   Flexible fiberoptic laryngoscopy: Scope exam was indicated due to laryngeal cancer. Verbal consent was obtained. The nasal cavity was prepped with an aerosolized solution of topical anesthetic and vasoconstrictive agent. The scope was passed through the anterior nasal cavity and advanced. Inspection of the nasopharynx revealed no gross abnormality. The base of tongue and neovallecula are normal. There is a shelf at the site of the previous epiglottis. The neopharynx is clear with healthy appearing mucosa and no masses.  Procedure tolerated well with no immediate complications noted.    Flexible tracheoscopy: The flexible scope was passed through the stoma and down to the primary bronchi. There is no crusting, no dry mucosa, no bleeding. There are few secretions in the bronchi. The airway was patent. Patient tolerated the procedure well with no immediate complications.                 RESULTS REVIEWED:   Care discussed with SLP    CT neck and chest and MRI brain images reviewed    CT neck and chest and MRI reports reviewed     TSH/T4 reviewed      IMPRESSION AND PLAN:   Evin Sterling is a 72-year-old man with a T4aN0 SCC of the larynx. He underwent a total laryngectomy with bilateral neck dissections on 8/25/2020. He was recommended for postoperative radiation but decided against treatment.    He is doing well with no  signs of recurrent disease.     He had repeat imaging today including an MRI brain w/ and w/o contrast to further evaluate mengingioma. We will send the results to [facility]. We will send referral to cardiology for the pulmonary hypertension concerns. Will refer to [facility] [place] the [place]ioma monitoring, if issues requiring intervention would have patient seen by Dr Cardoso.    He did have a hemithyroidectomy performed with his laryngectomy. His TSH was checked today. He has mild elevation in TSH, T4 normal and asymptomatic. Will recheck in 6 months.    He continues to have issues with his alisa tube despite use of viscous lidocaine, SLP will try alisa clips with him to see if this is better tolerated.    I will see him back in 6 months with labs.    Thank you very much for the opportunity to participate in the care of your patient.      Caron Wang MD  Otolaryngology- Head & Neck Surgery      This note was dictated with voice recognition software and then edited. Please excuse any unintentional errors.       A total of 30 minutes was spent on patient visit and charting activities on the date of service, excluding procedures.          CC:  Kyaw Blankenship MD  [address] [address]      Vineet Gonzales MD  Department of Radiation Oncology  [facility]

## 2022-10-31 ENCOUNTER — ANCILLARY PROCEDURE (OUTPATIENT)
Dept: CT IMAGING | Facility: CLINIC | Age: 72
End: 2022-10-31
Attending: OTOLARYNGOLOGY
Payer: COMMERCIAL

## 2022-10-31 ENCOUNTER — ANCILLARY PROCEDURE (OUTPATIENT)
Dept: MRI IMAGING | Facility: CLINIC | Age: 72
End: 2022-10-31
Attending: OTOLARYNGOLOGY
Payer: COMMERCIAL

## 2022-10-31 ENCOUNTER — OFFICE VISIT (OUTPATIENT)
Dept: OTOLARYNGOLOGY | Facility: CLINIC | Age: 72
End: 2022-10-31
Payer: COMMERCIAL

## 2022-10-31 ENCOUNTER — THERAPY VISIT (OUTPATIENT)
Dept: SPEECH THERAPY | Facility: CLINIC | Age: 72
End: 2022-10-31
Payer: COMMERCIAL

## 2022-10-31 ENCOUNTER — LAB (OUTPATIENT)
Dept: LAB | Facility: CLINIC | Age: 72
End: 2022-10-31
Payer: COMMERCIAL

## 2022-10-31 VITALS
HEIGHT: 69 IN | SYSTOLIC BLOOD PRESSURE: 141 MMHG | TEMPERATURE: 97.3 F | DIASTOLIC BLOOD PRESSURE: 96 MMHG | WEIGHT: 187 LBS | BODY MASS INDEX: 27.7 KG/M2 | OXYGEN SATURATION: 98 % | HEART RATE: 68 BPM

## 2022-10-31 DIAGNOSIS — D32.9 MENINGIOMA (H): ICD-10-CM

## 2022-10-31 DIAGNOSIS — E03.4 HYPOTHYROIDISM DUE TO ACQUIRED ATROPHY OF THYROID: ICD-10-CM

## 2022-10-31 DIAGNOSIS — C32.9 LARYNGEAL CANCER (H): ICD-10-CM

## 2022-10-31 DIAGNOSIS — C32.9 LARYNGEAL CANCER (H): Primary | ICD-10-CM

## 2022-10-31 DIAGNOSIS — R49.1 APHONIA: ICD-10-CM

## 2022-10-31 DIAGNOSIS — I27.20 PULMONARY HYPERTENSION (H): ICD-10-CM

## 2022-10-31 LAB
CREAT BLD-MCNC: 0.9 MG/DL (ref 0.7–1.3)
GFR SERPL CREATININE-BSD FRML MDRD: >60 ML/MIN/1.73M2

## 2022-10-31 PROCEDURE — 99000 SPECIMEN HANDLING OFFICE-LAB: CPT | Performed by: PATHOLOGY

## 2022-10-31 PROCEDURE — 99207 PR NO CHARGE LOS: CPT | Performed by: SPEECH-LANGUAGE PATHOLOGIST

## 2022-10-31 PROCEDURE — 84439 ASSAY OF FREE THYROXINE: CPT | Mod: 90 | Performed by: PATHOLOGY

## 2022-10-31 PROCEDURE — 70553 MRI BRAIN STEM W/O & W/DYE: CPT | Mod: GC | Performed by: RADIOLOGY

## 2022-10-31 PROCEDURE — 84443 ASSAY THYROID STIM HORMONE: CPT | Mod: GA | Performed by: OTOLARYNGOLOGY

## 2022-10-31 PROCEDURE — 99214 OFFICE O/P EST MOD 30 MIN: CPT | Mod: 25 | Performed by: OTOLARYNGOLOGY

## 2022-10-31 PROCEDURE — 71260 CT THORAX DX C+: CPT | Mod: GC | Performed by: RADIOLOGY

## 2022-10-31 PROCEDURE — 82565 ASSAY OF CREATININE: CPT | Performed by: PATHOLOGY

## 2022-10-31 PROCEDURE — 70491 CT SOFT TISSUE NECK W/DYE: CPT | Mod: GC | Performed by: STUDENT IN AN ORGANIZED HEALTH CARE EDUCATION/TRAINING PROGRAM

## 2022-10-31 PROCEDURE — 36415 COLL VENOUS BLD VENIPUNCTURE: CPT | Mod: GA | Performed by: PATHOLOGY

## 2022-10-31 PROCEDURE — 31615 TRCHEOBRNCHSC EST TRACHS INC: CPT | Performed by: OTOLARYNGOLOGY

## 2022-10-31 PROCEDURE — A9585 GADOBUTROL INJECTION: HCPCS | Performed by: RADIOLOGY

## 2022-10-31 PROCEDURE — 31575 DIAGNOSTIC LARYNGOSCOPY: CPT | Mod: 51 | Performed by: OTOLARYNGOLOGY

## 2022-10-31 RX ORDER — IOPAMIDOL 755 MG/ML
84 INJECTION, SOLUTION INTRAVASCULAR ONCE
Status: COMPLETED | OUTPATIENT
Start: 2022-10-31 | End: 2022-10-31

## 2022-10-31 RX ORDER — GADOBUTROL 604.72 MG/ML
10 INJECTION INTRAVENOUS ONCE
Status: COMPLETED | OUTPATIENT
Start: 2022-10-31 | End: 2022-10-31

## 2022-10-31 RX ADMIN — IOPAMIDOL 84 ML: 755 INJECTION, SOLUTION INTRAVASCULAR at 09:54

## 2022-10-31 RX ADMIN — GADOBUTROL 8.5 ML: 604.72 INJECTION INTRAVENOUS at 08:13

## 2022-10-31 ASSESSMENT — PAIN SCALES - GENERAL: PAINLEVEL: NO PAIN (0)

## 2022-10-31 NOTE — LETTER
10/31/2022       RE: Evin Sterling  [address] [facility] [address]     Dear Colleague,    Thank you for referring your patient, Evin Sterling, to the [facility] [place] at [facility]. Please see a copy of my visit note below.    Dear Dr. Blankenship:    I had the pleasure of seeing Evin Sterling in follow-up today at the [facility].     History of Present Illness:   Evin Sterling is a 72 year old man with a T4aN0 SCC of the larynx. The patient has about a 4 year history of progressive hoarseness. He was potentially seen by an ENT locally in [facility] [place] and diagnosed with reflux. He had insurance issues and was not able to afford the medication. He had progressive voice changes. He saw Dr Blankenship on 7/28/2020 for consultation at which time he had biphasic stridor and on scope exam was noted to have a large exophytic mass of the glottis without mobility of either cord and a glottic opening of about 5 mm.     He was taken to the OR on 8/3/2020 for an awake trach with DL. Biopsies were consistent with SCC. He had a PET scan which showed the tumor in the larynx with thyroid cartilage erosion but no obvious mariel disease. The patient demonstrated poor swallow function with aspiration on swallow study. Given these findings he was recommended surgery followed by adjuvant radiation.    He was taken to the OR on 8/25/2020 for a DL, total laryngectomy, left hemithyroidectomy, bilateral neck dissections, cricopharyngeal myotomy, dental extractions.. He had PEG tube placement performed by thoracic surgery at the time of surgery. His postoperative course was uncomplicated. His final pathology demonstrated a 2.5 cm invasive moderately differentiated SCC, 2.5 cm involving the right and left cords, invading the thyroid cartilage and focally extending to the soft tissues, benign thyroid, angiolymphatic invasion present, no  PNI, negative margins, 0/86 lymph nodes.  His case was reviewed at tumor conference with recommendation for postoperative radiation. The patient decided against radiation. He was started on an oral diet in October 2020.  He had a negative posttreatment PET scan in November 2020. He had his PEG removed by thoracic surgery in December 2020.      Interval history:  He comes in today for follow-up. He was last seen in clinic in July 2022. He comes in with repeat imaging and labs. At that time he was having issues with tolerating his alisa tube, given shorter tube and viscous lidocaine to try. He says he is still coughing because the tube will rub. He is not wearing the tube at all. His eating and drinking are really good. He has to eat slowly. He just got dentures this week. He is having pain as he adjusts to it. He does just a few hours per day. He has not had the final adjustment yet. He is doing well with the electrolarynx, feels less self conscious. He is breathing well. He rarely has speck of blood from the stoma. He does saline down the stoma a few times per day. His weight is stable. He has a hard time eating enough the day. He has no pain with swallowing. He has popping of his ears sometimes. He has no other problems or concerns.         MEDICATIONS:     Current Outpatient Medications   Medication Sig Dispense Refill     carboxymethylcellulose PF (REFRESH PLUS) 0.5 % ophthalmic solution Place 1 drop into both eyes 4 times daily as needed for dry eyes 1 Bottle 3     lidocaine, viscous, (XYLOCAINE) 2 % solution Apply topically to alisa tube prior to placement to help with cough 100 mL 11     metoprolol tartrate (LOPRESSOR) 25 MG tablet 0.5 tablets (12.5 mg) by Per G Tube route 2 times daily 30 tablet 0     prednisoLONE acetate (PRED MILD) 0.12 % ophthalmic suspension 1-2 drops 4 times daily       spironolactone (ALDACTONE) 25 MG tablet Take 12.5 mg by mouth daily       lisinopril (ZESTRIL) 10 MG tablet 1 tablet (10  mg) by Per G Tube route daily for 14 days 14 tablet 0       ALLERGIES:    Allergies   Allergen Reactions     Sulfa Drugs      Other reaction(s): Fever       HABITS/SOCIAL HISTORY:   Previous alcoholic. Former smoker, quit 12 years ago, previously 1/2 ppd.  Some chewing tobacco use, quit about 30 years ago.    Lives with his son-in-law's father.    Previously worked in construction.    Social History     Socioeconomic History     Marital status:      Spouse name: Not on file     Number of children: Not on file     Years of education: Not on file     Highest education level: Not on file   Occupational History     Not on file   Tobacco Use     Smoking status: Former     Packs/day: 1.00     Years: 20.00     Pack years: 20.00     Types: Cigarettes     Quit date:      Years since quittin.8     Smokeless tobacco: Former   Substance and Sexual Activity     Alcohol use: Not Currently     Drug use: Not Currently     Sexual activity: Not on file   Other Topics Concern     Not on file   Social History Narrative     Not on file     Social Determinants of Health     Financial Resource Strain: Not on file   Food Insecurity: Not on file   Transportation Needs: Not on file   Physical Activity: Not on file   Stress: Not on file   Social Connections: Not on file   Intimate Partner Violence: Not on file   Housing Stability: Not on file       PAST MEDICAL HISTORY:   Past Medical History:   Diagnosis Date     CHF (congestive heart failure) (H)      GERD (gastroesophageal reflux disease)      Hypertension         PAST SURGICAL HISTORY:   Past Surgical History:   Procedure Laterality Date     DISSECTION RADICAL NECK BILATERAL Bilateral 2020    Procedure: Bilateral neck dissection;  Surgeon: Caron Wang MD;  [facility]     ENDOSCOPIC INSERTION TUBE GASTROSTOMY N/A 2020    Procedure: INSERTION, PEG TUBE;  Surgeon: Reuben Hastings MD;  [facility]     HERNIA REPAIR, INGUINAL RT/LT       HERNIA  "REPAIR, UMBILICAL       LARYNGECTOMY N/A 8/25/2020    Procedure: TOTAL LARYNGECTOMY WITH LEFT HEMITHYROIDECTOMY;  Surgeon: Caron Wang MD;  [facility]     LARYNGOSCOPY N/A 8/25/2020    Procedure: DIRECT LARYNGOSCOPY;  Surgeon: Caron Wang MD;  [facility]     LARYNGOSCOPY WITH BIOPSY(IES) N/A 8/3/2020    Procedure: LARYNGOSCOPY WITH BIOPSY, TRACHEOSTOMY, NG TUBE PLACEMENT;  Surgeon: Caron Wang MD;  [facility]     ODONTECTOMY N/A 8/25/2020    Procedure: DENTAL EXTRACTION OF TEETH x 13;  Surgeon: Caron Wang MD;  [facility]     TRACHEOSTOMY N/A 8/3/2020    Procedure: TRACHEOSTOMY;  Surgeon: Caron Wang MD;  [facility]       FAMILY HISTORY:    Family History   Problem Relation Age of Onset     Anesthesia Reaction No family hx of      Deep Vein Thrombosis (DVT) No family hx of        REVIEW OF SYSTEMS:  12 point ROS was negative other than the symptoms noted above in the HPI.  Patient Supplied Answers to Review of Systems  [facility] E[facility] ROS 3/7/2022   Constitutional: -   Neurology: -   Ears, Nose, Throat: -   Cardiopulmonary: -   Gastrointestinal/Genitourinary: -   Musculoskeletal: Sore or stiff joints   Endocrine: Frequent urination         PHYSICAL EXAMINATION:   BP (!) 141/96   Pulse 68   Temp 97.3  F (36.3  C)   Ht 1.753 m (5' 9\")   Wt 84.8 kg (187 lb)   SpO2 98%   BMI 27.62 kg/m     Appearance:   normal; NAD, age-appropriate appearance, well-developed, normal habitus   Communication:   aphonic, communicates with electrolarynx   Head/Face:   inspection -  Normal; no scars or visible lesions   Salivary glands -  Normal size, no tenderness, swelling, or palpable masses   Facial strength -  Normal and symmetric    Skin:  normal, no rash   Ears:  auricle (AD) -  normal  EAC (AD) -  normal  TM (AD) -  normal  auricle (AS) -  normal  EAC (AS) - normal  TM (AS) -  normal  Normal clinical speech reception   Nose:  Ext. inspection -  Normal  Internal Inspection " -  Normal mucosa, septum, and turbinates   Nasopharynx:  normal mucosa, no masses   Oral Cavity:  lips -  Normal mucosa, oral competence, and stoma size   Edentulous   Hard palate, buccal, floor of mouth mucosa normal   Tongue - normal movement, no lesions   Oropharynx:  mucosa -  Normal, no lesions  soft palate -  Normal, no lesions, no asymmetry, normal elevation  Base of tongue - normal   Neoharynx:  Normal mucosa, no masses  neovallecula clear  Shelf at site of previous epiglottis   Neck: Well healed apron neck incision  No palpable masses  Stoma patent but deep, no crusting, no bleeding   Normal range of motion  Scar band bilaterally along SCM   Lymphatic:  no abnormal nodes   Cardiovascular:  warm, pink, well-perfused extremities without swelling, tenderness, or edema   Respiratory:  Normal respiratory effort, no stridor   Neuro/Psych.:  mood/affect -  normal  mental status -  normal       PROCEDURES:   Flexible fiberoptic laryngoscopy: Scope exam was indicated due to laryngeal cancer. Verbal consent was obtained. The nasal cavity was prepped with an aerosolized solution of topical anesthetic and vasoconstrictive agent. The scope was passed through the anterior nasal cavity and advanced. Inspection of the nasopharynx revealed no gross abnormality. The base of tongue and neovallecula are normal. There is a shelf at the site of the previous epiglottis. The neopharynx is clear with healthy appearing mucosa and no masses.  Procedure tolerated well with no immediate complications noted.    Flexible tracheoscopy: The flexible scope was passed through the stoma and down to the primary bronchi. There is no crusting, no dry mucosa, no bleeding. There are few secretions in the bronchi. The airway was patent. Patient tolerated the procedure well with no immediate complications.                 RESULTS REVIEWED:   Care discussed with SLP    CT neck and chest and MRI brain images reviewed    CT neck and chest and MRI  reports reviewed     TSH/T4 reviewed      IMPRESSION AND PLAN:   Evin Sterling is a 72-year-old man with a T4aN0 SCC of the larynx. He underwent a total laryngectomy with bilateral neck dissections on 8/25/2020. He was recommended for postoperative radiation but decided against treatment.    He is doing well with no signs of recurrent disease.     He had repeat imaging today including an MRI brain w/ and w/o contrast at next set of imaging to further evaluate mengingioma. We will send the results to [facility]. We will send referral to cardiology for the pulmonary hypertension concerns.    He did have a hemithyroidectomy performed with his laryngectomy. His TSH was checked today. He has mild elevation in TSH, T4 normal and asymptomatic. Will recheck in 6 months.    He continues to have issues with his alisa tube despite use of viscous lidocaine, SLP will try alisa clips with him to see if this is better tolerated.    I will see him back in 6 months with labs.    Thank you very much for the opportunity to participate in the care of your patient.      Caron Wang MD  Otolaryngology- Head & Neck Surgery      This note was dictated with voice recognition software and then edited. Please excuse any unintentional errors.       A total of 30 minutes was spent on patient visit and charting activities on the date of service, excluding procedures.      CC:  Kyaw Blankenship MD  [address] [address]      Vineet Gonzales MD  Department of Radiation Oncology  [facility]

## 2022-10-31 NOTE — PROGRESS NOTES
Pt was seen for an allied health visit in conjunction with ENT visit. Pt reports he has struggled with wearing a Larytube and he now has opted to not wear anything. At last SLP visit, Pt was fitted with a 12/36. He reports it will not stay secured in the stoma and therefore it moves, causing his to cough. Pt was open to getting trials of alisa clips to get a better secured alisa tube or button, which will hopefully decrease the coughing, but also provide the ability to wear an HME. SLP to reach out to [employer] rep and get trials of laryclips to try. Pt agreeable to plan of care.       Bess Willis MS, CCC-SLP  Speech-Language Pathology  [facility] [place]  Department of Otolaryngology/D&T - 4th floor  Phone: [phone]  Email: [email or web address]

## 2022-10-31 NOTE — NURSING NOTE
"Chief Complaint   Patient presents with     RECHECK     3 month follow up      Blood pressure (!) 141/96, pulse 68, temperature 97.3  F (36.3  C), height 1.753 m (5' 9\"), weight 84.8 kg (187 lb), SpO2 98 %.    Yoseph Yee LPN    "

## 2022-11-04 ENCOUNTER — TELEPHONE (OUTPATIENT)
Dept: NEUROSURGERY | Facility: CLINIC | Age: 72
End: 2022-11-04

## 2022-11-04 NOTE — TELEPHONE ENCOUNTER
Writer tried to reach pt regarding neurosurgery referral but VM was full, Writer LVM for pts daughter.    Please schedule a new, in person or video visit, with Erendira Jeffery for next available    Justina Bridges

## 2023-03-02 ENCOUNTER — MYC MEDICAL ADVICE (OUTPATIENT)
Dept: OTOLARYNGOLOGY | Facility: CLINIC | Age: 73
End: 2023-03-02
Payer: COMMERCIAL

## 2023-04-28 NOTE — TELEPHONE ENCOUNTER
FUTURE VISIT INFORMATION      FUTURE VISIT INFORMATION:    Date: 2020    Time: 2:20PM    [place] [facility] [place]  REFERRAL INFORMATION:    Referring provider: Kyaw Blankenship DO      Referring providers clinic:  [facility]      Reason for visit/diagnosis  Laryngeal cancer- Referred by Dr. Blankenship    RECORDS REQUESTED FROM:       Clinic name Comments Records Status Imaging Status   [facility]  ENT  [address] [place] [address]    [phone] 2020 referral and note from Kyaw Blankenship DO   Care Everywhere     [facility] 2020 note from Gabriele Bui MD   Care Everywhere     [facility]   2020 note from Tigist Hernández NP Care Everywhere     [facility]  Imaging 2020 XR Chest    *trackin   Care Everywhere req 2020 sent for upload                 2020 8:47AM Closplint unable to push images, sent a fax for XR Chest  - Amay   * 2020 10:33 AM Imaging disc received from [facility] and sent to [facility] to be uploaded into PACs. - Korin   Otolaryngology Progress Note    Subjective/Intvl events: No acute events overnight. Transferred to  from the PACU. Reports that the vision in his right eye seems much better than before his surgery. He denies nausea or emesis, worsening vision or headaches, facial numbness, anterior or posterior drainage, or salty or metallic taste in his mouth.    O: /86 (BP Location: Left arm)   Pulse 95   Temp 98.4  F (36.9  C) (Oral)   Resp 18   Wt 73.4 kg (161 lb 13.4 oz)   SpO2 100%   BMI 26.12 kg/m     General: Alert and oriented x 3, No acute distress   HEENT: Bilateral lateral gaze palsy and restricted EOM on the right, PERRLA, CN V1-V3 intact, facial movement symmetric, palate elevates symmetrically with uvula midline   Pulmonary: Breathing non-labored, no stridor, no accessory muscle use.      Intake/Output Summary (Last 24 hours) at 4/28/2023 1100  Last data filed at 4/28/2023 0738  Gross per 24 hour   Intake 3875.25 ml   Output 2175 ml   Net 1700.25 ml     LABS:    BMP  Recent Labs   Lab 04/28/23  0815 04/28/23  0733 04/28/23  0219 04/27/23  2212 04/27/23  1939 04/27/23  1759 04/27/23  1720 04/27/23  1607 04/27/23  1218 04/27/23  1126 04/27/23  0814 04/27/23  0541 04/26/23  1151 04/26/23  0859     140  --   --   --  143  --  144 144  --  144  --  140  --  139   POTASSIUM 2.8*  --   --   --  3.4*  --  3.4* 2.7*  --  2.8*  --  3.4  --  4.1   CHLORIDE 99  --   --   --   --   --   --   --   --  103  --  101  --  101   KORIN 7.6*  --   --   --   --   --   --   --   --  8.5*  --  8.3*  --  8.6   CO2 23  --   --   --   --   --   --   --   --  31*  --  29  --  27   BUN 20.9*  --   --   --   --   --   --   --   --  19.9  --  22.1*  --  21.3*   CR 0.83  --   --   --   --   --   --   --   --  0.85  --  0.93  --  0.98   * 252* 242* 100* 115*   < > 75 85   < > 118*   < > 214*   < > 297*    < > = values in this interval not displayed.     CBC  Recent Labs   Lab 04/28/23  0815 04/28/23  0045 04/27/23  1939  04/27/23  1720 04/27/23  1607 04/27/23  1126 04/27/23  0541   WBC 15.2* 16.4*  --   --   --  12.0* 12.1*   RBC 3.49* 3.65*  --   --   --  3.32* 2.73*   HGB 9.3* 9.7* 7.3* 7.9*   < > 8.6* 6.9*   HCT 30.3* 31.7*  --   --   --  28.7* 24.1*   MCV 87 87  --   --   --  86 88   MCH 26.6 26.6  --   --   --  25.9* 25.3*   MCHC 30.7* 30.6*  --   --   --  30.0* 28.6*   RDW 23.2* 22.9*  --   --   --  24.7* 27.3*    362  --   --   --  387 390    < > = values in this interval not displayed.     INR  Recent Labs   Lab 04/28/23  0110 04/26/23  0920 04/24/23  1145   INR 1.02 0.99 1.01       A/P: Gustavo Faria is a 59 year old male with a past medical history of pituitary ACTH secreting macroadenoma s/p endoscopic endonasal transcavernous approach for resection of functional pituitary adenoma w/ nasoseptal flap on 4/28. He is recovering well post-operatively without signs of CSF leak.    - Serial neuro exams  - Nasal saline starting POD 1  - Sinus precautions: No nose blowing, sneeze with mouth open, no straining, and scheduled bowel regimen  - Rest of care per primary team      -- Patient and above plan discussed with Dr. Adan Jimenez, PGY-2  Otolaryngology-Head & Neck Surgery   Please contact ENT with questions by dialing [phone] and entering job [phone] when prompted.

## 2023-04-29 NOTE — PROGRESS NOTES
Dear Dr. Blankenship:    I had the pleasure of seeing Evin Sterling in follow-up today at the [facility].     History of Present Illness:   Evin Sterling is a 72 year old man with a T4aN0 SCC of the larynx. The patient had about a 4 year history of progressive hoarseness. He saw Dr Blankenship on 7/28/2020 for consultation at which time he had biphasic stridor and on scope exam was noted to have a large exophytic mass of the glottis without mobility of either cord and a glottic opening of about 5 mm.     He was taken to the OR on 8/3/2020 for an awake trach with DL. Biopsies were consistent with SCC. He had a PET scan which showed the tumor in the larynx with thyroid cartilage erosion but no obvious mariel disease. The patient demonstrated poor swallow function with aspiration on swallow study. Given these findings he was recommended surgery followed by adjuvant radiation.    He was taken to the OR on 8/25/2020 for a DL, total laryngectomy, left hemithyroidectomy, bilateral neck dissections, cricopharyngeal myotomy, dental extractions.. He had PEG tube placement performed by thoracic surgery at the time of surgery. His postoperative course was uncomplicated. His final pathology demonstrated a 2.5 cm invasive moderately differentiated SCC, 2.5 cm involving the right and left cords, invading the thyroid cartilage and focally extending to the soft tissues, benign thyroid, angiolymphatic invasion present, no PNI, negative margins, 0/86 lymph nodes.  His case was reviewed at tumor conference with recommendation for postoperative radiation. The patient decided against radiation. He was started on an oral diet in October 2020.  He had a negative posttreatment PET scan in November 2020. He had his PEG removed by thoracic surgery in December 2020.      Interval history:  He comes in today for follow-up. He was last seen in clinic in October 2022. [facility] [place] to reach him for scheduling follow-up of his  meningioma but could not reach him. He had COVID in March 2023. He is mostly recovered from COVID. He says that it was not bad. He had issues with a lot of stuff from his stoma, really only improved in the last week. He says otherwise things are going well. He says his swallowing is good. He has dentures now but is having a hard time getting used to them. His gag reflex is causing him some issues with his dentures. He tries to eat more things with the dentures but that's when he has issues with the gag. He can largely eat what he wants. He says the only times things get stuck is if he eats too quickly. This is mostly in social situations. His weight is stable. He is doing ok with his electrolarynx. His breathing is ok. He sometimes has blood streaks in his stoma when really dry. He did not use the alisa clips after his last visit. He is not using an HME. He says things get in around a tube. He uses a bandana cover when outside. He keeps saline with him most of the time.  He has no pain with swallowing. He has no neck masses. He has no other problems or concerns.         MEDICATIONS:     Current Outpatient Medications   Medication Sig Dispense Refill     lisinopril (ZESTRIL) 10 MG tablet Take 10 mg by mouth       metoprolol tartrate (LOPRESSOR) 25 MG tablet 0.5 tablets (12.5 mg) by Per G Tube route 2 times daily 30 tablet 0     spironolactone (ALDACTONE) 25 MG tablet Take 12.5 mg by mouth daily         ALLERGIES:    Allergies   Allergen Reactions     Sulfa Antibiotics      Other reaction(s): Fever       HABITS/SOCIAL HISTORY:   Previous alcoholic. Former smoker, quit 12 years ago, previously 1/2 ppd.  Some chewing tobacco use, quit about 30 years ago.    Lives with his son-in-law's father.    Previously worked in construction.    Social History     Socioeconomic History     Marital status:      Spouse name: Not on file     Number of children: Not on file     Years of education: Not on file     Highest education  level: Not on file   Occupational History     Not on file   Tobacco Use     Smoking status: Former     Packs/day: 1.00     Years: 20.00     Pack years: 20.00     Types: Cigarettes     Quit date:      Years since quittin.3     Smokeless tobacco: Former   Vaping Use     Vaping status: Not on file   Substance and Sexual Activity     Alcohol use: Not Currently     Drug use: Not Currently     Sexual activity: Not on file   Other Topics Concern     Not on file   Social History Narrative     Not on file     Social Determinants of Health     Financial Resource Strain: Not on file   Food Insecurity: Not on file   Transportation Needs: Not on file   Physical Activity: Not on file   Stress: Not on file   Social Connections: Not on file   Intimate Partner Violence: Not on file   Housing Stability: Not on file       PAST MEDICAL HISTORY:   Past Medical History:   Diagnosis Date     CHF (congestive heart failure) (H)      GERD (gastroesophageal reflux disease)      Hypertension         PAST SURGICAL HISTORY:   Past Surgical History:   Procedure Laterality Date     DISSECTION RADICAL NECK BILATERAL Bilateral 2020    Procedure: Bilateral neck dissection;  Surgeon: Caron Wang MD;  Location: [facility] OR     ENDOSCOPIC INSERTION TUBE GASTROSTOMY N/A 2020    Procedure: INSERTION, PEG TUBE;  Surgeon: Reuben Hastings MD;  Location: [facility] OR     HERNIA REPAIR, INGUINAL RT/LT       HERNIA REPAIR, UMBILICAL       LARYNGECTOMY N/A 2020    Procedure: TOTAL LARYNGECTOMY WITH LEFT HEMITHYROIDECTOMY;  Surgeon: Caron Wang MD;  Location: [facility] OR     LARYNGOSCOPY N/A 2020    Procedure: DIRECT LARYNGOSCOPY;  Surgeon: Caron Wang MD;  Location: [facility] OR     LARYNGOSCOPY WITH BIOPSY(IES) N/A 8/3/2020    Procedure: LARYNGOSCOPY WITH BIOPSY, TRACHEOSTOMY, NG TUBE PLACEMENT;  Surgeon: Caron Wang MD;  Location: [facility] OR     ODONTECTOMY N/A 2020    Procedure: DENTAL EXTRACTION OF TEETH x 13;  Surgeon:  "Caron Wang MD;  Location: [facility] OR     TRACHEOSTOMY N/A 8/3/2020    Procedure: TRACHEOSTOMY;  Surgeon: Caron Wang MD;  Location: [facility] OR       FAMILY HISTORY:    Family History   Problem Relation Age of Onset     Anesthesia Reaction No family hx of      Deep Vein Thrombosis (DVT) No family hx of        REVIEW OF SYSTEMS:  12 point ROS was negative other than the symptoms noted above in the HPI.  Patient Supplied Answers to Review of Systems      3/7/2022     9:59 AM   [facility] ROS   Musculoskeletal Sore or stiff joints   Endocrine Frequent urination         PHYSICAL EXAMINATION:   /87   Pulse 75   Temp 97.4  F (36.3  C)   Ht 1.778 m (5' 10\")   Wt 86.2 kg (190 lb)   SpO2 98%   BMI 27.26 kg/m     Appearance:   normal; NAD, age-appropriate appearance, well-developed, normal habitus   Communication:   aphonic, communicates with electrolarynx   Head/Face:   inspection -  Normal; no scars or visible lesions   Salivary glands -  Normal size, no tenderness, swelling, or palpable masses   Facial strength -  Normal and symmetric    Skin:  normal, no rash   Ears:  auricle (AD) -  normal  EAC (AD) -  normal  TM (AD) -  normal  auricle (AS) -  normal  EAC (AS) - normal  TM (AS) -  normal  Normal clinical speech reception   Nose:  Ext. inspection -  Normal  Internal Inspection -  Normal mucosa, septum, and turbinates   Nasopharynx:  normal mucosa, no masses   Oral Cavity:  lips -  Normal mucosa, oral competence, and stoma size   Edentulous   Hard palate, buccal, floor of mouth mucosa normal  Strong gag   Tongue - normal movement, no lesions   Oropharynx:  mucosa -  Normal, no lesions  soft palate -  Normal, no lesions, no asymmetry, normal elevation  Base of tongue - normal   Neoharynx:  Normal mucosa, no masses  neovallecula clear  Shelf at site of previous epiglottis   Neck: Well healed apron neck incision  No palpable masses  Stoma patent but deep, no crusting, no bleeding   Normal range of motion  Scar " band bilaterally along SCM   Lymphatic:  no abnormal nodes   Cardiovascular:  warm, pink, well-perfused extremities without swelling, tenderness, or edema   Respiratory:  Normal respiratory effort, no stridor   Neuro/Psych.:  mood/affect -  normal  mental status -  normal       PROCEDURES:   Flexible fiberoptic laryngoscopy: Scope exam was indicated due to laryngeal cancer. Verbal consent was obtained. The nasal cavity was prepped with an aerosolized solution of topical anesthetic and vasoconstrictive agent. The scope was passed through the anterior nasal cavity and advanced. Inspection of the nasopharynx revealed no gross abnormality. The base of tongue and neovallecula are normal. There is a shelf at the site of the previous epiglottis. The neopharynx is clear with healthy appearing mucosa and no masses.  Procedure tolerated well with no immediate complications noted.    Flexible tracheoscopy: The flexible scope was passed through the stoma and down to the primary bronchi. There is no crusting, no dry mucosa, no bleeding. There are thickened secretions in the bronchi. The airway was patent. Patient tolerated the procedure well with no immediate complications.           RESULTS REVIEWED:   Care discussed with SLP    IMPRESSION AND PLAN:   Evin Sterling is a 72-year-old man with a T4aN0 SCC of the larynx. He underwent a total laryngectomy with bilateral neck dissections on 8/25/2020. He was recommended for postoperative radiation but decided against treatment.    He is doing well with no signs of recurrent disease. His thick secretions are a bit worse after having had COVID. He is still not interested in alisa tube or HME.     He was encouraged to follow-up with NSG as previously recommended for his meningioma. He will see if his daughter has their contact information, otherwise should reach out to us so we can help him reschedule.    He is due for repeat imaging in 6 months    He did have a hemithyroidectomy  performed with his laryngectomy. TSH check at next visit    SLP discussed foam covering for his stoma with him, he will let our team know if he wants to proceed.    I will see him back in 6 months with labs and imaging.    Thank you very much for the opportunity to participate in the care of your patient.      Caron Wang MD  Otolaryngology- Head & Neck Surgery      This note was dictated with voice recognition software and then edited. Please excuse any unintentional errors.               CC:  Kyaw Blankenship MD  [address] [address]      Vineet Gonzales MD  Department of Radiation Oncology  [facility]

## 2023-05-01 ENCOUNTER — THERAPY VISIT (OUTPATIENT)
Dept: SPEECH THERAPY | Facility: CLINIC | Age: 73
End: 2023-05-01
Payer: COMMERCIAL

## 2023-05-01 ENCOUNTER — OFFICE VISIT (OUTPATIENT)
Dept: OTOLARYNGOLOGY | Facility: CLINIC | Age: 73
End: 2023-05-01
Payer: COMMERCIAL

## 2023-05-01 VITALS
SYSTOLIC BLOOD PRESSURE: 130 MMHG | HEART RATE: 75 BPM | WEIGHT: 190 LBS | BODY MASS INDEX: 27.2 KG/M2 | TEMPERATURE: 97.4 F | OXYGEN SATURATION: 98 % | DIASTOLIC BLOOD PRESSURE: 87 MMHG | HEIGHT: 70 IN

## 2023-05-01 DIAGNOSIS — C32.9 LARYNGEAL CANCER (H): Primary | ICD-10-CM

## 2023-05-01 DIAGNOSIS — R49.1 APHONIA: ICD-10-CM

## 2023-05-01 DIAGNOSIS — E03.4 HYPOTHYROIDISM DUE TO ACQUIRED ATROPHY OF THYROID: ICD-10-CM

## 2023-05-01 PROCEDURE — 99213 OFFICE O/P EST LOW 20 MIN: CPT | Mod: 25 | Performed by: OTOLARYNGOLOGY

## 2023-05-01 PROCEDURE — 31615 TRCHEOBRNCHSC EST TRACHS INC: CPT | Performed by: OTOLARYNGOLOGY

## 2023-05-01 PROCEDURE — 99207 PR NO BILLABLE SERVICE THIS VISIT: CPT | Performed by: SPEECH-LANGUAGE PATHOLOGIST

## 2023-05-01 PROCEDURE — 31575 DIAGNOSTIC LARYNGOSCOPY: CPT | Mod: 51 | Performed by: OTOLARYNGOLOGY

## 2023-05-01 RX ORDER — LISINOPRIL 10 MG/1
10 TABLET ORAL
COMMUNITY
Start: 2022-10-13

## 2023-05-01 ASSESSMENT — PAIN SCALES - GENERAL: PAINLEVEL: NO PAIN (0)

## 2023-05-01 NOTE — LETTER
5/1/2023       RE: Evin Sterling  [address] [facility] [address]     Dear Colleague,    Thank you for referring your patient, Evin Sterling, to the [facility] EAR NOSE AND THROAT CLINIC [place] at [facility]. Please see a copy of my visit note below.    Dear Dr. Blankenship:    I had the pleasure of seeing Evin Sterling in follow-up today at the [facility] Otolaryngology Clinic.     History of Present Illness:   Evin Sterling is a 72 year old man with a T4aN0 SCC of the larynx. The patient had about a 4 year history of progressive hoarseness. He saw Dr Blankenship on 7/28/2020 for consultation at which time he had biphasic stridor and on scope exam was noted to have a large exophytic mass of the glottis without mobility of either cord and a glottic opening of about 5 mm.     He was taken to the OR on 8/3/2020 for an awake trach with DL. Biopsies were consistent with SCC. He had a PET scan which showed the tumor in the larynx with thyroid cartilage erosion but no obvious mariel disease. The patient demonstrated poor swallow function with aspiration on swallow study. Given these findings he was recommended surgery followed by adjuvant radiation.    He was taken to the OR on 8/25/2020 for a DL, total laryngectomy, left hemithyroidectomy, bilateral neck dissections, cricopharyngeal myotomy, dental extractions.. He had PEG tube placement performed by thoracic surgery at the time of surgery. His postoperative course was uncomplicated. His final pathology demonstrated a 2.5 cm invasive moderately differentiated SCC, 2.5 cm involving the right and left cords, invading the thyroid cartilage and focally extending to the soft tissues, benign thyroid, angiolymphatic invasion present, no PNI, negative margins, 0/86 lymph nodes.  His case was reviewed at tumor conference with recommendation for postoperative radiation. The patient decided against radiation. He was started  on an oral diet in October 2020.  He had a negative posttreatment PET scan in November 2020. He had his PEG removed by thoracic surgery in December 2020.      Interval history:  He comes in today for follow-up. He was last seen in clinic in October 2022. [facility] tried to reach him for scheduling follow-up of his meningioma but could not reach him. He had COVID in March 2023. He is mostly recovered from COVID. He says that it was not bad. He had issues with a lot of stuff from his stoma, really only improved in the last week. He says otherwise things are going well. He says his swallowing is good. He has dentures now but is having a hard time getting used to them. His gag reflex is causing him some issues with his dentures. He tries to eat more things with the dentures but that's when he has issues with the gag. He can largely eat what he wants. He says the only times things get stuck is if he eats too quickly. This is mostly in social situations. His weight is stable. He is doing ok with his electrolarynx. His breathing is ok. He sometimes has blood streaks in his stoma when really dry. He did not use the alisa clips after his last visit. He is not using an HME. He says things get in around a tube. He uses a bandana cover when outside. He keeps saline with him most of the time.  He has no pain with swallowing. He has no neck masses. He has no other problems or concerns.         MEDICATIONS:     Current Outpatient Medications   Medication Sig Dispense Refill    lisinopril (ZESTRIL) 10 MG tablet Take 10 mg by mouth      metoprolol tartrate (LOPRESSOR) 25 MG tablet 0.5 tablets (12.5 mg) by Per G Tube route 2 times daily 30 tablet 0    spironolactone (ALDACTONE) 25 MG tablet Take 12.5 mg by mouth daily         ALLERGIES:    Allergies   Allergen Reactions    Sulfa Antibiotics      Other reaction(s): Fever       HABITS/SOCIAL HISTORY:   Previous alcoholic. Former smoker, quit 12 years ago, previously 1/2 ppd.  Some chewing  tobacco use, quit about 30 years ago.    Lives with his son-in-law's father.    Previously worked in construction.    Social History     Socioeconomic History    Marital status:      Spouse name: Not on file    Number of children: Not on file    Years of education: Not on file    Highest education level: Not on file   Occupational History    Not on file   Tobacco Use    Smoking status: Former     Packs/day: 1.00     Years: 20.00     Pack years: 20.00     Types: Cigarettes     Quit date:      Years since quittin.3    Smokeless tobacco: Former   Vaping Use    Vaping status: Not on file   Substance and Sexual Activity    Alcohol use: Not Currently    Drug use: Not Currently    Sexual activity: Not on file   Other Topics Concern    Not on file   Social History Narrative    Not on file     Social Determinants of Health     Financial Resource Strain: Not on file   Food Insecurity: Not on file   Transportation Needs: Not on file   Physical Activity: Not on file   Stress: Not on file   Social Connections: Not on file   Intimate Partner Violence: Not on file   Housing Stability: Not on file       PAST MEDICAL HISTORY:   Past Medical History:   Diagnosis Date    CHF (congestive heart failure) (H)     GERD (gastroesophageal reflux disease)     Hypertension         PAST SURGICAL HISTORY:   Past Surgical History:   Procedure Laterality Date    DISSECTION RADICAL NECK BILATERAL Bilateral 2020    Procedure: Bilateral neck dissection;  Surgeon: Caron Wang MD;  [facility]    ENDOSCOPIC INSERTION TUBE GASTROSTOMY N/A 2020    Procedure: INSERTION, PEG TUBE;  Surgeon: Reuben Hastings MD;  [facility]    HERNIA REPAIR, INGUINAL RT/LT      HERNIA REPAIR, UMBILICAL      LARYNGECTOMY N/A 2020    Procedure: TOTAL LARYNGECTOMY WITH LEFT HEMITHYROIDECTOMY;  Surgeon: Caron Wang MD;  [facility]    LARYNGOSCOPY N/A 2020    Procedure: DIRECT LARYNGOSCOPY;  Surgeon: Caron Wang  "MD Francisco;  [facility]    LARYNGOSCOPY WITH BIOPSY(IES) N/A 8/3/2020    Procedure: LARYNGOSCOPY WITH BIOPSY, TRACHEOSTOMY, NG TUBE PLACEMENT;  Surgeon: Caron Wang MD;  [facility]    ODONTECTOMY N/A 8/25/2020    Procedure: DENTAL EXTRACTION OF TEETH x 13;  Surgeon: Caron Wang MD;  [facility]    TRACHEOSTOMY N/A 8/3/2020    Procedure: TRACHEOSTOMY;  Surgeon: Caron Wang MD;  [facility]       FAMILY HISTORY:    Family History   Problem Relation Age of Onset    Anesthesia Reaction No family hx of     Deep Vein Thrombosis (DVT) No family hx of        REVIEW OF SYSTEMS:  12 point ROS was negative other than the symptoms noted above in the HPI.  Patient Supplied Answers to Review of Systems      3/7/2022     9:59 AM   [facility] ENT ROS   Musculoskeletal Sore or stiff joints   Endocrine Frequent urination         PHYSICAL EXAMINATION:   /87   Pulse 75   Temp 97.4  F (36.3  C)   Ht 1.778 m (5' 10\")   Wt 86.2 kg (190 lb)   SpO2 98%   BMI 27.26 kg/m     Appearance:   normal; NAD, age-appropriate appearance, well-developed, normal habitus   Communication:   aphonic, communicates with electrolarynx   Head/Face:   inspection -  Normal; no scars or visible lesions   Salivary glands -  Normal size, no tenderness, swelling, or palpable masses   Facial strength -  Normal and symmetric    Skin:  normal, no rash   Ears:  auricle (AD) -  normal  EAC (AD) -  normal  TM (AD) -  normal  auricle (AS) -  normal  EAC (AS) - normal  TM (AS) -  normal  Normal clinical speech reception   Nose:  Ext. inspection -  Normal  Internal Inspection -  Normal mucosa, septum, and turbinates   Nasopharynx:  normal mucosa, no masses   Oral Cavity:  lips -  Normal mucosa, oral competence, and stoma size   Edentulous   Hard palate, buccal, floor of mouth mucosa normal  Strong gag   Tongue - normal movement, no lesions   Oropharynx:  mucosa -  Normal, no lesions  soft palate -  Normal, no lesions, no " asymmetry, normal elevation  Base of tongue - normal   Neoharynx:  Normal mucosa, no masses  neovallecula clear  Shelf at site of previous epiglottis   Neck: Well healed apron neck incision  No palpable masses  Stoma patent but deep, no crusting, no bleeding   Normal range of motion  Scar band bilaterally along SCM   Lymphatic:  no abnormal nodes   Cardiovascular:  warm, pink, well-perfused extremities without swelling, tenderness, or edema   Respiratory:  Normal respiratory effort, no stridor   Neuro/Psych.:  mood/affect -  normal  mental status -  normal       PROCEDURES:   Flexible fiberoptic laryngoscopy: Scope exam was indicated due to laryngeal cancer. Verbal consent was obtained. The nasal cavity was prepped with an aerosolized solution of topical anesthetic and vasoconstrictive agent. The scope was passed through the anterior nasal cavity and advanced. Inspection of the nasopharynx revealed no gross abnormality. The base of tongue and neovallecula are normal. There is a shelf at the site of the previous epiglottis. The neopharynx is clear with healthy appearing mucosa and no masses.  Procedure tolerated well with no immediate complications noted.    Flexible tracheoscopy: The flexible scope was passed through the stoma and down to the primary bronchi. There is no crusting, no dry mucosa, no bleeding. There are thickened secretions in the bronchi. The airway was patent. Patient tolerated the procedure well with no immediate complications.           RESULTS REVIEWED:   Care discussed with SLP    IMPRESSION AND PLAN:   Evin Sterling is a 72-year-old man with a T4aN0 SCC of the larynx. He underwent a total laryngectomy with bilateral neck dissections on 8/25/2020. He was recommended for postoperative radiation but decided against treatment.    He is doing well with no signs of recurrent disease. His thick secretions are a bit worse after having had COVID. He is still not interested in alisa tube or HME.     He was  encouraged to follow-up with NSG as previously recommended for his meningioma. He will see if his daughter has their contact information, otherwise should reach out to us so we can help him reschedule.    He is due for repeat imaging in 6 months    He did have a hemithyroidectomy performed with his laryngectomy. TSH check at next visit    SLP discussed foam covering for his stoma with him, he will let our team know if he wants to proceed.    I will see him back in 6 months with labs and imaging.    Thank you very much for the opportunity to participate in the care of your patient.      Caron Wang MD  Otolaryngology- Head & Neck Surgery      This note was dictated with voice recognition software and then edited. Please excuse any unintentional errors.         CC:  Kyaw Blankenship MD  [address] [address]      Vineet Gonzales MD  Department of Radiation Oncology  [facility]

## 2023-05-01 NOTE — PROGRESS NOTES
Pt was seen today in conjunction with ENT visit. Discussed at length with Pt on the use of Laryclips, however due to pts topography of his stoma, it does not appear Laryclips will be beneficial. Discussed a foam stoma cover. Pt was open to trials of this. Otherwise he reports he uses a neck gator. Pt also reports he has a razor with a vacuum on the end to help prevent air from getting in his airway. Pt did reported that he would like an upgrade for his EL. SLP completed prescription form for Pt to get a new Electrolarynx. Pt provided with a new [employer] product catalog.     Bess Willis MS, CCC-SLP  Speech-Language Pathology  [facility] [place]  Department of Otolaryngology/D&T - 4th floor  Phone: [phone]  Email: [email or web address]

## 2023-05-01 NOTE — NURSING NOTE
"Chief Complaint   Patient presents with     RECHECK     6 month follow up     Blood pressure 130/87, pulse 75, temperature 97.4  F (36.3  C), height 1.778 m (5' 10\"), weight 86.2 kg (190 lb), SpO2 98 %.    Yoseph Yee LPN    "

## 2023-05-08 ENCOUNTER — HEALTH MAINTENANCE LETTER (OUTPATIENT)
Age: 73
End: 2023-05-08

## 2023-11-04 NOTE — PROGRESS NOTES
Dear Dr. Blankenship:    I had the pleasure of seeing Evin Sterling in follow-up today at the [facility] Otolaryngology Clinic.     History of Present Illness:   Evin Sterling is a 73 year old man with a T4aN0 SCC of the larynx. The patient had about a 4 year history of progressive hoarseness. He saw Dr Blankenship on 7/28/2020 for consultation at which time he had biphasic stridor and on scope exam was noted to have a large exophytic mass of the glottis without mobility of either cord and a glottic opening of about 5 mm.     He was taken to the OR on 8/3/2020 for an awake trach with DL. Biopsies were consistent with SCC. He had a PET scan which showed the tumor in the larynx with thyroid cartilage erosion but no obvious mariel disease. The patient demonstrated poor swallow function with aspiration on swallow study. Given these findings he was recommended surgery followed by adjuvant radiation.    He was taken to the OR on 8/25/2020 for a DL, total laryngectomy, left hemithyroidectomy, bilateral neck dissections, cricopharyngeal myotomy, dental extractions.. He had PEG tube placement performed by thoracic surgery at the time of surgery. His postoperative course was uncomplicated. His final pathology demonstrated a 2.5 cm invasive moderately differentiated SCC, 2.5 cm involving the right and left cords, invading the thyroid cartilage and focally extending to the soft tissues, benign thyroid, angiolymphatic invasion present, no PNI, negative margins, 0/86 lymph nodes.  His case was reviewed at tumor conference with recommendation for postoperative radiation. The patient decided against radiation. He was started on an oral diet in October 2020.  He had a negative posttreatment PET scan in November 2020. He had his PEG removed by thoracic surgery in December 2020.      Interval history:  He comes in today for follow-up. He was last seen in clinic in May 2023. He comes in with labs and imaging. He says the neurosurgery  follow-up is not at the top of his list right now. He says he is feeling good. He sometimes has issues with an upset stomach. He says sometimes eating is hard for him. If he is in a hurry and eats too quickly he feels poorly. He says everyone is done eating before him if he eats with other people. He says sometimes his electrolarynx has problems working. His weight is up a little. His energy is very good. He says he is still working on the dentures. He says the electrolarynx pushes on the dentures, and he can't get enough volume with neck placement. The stoma is good. He says sometimes he gets gunk in it. He did not try the alisa clips. He is not wearing an HME. He has some issues with the electrolarynx. He has occasional speck of blood in the stoma. He has no other changes in his health.       MEDICATIONS:     Current Outpatient Medications   Medication Sig Dispense Refill    lisinopril (ZESTRIL) 10 MG tablet Take 10 mg by mouth      metoprolol tartrate (LOPRESSOR) 25 MG tablet 0.5 tablets (12.5 mg) by Per G Tube route 2 times daily 30 tablet 0    spironolactone (ALDACTONE) 25 MG tablet Take 12.5 mg by mouth daily         ALLERGIES:    Allergies   Allergen Reactions    Sulfa Antibiotics      Other reaction(s): Fever       HABITS/SOCIAL HISTORY:   Previous alcoholic. Former smoker, quit 12 years ago, previously 1/2 ppd.  Some chewing tobacco use, quit about 30 years ago.    Lives with his son-in-law's father.    Previously worked in construction.    Social History     Socioeconomic History    Marital status:      Spouse name: Not on file    Number of children: Not on file    Years of education: Not on file    Highest education level: Not on file   Occupational History    Not on file   Tobacco Use    Smoking status: Former     Packs/day: 1.00     Years: 20.00     Additional pack years: 0.00     Total pack years: 20.00     Types: Cigarettes     Quit date:      Years since quittin.8    Smokeless tobacco:  Former   Substance and Sexual Activity    Alcohol use: Not Currently    Drug use: Not Currently    Sexual activity: Not on file   Other Topics Concern    Not on file   Social History Narrative    Not on file     Social Determinants of Health     Financial Resource Strain: Not on file   Food Insecurity: Not on file   Transportation Needs: Not on file   Physical Activity: Not on file   Stress: Not on file   Social Connections: Not on file   Interpersonal Safety: Not on file   Housing Stability: Not on file       PAST MEDICAL HISTORY:   Past Medical History:   Diagnosis Date    CHF (congestive heart failure) (H)     GERD (gastroesophageal reflux disease)     Hypertension         PAST SURGICAL HISTORY:   Past Surgical History:   Procedure Laterality Date    DISSECTION RADICAL NECK BILATERAL Bilateral 8/25/2020    Procedure: Bilateral neck dissection;  Surgeon: Caron Wang MD;  [facility]    ENDOSCOPIC INSERTION TUBE GASTROSTOMY N/A 8/25/2020    Procedure: INSERTION, PEG TUBE;  Surgeon: Reuben Hastings MD;  [facility]    HERNIA REPAIR, INGUINAL RT/LT      HERNIA REPAIR, UMBILICAL      LARYNGECTOMY N/A 8/25/2020    Procedure: TOTAL LARYNGECTOMY WITH LEFT HEMITHYROIDECTOMY;  Surgeon: Caron Wang MD;  [facility]    LARYNGOSCOPY N/A 8/25/2020    Procedure: DIRECT LARYNGOSCOPY;  Surgeon: Caron Wang MD;  [facility]    LARYNGOSCOPY WITH BIOPSY(IES) N/A 8/3/2020    Procedure: LARYNGOSCOPY WITH BIOPSY, TRACHEOSTOMY, NG TUBE PLACEMENT;  Surgeon: Caron Wang MD;  [facility]    ODONTECTOMY N/A 8/25/2020    Procedure: DENTAL EXTRACTION OF TEETH x 13;  Surgeon: Caron Wang MD;  [facility]    TRACHEOSTOMY N/A 8/3/2020    Procedure: TRACHEOSTOMY;  Surgeon: Caron Wang MD;  [facility]       FAMILY HISTORY:    Family History   Problem Relation Age of Onset    Anesthesia Reaction No family hx of     Deep Vein Thrombosis (DVT) No family hx of        REVIEW  "OF SYSTEMS:  12 point ROS was negative other than the symptoms noted above in the HPI.  Patient Supplied Answers to Review of Systems      3/7/2022     9:59 AM   [facility] ENT ROS   Musculoskeletal Sore or stiff joints   Endocrine Frequent urination         PHYSICAL EXAMINATION:   /75 (BP Location: Right arm, Patient Position: Sitting, Cuff Size: Adult Large)   Pulse 73   Ht 1.778 m (5' 10\")   Wt 89.4 kg (197 lb)   SpO2 96%   BMI 28.27 kg/m     Appearance:   normal; NAD, age-appropriate appearance, well-developed, normal habitus   Communication:   aphonic, communicates with electrolarynx   Head/Face:   inspection -  Normal; no scars or visible lesions   Salivary glands -  Normal size, no tenderness, swelling, or palpable masses   Facial strength -  Normal and symmetric    Skin:  normal, no rash   Ears:  auricle (AD) -  normal  EAC (AD) -  normal  TM (AD) -  normal  auricle (AS) -  normal  EAC (AS) - normal  TM (AS) -  normal  Normal clinical speech reception   Nose:  Ext. inspection -  Normal  Internal Inspection -  Normal mucosa, septum, and turbinates   Nasopharynx:  normal mucosa, no masses   Oral Cavity:  lips -  Normal mucosa, oral competence, and stoma size   Edentulous   Hard palate, buccal, floor of mouth mucosa normal   Tongue - normal movement, no lesions   Oropharynx:  mucosa -  Normal, no lesions  soft palate -  Normal, no lesions, no asymmetry, normal elevation  Base of tongue - normal   Neoharynx:  Normal mucosa, no masses  neovallecula clear  Shelf at site of previous epiglottis   Neck: Well healed apron neck incision  No palpable masses  Stoma patent but deep, no crusting, no bleeding   Normal range of motion  Scar band bilaterally along SCM   Lymphatic:  no abnormal nodes   Cardiovascular:  warm, pink, well-perfused extremities without swelling, tenderness, or edema   Respiratory:  Normal respiratory effort, no stridor   Neuro/Psych.:  mood/affect -  normal  mental status -  normal "       PROCEDURES:   Flexible fiberoptic laryngoscopy: Scope exam was indicated due to laryngeal cancer. Verbal consent was obtained. The nasal cavity was prepped with an aerosolized solution of topical anesthetic and vasoconstrictive agent. The scope was passed through the anterior nasal cavity and advanced. Inspection of the nasopharynx revealed no gross abnormality. The base of tongue and neovallecula are normal. There is a shelf at the site of the previous epiglottis. The neopharynx is clear with healthy appearing mucosa and no masses.  Procedure tolerated well with no immediate complications noted.    Flexible tracheoscopy: The flexible scope was passed through the stoma and down to the primary bronchi. There is no crusting, no dry mucosa, no bleeding. There are thickened secretions in the bronchi. The airway was patent. Patient tolerated the procedure well with no immediate complications.       RESULTS REVIEWED:   Care discussed with SLP    CT neck and chest imaging independently reviewed     CT neck and chest reports reviewed     TSH normal      IMPRESSION AND PLAN:   Evin Sterling is a 73-year-old man with a T4aN0 SCC of the larynx. He underwent a total laryngectomy with bilateral neck dissections on 8/25/2020. He was recommended for postoperative radiation but decided against treatment.    He is doing well with no signs of recurrent disease.     He was again encouraged to follow-up with NSG as previously recommended for his meningioma.     He is having issues with his electrolarynx, our SLP team will reach out to the rep this week.    He had repeat imaging in today. Repeat in 1 year pending final results. Results will be sent to [facility].    He did have a hemithyroidectomy performed with his laryngectomy. TSH check today normal. Repeat in 6-12 months.    I will see him back in 6 months.    Thank you very much for the opportunity to participate in the care of your patient.      Caron Wang,  MD  Otolaryngology- Head & Neck Surgery      This note was dictated with voice recognition software and then edited. Please excuse any unintentional errors.               CC:  Kyaw Blankenship MD  [address] [address]      Vineet Gonzales MD  Department of Radiation Oncology  [facility]

## 2023-11-06 ENCOUNTER — OFFICE VISIT (OUTPATIENT)
Dept: OTOLARYNGOLOGY | Facility: CLINIC | Age: 73
End: 2023-11-06
Payer: COMMERCIAL

## 2023-11-06 ENCOUNTER — ANCILLARY PROCEDURE (OUTPATIENT)
Dept: CT IMAGING | Facility: CLINIC | Age: 73
End: 2023-11-06
Attending: OTOLARYNGOLOGY
Payer: COMMERCIAL

## 2023-11-06 ENCOUNTER — LAB (OUTPATIENT)
Dept: LAB | Facility: CLINIC | Age: 73
End: 2023-11-06
Payer: COMMERCIAL

## 2023-11-06 VITALS
OXYGEN SATURATION: 96 % | DIASTOLIC BLOOD PRESSURE: 75 MMHG | HEIGHT: 70 IN | HEART RATE: 73 BPM | SYSTOLIC BLOOD PRESSURE: 132 MMHG | WEIGHT: 197 LBS | BODY MASS INDEX: 28.2 KG/M2

## 2023-11-06 DIAGNOSIS — E03.4 HYPOTHYROIDISM DUE TO ACQUIRED ATROPHY OF THYROID: ICD-10-CM

## 2023-11-06 DIAGNOSIS — C32.9 LARYNGEAL CANCER (H): Primary | ICD-10-CM

## 2023-11-06 DIAGNOSIS — C32.9 LARYNGEAL CANCER (H): ICD-10-CM

## 2023-11-06 LAB
CREAT BLD-MCNC: 1 MG/DL (ref 0.7–1.3)
EGFRCR SERPLBLD CKD-EPI 2021: >60 ML/MIN/1.73M2
TSH SERPL DL<=0.005 MIU/L-ACNC: 3.52 UIU/ML (ref 0.3–4.2)

## 2023-11-06 PROCEDURE — 71260 CT THORAX DX C+: CPT | Mod: GC | Performed by: RADIOLOGY

## 2023-11-06 PROCEDURE — 31615 TRCHEOBRNCHSC EST TRACHS INC: CPT | Performed by: OTOLARYNGOLOGY

## 2023-11-06 PROCEDURE — 99213 OFFICE O/P EST LOW 20 MIN: CPT | Mod: 25 | Performed by: OTOLARYNGOLOGY

## 2023-11-06 PROCEDURE — 84443 ASSAY THYROID STIM HORMONE: CPT | Performed by: PATHOLOGY

## 2023-11-06 PROCEDURE — 36415 COLL VENOUS BLD VENIPUNCTURE: CPT | Performed by: PATHOLOGY

## 2023-11-06 PROCEDURE — 82565 ASSAY OF CREATININE: CPT | Performed by: PATHOLOGY

## 2023-11-06 PROCEDURE — 70491 CT SOFT TISSUE NECK W/DYE: CPT | Performed by: RADIOLOGY

## 2023-11-06 RX ORDER — IOPAMIDOL 755 MG/ML
93 INJECTION, SOLUTION INTRAVASCULAR ONCE
Status: COMPLETED | OUTPATIENT
Start: 2023-11-06 | End: 2023-11-06

## 2023-11-06 RX ADMIN — IOPAMIDOL 93 ML: 755 INJECTION, SOLUTION INTRAVASCULAR at 08:45

## 2023-11-06 ASSESSMENT — PAIN SCALES - GENERAL: PAINLEVEL: NO PAIN (0)

## 2023-11-06 NOTE — NURSING NOTE
"Chief Complaint   Patient presents with    RECHECK     6 month follow up       Blood pressure 132/75, pulse 73, height 1.778 m (5' 10\"), weight 89.4 kg (197 lb), SpO2 96%.    Annalee Chambers, EMT    "

## 2023-11-06 NOTE — DISCHARGE INSTRUCTIONS

## 2023-11-06 NOTE — LETTER
11/6/2023       RE: Evin Sterling  [address] [facility] [address]     Dear Colleague,    Thank you for referring your patient, Evin Sterling, to the [facility] [place] [facility]. Please see a copy of my visit note below.    Dear Dr. Blankenship:    I had the pleasure of seeing Evin Sterling in follow-up today at the [facility] Otolaryng[facility].     History of Present Illness:   Evin Sterling is a 73 year old man with a T4aN0 SCC of the larynx. The patient had about a 4 year history of progressive hoarseness. He saw Dr Blankenship on 7/28/2020 for consultation at which time he had biphasic stridor and on scope exam was noted to have a large exophytic mass of the glottis without mobility of either cord and a glottic opening of about 5 mm.     He was taken to the OR on 8/3/2020 for an awake trach with DL. Biopsies were consistent with SCC. He had a PET scan which showed the tumor in the larynx with thyroid cartilage erosion but no obvious mariel disease. The patient demonstrated poor swallow function with aspiration on swallow study. Given these findings he was recommended surgery followed by adjuvant radiation.    He was taken to the OR on 8/25/2020 for a DL, total laryngectomy, left hemithyroidectomy, bilateral neck dissections, cricopharyngeal myotomy, dental extractions.. He had PEG tube placement performed by thoracic surgery at the time of surgery. His postoperative course was uncomplicated. His final pathology demonstrated a 2.5 cm invasive moderately differentiated SCC, 2.5 cm involving the right and left cords, invading the thyroid cartilage and focally extending to the soft tissues, benign thyroid, angiolymphatic invasion present, no PNI, negative margins, 0/86 lymph nodes.  His case was reviewed at tumor conference with recommendation for postoperative radiation. The patient decided against radiation. He was  started on an oral diet in October 2020.  He had a negative posttreatment PET scan in November 2020. He had his PEG removed by thoracic surgery in December 2020.      Interval history:  He comes in today for follow-up. He was last seen in clinic in May 2023. He comes in with labs and imaging. He says the neurosurgery follow-up is not at the top of his list right now. He says he is feeling good. He sometimes has issues with an upset stomach. He says sometimes eating is hard for him. If he is in a hurry and eats too quickly he feels poorly. He says everyone is done eating before him if he eats with other people. He says sometimes his electrolarynx has problems working. His weight is up a little. His energy is very good. He says he is still working on the dentures. He says the electrolarynx pushes on the dentures, and he can't get enough volume with neck placement. The stoma is good. He says sometimes he gets gunk in it. He did not try the alisa clips. He is not wearing an HME. He has some issues with the electrolarynx. He has occasional speck of blood in the stoma. He has no other changes in his health.       MEDICATIONS:     Current Outpatient Medications   Medication Sig Dispense Refill    lisinopril (ZESTRIL) 10 MG tablet Take 10 mg by mouth      metoprolol tartrate (LOPRESSOR) 25 MG tablet 0.5 tablets (12.5 mg) by Per G Tube route 2 times daily 30 tablet 0    spironolactone (ALDACTONE) 25 MG tablet Take 12.5 mg by mouth daily         ALLERGIES:    Allergies   Allergen Reactions    Sulfa Antibiotics      Other reaction(s): Fever       HABITS/SOCIAL HISTORY:   Previous alcoholic. Former smoker, quit 12 years ago, previously 1/2 ppd.  Some chewing tobacco use, quit about 30 years ago.    Lives with his son-in-law's father.    Previously worked in construction.    Social History     Socioeconomic History    Marital status:      Spouse name: Not on file    Number of children: Not on file    Years of education:  Not on file    Highest education level: Not on file   Occupational History    Not on file   Tobacco Use    Smoking status: Former     Packs/day: 1.00     Years: 20.00     Additional pack years: 0.00     Total pack years: 20.00     Types: Cigarettes     Quit date:      Years since quittin.8    Smokeless tobacco: Former   Substance and Sexual Activity    Alcohol use: Not Currently    Drug use: Not Currently    Sexual activity: Not on file   Other Topics Concern    Not on file   Social History Narrative    Not on file     Social Determinants of Health     Financial Resource Strain: Not on file   Food Insecurity: Not on file   Transportation Needs: Not on file   Physical Activity: Not on file   Stress: Not on file   Social Connections: Not on file   Interpersonal Safety: Not on file   Housing Stability: Not on file       PAST MEDICAL HISTORY:   Past Medical History:   Diagnosis Date    CHF (congestive heart failure) (H)     GERD (gastroesophageal reflux disease)     Hypertension         PAST SURGICAL HISTORY:   Past Surgical History:   Procedure Laterality Date    DISSECTION RADICAL NECK BILATERAL Bilateral 2020    Procedure: Bilateral neck dissection;  Surgeon: Caron Wang MD;  Location: [facility]    ENDOSCOPIC INSERTION TUBE GASTROSTOMY N/A 2020    Procedure: INSERTION, PEG TUBE;  Surgeon: Reuben Hastings MD;  [facility]: [facility]    HERNIA REPAIR, INGUINAL RT/LT      HERNIA REPAIR, UMBILICAL      LARYNGECTOMY N/A 2020    Procedure: TOTAL LARYNGECTOMY WITH LEFT HEMITHYROIDECTOMY;  Surgeon: Caron Wang MD;  [facility]    LARYNGOSCOPY N/A 2020    Procedure: DIRECT LARYNGOSCOPY;  Surgeon: Caron Wang MD;  [facility]    LARYNGOSCOPY WITH BIOPSY(IES) N/A 8/3/2020    Procedure: LARYNGOSCOPY WITH BIOPSY, TRACHEOSTOMY, NG TUBE PLACEMENT;  Surgeon: Caron Wang MD;  [facility]    ODONTECTOMY N/A 2020    Procedure: DENTAL EXTRACTION OF TEETH x 13;  Surgeon:  "Caron Wang MD;  [facility]    TRACHEOSTOMY N/A 8/3/2020    Procedure: TRACHEOSTOMY;  Surgeon: Caron Wang MD;  [facility]       FAMILY HISTORY:    Family History   Problem Relation Age of Onset    Anesthesia Reaction No family hx of     Deep Vein Thrombosis (DVT) No family hx of        REVIEW OF SYSTEMS:  12 point ROS was negative other than the symptoms noted above in the HPI.  Patient Supplied Answers to Review of Systems      3/7/2022     9:59 AM   [facility] ENT ROS   Musculoskeletal Sore or stiff joints   Endocrine Frequent urination         PHYSICAL EXAMINATION:   /75 (BP Location: Right arm, Patient Position: Sitting, Cuff Size: Adult Large)   Pulse 73   Ht 1.778 m (5' 10\")   Wt 89.4 kg (197 lb)   SpO2 96%   BMI 28.27 kg/m     Appearance:   normal; NAD, age-appropriate appearance, well-developed, normal habitus   Communication:   aphonic, communicates with electrolarynx   Head/Face:   inspection -  Normal; no scars or visible lesions   Salivary glands -  Normal size, no tenderness, swelling, or palpable masses   Facial strength -  Normal and symmetric    Skin:  normal, no rash   Ears:  auricle (AD) -  normal  EAC (AD) -  normal  TM (AD) -  normal  auricle (AS) -  normal  EAC (AS) - normal  TM (AS) -  normal  Normal clinical speech reception   Nose:  Ext. inspection -  Normal  Internal Inspection -  Normal mucosa, septum, and turbinates   Nasopharynx:  normal mucosa, no masses   Oral Cavity:  lips -  Normal mucosa, oral competence, and stoma size   Edentulous   Hard palate, buccal, floor of mouth mucosa normal   Tongue - normal movement, no lesions   Oropharynx:  mucosa -  Normal, no lesions  soft palate -  Normal, no lesions, no asymmetry, normal elevation  Base of tongue - normal   Neoharynx:  Normal mucosa, no masses  neovallecula clear  Shelf at site of previous epiglottis   Neck: Well healed apron neck incision  No palpable masses  Stoma patent but deep, no crusting, no " bleeding   Normal range of motion  Scar band bilaterally along SCM   Lymphatic:  no abnormal nodes   Cardiovascular:  warm, pink, well-perfused extremities without swelling, tenderness, or edema   Respiratory:  Normal respiratory effort, no stridor   Neuro/Psych.:  mood/affect -  normal  mental status -  normal       PROCEDURES:   Flexible fiberoptic laryngoscopy: Scope exam was indicated due to laryngeal cancer. Verbal consent was obtained. The nasal cavity was prepped with an aerosolized solution of topical anesthetic and vasoconstrictive agent. The scope was passed through the anterior nasal cavity and advanced. Inspection of the nasopharynx revealed no gross abnormality. The base of tongue and neovallecula are normal. There is a shelf at the site of the previous epiglottis. The neopharynx is clear with healthy appearing mucosa and no masses.  Procedure tolerated well with no immediate complications noted.    Flexible tracheoscopy: The flexible scope was passed through the stoma and down to the primary bronchi. There is no crusting, no dry mucosa, no bleeding. There are thickened secretions in the bronchi. The airway was patent. Patient tolerated the procedure well with no immediate complications.       RESULTS REVIEWED:   Care discussed with SLP    CT neck and chest imaging independently reviewed     CT neck and chest reports reviewed     TSH normal      IMPRESSION AND PLAN:   Evin Sterling is a 73-year-old man with a T4aN0 SCC of the larynx. He underwent a total laryngectomy with bilateral neck dissections on 8/25/2020. He was recommended for postoperative radiation but decided against treatment.    He is doing well with no signs of recurrent disease.     He was again encouraged to follow-up with NSG as previously recommended for his meningioma.     He is having issues with his electrolarynx, our SLP team will reach out to the rep this week.    He had repeat imaging in today. Repeat in 1 year pending final  results. Results will be sent to [facility].    He did have a hemithyroidectomy performed with his laryngectomy. TSH check today normal. Repeat in 6-12 months.    I will see him back in 6 months.    Thank you very much for the opportunity to participate in the care of your patient.      Caron Wang MD  Otolaryngology- Head & Neck Surgery      This note was dictated with voice recognition software and then edited. Please excuse any unintentional errors.         CC:  Kyaw Blankenship MD  [address] [address]      Vineet Gonzales MD  Department of Radiation Oncology  [facility]        Again, thank you for allowing me to participate in the care of your patient.      Sincerely,    Caron Wang MD

## 2023-11-08 DIAGNOSIS — I27.20 PULMONARY HYPERTENSION (H): ICD-10-CM

## 2023-11-08 DIAGNOSIS — C32.9 LARYNGEAL CANCER (H): Primary | ICD-10-CM

## 2023-11-09 ENCOUNTER — MYC MEDICAL ADVICE (OUTPATIENT)
Dept: OTOLARYNGOLOGY | Facility: CLINIC | Age: 73
End: 2023-11-09
Payer: MEDICARE

## 2024-02-16 ENCOUNTER — ANCILLARY PROCEDURE (OUTPATIENT)
Dept: CT IMAGING | Facility: CLINIC | Age: 74
End: 2024-02-16
Attending: OTOLARYNGOLOGY
Payer: COMMERCIAL

## 2024-02-16 DIAGNOSIS — I27.20 PULMONARY HYPERTENSION (H): ICD-10-CM

## 2024-02-16 DIAGNOSIS — C32.9 LARYNGEAL CANCER (H): ICD-10-CM

## 2024-02-16 LAB
CREAT BLD-MCNC: 1 MG/DL (ref 0.7–1.3)
EGFRCR SERPLBLD CKD-EPI 2021: >60 ML/MIN/1.73M2

## 2024-02-16 PROCEDURE — 71260 CT THORAX DX C+: CPT | Mod: GC | Performed by: RADIOLOGY

## 2024-02-16 PROCEDURE — 82565 ASSAY OF CREATININE: CPT | Performed by: PATHOLOGY

## 2024-02-16 RX ORDER — IOPAMIDOL 755 MG/ML
96 INJECTION, SOLUTION INTRAVASCULAR ONCE
Status: COMPLETED | OUTPATIENT
Start: 2024-02-16 | End: 2024-02-16

## 2024-02-16 RX ADMIN — IOPAMIDOL 96 ML: 755 INJECTION, SOLUTION INTRAVASCULAR at 08:20

## 2024-02-16 NOTE — DISCHARGE INSTRUCTIONS

## 2024-02-20 ENCOUNTER — MYC MEDICAL ADVICE (OUTPATIENT)
Dept: OTOLARYNGOLOGY | Facility: CLINIC | Age: 74
End: 2024-02-20
Payer: MEDICARE

## 2024-02-20 ENCOUNTER — PATIENT OUTREACH (OUTPATIENT)
Dept: OTOLARYNGOLOGY | Facility: CLINIC | Age: 74
End: 2024-02-20
Payer: MEDICARE

## 2024-02-20 DIAGNOSIS — C32.9 LARYNGEAL CANCER (H): Primary | ICD-10-CM

## 2024-02-20 DIAGNOSIS — R91.8 PULMONARY NODULES: ICD-10-CM

## 2024-02-20 DIAGNOSIS — I27.20 PULMONARY HYPERTENSION (H): Primary | ICD-10-CM

## 2024-02-20 NOTE — TELEPHONE ENCOUNTER
LVM for patient and daughter to discuss CT chest results. Provided direct call back number for patient or daughter to return call. Will send Blackberry message as well.    Birgit HALLN, RN

## 2024-02-26 DIAGNOSIS — C32.9 LARYNGEAL CANCER (H): Primary | ICD-10-CM

## 2024-02-26 RX ORDER — SODIUM CHLORIDE FOR INHALATION 0.9 %
5 VIAL, NEBULIZER (ML) INHALATION PRN
Qty: 500 ML | Refills: 3 | Status: SHIPPED | OUTPATIENT
Start: 2024-02-26

## 2024-05-04 NOTE — PROGRESS NOTES
Dear Dr. Blankenship:    I had the pleasure of seeing Evin Sterling in follow-up today at the [facility] Otolaryngology [facility].     History of Present Illness:   Evin Sterling is a 73 year old man with a T4aN0 SCC of the larynx. The patient had about a 4 year history of progressive hoarseness. He saw Dr Blankenship on 7/28/2020 for consultation at which time he had biphasic stridor and on scope exam was noted to have a large exophytic mass of the glottis without mobility of either cord and a glottic opening of about 5 mm.     He was taken to the OR on 8/3/2020 for an awake trach with DL. Biopsies were consistent with SCC. He had a PET scan which showed the tumor in the larynx with thyroid cartilage erosion but no obvious mariel disease. The patient demonstrated poor swallow function with aspiration on swallow study. Given these findings he was recommended surgery followed by adjuvant radiation.    He was taken to the OR on 8/25/2020 for a DL, total laryngectomy, left hemithyroidectomy, bilateral neck dissections, cricopharyngeal myotomy, dental extractions.. He had PEG tube placement performed by thoracic surgery at the time of surgery. His postoperative course was uncomplicated. His final pathology demonstrated a 2.5 cm invasive moderately differentiated SCC, 2.5 cm involving the right and left cords, invading the thyroid cartilage and focally extending to the soft tissues, benign thyroid, angiolymphatic invasion present, no PNI, negative margins, 0/86 lymph nodes.  His case was reviewed at tumor conference with recommendation for postoperative radiation. The patient decided against radiation. He was started on an oral diet in October 2020.  He had a negative posttreatment PET scan in November 2020. He had his PEG removed by thoracic surgery in December 2020.      Interval history:  He comes in today for follow-up. He was last seen in clinic in November 2023. He saw primary care in December 2023. He had a new  pulmonary nodule on chest imaging in February 2024. He ws supposed to have a repeat chest CT today but did not remember the appointment. He says things are going well. He has no problems or concerns. He says sometimes he has more phlegm than he is comfortable. He says that is worse with the spring. He had allergies when he moved to [place]. He is not wearing a HME. He tries to wear a covering but he says his neck is swollen. He says the alisa tube does not fit well because of the angle. He says drinking is fine. He says he swallows a lot of air with eating and feels like he has problems with gas. He is able to swallow and eat everything he wants. He does some softer things that are easier to swollen. He has a problem using a electrolarynx with his dentures. He says the neck adapter does not work well for him. He is happy with his weight. He is at his weight from high school. He has no ear pain. He has no pain with swallowing. He has no hemoptysis. He has no breathing issues. He says he has heart failure so this can impact his breathing.       MEDICATIONS:     Current Outpatient Medications   Medication Sig Dispense Refill    lisinopril (ZESTRIL) 10 MG tablet Take 10 mg by mouth      metoprolol tartrate (LOPRESSOR) 25 MG tablet 0.5 tablets (12.5 mg) by Per G Tube route 2 times daily (Patient taking differently: 50 mg by Per G Tube route 2 times daily) 30 tablet 0    sodium chloride 0.9 % neb solution Take 5 mLs by nebulization as needed for wheezing 500 mL 3    spironolactone (ALDACTONE) 25 MG tablet Take 12.5 mg by mouth daily         ALLERGIES:    Allergies   Allergen Reactions    Covid-19 Ad26 Vaccine(scenios)     Fluogen [Influenza Virus Vaccine]     Sulfa Antibiotics      Other reaction(s): Fever       HABITS/SOCIAL HISTORY:   Previous alcoholic. Former smoker, quit 12 years ago, previously 1/2 ppd.  Some chewing tobacco use, quit about 30 years ago.    Lives with his son-in-law's father.    Previously worked in  construction.    Social History     Socioeconomic History    Marital status:      Spouse name: Not on file    Number of children: Not on file    Years of education: Not on file    Highest education level: Not on file   Occupational History    Not on file   Tobacco Use    Smoking status: Former     Current packs/day: 0.00     Average packs/day: 1 pack/day for 20.0 years (20.0 ttl pk-yrs)     Types: Cigarettes     Start date:      Quit date:      Years since quittin.3    Smokeless tobacco: Former   Substance and Sexual Activity    Alcohol use: Not Currently    Drug use: Not Currently    Sexual activity: Not on file   Other Topics Concern    Not on file   Social History Narrative    Not on file     Social Determinants of Health     Financial Resource Strain: High Risk (2021)    [facility], [facility]    Financial Resource Strain     Difficulty of Paying Living Expenses: Not on file     Difficulty of Paying Living Expenses: Not on file   Food Insecurity: Not on file   Transportation Needs: Not on file   Physical Activity: Not on file   Stress: Not on file   Social Connections: Unknown (2021)    Received from [facility], [facility]    Social Connections     Frequency of Communication with Friends and Family: Not on file   Interpersonal Safety: Not on file   Housing Stability: Not on file       PAST MEDICAL HISTORY:   Past Medical History:   Diagnosis Date    CHF (congestive heart failure) (H)     GERD (gastroesophageal reflux disease)     Hypertension         PAST SURGICAL HISTORY:   Past Surgical History:   Procedure Laterality Date    DISSECTION RADICAL NECK BILATERAL Bilateral 2020    Procedure: Bilateral neck dissection;  Surgeon: Caron Wang MD;  [facility]    ENDOSCOPIC INSERTION TUBE GASTROSTOMY N/A 2020     "Procedure: INSERTION, PEG TUBE;  Surgeon: Reuben Hastings MD;  [facility]    HERNIA REPAIR, INGUINAL RT/LT      HERNIA REPAIR, UMBILICAL      LARYNGECTOMY N/A 8/25/2020    Procedure: TOTAL LARYNGECTOMY WITH LEFT HEMITHYROIDECTOMY;  Surgeon: Caron Wang MD;  [facility]    LARYNGOSCOPY N/A 8/25/2020    Procedure: DIRECT LARYNGOSCOPY;  Surgeon: Caron Wang MD;  [facility]    LARYNGOSCOPY WITH BIOPSY(IES) N/A 8/3/2020    Procedure: LARYNGOSCOPY WITH BIOPSY, TRACHEOSTOMY, NG TUBE PLACEMENT;  Surgeon: Caron Wang MD;  [facility]    ODONTECTOMY N/A 8/25/2020    Procedure: DENTAL EXTRACTION OF TEETH x 13;  Surgeon: Caron Wang MD;  [facility]    TRACHEOSTOMY N/A 8/3/2020    Procedure: TRACHEOSTOMY;  Surgeon: Caron Wang MD;  [facility]       FAMILY HISTORY:    Family History   Problem Relation Age of Onset    Anesthesia Reaction No family hx of     Deep Vein Thrombosis (DVT) No family hx of        REVIEW OF SYSTEMS:  12 point ROS was negative other than the symptoms noted above in the HPI.  Patient Supplied Answers to Review of Systems      3/7/2022     9:59 AM    ENT ROS   Musculoskeletal Sore or stiff joints   Endocrine Frequent urination         PHYSICAL EXAMINATION:   Ht 1.778 m (5' 10\")   Wt 88.9 kg (196 lb)   BMI 28.12 kg/m     Appearance:   normal; NAD, age-appropriate appearance, well-developed, normal habitus   Communication:   aphonic, communicates with electrolarynx   Head/Face:   inspection -  Normal; no scars or visible lesions   Salivary glands -  Normal size, no tenderness, swelling, or palpable masses   Facial strength -  Normal and symmetric    Skin:  normal, no rash   Ears:  auricle (AD) -  normal  EAC (AD) -  normal  TM (AD) -  normal  auricle (AS) -  normal  EAC (AS) - normal  TM (AS) -  normal  Normal clinical speech reception   Nose:  Ext. inspection -  Normal  Internal Inspection -  Normal mucosa, septum, and turbinates "   Nasopharynx:  normal mucosa, no masses   Oral Cavity:  lips -  Normal mucosa, oral competence, and stoma size   Edentulous   Hard palate, buccal, floor of mouth mucosa normal   Tongue - normal movement, no lesions   Oropharynx:  mucosa -  Normal, no lesions  soft palate -  Normal, no lesions, no asymmetry, normal elevation  Base of tongue - normal   Neoharynx:  Normal mucosa, no masses  neovallecula clear  Shelf at site of previous epiglottis   Neck: Well healed apron neck incision  No palpable masses  Stoma patent but deep, no crusting, no bleeding   Normal range of motion  Scar band bilaterally along SCM - similar to previous   Lymphatic:  no abnormal nodes   Cardiovascular:  warm, pink, well-perfused extremities without swelling, tenderness, or edema   Respiratory:  Normal respiratory effort, no stridor   Neuro/Psych.:  mood/affect -  normal  mental status -  normal       PROCEDURES:   Flexible fiberoptic laryngoscopy: Scope exam was indicated due to laryngeal cancer. Verbal consent was obtained. The nasal cavity was prepped with an aerosolized solution of topical anesthetic and vasoconstrictive agent. The scope was passed through the anterior nasal cavity and advanced. Inspection of the nasopharynx revealed no gross abnormality. The base of tongue and neovallecula are normal. There is a shelf at the site of the previous epiglottis. The neopharynx is clear with healthy appearing mucosa and no masses.  Procedure tolerated well with no immediate complications noted.    Flexible tracheoscopy: The flexible scope was passed through the stoma and down to the primary bronchi. There is no crusting, no dry mucosa, no bleeding. The airway was patent. Patient tolerated the procedure well with no immediate complications.             RESULTS REVIEWED:   Care discussed with SLP    CT chest report reviewed     Note from PCP reviewed      IMPRESSION AND PLAN:   Evin Sterling is a 73-year-old man with a T4aN0 SCC of the larynx.  He underwent a total laryngectomy with bilateral neck dissections on 8/25/2020. He was recommended for postoperative radiation but decided against treatment.    He is doing well with no signs of recurrent disease.     We discussed stretching of his neck and massage to try to help with the scar band.     He is going to reschedule his chest CT that he needs for follow-up of lung findings.    He was seen by SLP today to see if there are other suggestions for neck placement to give him options to wear his dentures while using his electrolarynx.     Repeat neck CT in 6 months.     He did have a hemithyroidectomy performed with his laryngectomy. TSH check in 6-12 months.    I will see him back in 6 months with labs and CT neck.    Thank you very much for the opportunity to participate in the care of your patient.      Caron Wang MD  Otolaryngology- Head & Neck Surgery      This note was dictated with voice recognition software and then edited. Please excuse any unintentional errors.               CC:  Kyaw Blankenship MD  [address] [address]      Vineet Gonzales MD  Department of Radiation Oncology  [facility]

## 2024-05-06 ENCOUNTER — THERAPY VISIT (OUTPATIENT)
Dept: SPEECH THERAPY | Facility: CLINIC | Age: 74
End: 2024-05-06
Payer: MEDICARE

## 2024-05-06 ENCOUNTER — OFFICE VISIT (OUTPATIENT)
Dept: OTOLARYNGOLOGY | Facility: CLINIC | Age: 74
End: 2024-05-06
Payer: COMMERCIAL

## 2024-05-06 VITALS — WEIGHT: 196 LBS | BODY MASS INDEX: 28.06 KG/M2 | HEIGHT: 70 IN

## 2024-05-06 DIAGNOSIS — E03.4 HYPOTHYROIDISM DUE TO ACQUIRED ATROPHY OF THYROID: ICD-10-CM

## 2024-05-06 DIAGNOSIS — C32.9 LARYNGEAL CANCER (H): Primary | ICD-10-CM

## 2024-05-06 DIAGNOSIS — R49.1 APHONIA: Primary | ICD-10-CM

## 2024-05-06 DIAGNOSIS — C32.9 LARYNGEAL CANCER (H): ICD-10-CM

## 2024-05-06 PROCEDURE — 99213 OFFICE O/P EST LOW 20 MIN: CPT | Mod: 25 | Performed by: OTOLARYNGOLOGY

## 2024-05-06 PROCEDURE — 31615 TRCHEOBRNCHSC EST TRACHS INC: CPT | Performed by: OTOLARYNGOLOGY

## 2024-05-06 PROCEDURE — 31575 DIAGNOSTIC LARYNGOSCOPY: CPT | Mod: 51 | Performed by: OTOLARYNGOLOGY

## 2024-05-06 PROCEDURE — 92522 EVALUATE SPEECH PRODUCTION: CPT | Mod: GN | Performed by: SPEECH-LANGUAGE PATHOLOGIST

## 2024-05-06 ASSESSMENT — PAIN SCALES - GENERAL: PAINLEVEL: MILD PAIN (3)

## 2024-05-06 NOTE — LETTER
5/6/2024       RE: Evin Sterling  [address] [facility] [address]     Dear Colleague,    Thank you for referring your patient, Evin Sterling, to the [facility] [place] at [facility]. Please see a copy of my visit note below.    Dear Dr. Blankenship:    I had the pleasure of seeing Evin Sterling in follow-up today at the [facility].     History of Present Illness:   Evin Sterling is a 73 year old man with a T4aN0 SCC of the larynx. The patient had about a 4 year history of progressive hoarseness. He saw Dr Blankenship on 7/28/2020 for consultation at which time he had biphasic stridor and on scope exam was noted to have a large exophytic mass of the glottis without mobility of either cord and a glottic opening of about 5 mm.     He was taken to the OR on 8/3/2020 for an awake trach with DL. Biopsies were consistent with SCC. He had a PET scan which showed the tumor in the larynx with thyroid cartilage erosion but no obvious mariel disease. The patient demonstrated poor swallow function with aspiration on swallow study. Given these findings he was recommended surgery followed by adjuvant radiation.    He was taken to the OR on 8/25/2020 for a DL, total laryngectomy, left hemithyroidectomy, bilateral neck dissections, cricopharyngeal myotomy, dental extractions.. He had PEG tube placement performed by thoracic surgery at the time of surgery. His postoperative course was uncomplicated. His final pathology demonstrated a 2.5 cm invasive moderately differentiated SCC, 2.5 cm involving the right and left cords, invading the thyroid cartilage and focally extending to the soft tissues, benign thyroid, angiolymphatic invasion present, no PNI, negative margins, 0/86 lymph nodes.  His case was reviewed at tumor conference with recommendation for postoperative radiation. The patient decided against radiation. He was started  Encounter Summary
  Created on: 2020
 
 SantosVeda
 External Reference #: [id number]
 : 45
 Sex: Female
 
 Demographics
 
 
+-----------------------+----------------------+
| Address               | [address]      |
|                       | [place]  [address] |
+-----------------------+----------------------+
| Home Phone            | [phone]      |
+-----------------------+----------------------+
| Preferred Language    | Unknown              |
+-----------------------+----------------------+
| Marital Status        |               |
+-----------------------+----------------------+
| Pentecostal Affiliation | [id number]                 |
+-----------------------+----------------------+
| Race                  | Unknown              |
+-----------------------+----------------------+
| Ethnic Group          | Unknown              |
+-----------------------+----------------------+
 
 
 Author
 
 
+--------------+--------------------------------------------+
| Author       | [facility]  |
|              | and [facility]                                |
+--------------+--------------------------------------------+
| Organization | [facility]  |
|              | and [facility]                                |
+--------------+--------------------------------------------+
| Address      | Unknown                                    |
+--------------+--------------------------------------------+
| Phone        | Unavailable                                |
+--------------+--------------------------------------------+
 
 
 
 Support
 
 
+----------------+--------------+-----------------+-----------------+
| Name           | Relationship | Address         | Phone           |
+----------------+--------------+-----------------+-----------------+
| Johan Santos | [place]         | [address] | [phone] |
|                |              | [place]  [address] |                 |
+----------------+--------------+-----------------+-----------------+
 
 
 
 
 Care Team Providers
 
 
+--------------------------+------+-----------------+
| Care Team Member Name    | Role | Phone           |
+--------------------------+------+-----------------+
| William Ferguson MD | PCP  | [phone] |
+--------------------------+------+-----------------+
 
 
 
 Reason for Visit
 
 
+-------------------+----------+
| Reason            | Comments |
+-------------------+----------+
| Medication Refill |          |
+-------------------+----------+
 
 
 
 Encounter Details
 
 
+--------+--------+---------------------+----------------------+-------------------+
| Date   | Type   | Department          | Care Team            | Description       |
+--------+--------+---------------------+----------------------+-------------------+
| / | Refill |   [facility]         |   [place],       | Medication Refill |
|    |        | NEPHROLOGY  [address]   | [address] |                   |
|        |        | [address]   |  [address] [place]    |                   |
|        |        | [place], [place]     | [address]  [place], [place] |                   |
|        |        | [address]          |  [address]  [phone] |                   |
|        |        | [phone]        |   [phone] (Fax) |                   |
+--------+--------+---------------------+----------------------+-------------------+
 
 
 
 Social History
 
 
+---------------+------------+-----------+--------+------------------+
| Tobacco Use   | Types      | Packs/Day | Years  | Date             |
|               |            |           | Used   |                  |
+---------------+------------+-----------+--------+------------------+
| Former Smoker | Cigarettes | 0.25      | 5      | Quit: 1968 |
+---------------+------------+-----------+--------+------------------+
 
 
 
+---------------------+---+---+---+
| Smokeless Tobacco:  |   |   |   |
| Never Used          |   |   |   |
+---------------------+---+---+---+
 
 
 
+-------------+-------------+---------+--------------+
| Alcohol Use | Drinks/Week | oz/Week | Comments     |
+-------------+-------------+---------+--------------+
 
| Yes         |             |         | Twice a year |
+-------------+-------------+---------+--------------+
 
 
 
+------------------+---------------+
| Sex Assigned at  | Date Recorded |
| Birth            |               |
+------------------+---------------+
| Not on file      |               |
+------------------+---------------+
 
 
 
+----------------+-------------+-------------+
| Job Start Date | Occupation  | Industry    |
+----------------+-------------+-------------+
| Not on file    | Not on file | Not on file |
+----------------+-------------+-------------+
 
 
 
+----------------+--------------+------------+
| Travel History | Travel Start | Travel End |
+----------------+--------------+------------+
 
 
 
+-------------------------------------+
| No recent travel history available. |
+-------------------------------------+
 documented as of this encounter
 
 Plan of Treatment
 
 
+--------+---------+------------+----------------------+-------------+
| Date   | Type    | Specialty  | Care Team            | Description |
+--------+---------+------------+----------------------+-------------+
| / | Office  | Nephrology |   Dante Escudero MD  |             |
| 2020   | Visit   |            |  [address]  |             |
|        |         |            | [address]  [place]   |             |
|        |         |            | [address]  [phone]  |             |
|        |         |            |  [phone] (Fax)  |             |
+--------+---------+------------+----------------------+-------------+
 documented as of this encounter
 
 Visit Diagnoses
 Not on filedocumented in this encounter on an oral diet in October 2020.  He had a negative posttreatment PET scan in November 2020. He had his PEG removed by thoracic surgery in December 2020.      Interval history:  He comes in today for follow-up. He was last seen in clinic in November 2023. He saw primary care in December 2023. He had a new pulmonary nodule on chest imaging in February 2024. He ws supposed to have a repeat chest CT today but did not remember the appointment. He says things are going well. He has no problems or concerns. He says sometimes he has more phlegm than he is comfortable. He says that is worse with the spring. He had allergies when he moved to [place]. He is not wearing a HME. He tries to wear a covering but he says his neck is swollen. He says the alisa tube does not fit well because of the angle. He says drinking is fine. He says he swallows a lot of air with eating and feels like he has problems with gas. He is able to swallow and eat everything he wants. He does some softer things that are easier to swollen. He has a problem using a electrolarynx with his dentures. He says the neck adapter does not work well for him. He is happy with his weight. He is at his weight from high school. He has no ear pain. He has no pain with swallowing. He has no hemoptysis. He has no breathing issues. He says he has heart failure so this can impact his breathing.       MEDICATIONS:     Current Outpatient Medications   Medication Sig Dispense Refill     lisinopril (ZESTRIL) 10 MG tablet Take 10 mg by mouth       metoprolol tartrate (LOPRESSOR) 25 MG tablet 0.5 tablets (12.5 mg) by Per G Tube route 2 times daily (Patient taking differently: 50 mg by Per G Tube route 2 times daily) 30 tablet 0     sodium chloride 0.9 % neb solution Take 5 mLs by nebulization as needed for wheezing 500 mL 3     spironolactone (ALDACTONE) 25 MG tablet Take 12.5 mg by mouth daily         ALLERGIES:    Allergies   Allergen Reactions     Covid-19 Ad26 Vaccine(Field Dailies)       Fluogen [Influenza Virus Vaccine]      Sulfa Antibiotics      Other reaction(s): Fever       HABITS/SOCIAL HISTORY:   Previous alcoholic. Former smoker, quit 12 years ago, previously 1/2 ppd.  Some chewing tobacco use, quit about 30 years ago.    Lives with his son-in-law's father.    Previously worked in construction.    Social History     Socioeconomic History     Marital status:      Spouse name: Not on file     Number of children: Not on file     Years of education: Not on file     Highest education level: Not on file   Occupational History     Not on file   Tobacco Use     Smoking status: Former     Current packs/day: 0.00     Average packs/day: 1 pack/day for 20.0 years (20.0 ttl pk-yrs)     Types: Cigarettes     Start date:      Quit date:      Years since quittin.3     Smokeless tobacco: Former   Substance and Sexual Activity     Alcohol use: Not Currently     Drug use: Not Currently     Sexual activity: Not on file   Other Topics Concern     Not on file   Social History Narrative     Not on file     Social Determinants of Health     Financial Resource Strain: High Risk (2021)    [facility]    Financial Resource Strain      Difficulty of Paying Living Expenses: Not on file      Difficulty of Paying Living Expenses: Not on file   Food Insecurity: Not on file   Transportation Needs: Not on file   Physical Activity: Not on file   Stress: Not on file   Social Connections: Unknown (2021)    [facility]    Social Connections      Frequency of Communication with Friends and Family: Not on file   Interpersonal Safety: Not on file   Housing Stability: Not on file       PAST MEDICAL HISTORY:   Past Medical History:   Diagnosis Date     CHF (congestive heart failure) (H)      GERD (gastroesophageal  "reflux disease)      Hypertension         PAST SURGICAL HISTORY:   Past Surgical History:   Procedure Laterality Date     DISSECTION RADICAL NECK BILATERAL Bilateral 8/25/2020    Procedure: Bilateral neck dissection;  Surgeon: Caron Wang MD;  Location: [facility] OR     ENDOSCOPIC INSERTION TUBE GASTROSTOMY N/A 8/25/2020    Procedure: INSERTION, PEG TUBE;  Surgeon: Reuben Hastings MD;  Location: [facility] OR     HERNIA REPAIR, INGUINAL RT/LT       HERNIA REPAIR, UMBILICAL       LARYNGECTOMY N/A 8/25/2020    Procedure: TOTAL LARYNGECTOMY WITH LEFT HEMITHYROIDECTOMY;  Surgeon: Caron Wang MD;  Location: [facility] OR     LARYNGOSCOPY N/A 8/25/2020    Procedure: DIRECT LARYNGOSCOPY;  Surgeon: Caron Wang MD;  Location: [facility] OR     LARYNGOSCOPY WITH BIOPSY(IES) N/A 8/3/2020    Procedure: LARYNGOSCOPY WITH BIOPSY, TRACHEOSTOMY, NG TUBE PLACEMENT;  Surgeon: Caron Wang MD;  Location: [facility] OR     ODONTECTOMY N/A 8/25/2020    Procedure: DENTAL EXTRACTION OF TEETH x 13;  Surgeon: Caron Wang MD;  Location: [facility] OR     TRACHEOSTOMY N/A 8/3/2020    Procedure: TRACHEOSTOMY;  Surgeon: Caron Wang MD;  Location: [facility] OR       FAMILY HISTORY:    Family History   Problem Relation Age of Onset     Anesthesia Reaction No family hx of      Deep Vein Thrombosis (DVT) No family hx of        REVIEW OF SYSTEMS:  12 point ROS was negative other than the symptoms noted above in the HPI.  Patient Supplied Answers to Review of Systems      3/7/2022     9:59 AM   [facility] ROS   Musculoskeletal Sore or stiff joints   Endocrine Frequent urination         PHYSICAL EXAMINATION:   Ht 1.778 m (5' 10\")   Wt 88.9 kg (196 lb)   BMI 28.12 kg/m     Appearance:   normal; NAD, age-appropriate appearance, well-developed, normal habitus   Communication:   aphonic, communicates with electrolarynx   Head/Face:   inspection -  Normal; no scars or visible lesions   Salivary glands -  Normal size, no tenderness, swelling, or palpable masses   " Facial strength -  Normal and symmetric    Skin:  normal, no rash   Ears:  auricle (AD) -  normal  EAC (AD) -  normal  TM (AD) -  normal  auricle (AS) -  normal  EAC (AS) - normal  TM (AS) -  normal  Normal clinical speech reception   Nose:  Ext. inspection -  Normal  Internal Inspection -  Normal mucosa, septum, and turbinates   Nasopharynx:  normal mucosa, no masses   Oral Cavity:  lips -  Normal mucosa, oral competence, and stoma size   Edentulous   Hard palate, buccal, floor of mouth mucosa normal   Tongue - normal movement, no lesions   Oropharynx:  mucosa -  Normal, no lesions  soft palate -  Normal, no lesions, no asymmetry, normal elevation  Base of tongue - normal   Neoharynx:  Normal mucosa, no masses  neovallecula clear  Shelf at site of previous epiglottis   Neck: Well healed apron neck incision  No palpable masses  Stoma patent but deep, no crusting, no bleeding   Normal range of motion  Scar band bilaterally along SCM - similar to previous   Lymphatic:  no abnormal nodes   Cardiovascular:  warm, pink, well-perfused extremities without swelling, tenderness, or edema   Respiratory:  Normal respiratory effort, no stridor   Neuro/Psych.:  mood/affect -  normal  mental status -  normal       PROCEDURES:   Flexible fiberoptic laryngoscopy: Scope exam was indicated due to laryngeal cancer. Verbal consent was obtained. The nasal cavity was prepped with an aerosolized solution of topical anesthetic and vasoconstrictive agent. The scope was passed through the anterior nasal cavity and advanced. Inspection of the nasopharynx revealed no gross abnormality. The base of tongue and neovallecula are normal. There is a shelf at the site of the previous epiglottis. The neopharynx is clear with healthy appearing mucosa and no masses.  Procedure tolerated well with no immediate complications noted.    Flexible tracheoscopy: The flexible scope was passed through the stoma and down to the primary bronchi. There is no  crusting, no dry mucosa, no bleeding. The airway was patent. Patient tolerated the procedure well with no immediate complications.             RESULTS REVIEWED:   Care discussed with SLP    CT chest report reviewed     Note from PCP reviewed      IMPRESSION AND PLAN:   Evin Sterling is a 73-year-old man with a T4aN0 SCC of the larynx. He underwent a total laryngectomy with bilateral neck dissections on 8/25/2020. He was recommended for postoperative radiation but decided against treatment.    He is doing well with no signs of recurrent disease.     We discussed stretching of his neck and massage to try to help with the scar band.     He is going to reschedule his chest CT that he needs for follow-up of lung findings.    He was seen by SLP today to see if there are other suggestions for neck placement to give him options to wear his dentures while using his electrolarynx.     Repeat neck CT in 6 months.     He did have a hemithyroidectomy performed with his laryngectomy. TSH check in 6-12 months.    I will see him back in 6 months with labs and CT neck.    Thank you very much for the opportunity to participate in the care of your patient.      Caron Wang MD  Otolaryngology- Head & Neck Surgery      This note was dictated with voice recognition software and then edited. Please excuse any unintentional errors.               CC:  Kyaw Blankenship MD  [address] [address]      Vineet Gonzales MD  Department of Radiation Oncology  [facility]

## 2024-05-06 NOTE — PROGRESS NOTES
"SPEECH LANGUAGE PATHOLOGY EVALUATION    Subjective      Presenting condition or subjective complaint: Pt presents today in conjunction with ENT clinic visit wanting to use his EL on his neck instead of his cheek.  Date of onset: 08/25/20    Relevant medical history: T4aN0 SCC of the larynx, GERD, CHF, HTN  Dates & types of surgery: total laryngectomy on 8/25/20    Prior diagnostic imaging/testing results:   yes    Prior therapy history for the same diagnosis, illness or injury:   yes     Living Environment  Hobbies/Interests: biking    Patient goals for therapy: to use EL on his neck instead of his cheek    Pain assessment: Pain denied     Objective       POST-OPERATIVE ASSESSMENT    Primary Communication Method: electrolarynx   Oral Motor Structure Assessment: upper/lower dentures; otherwise all generally WFL except laryngeal function 2/2 total laryngectomy    Date of Surgery: 8/25/2020  Primary Site of Tumor: T4aN0 SCC larynx  Extent of Surgery:   total laryngectomy, left hemithyroidectomy, bilateral neck dissections, cricopharyngeal myotomy, dental extractions      ELECTROLARYNX  Model: Rometeddy Clarify EL  Placement: cheek, neck   Instrument pitch: medium  Speech intelligibility:  95% to a trained listener  Factors impacting speech intelligibility: lack of exaggerated oral movement     Assessment & Plan   CLINICAL IMPRESSIONS   Medical Diagnosis: Laryngeal cancer    Treatment Diagnosis: Aphonia 2/2 total laryngectomy   Impression/Assessment: Pt presents today with aphonia 2/2 total laryngectomy. He has been using his electrolarynx with cheek placement, but cannot wear his dentures when doing this so it is restricting his diet. He wants to use the EL on his neck. Just medially to his mandible on his right the tissue is very soft. Trialed placement here with good sound output. Trialed on the left side with reduce clarity. Discussed practicing with neck placement and finding \"sweet spot\" to give him the option of neck " vs cheek placement. Pt very appreciative and reported he will continue to practice.     PLAN OF CARE  Evaluation only    Education Assessment:   Learner/Method: Patient;Listening;No Barriers to Learning    Risks and benefits of evaluation/treatment have been explained.   Patient/Family/caregiver agrees with Plan of Care.     Evaluation Time:    Sound production (artic, phonology, apraxia, dysarthria) Minutes (94415): 16    Signing Clinician: SUE Gallegos    [facility]                                                                                   OUTPATIENT SPEECH LANGUAGE PATHOLOGY

## 2024-05-28 NOTE — PLAN OF CARE
Caller: vik Jaime  Doctor/office: Selin  CB#: [phone]    % of improvement: 20    Pain Scale (1-10): 7     Discharge Planner SLP   Patient plan for discharge: none stated  Current status: Pt continues to tolerate PO intake well with use of chin tuck strategy. Reviewed guidelines for thickening liquids and importance of consistent humidification to thin secretions. Recommend pt continue regular textures and nectar thick liquids with strict use of chin tuck strategy with all PO. Pt should be fully upright for all PO, chin tuck every swallow, slow pacing, and alternate between consistencies. ST to continue to follow.   Barriers to return to prior living situation: dysphagia, trach, TF, medical readiness   Recommendations for discharge: home with OP ST follow up at ENT clinic   Rationale for recommendations: pt would benefit from OP ST at ENT clinic targeting pre-operative total laryngectomy counseling and education.        Entered by: Priscilla Abdi 08/12/2020 10:31 AM         Speech Language Therapy Discharge Summary    Reason for therapy discharge:    Discharged to home with outpatient therapy.    Progress towards therapy goal(s). See goals on Care Plan in [facility] electronic health record for goal details.  Goals not met.  Barriers to achieving goals:   discharge from facility.    Therapy recommendation(s):    Continued therapy is recommended.  Rationale/Recommendations:  Continued OP ST at ENT clinic.

## 2024-06-18 NOTE — PROGRESS NOTES
[facility]                                                                                   OUTPATIENT SPEECH LANGUAGE PATHOLOGY    PLAN OF TREATMENT FOR OUTPATIENT REHABILITATION   Patient's Last Name, First Name, Evin Ramos YOB: 1950   Provider's Name   [facility]   Medical Record No.  [id number]     Onset Date: (P) 08/25/20 Start of Care Date: (P) 12/01/20     Medical Diagnosis:   laryngeal cancer s/p total laryngectomy      SLP Treatment Diagnosis: Mild pharyngeal dysphagia  Plan of Treatment  Frequency/Duration: (P) 1x/month  / (P) 3 months     Certification date from 03/02/21   To (P) 05/02/21          See note for plan of treatment details and functional goals     Aide Whitfield, SUE                         I CERTIFY THE NEED FOR THESE SERVICES FURNISHED UNDER        THIS PLAN OF TREATMENT AND WHILE UNDER MY CARE     (Physician attestation of this document indicates review and certification of the therapy plan).              Referring Provider:  Caron Wang    Initial Assessment  See Epic Evaluation- (P) 12/01/20 04/19/21 1300   Appointment Info   Treating Provider KADE Rodriguez   SLP Tx Diagnosis Mild pharyngeal dysphagia   Quick Adds Certification   Session Number   Session Number 4   Progress Note/Certification   Start Of Care Date 12/01/20   Onset Of Illness/injury Or Date Of Surgery 08/25/20   Therapy Frequency 1x/month   Predicted Duration 3 months   Certification date from 03/02/21   Certification date to 05/02/21   Supervision   Student Supervision Direct supervision provided  (Aide Whitfield MS CCC-SLP)       Present No   Subjective Report   Subjective Report Evin Sterling is a 70-year-old man who underwent total laryngectomy, bilateral neck dissections, left hemithyroidectomy, and dental extractions on 8/25/2020 for T4N0 SCC of the larynx. He was seen for  dysphagia therapy in conjunction with ENT clinic visit. Pt was accompanied by daughter. He appeared alert, energetic, and responsive.   Treatment Interventions (SLP)   Treatment Interventions Treatment Speech/Lang/Voice;Treatment Swallow/Oral dysfunction   Treatment Swallow/Oral dysfunction   Treatment of Swallowing Dysfunction &/or Oral Function for Feeding Minutes (85293) 18 Minutes   Skilled Intervention Provided written and verbal information on diet modifications.   Patient Response/Progress Pt demonstrated understanding for all information presented.    Treatment Detail Evin Sterling is a 70-year-old M who underwent a total laryngectomy in August of 2020. He subsequently refused radiotherapy. He presents today in the company of his daughter for an SLP consult and clinical visit with Dr Wang. 1/2. Patient reports that he is receiving adequate nutrition. Outwardly, he appears very healthy and has been working out. He states that he eats and drinks what he wants. Although his meals take him 30% longer than others he eats with, he has no further concerns that require skilled intervention. Patient states that he is no longer interested in SLP care. Educated pt on the risks of social isolation when using written speech and gesture as primary communication method. Educated pt on the role of SLP regarding future care. Plan to discharge pt at this time given attainment of goals.   Progress Good   Treatment Speech/Lang/Voice   Treatment of Speech, Language, Voice Communication&/or Auditory Processing (47750) 18 Minutes   Skilled Intervention Provided written and verbal information on.   Patient Response/Progress Pt demonstrated and verbalized understanding of all material provided.   Treatment Detail Evin Sterling is a 70-year-old M who underwent a total laryngectomy in August of 2020. He subsequently refused radiotherapy. He presents today in the company of his daughter for an SLP consult and clinical visit with   Felipe. 1. Patient has no further concerns at this time regarding baseplates, HMEs, and other pulmonary rehab supplies. His setup today looked good. 2/3. Clinicians presented pt with an electrolarynx to use during the session. He had only brought a pen and paper. Pt refused the electrolarynx. He stated that he believes losing his voice to cancer was a blessing in disguise and he prefers to gesture and write for communication. He states that he appreciates the care and concern for his overall rehabilitation, but he is not interested in speaking at this time. He states that the buzzing is distracting. Encouraged pt to reach out about AAC devices in the future and educated pt on the specifics of these. Pt said that he will work on his typing ability and will consider this for the future. Patient states that he is no longer interested in SLP care. Educated pt on the risks of social isolation when using written speech and gesture as primary communication method. Educated pt on the role of SLP regarding future care. Plan to discharge pt at this time given attainment of goals.   Progress Good   Education   Learner/Method Patient;Family   Plan   Home program Continue to optimize nutrition   Plan for next session Discharge   Swallow Goal 1   Goal Identifier PO   Goal Description 1. Pt will advance to soft solids while adhering to safe swallow precautions 100% of the time indpendently.    Target Date 04/19/21   Swallow Goal 2   Goal Identifier PO   Goal Description 2. Pt will advance to regular solids and thin liquids while adhering to safe swallow precautions indpendently.    Target Date 04/19/21   Alaryngeal Goal 1   Goal Identifier Pulmonary Rehab   Goal Description 1. Pt will demonstrate understanding and use for all pulmonary rehab options (i.e.baseplates, HME's, larytube) for maintaining good pulmonary function post laryngectomy 100% of the time independently.   Target Date 03/01/21   Date Met 04/19/21   Alaryngeal Goal  "2   Goal Identifier EL Placement   Goal Description 2. Pt jorge demonstrate adequate neck placement in the \"sweet spot\" independently in 4/5 opportunities.   Target Date 03/01/21   Date Met 04/19/21   Alaryngeal Goal 3   Goal Identifier EL Intelligiblity   Goal Description 3. Pt will use electrolarynx in conversation with ~90% intelligibility as judged by a trained listener 80% of the time.    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

## 2025-02-15 ENCOUNTER — HEALTH MAINTENANCE LETTER (OUTPATIENT)
Age: 75
End: 2025-02-15

## (undated) DEVICE — Device

## (undated) DEVICE — SU SILK 2-0 SH CR 5X18" C0125

## (undated) DEVICE — LINEN TOWEL PACK X5 5464

## (undated) DEVICE — SU PROLENE 5-0 RB-2DA 18" 8713H

## (undated) DEVICE — LIGHT HANDLE X1 31140133

## (undated) DEVICE — SU CHROMIC 4-0 SH 27" G121H

## (undated) DEVICE — CLIP HORIZON SM RED WIDE SLOT 001201

## (undated) DEVICE — SYR 30ML SLIP TIP W/O NDL 302833

## (undated) DEVICE — SPONGE COTTONOID 1/2X1 1/2" 20-06S

## (undated) DEVICE — LABEL MEDICATION SYSTEM 3303-P

## (undated) DEVICE — STIMULATOR NERVE NEURO-PULSE SURGICAL LOCATOR 30968-220

## (undated) DEVICE — DRAIN JACKSON PRATT 10MM FLAT 4/4 PERF SU130-1311

## (undated) DEVICE — ESU PENCIL W/SMOKE EVAC CVPLP2000

## (undated) DEVICE — ESU GROUND PAD ADULT W/CORD E7507

## (undated) DEVICE — TUBE NASOGASTRIC FEEDING ENTRIFLEX ENFIT 12FR 43 8884721252E

## (undated) DEVICE — DRAIN JACKSON PRATT RESERVOIR 100ML SU130-1305

## (undated) DEVICE — ESU ELEC BLADE 2.75" COATED/INSULATED E1455

## (undated) DEVICE — COVER CAMERA VIDEO LASER 9X96" 04-CC227

## (undated) DEVICE — JELLY LUBRICATING SURGILUBE 2OZ TUBE

## (undated) DEVICE — SU PDS II 3-0 SH 27" Z316H

## (undated) DEVICE — PREP SKIN SCRUB TRAY 4461A

## (undated) DEVICE — ENDO TOOTH GUARD SAC2001

## (undated) DEVICE — DRSG TELFA 3X8" 1238

## (undated) DEVICE — KIT ENDO FIRST STEP DISINFECTANT 200ML W/POUCH EP-4

## (undated) DEVICE — SU SILK 2-0 TIE 12X30" A305H

## (undated) DEVICE — SPONGE SURGIFOAM 100 1974

## (undated) DEVICE — ESU PENCIL SMOKE EVAC W/ROCKER SWITCH 0703-047-000

## (undated) DEVICE — ENDO SYSTEM WATER BOTTLE & TUBING W/CO2 FILTER 00711549

## (undated) DEVICE — SPONGE KITTNER 30-101

## (undated) DEVICE — PACK ENT ENDOSCOPY UMMC

## (undated) DEVICE — SPONGE COTTONOID 1/2X3" 80-1407

## (undated) DEVICE — SU PROLENE 5-0 PS-3 18" 8681G

## (undated) DEVICE — DRSG STERI STRIP 1/2X4" R1547

## (undated) DEVICE — SUCTION SLEEVE NEPTUNE 2 125MM 0703-005-125

## (undated) DEVICE — SPONGE RAY-TEC 4X8" 7318

## (undated) DEVICE — BLADE KNIFE SURG 15 371115

## (undated) DEVICE — PITCHER STERILE 1000ML  SSK9004A

## (undated) DEVICE — DRAPE SHEET MED 44X70" 9355

## (undated) DEVICE — SU SILK 3-0 TIE 12X30" A304H

## (undated) DEVICE — LINEN TOWEL PACK X6 WHITE 5487

## (undated) DEVICE — DRSG TEGADERM 4X4 3/4" 1626W

## (undated) DEVICE — SU SILK 4-0 TIE 12X30" A303H

## (undated) DEVICE — KIT CONNECTOR FOR OLYMPUS ENDOSCOPES DEFENDO 100310

## (undated) DEVICE — SU VICRYL 3-0 SH 8X18" UND J864D

## (undated) DEVICE — SOL WATER IRRIG 1000ML BOTTLE 2F7114

## (undated) DEVICE — PACK NEURO MINOR UMMC SNE32MNMU4

## (undated) DEVICE — ESU HARMONIC FOCUS SHEARS CVD 9CM HAR9F

## (undated) DEVICE — SU ETHILON 3-0 PS-1 18" 1663H

## (undated) DEVICE — BAG URINARY DRAIN LUBRISIL IC 4000ML LF 253509A

## (undated) DEVICE — RX BACITRACIN OINTMENT 0.9G 1/32OZ CUR001109

## (undated) DEVICE — RETR ELASTIC STAYS LONE STAR BLUNT DUAL LEAD 3550-1G

## (undated) DEVICE — SOL NACL 0.9% IRRIG 1000ML BOTTLE 2F7124

## (undated) DEVICE — GLOVE PROTEXIS MICRO 6.0  2D73PM60

## (undated) DEVICE — SUCTION MANIFOLD DORNOCH ULTRA CART UL-CL500

## (undated) DEVICE — DECANTER VIAL 2006S

## (undated) DEVICE — TUBING SUCTION 10'X3/16" N510

## (undated) DEVICE — SPONGE LAP 18X18" X8435

## (undated) DEVICE — ESU HOLSTER PLASTIC DISP E2400

## (undated) DEVICE — CLIP HORIZON MED BLUE 002200

## (undated) DEVICE — PREP POVIDONE IODINE SOLUTION 10% 4OZ

## (undated) DEVICE — LINEN GOWN LG 5406

## (undated) DEVICE — LINEN TOWEL PACK X30 5481

## (undated) DEVICE — SU SILK 2-0 SH 30" K833H

## (undated) DEVICE — ESU ELEC NDL 1" COATED/INSULATED E1465

## (undated) DEVICE — CATH TRAY FOLEY SURESTEP 16FR W/TMP PRB STLK LATEX A319416AM

## (undated) DEVICE — NDL COUNTER 40CT  31142311

## (undated) DEVICE — ENDO CAP AND TUBING STERILE FOR ENDOGATOR  100130

## (undated) DEVICE — TUBE GASTROSTOMY PONSKY PULL DELUXE 20FR 000792

## (undated) DEVICE — SYR BULB IRRIG 50ML LATEX FREE 0035280

## (undated) RX ORDER — DEXAMETHASONE SODIUM PHOSPHATE 4 MG/ML
INJECTION, SOLUTION INTRA-ARTICULAR; INTRALESIONAL; INTRAMUSCULAR; INTRAVENOUS; SOFT TISSUE
Status: DISPENSED
Start: 2020-08-03

## (undated) RX ORDER — LABETALOL HYDROCHLORIDE 5 MG/ML
INJECTION, SOLUTION INTRAVENOUS
Status: DISPENSED
Start: 2020-08-03

## (undated) RX ORDER — SODIUM CHLORIDE, SODIUM LACTATE, POTASSIUM CHLORIDE, CALCIUM CHLORIDE 600; 310; 30; 20 MG/100ML; MG/100ML; MG/100ML; MG/100ML
INJECTION, SOLUTION INTRAVENOUS
Status: DISPENSED
Start: 2020-08-03

## (undated) RX ORDER — FENTANYL CITRATE 50 UG/ML
INJECTION, SOLUTION INTRAMUSCULAR; INTRAVENOUS
Status: DISPENSED
Start: 2020-08-25

## (undated) RX ORDER — SODIUM CHLORIDE, SODIUM LACTATE, POTASSIUM CHLORIDE, CALCIUM CHLORIDE 600; 310; 30; 20 MG/100ML; MG/100ML; MG/100ML; MG/100ML
INJECTION, SOLUTION INTRAVENOUS
Status: DISPENSED
Start: 2020-08-25

## (undated) RX ORDER — FENTANYL CITRATE-0.9 % NACL/PF 10 MCG/ML
PLASTIC BAG, INJECTION (ML) INTRAVENOUS
Status: DISPENSED
Start: 2020-08-03

## (undated) RX ORDER — AMPICILLIN AND SULBACTAM 2; 1 G/1; G/1
INJECTION, POWDER, FOR SOLUTION INTRAMUSCULAR; INTRAVENOUS
Status: DISPENSED
Start: 2020-08-25

## (undated) RX ORDER — LIDOCAINE HYDROCHLORIDE 20 MG/ML
INJECTION, SOLUTION EPIDURAL; INFILTRATION; INTRACAUDAL; PERINEURAL
Status: DISPENSED
Start: 2020-08-03

## (undated) RX ORDER — ONDANSETRON 2 MG/ML
INJECTION INTRAMUSCULAR; INTRAVENOUS
Status: DISPENSED
Start: 2020-08-25

## (undated) RX ORDER — ETOMIDATE 2 MG/ML
INJECTION INTRAVENOUS
Status: DISPENSED
Start: 2020-08-03

## (undated) RX ORDER — AMPICILLIN AND SULBACTAM 2; 1 G/1; G/1
INJECTION, POWDER, FOR SOLUTION INTRAMUSCULAR; INTRAVENOUS
Status: DISPENSED
Start: 2020-08-03

## (undated) RX ORDER — HYDROMORPHONE HYDROCHLORIDE 1 MG/ML
INJECTION, SOLUTION INTRAMUSCULAR; INTRAVENOUS; SUBCUTANEOUS
Status: DISPENSED
Start: 2020-08-25

## (undated) RX ORDER — LIDOCAINE HYDROCHLORIDE AND EPINEPHRINE 10; 10 MG/ML; UG/ML
INJECTION, SOLUTION INFILTRATION; PERINEURAL
Status: DISPENSED
Start: 2020-08-25

## (undated) RX ORDER — OXYMETAZOLINE HYDROCHLORIDE 0.05 G/100ML
SPRAY NASAL
Status: DISPENSED
Start: 2020-08-25

## (undated) RX ORDER — HYDROMORPHONE HYDROCHLORIDE 1 MG/ML
INJECTION, SOLUTION INTRAMUSCULAR; INTRAVENOUS; SUBCUTANEOUS
Status: DISPENSED
Start: 2020-08-03

## (undated) RX ORDER — ALBUMIN, HUMAN INJ 5% 5 %
SOLUTION INTRAVENOUS
Status: DISPENSED
Start: 2020-08-25

## (undated) RX ORDER — FENTANYL CITRATE 50 UG/ML
INJECTION, SOLUTION INTRAMUSCULAR; INTRAVENOUS
Status: DISPENSED
Start: 2020-08-03

## (undated) RX ORDER — EPHEDRINE SULFATE 50 MG/ML
INJECTION, SOLUTION INTRAMUSCULAR; INTRAVENOUS; SUBCUTANEOUS
Status: DISPENSED
Start: 2020-08-03

## (undated) RX ORDER — FENTANYL CITRATE-0.9 % NACL/PF 10 MCG/ML
PLASTIC BAG, INJECTION (ML) INTRAVENOUS
Status: DISPENSED
Start: 2020-08-25

## (undated) RX ORDER — LIDOCAINE HYDROCHLORIDE 20 MG/ML
SOLUTION OROPHARYNGEAL
Status: DISPENSED
Start: 2022-07-27

## (undated) RX ORDER — OXYMETAZOLINE HYDROCHLORIDE 0.05 G/100ML
SPRAY NASAL
Status: DISPENSED
Start: 2020-08-03

## (undated) RX ORDER — PROPOFOL 10 MG/ML
INJECTION, EMULSION INTRAVENOUS
Status: DISPENSED
Start: 2020-08-03